# Patient Record
Sex: MALE | Race: WHITE | NOT HISPANIC OR LATINO | Employment: OTHER | URBAN - METROPOLITAN AREA
[De-identification: names, ages, dates, MRNs, and addresses within clinical notes are randomized per-mention and may not be internally consistent; named-entity substitution may affect disease eponyms.]

---

## 2019-10-01 ENCOUNTER — SURGERY - HEALTHEAST (OUTPATIENT)
Dept: SURGERY | Facility: HOSPITAL | Age: 74
End: 2019-10-01

## 2019-10-01 ENCOUNTER — ANESTHESIA - HEALTHEAST (OUTPATIENT)
Dept: SURGERY | Facility: HOSPITAL | Age: 74
End: 2019-10-01

## 2019-10-02 ENCOUNTER — AMBULATORY - HEALTHEAST (OUTPATIENT)
Dept: OTHER | Facility: CLINIC | Age: 74
End: 2019-10-02

## 2021-01-22 ENCOUNTER — AMBULATORY - HEALTHEAST (OUTPATIENT)
Dept: SURGERY | Facility: AMBULATORY SURGERY CENTER | Age: 76
End: 2021-01-22

## 2021-01-22 DIAGNOSIS — Z11.59 ENCOUNTER FOR SCREENING FOR OTHER VIRAL DISEASES: ICD-10-CM

## 2021-01-29 ENCOUNTER — ANESTHESIA - HEALTHEAST (OUTPATIENT)
Dept: SURGERY | Facility: AMBULATORY SURGERY CENTER | Age: 76
End: 2021-01-29

## 2021-01-29 ASSESSMENT — MIFFLIN-ST. JEOR
SCORE: 1830.76
SCORE: 1830.76

## 2021-02-01 ENCOUNTER — SURGERY - HEALTHEAST (OUTPATIENT)
Dept: SURGERY | Facility: AMBULATORY SURGERY CENTER | Age: 76
End: 2021-02-01

## 2021-02-01 ENCOUNTER — HOSPITAL ENCOUNTER (OUTPATIENT)
Dept: SURGERY | Facility: AMBULATORY SURGERY CENTER | Age: 76
Discharge: HOME | End: 2021-02-01
Attending: UROLOGY | Admitting: UROLOGY
Payer: MEDICARE

## 2021-02-01 DIAGNOSIS — C80.1 MALIGNANT NEOPLASM (H): ICD-10-CM

## 2021-02-01 LAB — GLUCOSE BLDC GLUCOMTR-MCNC: 193 MG/DL (ref 70–125)

## 2021-02-01 RX ORDER — OXYCODONE AND ACETAMINOPHEN 5; 325 MG/1; MG/1
1 TABLET ORAL EVERY 6 HOURS PRN
Qty: 14 TABLET | Refills: 0 | Status: SHIPPED | OUTPATIENT
Start: 2021-02-01 | End: 2021-09-02

## 2021-02-01 ASSESSMENT — MIFFLIN-ST. JEOR
SCORE: 1830.76
SCORE: 1830.76

## 2021-02-02 ENCOUNTER — RECORDS - HEALTHEAST (OUTPATIENT)
Dept: ADMINISTRATIVE | Facility: OTHER | Age: 76
End: 2021-02-02

## 2021-02-18 ENCOUNTER — TRANSFERRED RECORDS (OUTPATIENT)
Dept: HEALTH INFORMATION MANAGEMENT | Facility: CLINIC | Age: 76
End: 2021-02-18
Payer: MEDICARE

## 2021-03-17 ASSESSMENT — MIFFLIN-ST. JEOR
SCORE: 1830.76
SCORE: 1830.76

## 2021-03-19 ENCOUNTER — ANESTHESIA - HEALTHEAST (OUTPATIENT)
Dept: SURGERY | Facility: AMBULATORY SURGERY CENTER | Age: 76
End: 2021-03-19

## 2021-03-22 ENCOUNTER — HOSPITAL ENCOUNTER (OUTPATIENT)
Dept: SURGERY | Facility: AMBULATORY SURGERY CENTER | Age: 76
Discharge: HOME | End: 2021-03-22
Attending: UROLOGY | Admitting: UROLOGY
Payer: MEDICARE

## 2021-03-22 ENCOUNTER — SURGERY - HEALTHEAST (OUTPATIENT)
Dept: SURGERY | Facility: AMBULATORY SURGERY CENTER | Age: 76
End: 2021-03-22

## 2021-03-22 DIAGNOSIS — C67.9 MALIGNANT NEOPLASM OF URINARY BLADDER, UNSPECIFIED SITE (H): ICD-10-CM

## 2021-03-22 DIAGNOSIS — C67.9 MALIGNANT NEOPLASM OF URINARY BLADDER (H): ICD-10-CM

## 2021-03-22 LAB
GLUCOSE BLDC GLUCOMTR-MCNC: 157 MG/DL (ref 70–125)
GLUCOSE BLDC GLUCOMTR-MCNC: 170 MG/DL (ref 70–125)

## 2021-03-22 RX ORDER — OXYCODONE HYDROCHLORIDE 5 MG/1
5 TABLET ORAL EVERY 6 HOURS PRN
Qty: 12 TABLET | Refills: 0 | Status: SHIPPED | OUTPATIENT
Start: 2021-03-22 | End: 2021-09-02

## 2021-03-22 ASSESSMENT — MIFFLIN-ST. JEOR
SCORE: 1830.76
SCORE: 1830.76

## 2021-03-23 ENCOUNTER — RECORDS - HEALTHEAST (OUTPATIENT)
Dept: ADMINISTRATIVE | Facility: OTHER | Age: 76
End: 2021-03-23

## 2021-05-25 ENCOUNTER — RECORDS - HEALTHEAST (OUTPATIENT)
Dept: ADMINISTRATIVE | Facility: CLINIC | Age: 76
End: 2021-05-25

## 2021-05-28 ENCOUNTER — RECORDS - HEALTHEAST (OUTPATIENT)
Dept: ADMINISTRATIVE | Facility: CLINIC | Age: 76
End: 2021-05-28

## 2021-06-01 NOTE — ANESTHESIA PREPROCEDURE EVALUATION
Anesthesia Evaluation      No history of anesthetic complications     Airway   Mallampati: II  Neck ROM: full  Comment: Short TM distance     Pulmonary     breath sounds clear to auscultation  (+) a smoker (former smoker)                         Cardiovascular   Exercise tolerance: > or = 4 METS  (+) hypertension, , hypercholesterolemia,     ECG reviewed  Rhythm: regular  Rate: normal,         Neuro/Psych - negative ROS     Endo/Other    (+) diabetes mellitus type 2 well controlled,      GI/Hepatic/Renal    (+)   chronic renal disease (CKD - s/f cysto and bladder biopsy),           Dental    (+) poor dentition                       Anesthesia Plan  Planned anesthetic: general LMA  LMA 4  Decadron 4, Zofran 4  ASA 3   Induction: intravenous   Anesthetic plan and risks discussed with: patient  Anesthesia plan special considerations: antiemetics,   Post-op plan: routine recovery

## 2021-06-01 NOTE — ANESTHESIA POSTPROCEDURE EVALUATION
Patient: Marcelo Valerio  CYSTOSCOPY, WITH BLADDER BIOPSIES and Fulgeration, BILATERAL RETROGRADE PYELOGRAMS  Anesthesia type: general    Patient location: PACU  Last vitals:   Vitals Value Taken Time   /94 10/1/2019  2:30 PM   Temp 36.2  C (97.2  F) 10/1/2019  2:30 PM   Pulse 70 10/1/2019  2:30 PM   Resp 24 10/1/2019  2:30 PM   SpO2 99 % 10/1/2019  2:30 PM     Post vital signs: stable  Level of consciousness: awake and responds to simple questions  Post-anesthesia pain: pain controlled  Post-anesthesia nausea and vomiting: no  Pulmonary: unassisted, return to baseline  Cardiovascular: stable and blood pressure at baseline  Hydration: adequate  Anesthetic events: no    QCDR Measures:  ASA# 11 - Veena-op Cardiac Arrest: ASA11B - Patient did NOT experience unanticipated cardiac arrest  ASA# 12 - Veena-op Mortality Rate: ASA12B - Patient did NOT die  ASA# 13 - PACU Re-Intubation Rate: ASA13B - Patient did NOT require a new airway mgmt  ASA# 10 - Composite Anes Safety: ASA10A - No serious adverse event    Additional Notes:

## 2021-06-01 NOTE — ANESTHESIA CARE TRANSFER NOTE
Last vitals:   Vitals:    10/01/19 1320   BP: 166/72   Pulse: 67   Resp: 16   Temp: 36.3  C (97.4  F)   SpO2: 100%     Patient's level of consciousness is drowsy  Spontaneous respirations: yes  Maintains airway independently: yes  Dentition unchanged: yes  Oropharynx: oropharynx clear of all foreign objects    QCDR Measures:  ASA# 20 - Surgical Safety Checklist: WHO surgical safety checklist completed prior to induction    PQRS# 430 - Adult PONV Prevention: 4558F - Pt received => 2 anti-emetic agents (different classes) preop & intraop  ASA# 8 - Peds PONV Prevention: NA - Not pediatric patient, not GA or 2 or more risk factors NOT present  PQRS# 424 - Veena-op Temp Management: 4559F - At least one body temp DOCUMENTED => 35.5C or 95.9F within required timeframe  PQRS# 426 - PACU Transfer Protocol: - Transfer of care checklist used  ASA# 14 - Acute Post-op Pain: ASA14B - Patient did NOT experience pain >= 7 out of 10

## 2021-06-03 VITALS — HEIGHT: 71 IN | BODY MASS INDEX: 35 KG/M2 | WEIGHT: 250 LBS

## 2021-06-05 VITALS
HEIGHT: 71 IN | BODY MASS INDEX: 33.6 KG/M2 | WEIGHT: 240 LBS | HEIGHT: 71 IN | BODY MASS INDEX: 33.6 KG/M2 | WEIGHT: 240 LBS

## 2021-06-05 VITALS
WEIGHT: 240 LBS | HEIGHT: 71 IN | BODY MASS INDEX: 33.6 KG/M2 | BODY MASS INDEX: 33.6 KG/M2 | WEIGHT: 240 LBS | HEIGHT: 71 IN

## 2021-06-14 NOTE — ANESTHESIA PREPROCEDURE EVALUATION
Anesthesia Evaluation      Patient summary reviewed   History of anesthetic complications     Airway   Mallampati: II  Neck ROM: full   Pulmonary - negative ROS and normal exam    breath sounds clear to auscultation                         Cardiovascular - normal exam  (+) hypertension, , hypercholesterolemia,     Rhythm: regular  Rate: normal,         Neuro/Psych    (+) neuromuscular disease (Peripheral neuropathy),      Endo/Other    (+) diabetes mellitus type 2 using insulin, obesity,      Comments: Psoriasis   Glaucoma  Bladder Cancer    GI/Hepatic/Renal    (+)   chronic renal disease CRI,           Dental - normal exam                        Anesthesia Plan  Planned anesthetic: general LMA    ASA 3   Induction: intravenous   Anesthetic plan and risks discussed with: patient  Anesthesia plan special considerations: antiemetics,   Post-op plan: routine recovery

## 2021-06-14 NOTE — ANESTHESIA POSTPROCEDURE EVALUATION
Patient: Marcelo Valerio  Procedure(s):  CYSTOSCOPY SELECTIVE CYTOLOGIES, BLADDER BIOPSY FULGURATION, MEATAL DILATION  Anesthesia type: general    Patient location: Phase II Recovery  Last vitals:   Vitals Value Taken Time   /75 02/01/21 1021   Temp 36.2  C (97.1  F) 02/01/21 0956   Pulse 66 02/01/21 1036   Resp 16 02/01/21 0956   SpO2 96 % 02/01/21 1036   Vitals shown include unvalidated device data.  Post vital signs: stable  Level of consciousness: awake and responds to simple questions  Post-anesthesia pain: pain controlled  Post-anesthesia nausea and vomiting: no  Pulmonary: unassisted, return to baseline  Cardiovascular: stable and blood pressure at baseline  Hydration: adequate  Anesthetic events: no    QCDR Measures:  ASA# 11 - Veena-op Cardiac Arrest: ASA11B - Patient did NOT experience unanticipated cardiac arrest  ASA# 12 - Veena-op Mortality Rate: ASA12B - Patient did NOT die  ASA# 13 - PACU Re-Intubation Rate: ASA13B - Patient did NOT require a new airway mgmt  ASA# 10 - Composite Anes Safety: ASA10A - No serious adverse event    Additional Notes:

## 2021-06-14 NOTE — OP NOTE
DOS: 2/1/21  Preoperative diagnosis: positive urine cytology, history of bladder cancer  Postoperative diagnosis:same  Surgery: cystoscopy, selective cytologies, bladder biopsies, fulguration, meatal dilation, insertion of ng catheter  Surgeon: Fam Manrique MD  Assistant: none  Specimen: right kidney urine for cytology, left kidney urine for cytology, bladder urine for cytology, bladder biopsies  Ebl: 10ml  Drains: ng  Anesthesia: general  Complications: none    Surgery:  Patient was brought into the operating room and identified as Marcelo Valerio.  After induction of anesthesia he was prepped and draped in typical sterile manner in lithotomy position. A time out was carried out, everyone was in agreement.   I began by attempting to insert a 21 fr cystoscope. There was resistance at meatus.   Using gary sounds he was dilated from 16fr to 24fr.   I then inserted the cystoscope.  The prostate was surgically absent.   There were no tumors.   There were a few areas of erythema.     I collected the urine from the bladder and sent that as bladder cytology.   I then collected urine from the left kidney using an open ended catheter and yip wire. Great care to avoid cross contamination was used.   The same was done on the right side.   I then used cold cup biopsies to biopsy all of the different areas of erythema.   In all 6 biopsies were taken.   I then used a bugbi to fulgurate the biopsy sites. There was great hemostasis.   The bladder was relatively thin and I chose to leave a ng catheter for decompression.     This ended my procedure.     Fam Manrique

## 2021-06-14 NOTE — ANESTHESIA CARE TRANSFER NOTE
Last vitals:   Vitals:    02/01/21 0919   BP: 104/68   Pulse: 65   Resp: 14   Temp: 36.6  C (97.9  F)   SpO2: 98%     Patient spontaneous RR, TV 400s, LMA removed to facemask 10LPM, O2 sats 100%. VSS. Report to RN.    Patient's level of consciousness is drowsy  Spontaneous respirations: yes  Maintains airway independently: yes  Dentition unchanged: yes  Oropharynx: oral airway in place    QCDR Measures:  ASA# 20 - Surgical Safety Checklist: WHO surgical safety checklist completed prior to induction    PQRS# 430 - Adult PONV Prevention: 4558F - Pt received => 2 anti-emetic agents (different classes) preop & intraop  ASA# 8 - Peds PONV Prevention: NA - Not pediatric patient, not GA or 2 or more risk factors NOT present  PQRS# 424 - Veena-op Temp Management: 4559F - At least one body temp DOCUMENTED => 35.5C or 95.9F within required timeframe  PQRS# 426 - PACU Transfer Protocol: - Transfer of care checklist used  ASA# 14 - Acute Post-op Pain: ASA14B - Patient did NOT experience pain >= 7 out of 10

## 2021-06-16 NOTE — ANESTHESIA POSTPROCEDURE EVALUATION
Patient: Marcelo Valerio  Procedure(s):  CYSTOSCOPY, RIGHT RETROGRADE PYELOGRAM, RIGHT URETEROSCOPY, URETERAL BIOPSY, RIGHT URETERAL STENT PLACEMENT (Right)  Anesthesia type: general    Patient location: Phase II Recovery  Last vitals:   Vitals Value Taken Time   /88 03/22/21 0900   Temp 36.3  C (97.4  F) 03/22/21 0829   Pulse 69 03/22/21 0916   Resp 16 03/22/21 0857   SpO2 98 % 03/22/21 0916   Vitals shown include unvalidated device data.  Post vital signs: stable  Level of consciousness: awake and responds to simple questions  Post-anesthesia pain: pain controlled  Post-anesthesia nausea and vomiting: no  Pulmonary: unassisted, return to baseline  Cardiovascular: stable and blood pressure at baseline  Hydration: adequate  Anesthetic events: no    QCDR Measures:  ASA# 11 - Veena-op Cardiac Arrest: ASA11B - Patient did NOT experience unanticipated cardiac arrest  ASA# 12 - Veena-op Mortality Rate: ASA12B - Patient did NOT die  ASA# 13 - PACU Re-Intubation Rate: ASA13B - Patient did NOT require a new airway mgmt  ASA# 10 - Composite Anes Safety: ASA10A - No serious adverse event    Additional Notes:

## 2021-06-16 NOTE — ANESTHESIA CARE TRANSFER NOTE
Last vitals:   Vitals:    03/22/21 0757   BP: 115/76   Pulse: 80   Resp: 16   Temp: 36.8  C (98.2  F)   SpO2: 99%     Patient's level of consciousness is drowsy  Spontaneous respirations: yes  Maintains airway independently: yes  Dentition unchanged: yes  Oropharynx: oropharynx clear of all foreign objects    QCDR Measures:  ASA# 20 - Surgical Safety Checklist: WHO surgical safety checklist completed prior to induction    PQRS# 430 - Adult PONV Prevention: 4558F - Pt received => 2 anti-emetic agents (different classes) preop & intraop  ASA# 8 - Peds PONV Prevention: NA - Not pediatric patient, not GA or 2 or more risk factors NOT present  PQRS# 424 - Veena-op Temp Management: 4559F - At least one body temp DOCUMENTED => 35.5C or 95.9F within required timeframe  PQRS# 426 - PACU Transfer Protocol: - Transfer of care checklist used  ASA# 14 - Acute Post-op Pain: ASA14B - Patient did NOT experience pain >= 7 out of 10

## 2021-06-16 NOTE — ANESTHESIA PREPROCEDURE EVALUATION
Anesthesia Evaluation      Patient summary reviewed   History of anesthetic complications     Airway   Mallampati: II   Pulmonary - normal exam   (+) a smoker (quit 1.5 years ago)                         Cardiovascular - normal exam  Exercise tolerance: > or = 4 METS  (+) hypertension, , hypercholesterolemia,     ECG reviewed (NSR with PVD min V crieria for LVH)  Rhythm: regular  Rate: normal,         Neuro/Psych    (+) neuromuscular disease (Peripheral neuropathy),      Endo/Other    (+) diabetes mellitus type 2 using insulin, obesity,      Comments: Psoriasis   Glaucoma  Bladder Cancer    GI/Hepatic/Renal    (+)   chronic renal disease,           Dental - normal exam                          Anesthesia Plan  Planned anesthetic: general LMA    ASA 2   Induction: intravenous   Anesthetic plan and risks discussed with: patient  Anesthesia plan special considerations: antiemetics,   Post-op plan: routine recovery

## 2021-06-16 NOTE — OP NOTE
DOS: 3/22/21  Preoperative diagnosis: positive cytology from right ureter  Postoperative diagnosis:same  Surgery: cystoscopy, right retrograde pyelogram, right ureteroscopy, ureteral biopsy, right ureteral stent placement  Surgeon: Fam Manrique MD  Assistant: none  Specimen: right ureteral brush biopsy, right renal pelvis urine for cytology  Ebl: 10ml  Drains: none  Anesthesia: general  Complications: none    Surgery:   Patient was brought into the operating room and identified as Marcelo Valerio.  After induction of anesthesia he was prepped and draped in typical sterile manner   A time out was carried out, everyone was in agreement.   I began by inserting a rigid cystoscope, the prostate was surgically absent.   I examined the bladder. There was patchy erythema consistent with known carcinoma in situ.   I then shot a right retrograde pyelogram, there were no filling defects, no hydronephrosis.   I then placed a yip wire to the level of the pelvis.   I dilated with an 8/10 coaxial dilator.   Second yip wire was placed  Over the second yip wire I advanced a flexible ureteroscope.   The ureteropelvic junction was a little tight and there was some stagnant urine in the pelvis.   The renal pelvis and calyces were examined. There were no tumors.   I did notice some erythematous mucosa consistent with either malignancy or inflamation   The urine from the renal pelvis was sent for cytology .     I then made my way down to the bladder.   The ureter was inspected.   There were no tumors  There was a location of friable mucosa  Using a brush biopsy this was sampled and sent as a separate specimen.   At the end of the procedure I shot one last retrograde pyelogram  I placed a 6fr 26cm stent with a good coil in the renal pelvis and in the bladder. This was done under combination of fluorscopic and visual guidance.   I left the stent on a string.   The stent was taped to the penis   This completed my procedure.      Fam Manrique

## 2021-07-04 NOTE — ADDENDUM NOTE
Addendum Note by Janette Carlson LPN at 1/22/2021  8:35 AM     Author: Janette Carlson LPN Service: -- Author Type: Licensed Nurse    Filed: 2/26/2021  3:26 PM Encounter Date: 1/22/2021 Status: Signed    : Janette Carlson LPN (Licensed Nurse)    Addended by: KENDRICK CARLSON on: 2/26/2021 03:26 PM        Modules accepted: Orders

## 2021-08-24 DIAGNOSIS — Z11.59 ENCOUNTER FOR SCREENING FOR OTHER VIRAL DISEASES: ICD-10-CM

## 2021-09-01 ENCOUNTER — TRANSFERRED RECORDS (OUTPATIENT)
Dept: HEALTH INFORMATION MANAGEMENT | Facility: CLINIC | Age: 76
End: 2021-09-01

## 2021-09-01 LAB
CREATININE (EXTERNAL): 1.7 MG/DL (ref 0.6–1.2)
GFR ESTIMATED (EXTERNAL): 39 ML/MIN/{1.73M2}
GLUCOSE (EXTERNAL): 278 MG/DL (ref 70–99)
POTASSIUM (EXTERNAL): 5 MEQ/L (ref 3.4–5.1)

## 2021-09-04 ENCOUNTER — LAB (OUTPATIENT)
Dept: FAMILY MEDICINE | Facility: CLINIC | Age: 76
End: 2021-09-04
Payer: COMMERCIAL

## 2021-09-04 DIAGNOSIS — Z11.59 ENCOUNTER FOR SCREENING FOR OTHER VIRAL DISEASES: ICD-10-CM

## 2021-09-04 LAB — SARS-COV-2 RNA RESP QL NAA+PROBE: NEGATIVE

## 2021-09-04 PROCEDURE — U0003 INFECTIOUS AGENT DETECTION BY NUCLEIC ACID (DNA OR RNA); SEVERE ACUTE RESPIRATORY SYNDROME CORONAVIRUS 2 (SARS-COV-2) (CORONAVIRUS DISEASE [COVID-19]), AMPLIFIED PROBE TECHNIQUE, MAKING USE OF HIGH THROUGHPUT TECHNOLOGIES AS DESCRIBED BY CMS-2020-01-R: HCPCS

## 2021-09-04 PROCEDURE — U0005 INFEC AGEN DETEC AMPLI PROBE: HCPCS

## 2021-09-07 ENCOUNTER — ANESTHESIA EVENT (OUTPATIENT)
Dept: SURGERY | Facility: AMBULATORY SURGERY CENTER | Age: 76
End: 2021-09-07
Payer: COMMERCIAL

## 2021-09-08 ENCOUNTER — HOSPITAL ENCOUNTER (OUTPATIENT)
Facility: AMBULATORY SURGERY CENTER | Age: 76
End: 2021-09-08
Attending: UROLOGY
Payer: MEDICARE

## 2021-09-08 ENCOUNTER — ANESTHESIA (OUTPATIENT)
Dept: SURGERY | Facility: AMBULATORY SURGERY CENTER | Age: 76
End: 2021-09-08
Payer: COMMERCIAL

## 2021-09-08 VITALS
RESPIRATION RATE: 16 BRPM | SYSTOLIC BLOOD PRESSURE: 135 MMHG | OXYGEN SATURATION: 96 % | HEART RATE: 70 BPM | DIASTOLIC BLOOD PRESSURE: 66 MMHG | TEMPERATURE: 98 F

## 2021-09-08 DIAGNOSIS — C67.9 MALIGNANT NEOPLASM OF URINARY BLADDER, UNSPECIFIED SITE (H): Primary | ICD-10-CM

## 2021-09-08 DIAGNOSIS — Z80.52 FAMILY HISTORY OF MALIGNANT NEOPLASM OF BLADDER: ICD-10-CM

## 2021-09-08 LAB
GLUCOSE POCT: 164 MG/DL (ref 70–99)
POC GLU MONITORING DEVICE - HISTORICAL: 173

## 2021-09-08 DEVICE — URETERAL STENT
Type: IMPLANTABLE DEVICE | Site: KIDNEY | Status: FUNCTIONAL
Brand: PERCUFLEX™ PLUS

## 2021-09-08 RX ORDER — LIDOCAINE HYDROCHLORIDE 20 MG/ML
INJECTION, SOLUTION INFILTRATION; PERINEURAL PRN
Status: DISCONTINUED | OUTPATIENT
Start: 2021-09-08 | End: 2021-09-08

## 2021-09-08 RX ORDER — OXYCODONE HYDROCHLORIDE 5 MG/1
5 TABLET ORAL EVERY 4 HOURS PRN
Status: DISCONTINUED | OUTPATIENT
Start: 2021-09-08 | End: 2021-09-09 | Stop reason: HOSPADM

## 2021-09-08 RX ORDER — CEFAZOLIN SODIUM 2 G/100ML
2 INJECTION, SOLUTION INTRAVENOUS
Status: COMPLETED | OUTPATIENT
Start: 2021-09-08 | End: 2021-09-08

## 2021-09-08 RX ORDER — PROPOFOL 10 MG/ML
INJECTION, EMULSION INTRAVENOUS PRN
Status: DISCONTINUED | OUTPATIENT
Start: 2021-09-08 | End: 2021-09-08

## 2021-09-08 RX ORDER — ONDANSETRON 2 MG/ML
INJECTION INTRAMUSCULAR; INTRAVENOUS PRN
Status: DISCONTINUED | OUTPATIENT
Start: 2021-09-08 | End: 2021-09-08

## 2021-09-08 RX ORDER — ACETAMINOPHEN 325 MG/1
650 TABLET ORAL
Status: DISCONTINUED | OUTPATIENT
Start: 2021-09-08 | End: 2021-09-09 | Stop reason: HOSPADM

## 2021-09-08 RX ORDER — ONDANSETRON 2 MG/ML
4 INJECTION INTRAMUSCULAR; INTRAVENOUS EVERY 30 MIN PRN
Status: DISCONTINUED | OUTPATIENT
Start: 2021-09-08 | End: 2021-09-09 | Stop reason: HOSPADM

## 2021-09-08 RX ORDER — MEPERIDINE HYDROCHLORIDE 25 MG/ML
12.5 INJECTION INTRAMUSCULAR; INTRAVENOUS; SUBCUTANEOUS
Status: DISCONTINUED | OUTPATIENT
Start: 2021-09-08 | End: 2021-09-09 | Stop reason: HOSPADM

## 2021-09-08 RX ORDER — HYDROCODONE BITARTRATE AND ACETAMINOPHEN 7.5; 325 MG/15ML; MG/15ML
10 SOLUTION ORAL
Status: DISCONTINUED | OUTPATIENT
Start: 2021-09-08 | End: 2021-09-09 | Stop reason: HOSPADM

## 2021-09-08 RX ORDER — CEFAZOLIN SODIUM 2 G/100ML
2 INJECTION, SOLUTION INTRAVENOUS SEE ADMIN INSTRUCTIONS
Status: DISCONTINUED | OUTPATIENT
Start: 2021-09-08 | End: 2021-09-09 | Stop reason: HOSPADM

## 2021-09-08 RX ORDER — DEXAMETHASONE SODIUM PHOSPHATE 4 MG/ML
INJECTION, SOLUTION INTRA-ARTICULAR; INTRALESIONAL; INTRAMUSCULAR; INTRAVENOUS; SOFT TISSUE PRN
Status: DISCONTINUED | OUTPATIENT
Start: 2021-09-08 | End: 2021-09-08

## 2021-09-08 RX ORDER — OXYCODONE HYDROCHLORIDE 5 MG/1
5-10 TABLET ORAL EVERY 4 HOURS PRN
Qty: 6 TABLET | Refills: 0 | Status: ON HOLD | OUTPATIENT
Start: 2021-09-08 | End: 2021-12-06

## 2021-09-08 RX ORDER — ONDANSETRON 4 MG/1
4 TABLET, ORALLY DISINTEGRATING ORAL EVERY 30 MIN PRN
Status: DISCONTINUED | OUTPATIENT
Start: 2021-09-08 | End: 2021-09-09 | Stop reason: HOSPADM

## 2021-09-08 RX ORDER — SODIUM CHLORIDE, SODIUM LACTATE, POTASSIUM CHLORIDE, CALCIUM CHLORIDE 600; 310; 30; 20 MG/100ML; MG/100ML; MG/100ML; MG/100ML
INJECTION, SOLUTION INTRAVENOUS CONTINUOUS
Status: DISCONTINUED | OUTPATIENT
Start: 2021-09-08 | End: 2021-09-09 | Stop reason: HOSPADM

## 2021-09-08 RX ORDER — EPHEDRINE SULFATE 50 MG/ML
INJECTION, SOLUTION INTRAMUSCULAR; INTRAVENOUS; SUBCUTANEOUS PRN
Status: DISCONTINUED | OUTPATIENT
Start: 2021-09-08 | End: 2021-09-08

## 2021-09-08 RX ORDER — ACETAMINOPHEN 325 MG/1
975 TABLET ORAL ONCE
Status: COMPLETED | OUTPATIENT
Start: 2021-09-08 | End: 2021-09-08

## 2021-09-08 RX ORDER — FENTANYL CITRATE 50 UG/ML
INJECTION, SOLUTION INTRAMUSCULAR; INTRAVENOUS PRN
Status: DISCONTINUED | OUTPATIENT
Start: 2021-09-08 | End: 2021-09-08

## 2021-09-08 RX ORDER — FENTANYL CITRATE 50 UG/ML
25 INJECTION, SOLUTION INTRAMUSCULAR; INTRAVENOUS EVERY 5 MIN PRN
Status: DISCONTINUED | OUTPATIENT
Start: 2021-09-08 | End: 2021-09-09 | Stop reason: HOSPADM

## 2021-09-08 RX ORDER — LIDOCAINE 40 MG/G
CREAM TOPICAL
Status: DISCONTINUED | OUTPATIENT
Start: 2021-09-08 | End: 2021-09-09 | Stop reason: HOSPADM

## 2021-09-08 RX ADMIN — Medication 100 MCG: at 07:33

## 2021-09-08 RX ADMIN — LIDOCAINE HYDROCHLORIDE 60 MG: 20 INJECTION, SOLUTION INFILTRATION; PERINEURAL at 07:14

## 2021-09-08 RX ADMIN — SODIUM CHLORIDE, SODIUM LACTATE, POTASSIUM CHLORIDE, CALCIUM CHLORIDE: 600; 310; 30; 20 INJECTION, SOLUTION INTRAVENOUS at 06:50

## 2021-09-08 RX ADMIN — EPHEDRINE SULFATE 5 MG: 50 INJECTION, SOLUTION INTRAMUSCULAR; INTRAVENOUS; SUBCUTANEOUS at 07:33

## 2021-09-08 RX ADMIN — ACETAMINOPHEN 975 MG: 325 TABLET ORAL at 06:51

## 2021-09-08 RX ADMIN — DEXAMETHASONE SODIUM PHOSPHATE 4 MG: 4 INJECTION, SOLUTION INTRA-ARTICULAR; INTRALESIONAL; INTRAMUSCULAR; INTRAVENOUS; SOFT TISSUE at 07:14

## 2021-09-08 RX ADMIN — Medication 100 MCG: at 07:36

## 2021-09-08 RX ADMIN — Medication 100 MCG: at 07:23

## 2021-09-08 RX ADMIN — FENTANYL CITRATE 50 MCG: 50 INJECTION, SOLUTION INTRAMUSCULAR; INTRAVENOUS at 07:14

## 2021-09-08 RX ADMIN — CEFAZOLIN SODIUM 2 G: 2 INJECTION, SOLUTION INTRAVENOUS at 07:20

## 2021-09-08 RX ADMIN — ONDANSETRON 4 MG: 2 INJECTION INTRAMUSCULAR; INTRAVENOUS at 07:14

## 2021-09-08 RX ADMIN — PROPOFOL 120 MG: 10 INJECTION, EMULSION INTRAVENOUS at 07:14

## 2021-09-08 RX ADMIN — EPHEDRINE SULFATE 5 MG: 50 INJECTION, SOLUTION INTRAMUSCULAR; INTRAVENOUS; SUBCUTANEOUS at 07:42

## 2021-09-08 RX ADMIN — Medication 100 MCG: at 07:42

## 2021-09-08 RX ADMIN — Medication 100 MCG: at 07:29

## 2021-09-08 RX ADMIN — PROPOFOL 50 MG: 10 INJECTION, EMULSION INTRAVENOUS at 08:02

## 2021-09-08 RX ADMIN — EPHEDRINE SULFATE 5 MG: 50 INJECTION, SOLUTION INTRAMUSCULAR; INTRAVENOUS; SUBCUTANEOUS at 07:32

## 2021-09-08 RX ADMIN — FENTANYL CITRATE 50 MCG: 50 INJECTION, SOLUTION INTRAMUSCULAR; INTRAVENOUS at 07:29

## 2021-09-08 RX ADMIN — Medication 100 MCG: at 07:48

## 2021-09-08 ASSESSMENT — ENCOUNTER SYMPTOMS: SEIZURES: 0

## 2021-09-08 ASSESSMENT — LIFESTYLE VARIABLES: TOBACCO_USE: 1

## 2021-09-08 NOTE — ANESTHESIA CARE TRANSFER NOTE
Patient: Marcelo Valerio    Procedure(s):  CYSTOSCOPY, SELECTIVE CYTOLOGIES, BLADDER BIOPSIES AND FULGURATION, BILATERAL RETROGRADE PYELOGRAMS, RIGHT URETEROSCOPY , RIGHT URERETAL STENT PLACEMENT    Diagnosis: Family history of malignant neoplasm of bladder [Z80.52]  Diagnosis Additional Information: No value filed.    Anesthesia Type:   General     Note:    Oropharynx: oropharynx clear of all foreign objects  Level of Consciousness: drowsy  Oxygen Supplementation: face mask  Level of Supplemental Oxygen (L/min / FiO2): 6  Independent Airway: airway patency satisfactory and stable  Dentition: dentition unchanged  Vital Signs Stable: post-procedure vital signs reviewed and stable  Report to RN Given: handoff report given  Patient transferred to: PACU    Handoff Report: Identifed the Patient, Identified the Reponsible Provider, Reviewed the pertinent medical history, Discussed the surgical course, Reviewed Intra-OP anesthesia mangement and issues during anesthesia, Set expectations for post-procedure period and Allowed opportunity for questions and acknowledgement of understanding      Vitals:  Vitals Value Taken Time   /76 09/08/21 0815   Temp 98.3  F (36.8  C) 09/08/21 0815   Pulse 72 09/08/21 0815   Resp 16 09/08/21 0815   SpO2 98 % 09/08/21 0815       Electronically Signed By: YANCY Dumont CRNA  September 8, 2021  8:18 AM

## 2021-09-08 NOTE — ANESTHESIA PREPROCEDURE EVALUATION
Anesthesia Pre-Procedure Evaluation    Patient: Marcelo Valerio   MRN: [id number] : 1945        Preoperative Diagnosis: Family history of malignant neoplasm of bladder [Z80.52]   Procedure : Procedure(s):  CYSTOSCOPY, SELECTIVE CYTOLOGIES, BLADDER BIOPSIES AND FULGURATION, BILATERAL RETROGRADE PYELOGRAMS, RIGHT URETEROSCOPY , POSSIBLE URETERAL BIOPSY, RIGHT URERETAL STENT PLACEMENT, POSSIBLE LEFT URETEROSCOPY     Past Medical History:   Diagnosis Date     Basal cell carcinoma      Cancer (H)     Prostate     Chronic kidney disease      DM2 (diabetes mellitus, type 2) (H)      Glaucoma      History of gout      HTN (hypertension)      Hyperlipidemia      Metabolic syndrome      Obesity      Osteoarthritis of knee      Psoriasis       Past Surgical History:   Procedure Laterality Date     APPENDECTOMY       ARTHROPLASTY REVISION HIP Left      BASAL CELL CARCINOMA EXCISION      back     CYSTOSCOPY, TRANSURETHRAL RESECTION (TUR) TUMOR BLADDER, COMBINED N/A 2021    Procedure: CYSTOSCOPY SELECTIVE CYTOLOGIES, BLADDER BIOPSY FULGURATION, MEATAL DILATION;  Surgeon: Fam Manrique MD;  Location: [facility];  Service: Urology     INGUINAL HERNIA REPAIR Bilateral      JOINT REPLACEMENT Bilateral     bilateral CARLI, left TKA     WI CYSTOSCOPY,REMV CALCULUS,SIMPLE Right 3/22/2021    Procedure: CYSTOSCOPY, RIGHT RETROGRADE PYELOGRAM, RIGHT URETEROSCOPY, URETERAL BIOPSY, RIGHT URETERAL STENT PLACEMENT;  Surgeon: Fam Manrique MD;  Location: [facility];  Service: Urology     WI CYSTOURETHROSCOPY,BIOPSY N/A 10/1/2019    Procedure: CYSTOSCOPY, WITH BLADDER BIOPSIES and Fulgeration;  Surgeon: Jose Mucria MD;  Location: [facility] [place];  Service: Urology     WI CYSTOURETHROSCOPY,URETER CATHETER Bilateral 10/1/2019    Procedure: BILATERAL RETROGRADE PYELOGRAMS;  Surgeon: Jose Murcia MD;  Location: [facility] [place];  Service: Urology     PROSTATECTOMY      Radical suprapubic      RELEASE CARPAL TUNNEL       TONSILLECTOMY       TOTAL KNEE ARTHROPLASTY       VASECTOMY        Allergies   Allergen Reactions     Sulfa (Sulfonamide Antibiotics) [Sulfa Drugs] Unknown     Zinc Acetate Itching      Social History     Tobacco Use     Smoking status: Former Smoker     Packs/day: 0.00     Quit date: 8/1/2018     Years since quitting: 3.1     Smokeless tobacco: Never Used     Tobacco comment: Cigars and Pipes   Substance Use Topics     Alcohol use: Yes     Comment: Alcoholic Drinks/day: occassional       Wt Readings from Last 1 Encounters:   03/22/21 108.9 kg (240 lb)        Anesthesia Evaluation            ROS/MED HX  ENT/Pulmonary: Comment: Glaucoma    (+) JERALD risk factors, tobacco use, Past use,  (-) sleep apnea   Neurologic:     (+) peripheral neuropathy, - peripheral.  (-) no seizures, no CVA and no TIA   Cardiovascular:     (+) Dyslipidemia hypertension-----    METS/Exercise Tolerance: >4 METS    Hematologic:       Musculoskeletal: Comment: psoriasis      GI/Hepatic:    (-) GERD   Renal/Genitourinary:     (+) renal disease,     Endo:     (+) type II DM, Obesity (BMI 33),  (-) thyroid disease   Psychiatric/Substance Use:       Infectious Disease:       Malignancy:   (+) Malignancy, History of Other.Other CA Bladder Active status post.    Other:            Physical Exam    Airway        Mallampati: III   TM distance: > 3 FB   Neck ROM: full   Mouth opening: > 3 cm    Respiratory Devices and Support         Dental     Comment: Fair dentition. No loose or removable teeth.         Cardiovascular          Rhythm and rate: regular and normal     Pulmonary           breath sounds clear to auscultation       Other findings:   COVID negative 9/4    Labs 9/1  Na 138  K 5  BUN/Cr 34/1.7    OUTSIDE LABS:  CBC: No results found for: WBC, HGB, HCT, PLT  BMP:   Lab Results   Component Value Date     (A) 03/22/2021     (A) 03/22/2021     COAGS: No results found for: PTT, INR, FIBR  POC: No results  found for: BGM, HCG, HCGS  HEPATIC: No results found for: ALBUMIN, PROTTOTAL, ALT, AST, GGT, ALKPHOS, BILITOTAL, BILIDIRECT, BRAYAN  OTHER: No results found for: PH, LACT, A1C, JERI, PHOS, MAG, LIPASE, AMYLASE, TSH, T4, T3, CRP, SED    Anesthesia Plan    ASA Status:  3   NPO Status:  NPO Appropriate    Anesthesia Type: General.     - Airway: LMA              Consents    Anesthesia Plan(s) and associated risks, benefits, and realistic alternatives discussed. Questions answered and patient/representative(s) expressed understanding.     - Discussed with:  Patient         Postoperative Care    Pain management: IV analgesics, Multi-modal analgesia (ketamine 25 mg on induction).   PONV prophylaxis: Ondansetron (or other 5HT-3), Dexamethasone or Solumedrol     Comments:                Fiorella Haddad MD

## 2021-09-08 NOTE — OP NOTE
DOS: 9/8/21  Preoperative diagnosis: history of bladder cancer  Postoperative diagnosis:same  Surgery: cystoscopy, selective cytologies, bilateral retrograde pyelogram, right ureteral stent placement, right ureteroscopy, bladder biopsy and fulguration   Surgeon: Fam Manrique MD  Assistant: none  Specimen: bladder urine cytology, left and right kidney urine cytology, bladder biopsy area of erythema, bladder biopsy random  Ebl: 10  Drains: none  Anesthesia: general  Complications: none    Surgery:   Patient was brought into the operating room and identified as cesilia danielson.   After induction of anesthesia he was prepped and draped in typical sterile manner in lithotomy position.    a time out was carried out.   I began by inserting a rigid cystoscope.   The prostate was surgically absent.   The bladder was inspected.  The urine from bladder was collected and sent for cytology  I then placed an open ended catheter into the left collecting system and collected urine for cytology. Same was done on the right.   I then shot bilateral retrograde pyelograms, both sides did not have filling defects or hydronephrosis.   I then placed a yip wire to the right collecting system. Dilated the distal ureter with an 8/10 dilator. Placed a second yip wire and advanced a ureteroscope.   I was able to get to the mid ureter but due to a narrowing was not able to advance the scope further. I then placed a 6fr 28cm stent with a good coil in the kidney and in the bladder.     I then took cup biopsy of the area of erythema on the back wall.  Also took random bladder biopsies.  The biopsy sites were fulgurated and there was great hemostasis.   This completed my procedure.     Fam Manrique MD

## 2021-09-08 NOTE — DISCHARGE INSTRUCTIONS
You received 975 mg of acetaminophen (Tylenol) at 6:51 AM. Do not exceed 4,000 mg of acetaminophen during a 24 hour period and keep in mind that acetaminophen can also be found in many over-the-counter cold medications as well as narcotics that may be given for pain.

## 2021-09-08 NOTE — ANESTHESIA POSTPROCEDURE EVALUATION
Patient: Marcelo Valerio    Procedure(s):  CYSTOSCOPY, SELECTIVE CYTOLOGIES, BLADDER BIOPSIES AND FULGURATION, BILATERAL RETROGRADE PYELOGRAMS, RIGHT URETEROSCOPY , RIGHT URERETAL STENT PLACEMENT    Diagnosis:Family history of malignant neoplasm of bladder [Z80.52]  Diagnosis Additional Information: No value filed.    Anesthesia Type:  General    Note:  Disposition: Outpatient   Postop Pain Control: Uneventful            Sign Out: Well controlled pain   PONV: No   Neuro/Psych: Uneventful            Sign Out: Acceptable/Baseline neuro status   Airway/Respiratory: Uneventful            Sign Out: Acceptable/Baseline resp. status   CV/Hemodynamics: Uneventful            Sign Out: Acceptable CV status; No obvious hypovolemia; No obvious fluid overload   Other NRE: NONE   DID A NON-ROUTINE EVENT OCCUR? No           Last vitals:  Vitals Value Taken Time   /80 09/08/21 0820   Temp 98.3  F (36.8  C) 09/08/21 0815   Pulse 71 09/08/21 0827   Resp 16 09/08/21 0815   SpO2 92 % 09/08/21 0827   Vitals shown include unvalidated device data.    Electronically Signed By: Fiorella Haddad MD  September 8, 2021  10:04 AM

## 2021-09-08 NOTE — ANESTHESIA PROCEDURE NOTES
Airway       Patient location during procedure: OR  Staff -        CRNA: Haydee Flores APRN CRNA       Performed By: CRNAIndications and Patient Condition       Indications for airway management: ranjit-procedural       Induction type:intravenous       Mask difficulty assessment: 1 - vent by mask    Final Airway Details       Final airway type: supraglottic airway    Supraglottic Airway Details        Type: LMA       LMA size: 5    Post intubation assessment        Placement verified by: capnometry, equal breath sounds and chest rise        Number of attempts at approach: 1       Secured with: silk tape       Ease of procedure: easy       Dentition: Intact and Unchanged

## 2021-11-03 DIAGNOSIS — Z11.59 ENCOUNTER FOR SCREENING FOR OTHER VIRAL DISEASES: ICD-10-CM

## 2021-11-08 NOTE — OR NURSING
Pt called with questions, uncertain of what he needs to do before his surgery on 12/06/2021. Advised pre op with PMD not later than 30 days before procedure and they may order labs.  He asked about site markings. Advised pt to call Dr. Whitman office to find out where and when this will be done.  Pt was agreeable and will call Dr. Manrique's office

## 2021-11-15 ENCOUNTER — TRANSFERRED RECORDS (OUTPATIENT)
Dept: MULTI SPECIALTY CLINIC | Facility: CLINIC | Age: 76
End: 2021-11-15
Payer: COMMERCIAL

## 2021-11-15 LAB
CREATININE (EXTERNAL): 2.8 MG/DL (ref 0.6–1.2)
GFR ESTIMATED (EXTERNAL): 22 ML/MIN/1.73M2
GLUCOSE (EXTERNAL): 187 MG/DL (ref 70–99)
POTASSIUM (EXTERNAL): 4.9 MEQ/L (ref 3.4–5.1)

## 2021-11-17 ENCOUNTER — TRANSFERRED RECORDS (OUTPATIENT)
Dept: HEALTH INFORMATION MANAGEMENT | Facility: CLINIC | Age: 76
End: 2021-11-17
Payer: MEDICARE

## 2021-11-19 ENCOUNTER — LAB (OUTPATIENT)
Dept: LAB | Facility: CLINIC | Age: 76
End: 2021-11-19
Attending: UROLOGY
Payer: COMMERCIAL

## 2021-11-19 DIAGNOSIS — Z11.59 ENCOUNTER FOR SCREENING FOR OTHER VIRAL DISEASES: ICD-10-CM

## 2021-11-19 PROCEDURE — U0005 INFEC AGEN DETEC AMPLI PROBE: HCPCS

## 2021-11-19 PROCEDURE — U0003 INFECTIOUS AGENT DETECTION BY NUCLEIC ACID (DNA OR RNA); SEVERE ACUTE RESPIRATORY SYNDROME CORONAVIRUS 2 (SARS-COV-2) (CORONAVIRUS DISEASE [COVID-19]), AMPLIFIED PROBE TECHNIQUE, MAKING USE OF HIGH THROUGHPUT TECHNOLOGIES AS DESCRIBED BY CMS-2020-01-R: HCPCS

## 2021-11-20 LAB — SARS-COV-2 RNA RESP QL NAA+PROBE: NEGATIVE

## 2021-11-22 ENCOUNTER — APPOINTMENT (OUTPATIENT)
Dept: RADIOLOGY | Facility: HOSPITAL | Age: 76
End: 2021-11-22
Attending: UROLOGY
Payer: MEDICARE

## 2021-11-22 ENCOUNTER — HOSPITAL ENCOUNTER (OUTPATIENT)
Facility: HOSPITAL | Age: 76
Discharge: HOME | End: 2021-11-22
Attending: UROLOGY | Admitting: UROLOGY
Payer: MEDICARE

## 2021-11-22 ENCOUNTER — ANESTHESIA EVENT (OUTPATIENT)
Dept: SURGERY | Facility: HOSPITAL | Age: 76
End: 2021-11-22
Payer: MEDICARE

## 2021-11-22 ENCOUNTER — ANESTHESIA (OUTPATIENT)
Dept: SURGERY | Facility: HOSPITAL | Age: 76
End: 2021-11-22
Payer: MEDICARE

## 2021-11-22 VITALS
DIASTOLIC BLOOD PRESSURE: 76 MMHG | SYSTOLIC BLOOD PRESSURE: 162 MMHG | TEMPERATURE: 97 F | WEIGHT: 232.9 LBS | HEART RATE: 69 BPM | OXYGEN SATURATION: 95 % | RESPIRATION RATE: 18 BRPM | BODY MASS INDEX: 32.48 KG/M2

## 2021-11-22 DIAGNOSIS — Q62.11 HYDRONEPHROSIS WITH URETEROPELVIC JUNCTION (UPJ) OBSTRUCTION: Primary | ICD-10-CM

## 2021-11-22 LAB
GLUCOSE BLDC GLUCOMTR-MCNC: 143 MG/DL (ref 70–99)
GLUCOSE BLDC GLUCOMTR-MCNC: 191 MG/DL (ref 70–99)

## 2021-11-22 PROCEDURE — 272N000001 HC OR GENERAL SUPPLY STERILE: Performed by: UROLOGY

## 2021-11-22 PROCEDURE — 250N000009 HC RX 250: Performed by: NURSE ANESTHETIST, CERTIFIED REGISTERED

## 2021-11-22 PROCEDURE — 999N000180 XR SURGERY CARM FLUORO LESS THAN 5 MIN

## 2021-11-22 PROCEDURE — 710N000012 HC RECOVERY PHASE 2, PER MINUTE: Performed by: UROLOGY

## 2021-11-22 PROCEDURE — C1769 GUIDE WIRE: HCPCS | Performed by: UROLOGY

## 2021-11-22 PROCEDURE — C2617 STENT, NON-COR, TEM W/O DEL: HCPCS | Performed by: UROLOGY

## 2021-11-22 PROCEDURE — 370N000017 HC ANESTHESIA TECHNICAL FEE, PER MIN: Performed by: UROLOGY

## 2021-11-22 PROCEDURE — 250N000011 HC RX IP 250 OP 636: Performed by: NURSE ANESTHETIST, CERTIFIED REGISTERED

## 2021-11-22 PROCEDURE — 258N000003 HC RX IP 258 OP 636: Performed by: NURSE ANESTHETIST, CERTIFIED REGISTERED

## 2021-11-22 PROCEDURE — 999N000141 HC STATISTIC PRE-PROCEDURE NURSING ASSESSMENT: Performed by: UROLOGY

## 2021-11-22 PROCEDURE — 82962 GLUCOSE BLOOD TEST: CPT

## 2021-11-22 PROCEDURE — 258N000003 HC RX IP 258 OP 636: Performed by: ANESTHESIOLOGY

## 2021-11-22 PROCEDURE — 360N000082 HC SURGERY LEVEL 2 W/ FLUORO, PER MIN: Performed by: UROLOGY

## 2021-11-22 PROCEDURE — 255N000002 HC RX 255 OP 636: Performed by: UROLOGY

## 2021-11-22 DEVICE — URETERAL STENT
Type: IMPLANTABLE DEVICE | Site: URETER | Status: FUNCTIONAL
Brand: PERCUFLEX™ PLUS

## 2021-11-22 RX ORDER — CEFAZOLIN SODIUM 1 G/3ML
INJECTION, POWDER, FOR SOLUTION INTRAMUSCULAR; INTRAVENOUS PRN
Status: DISCONTINUED | OUTPATIENT
Start: 2021-11-22 | End: 2021-11-22

## 2021-11-22 RX ORDER — PROPOFOL 10 MG/ML
INJECTION, EMULSION INTRAVENOUS PRN
Status: DISCONTINUED | OUTPATIENT
Start: 2021-11-22 | End: 2021-11-22

## 2021-11-22 RX ORDER — LIDOCAINE 40 MG/G
CREAM TOPICAL
Status: DISCONTINUED | OUTPATIENT
Start: 2021-11-22 | End: 2021-11-22 | Stop reason: HOSPADM

## 2021-11-22 RX ORDER — SODIUM CHLORIDE, SODIUM LACTATE, POTASSIUM CHLORIDE, CALCIUM CHLORIDE 600; 310; 30; 20 MG/100ML; MG/100ML; MG/100ML; MG/100ML
INJECTION, SOLUTION INTRAVENOUS CONTINUOUS
Status: DISCONTINUED | OUTPATIENT
Start: 2021-11-22 | End: 2021-11-22 | Stop reason: HOSPADM

## 2021-11-22 RX ORDER — ONDANSETRON 4 MG/1
4 TABLET, ORALLY DISINTEGRATING ORAL EVERY 30 MIN PRN
Status: DISCONTINUED | OUTPATIENT
Start: 2021-11-22 | End: 2021-11-22 | Stop reason: HOSPADM

## 2021-11-22 RX ORDER — FENTANYL CITRATE 50 UG/ML
INJECTION, SOLUTION INTRAMUSCULAR; INTRAVENOUS PRN
Status: DISCONTINUED | OUTPATIENT
Start: 2021-11-22 | End: 2021-11-22

## 2021-11-22 RX ORDER — ACETAMINOPHEN 325 MG/1
650 TABLET ORAL
Status: CANCELLED | OUTPATIENT
Start: 2021-11-22

## 2021-11-22 RX ORDER — OXYCODONE HYDROCHLORIDE 5 MG/1
5 TABLET ORAL EVERY 4 HOURS PRN
Status: DISCONTINUED | OUTPATIENT
Start: 2021-11-22 | End: 2021-11-22 | Stop reason: HOSPADM

## 2021-11-22 RX ORDER — ONDANSETRON 2 MG/ML
INJECTION INTRAMUSCULAR; INTRAVENOUS PRN
Status: DISCONTINUED | OUTPATIENT
Start: 2021-11-22 | End: 2021-11-22

## 2021-11-22 RX ORDER — LIDOCAINE HYDROCHLORIDE 20 MG/ML
INJECTION, SOLUTION INFILTRATION; PERINEURAL PRN
Status: DISCONTINUED | OUTPATIENT
Start: 2021-11-22 | End: 2021-11-22

## 2021-11-22 RX ORDER — HYDROMORPHONE HCL IN WATER/PF 6 MG/30 ML
0.2 PATIENT CONTROLLED ANALGESIA SYRINGE INTRAVENOUS EVERY 5 MIN PRN
Status: DISCONTINUED | OUTPATIENT
Start: 2021-11-22 | End: 2021-11-22 | Stop reason: HOSPADM

## 2021-11-22 RX ORDER — ONDANSETRON 2 MG/ML
4 INJECTION INTRAMUSCULAR; INTRAVENOUS EVERY 30 MIN PRN
Status: DISCONTINUED | OUTPATIENT
Start: 2021-11-22 | End: 2021-11-22 | Stop reason: HOSPADM

## 2021-11-22 RX ORDER — HYDROCODONE BITARTRATE AND ACETAMINOPHEN 7.5; 325 MG/15ML; MG/15ML
10 SOLUTION ORAL
Status: CANCELLED | OUTPATIENT
Start: 2021-11-22

## 2021-11-22 RX ORDER — GLYCOPYRROLATE 0.2 MG/ML
INJECTION, SOLUTION INTRAMUSCULAR; INTRAVENOUS PRN
Status: DISCONTINUED | OUTPATIENT
Start: 2021-11-22 | End: 2021-11-22

## 2021-11-22 RX ORDER — FENTANYL CITRATE 50 UG/ML
25 INJECTION, SOLUTION INTRAMUSCULAR; INTRAVENOUS
Status: DISCONTINUED | OUTPATIENT
Start: 2021-11-22 | End: 2021-11-22 | Stop reason: HOSPADM

## 2021-11-22 RX ORDER — MEPERIDINE HYDROCHLORIDE 25 MG/ML
12.5 INJECTION INTRAMUSCULAR; INTRAVENOUS; SUBCUTANEOUS
Status: DISCONTINUED | OUTPATIENT
Start: 2021-11-22 | End: 2021-11-22 | Stop reason: HOSPADM

## 2021-11-22 RX ORDER — CEPHALEXIN 500 MG/1
500 CAPSULE ORAL 3 TIMES DAILY
Qty: 12 CAPSULE | Refills: 0 | Status: ON HOLD | OUTPATIENT
Start: 2021-11-22 | End: 2021-12-06

## 2021-11-22 RX ORDER — FENTANYL CITRATE 50 UG/ML
25 INJECTION, SOLUTION INTRAMUSCULAR; INTRAVENOUS EVERY 5 MIN PRN
Status: DISCONTINUED | OUTPATIENT
Start: 2021-11-22 | End: 2021-11-22 | Stop reason: HOSPADM

## 2021-11-22 RX ORDER — PROPOFOL 10 MG/ML
INJECTION, EMULSION INTRAVENOUS CONTINUOUS PRN
Status: DISCONTINUED | OUTPATIENT
Start: 2021-11-22 | End: 2021-11-22

## 2021-11-22 RX ADMIN — PHENYLEPHRINE HYDROCHLORIDE 200 MCG: 10 INJECTION INTRAVENOUS at 17:36

## 2021-11-22 RX ADMIN — PHENYLEPHRINE HYDROCHLORIDE 100 MCG: 10 INJECTION INTRAVENOUS at 17:26

## 2021-11-22 RX ADMIN — PHENYLEPHRINE HYDROCHLORIDE 100 MCG: 10 INJECTION INTRAVENOUS at 17:15

## 2021-11-22 RX ADMIN — ONDANSETRON 4 MG: 2 INJECTION INTRAMUSCULAR; INTRAVENOUS at 17:22

## 2021-11-22 RX ADMIN — PHENYLEPHRINE HYDROCHLORIDE 100 MCG: 10 INJECTION INTRAVENOUS at 17:22

## 2021-11-22 RX ADMIN — GLYCOPYRROLATE 0.2 MG: 0.2 INJECTION, SOLUTION INTRAMUSCULAR; INTRAVENOUS at 17:06

## 2021-11-22 RX ADMIN — PROPOFOL 100 MCG/KG/MIN: 10 INJECTION, EMULSION INTRAVENOUS at 17:07

## 2021-11-22 RX ADMIN — CEFAZOLIN 22 G: 1 INJECTION, POWDER, FOR SOLUTION INTRAMUSCULAR; INTRAVENOUS at 17:12

## 2021-11-22 RX ADMIN — FENTANYL CITRATE 50 MCG: 50 INJECTION, SOLUTION INTRAMUSCULAR; INTRAVENOUS at 17:11

## 2021-11-22 RX ADMIN — LIDOCAINE HYDROCHLORIDE 60 MG: 20 INJECTION, SOLUTION INFILTRATION; PERINEURAL at 17:06

## 2021-11-22 RX ADMIN — SODIUM CHLORIDE, POTASSIUM CHLORIDE, SODIUM LACTATE AND CALCIUM CHLORIDE: 600; 310; 30; 20 INJECTION, SOLUTION INTRAVENOUS at 15:17

## 2021-11-22 RX ADMIN — PROPOFOL 40 MG: 10 INJECTION, EMULSION INTRAVENOUS at 17:09

## 2021-11-22 RX ADMIN — PROPOFOL 40 MG: 10 INJECTION, EMULSION INTRAVENOUS at 17:32

## 2021-11-22 ASSESSMENT — ENCOUNTER SYMPTOMS: SEIZURES: 0

## 2021-11-22 ASSESSMENT — LIFESTYLE VARIABLES: TOBACCO_USE: 1

## 2021-11-22 NOTE — ANESTHESIA PREPROCEDURE EVALUATION
Anesthesia Pre-Procedure Evaluation    Patient: Marcelo Valerio   MRN: [id number] : 1945        Preoperative Diagnosis: Family history of malignant neoplasm of bladder [Z80.52]   Procedure : Procedure(s):  CYSTOSCOPY, SELECTIVE CYTOLOGIES, BLADDER BIOPSIES AND FULGURATION, BILATERAL RETROGRADE PYELOGRAMS, RIGHT URETEROSCOPY , POSSIBLE URETERAL BIOPSY, RIGHT URERETAL STENT PLACEMENT, POSSIBLE LEFT URETEROSCOPY     Past Medical History:   Diagnosis Date     Basal cell carcinoma      Bladder tumor      Cancer (H)     Prostate     Chronic kidney disease      CKD (chronic kidney disease)      DM2 (diabetes mellitus, type 2) (H)      Glaucoma      History of gout      HTN (hypertension)      Hyperlipidemia      Kidney stone      Lung nodule      Metabolic syndrome      Obesity      Osteoarthritis of knee      Psoriasis      Restless legs syndrome       Past Surgical History:   Procedure Laterality Date     APPENDECTOMY       ARTHROPLASTY REVISION HIP Left      BASAL CELL CARCINOMA EXCISION      back     CYSTOSCOPY, FULGURATE BLADDER TUMOR, COMBINED Bilateral 2021    Procedure: CYSTOSCOPY, SELECTIVE CYTOLOGIES, BLADDER BIOPSIES AND FULGURATION, BILATERAL RETROGRADE PYELOGRAMS, RIGHT URETEROSCOPY , RIGHT URERETAL STENT PLACEMENT;  Surgeon: Fam Manrique MD;  Location: [facility]     CYSTOSCOPY, TRANSURETHRAL RESECTION (TUR) TUMOR BLADDER, COMBINED N/A 2021    Procedure: CYSTOSCOPY SELECTIVE CYTOLOGIES, BLADDER BIOPSY FULGURATION, MEATAL DILATION;  Surgeon: Fam Manrique MD;  Location: [facility];  Service: Urology     INGUINAL HERNIA REPAIR Bilateral      JOINT REPLACEMENT Bilateral     bilateral CARLI, left TKA     MT CYSTOSCOPY,REMV CALCULUS,SIMPLE Right 3/22/2021    Procedure: CYSTOSCOPY, RIGHT RETROGRADE PYELOGRAM, RIGHT URETEROSCOPY, URETERAL BIOPSY, RIGHT URETERAL STENT PLACEMENT;  Surgeon: Fam Manrique MD;  Location: [facility];  Service: Urology     MT  CYSTOURETHROSCOPY,BIOPSY N/A 10/1/2019    Procedure: CYSTOSCOPY, WITH BLADDER BIOPSIES and Fulgeration;  Surgeon: Jose Murcia MD;  Location: [facility];  Service: Urology     ND CYSTOURETHROSCOPY,URETER CATHETER Bilateral 10/1/2019    Procedure: BILATERAL RETROGRADE PYELOGRAMS;  Surgeon: Jose Murcia MD;  Location: [facility];  Service: Urology     PROSTATECTOMY      Radical suprapubic     RELEASE CARPAL TUNNEL       TONSILLECTOMY       TOTAL KNEE ARTHROPLASTY       VASECTOMY        Allergies   Allergen Reactions     Zinc Acetate Itching     Sulfa (Sulfonamide Antibiotics) [Sulfa Drugs] Unknown      Social History     Tobacco Use     Smoking status: Former Smoker     Packs/day: 0.00     Quit date: 8/1/2018     Years since quitting: 3.3     Smokeless tobacco: Never Used     Tobacco comment: Cigars and Pipes   Substance Use Topics     Alcohol use: Yes     Comment: Alcoholic Drinks/day: occassional       Wt Readings from Last 1 Encounters:   11/22/21 105.6 kg (232 lb 14.4 oz)        Anesthesia Evaluation            ROS/MED HX  ENT/Pulmonary: Comment: Glaucoma    (+) JERALD risk factors, tobacco use, Past use,  (-) sleep apnea   Neurologic:     (+) peripheral neuropathy, - peripheral.  (-) no seizures, no CVA and no TIA   Cardiovascular:     (+) Dyslipidemia hypertension-----    METS/Exercise Tolerance: >4 METS    Hematologic:       Musculoskeletal: Comment: psoriasis      GI/Hepatic:    (-) GERD   Renal/Genitourinary:     (+) renal disease,     Endo:     (+) type II DM, Obesity (BMI 33),  (-) thyroid disease   Psychiatric/Substance Use:       Infectious Disease:       Malignancy:   (+) Malignancy, History of Other.Other CA Bladder Active status post.    Other:            Physical Exam    Airway        Mallampati: III   TM distance: > 3 FB   Neck ROM: full   Mouth opening: > 3 cm    Respiratory Devices and Support         Dental     Comment: Fair dentition. No loose or removable  teeth.         Cardiovascular          Rhythm and rate: regular and normal     Pulmonary           breath sounds clear to auscultation       Other findings:   COVID negative 9/4    Labs 9/1  Na 138  K 5  BUN/Cr 34/1.7    OUTSIDE LABS:  CBC: No results found for: WBC, HGB, HCT, PLT  BMP:   Lab Results   Component Value Date     (H) 11/22/2021     (A) 03/22/2021     COAGS: No results found for: PTT, INR, FIBR  POC: No results found for: BGM, HCG, HCGS  HEPATIC: No results found for: ALBUMIN, PROTTOTAL, ALT, AST, GGT, ALKPHOS, BILITOTAL, BILIDIRECT, BRAYAN  OTHER: No results found for: PH, LACT, A1C, JERI, PHOS, MAG, LIPASE, AMYLASE, TSH, T4, T3, CRP, SED    Anesthesia Plan    ASA Status:  3   NPO Status:  NPO Appropriate    Anesthesia Type: MAC.              Consents    Anesthesia Plan(s) and associated risks, benefits, and realistic alternatives discussed. Questions answered and patient/representative(s) expressed understanding.    - Discussed:     - Discussed with:  Patient         Postoperative Care    Pain management: IV analgesics, Multi-modal analgesia (ketamine 25 mg on induction).   PONV prophylaxis: Ondansetron (or other 5HT-3), Dexamethasone or Solumedrol     Comments:    Other Comments: COVID negative  Glucose-191  The patient understands and accepts the risks of MAC anesthesia including (but not limited to) nausea, vomiting, dizziness, and chipped teeth. I also discussed the possibility of conversion to GAETT/GALMA anesthesia which include hoarse voice, sore throat, and pinched lip or chipped teeth.  Versed/fent  propofol ggt  Decadron/zofran                Malvin Donald MD

## 2021-11-22 NOTE — OP NOTE
DOS: 11/22/21  Preoperative diagnosis: obstructed right ureteral stent  Postoperative diagnosis:same  Surgery: cystoscopy, right retrograde pyelogram, right ureteral stent exchange.   Surgeon: Fam Manrique MD  Assistant: none  Specimen: none  Ebl: none  Drains: none  Anesthesia: general  Complications: none    Patient was brought into the operating room and identified as Iman Robertson  After induction of anesthesia he was prepped and draped in typical sterile manner in lithotomy position.   A time out was carried out, everyone in agreement.   I began by inserting a rigid cystoscope into the bladder  The areas of cis are still present.   Well healed biopsy sites.     I then grasped the right ureteral stent and removed it.   A right retrograde pyelogram was shot.   The pyelogram demonstrated a dilated renal pelvis without a dilated ureter.   No filling defect.   The contrast did not drain well from that kidney.   I then replaced a yip wire and it went without difficulty.     I then advanced a new 6fr 26cm stent and it formed a good coil in the renal pelvis and in the bladder.     The contrast promptly drained form the collecting system    Bladder was drained.     This completed my procedure.     Fam Manrique MD

## 2021-11-22 NOTE — ANESTHESIA CARE TRANSFER NOTE
Patient: Marcelo Valerio    Procedure: Procedure(s):  CYSTOSCOPY, RIGHT RETROGRADE PYELOGRAM, RIGHT URETERAL STENT EXCHANGE       Diagnosis: Unspecified hydronephrosis [N13.30]  Diagnosis Additional Information: No value filed.    Anesthesia Type:   MAC     Note:    Oropharynx: oropharynx clear of all foreign objects and spontaneously breathing  Level of Consciousness: drowsy  Oxygen Supplementation: face mask  Level of Supplemental Oxygen (L/min / FiO2): 6  Independent Airway: airway patency satisfactory and stable  Dentition: dentition unchanged  Vital Signs Stable: post-procedure vital signs reviewed and stable  Report to RN Given: handoff report given  Patient transferred to: Phase II    Handoff Report: Identifed the Patient, Identified the Reponsible Provider, Reviewed the pertinent medical history, Discussed the surgical course, Reviewed Intra-OP anesthesia mangement and issues during anesthesia, Set expectations for post-procedure period and Allowed opportunity for questions and acknowledgement of understanding      Vitals:  Vitals Value Taken Time   /60 11/22/21 1756   Temp 98.4    Pulse 84    Resp 14    SpO2 100%        Electronically Signed By: YANCY Maher CRNA  November 22, 2021  5:58 PM

## 2021-11-23 NOTE — ANESTHESIA POSTPROCEDURE EVALUATION
Patient: Marcelo Valerio    Procedure: Procedure(s):  CYSTOSCOPY, RIGHT RETROGRADE PYELOGRAM, RIGHT URETERAL STENT EXCHANGE       Diagnosis:Unspecified hydronephrosis [N13.30]  Diagnosis Additional Information: No value filed.    Anesthesia Type:  MAC    Note:  Disposition: Outpatient   Postop Pain Control: Uneventful            Sign Out: Well controlled pain   PONV: No   Neuro/Psych: Uneventful            Sign Out: Acceptable/Baseline neuro status   Airway/Respiratory: Uneventful            Sign Out: Acceptable/Baseline resp. status   CV/Hemodynamics: Uneventful            Sign Out: Acceptable CV status; No obvious hypovolemia; No obvious fluid overload   Other NRE: NONE   DID A NON-ROUTINE EVENT OCCUR? No           Last vitals:  Vitals Value Taken Time   /75 11/22/21 1817   Temp 36.9  C (98.4  F) 11/22/21 1759   Pulse 74 11/22/21 1822   Resp 18 11/22/21 1815   SpO2 96 % 11/22/21 1822   Vitals shown include unvalidated device data.    Electronically Signed By: Kathy Ellison MD  November 22, 2021  6:30 PM

## 2021-11-30 ENCOUNTER — HOSPITAL ENCOUNTER (OUTPATIENT)
Dept: WOUND CARE | Facility: HOSPITAL | Age: 76
End: 2021-11-30
Attending: UROLOGY
Payer: MEDICARE

## 2021-11-30 PROCEDURE — 999N000199 HC STATISTIC WOC PT EDUCATION, 31-45 MIN

## 2021-11-30 NOTE — PROGRESS NOTES
PRE-OP OSTOMY CONSULTATION AND ASSESSMENT    INTAKE    Type of Stoma Planned: Permanent Other: Cystectomy with an ileal conduit    Diagnosis Pertinent to Stoma: Cancer - Bladder  Date of Diagnosis: 2/2020  Type of Surgery: Ileal Conduit Surgery Date: 12/6/21  Surgeon: [facility]: [facility]    Current Living Situation: House  Anticipated Discharge Location Post Hospital Stay: House    EDUCATION ASSESSMENT  Present for Teaching Session: Patient and Spouse/Significant Other   Present: NA    Significant Vision Issues: No  Hand Dexterity Issues: No    Pertinent Information/Identified Barriers to Independent Care: None    Stoma Site Marking    Assessed Patient while: Lying, Sitting and Standing  Marked in RUQ with: Permanent Marker, Covered with Tegaderm and Marker and extra Tegaderm sent with patient    EDUCATION    Teaching Folder Given: Yes  Instruction: WOC Role, Home Care: reviewed, Hospital Stay: reviewed, Pouching Products: Showed pouching system, Night time drainage  and Supply Ordering    Total Time Spent with Patient: 30 minutes

## 2021-12-03 ENCOUNTER — LAB (OUTPATIENT)
Dept: LAB | Facility: CLINIC | Age: 76
End: 2021-12-03
Attending: UROLOGY
Payer: COMMERCIAL

## 2021-12-03 DIAGNOSIS — Z11.59 ENCOUNTER FOR SCREENING FOR OTHER VIRAL DISEASES: ICD-10-CM

## 2021-12-03 PROCEDURE — U0005 INFEC AGEN DETEC AMPLI PROBE: HCPCS

## 2021-12-03 PROCEDURE — U0003 INFECTIOUS AGENT DETECTION BY NUCLEIC ACID (DNA OR RNA); SEVERE ACUTE RESPIRATORY SYNDROME CORONAVIRUS 2 (SARS-COV-2) (CORONAVIRUS DISEASE [COVID-19]), AMPLIFIED PROBE TECHNIQUE, MAKING USE OF HIGH THROUGHPUT TECHNOLOGIES AS DESCRIBED BY CMS-2020-01-R: HCPCS

## 2021-12-04 ENCOUNTER — ANESTHESIA EVENT (OUTPATIENT)
Dept: SURGERY | Facility: HOSPITAL | Age: 76
DRG: 654 | End: 2021-12-04
Payer: MEDICARE

## 2021-12-04 LAB — SARS-COV-2 RNA RESP QL NAA+PROBE: NEGATIVE

## 2021-12-05 RX ORDER — CEFAZOLIN SODIUM 2 G/100ML
2 INJECTION, SOLUTION INTRAVENOUS
Status: CANCELLED | OUTPATIENT
Start: 2021-12-05

## 2021-12-05 RX ORDER — CEFAZOLIN SODIUM 2 G/100ML
2 INJECTION, SOLUTION INTRAVENOUS SEE ADMIN INSTRUCTIONS
Status: CANCELLED | OUTPATIENT
Start: 2021-12-05

## 2021-12-06 ENCOUNTER — ANESTHESIA (OUTPATIENT)
Dept: SURGERY | Facility: HOSPITAL | Age: 76
DRG: 654 | End: 2021-12-06
Payer: MEDICARE

## 2021-12-06 ENCOUNTER — HOSPITAL ENCOUNTER (INPATIENT)
Facility: HOSPITAL | Age: 76
LOS: 6 days | Discharge: HOME HEALTH | DRG: 654 | End: 2021-12-12
Attending: UROLOGY | Admitting: UROLOGY
Payer: MEDICARE

## 2021-12-06 DIAGNOSIS — K52.1 ANTIBIOTIC-INDUCED COLITIS: ICD-10-CM

## 2021-12-06 DIAGNOSIS — T36.95XA ANTIBIOTIC-INDUCED COLITIS: ICD-10-CM

## 2021-12-06 DIAGNOSIS — C67.8 MALIGNANT NEOPLASM OF OVERLAPPING SITES OF BLADDER (H): Primary | ICD-10-CM

## 2021-12-06 DIAGNOSIS — I10 BENIGN ESSENTIAL HYPERTENSION: ICD-10-CM

## 2021-12-06 DIAGNOSIS — D72.829 LEUKOCYTOSIS, UNSPECIFIED TYPE: ICD-10-CM

## 2021-12-06 LAB
ANION GAP SERPL CALCULATED.3IONS-SCNC: 12 MMOL/L (ref 5–18)
ANION GAP SERPL CALCULATED.3IONS-SCNC: 8 MMOL/L (ref 5–18)
BUN SERPL-MCNC: 27 MG/DL (ref 8–28)
BUN SERPL-MCNC: 30 MG/DL (ref 8–28)
CALCIUM SERPL-MCNC: 8.6 MG/DL (ref 8.5–10.5)
CALCIUM SERPL-MCNC: 9.9 MG/DL (ref 8.5–10.5)
CHLORIDE BLD-SCNC: 105 MMOL/L (ref 98–107)
CHLORIDE BLD-SCNC: 107 MMOL/L (ref 98–107)
CO2 SERPL-SCNC: 21 MMOL/L (ref 22–31)
CO2 SERPL-SCNC: 27 MMOL/L (ref 22–31)
CREAT SERPL-MCNC: 1.78 MG/DL (ref 0.7–1.3)
CREAT SERPL-MCNC: 2.13 MG/DL (ref 0.7–1.3)
ERYTHROCYTE [DISTWIDTH] IN BLOOD BY AUTOMATED COUNT: 13.2 % (ref 10–15)
ERYTHROCYTE [DISTWIDTH] IN BLOOD BY AUTOMATED COUNT: 13.2 % (ref 10–15)
GFR SERPL CREATININE-BSD FRML MDRD: 29 ML/MIN/1.73M2
GFR SERPL CREATININE-BSD FRML MDRD: 36 ML/MIN/1.73M2
GLUCOSE BLD-MCNC: 183 MG/DL (ref 70–125)
GLUCOSE BLD-MCNC: 277 MG/DL (ref 70–125)
GLUCOSE BLDC GLUCOMTR-MCNC: 193 MG/DL (ref 70–99)
GLUCOSE BLDC GLUCOMTR-MCNC: 273 MG/DL (ref 70–99)
GLUCOSE BLDC GLUCOMTR-MCNC: 285 MG/DL (ref 70–99)
HCT VFR BLD AUTO: 37 % (ref 40–53)
HCT VFR BLD AUTO: 40.4 % (ref 40–53)
HGB BLD-MCNC: 11.6 G/DL (ref 13.3–17.7)
HGB BLD-MCNC: 13 G/DL (ref 13.3–17.7)
MCH RBC QN AUTO: 30.3 PG (ref 26.5–33)
MCH RBC QN AUTO: 30.5 PG (ref 26.5–33)
MCHC RBC AUTO-ENTMCNC: 31.4 G/DL (ref 31.5–36.5)
MCHC RBC AUTO-ENTMCNC: 32.2 G/DL (ref 31.5–36.5)
MCV RBC AUTO: 95 FL (ref 78–100)
MCV RBC AUTO: 97 FL (ref 78–100)
PLATELET # BLD AUTO: 196 10E3/UL (ref 150–450)
PLATELET # BLD AUTO: 202 10E3/UL (ref 150–450)
POTASSIUM BLD-SCNC: 4.8 MMOL/L (ref 3.5–5)
POTASSIUM BLD-SCNC: 5.1 MMOL/L (ref 3.5–5)
RBC # BLD AUTO: 3.83 10E6/UL (ref 4.4–5.9)
RBC # BLD AUTO: 4.26 10E6/UL (ref 4.4–5.9)
SODIUM SERPL-SCNC: 140 MMOL/L (ref 136–145)
SODIUM SERPL-SCNC: 140 MMOL/L (ref 136–145)
WBC # BLD AUTO: 14.1 10E3/UL (ref 4–11)
WBC # BLD AUTO: 7.4 10E3/UL (ref 4–11)

## 2021-12-06 PROCEDURE — 250N000009 HC RX 250: Performed by: FAMILY MEDICINE

## 2021-12-06 PROCEDURE — 258N000003 HC RX IP 258 OP 636: Performed by: ANESTHESIOLOGY

## 2021-12-06 PROCEDURE — 360N000080 HC SURGERY LEVEL 7, PER MIN: Performed by: UROLOGY

## 2021-12-06 PROCEDURE — P9041 ALBUMIN (HUMAN),5%, 50ML: HCPCS | Performed by: NURSE ANESTHETIST, CERTIFIED REGISTERED

## 2021-12-06 PROCEDURE — 250N000012 HC RX MED GY IP 250 OP 636 PS 637: Performed by: FAMILY MEDICINE

## 2021-12-06 PROCEDURE — 250N000011 HC RX IP 250 OP 636: Performed by: NURSE ANESTHETIST, CERTIFIED REGISTERED

## 2021-12-06 PROCEDURE — 80048 BASIC METABOLIC PNL TOTAL CA: CPT | Performed by: UROLOGY

## 2021-12-06 PROCEDURE — 250N000025 HC SEVOFLURANE, PER MIN: Performed by: UROLOGY

## 2021-12-06 PROCEDURE — 0T1847C BYPASS BILATERAL URETERS TO ILEOCUTANEOUS WITH AUTOLOGOUS TISSUE SUBSTITUTE, PERCUTANEOUS ENDOSCOPIC APPROACH: ICD-10-PCS | Performed by: UROLOGY

## 2021-12-06 PROCEDURE — C2625 STENT, NON-COR, TEM W/DEL SY: HCPCS | Performed by: UROLOGY

## 2021-12-06 PROCEDURE — 250N000011 HC RX IP 250 OP 636: Performed by: UROLOGY

## 2021-12-06 PROCEDURE — 99221 1ST HOSP IP/OBS SF/LOW 40: CPT | Performed by: FAMILY MEDICINE

## 2021-12-06 PROCEDURE — 85014 HEMATOCRIT: CPT | Performed by: UROLOGY

## 2021-12-06 PROCEDURE — 370N000017 HC ANESTHESIA TECHNICAL FEE, PER MIN: Performed by: UROLOGY

## 2021-12-06 PROCEDURE — 120N000001 HC R&B MED SURG/OB

## 2021-12-06 PROCEDURE — 07BC4ZZ EXCISION OF PELVIS LYMPHATIC, PERCUTANEOUS ENDOSCOPIC APPROACH: ICD-10-PCS | Performed by: UROLOGY

## 2021-12-06 PROCEDURE — 0DBB4ZZ EXCISION OF ILEUM, PERCUTANEOUS ENDOSCOPIC APPROACH: ICD-10-PCS | Performed by: UROLOGY

## 2021-12-06 PROCEDURE — 999N000141 HC STATISTIC PRE-PROCEDURE NURSING ASSESSMENT: Performed by: UROLOGY

## 2021-12-06 PROCEDURE — C1760 CLOSURE DEV, VASC: HCPCS | Performed by: UROLOGY

## 2021-12-06 PROCEDURE — 250N000013 HC RX MED GY IP 250 OP 250 PS 637: Performed by: UROLOGY

## 2021-12-06 PROCEDURE — 250N000011 HC RX IP 250 OP 636: Performed by: ANESTHESIOLOGY

## 2021-12-06 PROCEDURE — 250N000013 HC RX MED GY IP 250 OP 250 PS 637: Performed by: FAMILY MEDICINE

## 2021-12-06 PROCEDURE — 250N000009 HC RX 250: Performed by: NURSE ANESTHETIST, CERTIFIED REGISTERED

## 2021-12-06 PROCEDURE — 88305 TISSUE EXAM BY PATHOLOGIST: CPT | Mod: TC | Performed by: UROLOGY

## 2021-12-06 PROCEDURE — 710N000009 HC RECOVERY PHASE 1, LEVEL 1, PER MIN: Performed by: UROLOGY

## 2021-12-06 PROCEDURE — 258N000003 HC RX IP 258 OP 636: Performed by: NURSE ANESTHETIST, CERTIFIED REGISTERED

## 2021-12-06 PROCEDURE — 258N000003 HC RX IP 258 OP 636: Performed by: UROLOGY

## 2021-12-06 PROCEDURE — 36415 COLL VENOUS BLD VENIPUNCTURE: CPT | Performed by: UROLOGY

## 2021-12-06 PROCEDURE — 0DNW4ZZ RELEASE PERITONEUM, PERCUTANEOUS ENDOSCOPIC APPROACH: ICD-10-PCS | Performed by: UROLOGY

## 2021-12-06 PROCEDURE — 0TTB4ZZ RESECTION OF BLADDER, PERCUTANEOUS ENDOSCOPIC APPROACH: ICD-10-PCS | Performed by: UROLOGY

## 2021-12-06 PROCEDURE — 999N000127 HC STATISTIC PERIPHERAL IV START W US GUIDANCE

## 2021-12-06 PROCEDURE — 8E0W4CZ ROBOTIC ASSISTED PROCEDURE OF TRUNK REGION, PERCUTANEOUS ENDOSCOPIC APPROACH: ICD-10-PCS | Performed by: UROLOGY

## 2021-12-06 PROCEDURE — 272N000001 HC OR GENERAL SUPPLY STERILE: Performed by: UROLOGY

## 2021-12-06 PROCEDURE — 0TP98DZ REMOVAL OF INTRALUMINAL DEVICE FROM URETER, VIA NATURAL OR ARTIFICIAL OPENING ENDOSCOPIC: ICD-10-PCS | Performed by: UROLOGY

## 2021-12-06 PROCEDURE — 250N000011 HC RX IP 250 OP 636: Performed by: EMERGENCY MEDICINE

## 2021-12-06 PROCEDURE — 250N000009 HC RX 250: Performed by: UROLOGY

## 2021-12-06 DEVICE — IMPLANTABLE DEVICE: Type: IMPLANTABLE DEVICE | Site: BLADDER | Status: FUNCTIONAL

## 2021-12-06 RX ORDER — ACETAMINOPHEN 650 MG/1
650 SUPPOSITORY RECTAL EVERY 8 HOURS
Status: DISCONTINUED | OUTPATIENT
Start: 2021-12-06 | End: 2021-12-06

## 2021-12-06 RX ORDER — CALCIUM CARBONATE 500 MG/1
500 TABLET, CHEWABLE ORAL 4 TIMES DAILY PRN
Status: DISCONTINUED | OUTPATIENT
Start: 2021-12-06 | End: 2021-12-12 | Stop reason: HOSPADM

## 2021-12-06 RX ORDER — AMOXICILLIN 250 MG
1 CAPSULE ORAL 2 TIMES DAILY
Status: DISCONTINUED | OUTPATIENT
Start: 2021-12-06 | End: 2021-12-12 | Stop reason: HOSPADM

## 2021-12-06 RX ORDER — ONDANSETRON 2 MG/ML
4 INJECTION INTRAMUSCULAR; INTRAVENOUS EVERY 6 HOURS PRN
Status: DISCONTINUED | OUTPATIENT
Start: 2021-12-06 | End: 2021-12-12 | Stop reason: HOSPADM

## 2021-12-06 RX ORDER — SODIUM CHLORIDE, SODIUM LACTATE, POTASSIUM CHLORIDE, CALCIUM CHLORIDE 600; 310; 30; 20 MG/100ML; MG/100ML; MG/100ML; MG/100ML
INJECTION, SOLUTION INTRAVENOUS CONTINUOUS
Status: DISCONTINUED | OUTPATIENT
Start: 2021-12-06 | End: 2021-12-06 | Stop reason: HOSPADM

## 2021-12-06 RX ORDER — HYDROMORPHONE HCL IN WATER/PF 6 MG/30 ML
0.2 PATIENT CONTROLLED ANALGESIA SYRINGE INTRAVENOUS
Status: DISCONTINUED | OUTPATIENT
Start: 2021-12-06 | End: 2021-12-12 | Stop reason: HOSPADM

## 2021-12-06 RX ORDER — SODIUM CHLORIDE 9 MG/ML
INJECTION, SOLUTION INTRAVENOUS CONTINUOUS PRN
Status: DISCONTINUED | OUTPATIENT
Start: 2021-12-06 | End: 2021-12-06

## 2021-12-06 RX ORDER — OXYCODONE HYDROCHLORIDE 5 MG/1
5 TABLET ORAL EVERY 4 HOURS PRN
Status: DISCONTINUED | OUTPATIENT
Start: 2021-12-06 | End: 2021-12-10

## 2021-12-06 RX ORDER — ONDANSETRON 2 MG/ML
4 INJECTION INTRAMUSCULAR; INTRAVENOUS EVERY 30 MIN PRN
Status: DISCONTINUED | OUTPATIENT
Start: 2021-12-06 | End: 2021-12-06 | Stop reason: HOSPADM

## 2021-12-06 RX ORDER — NALOXONE HYDROCHLORIDE 0.4 MG/ML
0.2 INJECTION, SOLUTION INTRAMUSCULAR; INTRAVENOUS; SUBCUTANEOUS
Status: DISCONTINUED | OUTPATIENT
Start: 2021-12-06 | End: 2021-12-12 | Stop reason: HOSPADM

## 2021-12-06 RX ORDER — DEXTROSE MONOHYDRATE 25 G/50ML
25-50 INJECTION, SOLUTION INTRAVENOUS
Status: DISCONTINUED | OUTPATIENT
Start: 2021-12-06 | End: 2021-12-12 | Stop reason: HOSPADM

## 2021-12-06 RX ORDER — FENTANYL CITRATE 50 UG/ML
INJECTION, SOLUTION INTRAMUSCULAR; INTRAVENOUS PRN
Status: DISCONTINUED | OUTPATIENT
Start: 2021-12-06 | End: 2021-12-06

## 2021-12-06 RX ORDER — DEXAMETHASONE SODIUM PHOSPHATE 4 MG/ML
INJECTION, SOLUTION INTRA-ARTICULAR; INTRALESIONAL; INTRAMUSCULAR; INTRAVENOUS; SOFT TISSUE PRN
Status: DISCONTINUED | OUTPATIENT
Start: 2021-12-06 | End: 2021-12-06

## 2021-12-06 RX ORDER — LIDOCAINE HYDROCHLORIDE 20 MG/ML
INJECTION, SOLUTION INFILTRATION; PERINEURAL PRN
Status: DISCONTINUED | OUTPATIENT
Start: 2021-12-06 | End: 2021-12-06

## 2021-12-06 RX ORDER — LIDOCAINE 40 MG/G
CREAM TOPICAL
Status: DISCONTINUED | OUTPATIENT
Start: 2021-12-06 | End: 2021-12-12 | Stop reason: HOSPADM

## 2021-12-06 RX ORDER — BISACODYL 10 MG
10 SUPPOSITORY, RECTAL RECTAL DAILY PRN
Status: DISCONTINUED | OUTPATIENT
Start: 2021-12-06 | End: 2021-12-12 | Stop reason: HOSPADM

## 2021-12-06 RX ORDER — ALBUMIN, HUMAN INJ 5% 5 %
SOLUTION INTRAVENOUS CONTINUOUS PRN
Status: DISCONTINUED | OUTPATIENT
Start: 2021-12-06 | End: 2021-12-06

## 2021-12-06 RX ORDER — ONDANSETRON 2 MG/ML
INJECTION INTRAMUSCULAR; INTRAVENOUS PRN
Status: DISCONTINUED | OUTPATIENT
Start: 2021-12-06 | End: 2021-12-06

## 2021-12-06 RX ORDER — ATENOLOL 25 MG/1
50 TABLET ORAL 2 TIMES DAILY
Status: DISCONTINUED | OUTPATIENT
Start: 2021-12-06 | End: 2021-12-12 | Stop reason: HOSPADM

## 2021-12-06 RX ORDER — ACETAMINOPHEN 325 MG/1
650 TABLET ORAL EVERY 8 HOURS
Status: DISCONTINUED | OUTPATIENT
Start: 2021-12-06 | End: 2021-12-12 | Stop reason: HOSPADM

## 2021-12-06 RX ORDER — CEFAZOLIN SODIUM 2 G/100ML
2 INJECTION, SOLUTION INTRAVENOUS SEE ADMIN INSTRUCTIONS
Status: DISCONTINUED | OUTPATIENT
Start: 2021-12-06 | End: 2021-12-06 | Stop reason: HOSPADM

## 2021-12-06 RX ORDER — FENTANYL CITRATE 50 UG/ML
25 INJECTION, SOLUTION INTRAMUSCULAR; INTRAVENOUS EVERY 5 MIN PRN
Status: DISCONTINUED | OUTPATIENT
Start: 2021-12-06 | End: 2021-12-06 | Stop reason: HOSPADM

## 2021-12-06 RX ORDER — BUPIVACAINE HYDROCHLORIDE 5 MG/ML
INJECTION, SOLUTION PERINEURAL PRN
Status: DISCONTINUED | OUTPATIENT
Start: 2021-12-06 | End: 2021-12-06 | Stop reason: HOSPADM

## 2021-12-06 RX ORDER — HALOPERIDOL 5 MG/ML
1 INJECTION INTRAMUSCULAR
Status: DISCONTINUED | OUTPATIENT
Start: 2021-12-06 | End: 2021-12-06 | Stop reason: HOSPADM

## 2021-12-06 RX ORDER — OXYCODONE HYDROCHLORIDE 5 MG/1
5 TABLET ORAL EVERY 4 HOURS PRN
Status: DISCONTINUED | OUTPATIENT
Start: 2021-12-06 | End: 2021-12-06 | Stop reason: HOSPADM

## 2021-12-06 RX ORDER — KETAMINE HYDROCHLORIDE 50 MG/ML
INJECTION, SOLUTION INTRAMUSCULAR; INTRAVENOUS PRN
Status: DISCONTINUED | OUTPATIENT
Start: 2021-12-06 | End: 2021-12-06

## 2021-12-06 RX ORDER — SODIUM CHLORIDE 9 MG/ML
INJECTION, SOLUTION INTRAVENOUS CONTINUOUS
Status: DISCONTINUED | OUTPATIENT
Start: 2021-12-06 | End: 2021-12-10

## 2021-12-06 RX ORDER — NICOTINE POLACRILEX 4 MG
15-30 LOZENGE BUCCAL
Status: DISCONTINUED | OUTPATIENT
Start: 2021-12-06 | End: 2021-12-12 | Stop reason: HOSPADM

## 2021-12-06 RX ORDER — POLYETHYLENE GLYCOL 3350 17 G/17G
17 POWDER, FOR SOLUTION ORAL DAILY
Status: DISCONTINUED | OUTPATIENT
Start: 2021-12-07 | End: 2021-12-12 | Stop reason: HOSPADM

## 2021-12-06 RX ORDER — HYDRALAZINE HYDROCHLORIDE 20 MG/ML
5 INJECTION INTRAMUSCULAR; INTRAVENOUS EVERY 6 HOURS PRN
Status: DISCONTINUED | OUTPATIENT
Start: 2021-12-06 | End: 2021-12-12 | Stop reason: HOSPADM

## 2021-12-06 RX ORDER — HYDROMORPHONE HCL IN WATER/PF 6 MG/30 ML
0.2 PATIENT CONTROLLED ANALGESIA SYRINGE INTRAVENOUS EVERY 5 MIN PRN
Status: DISCONTINUED | OUTPATIENT
Start: 2021-12-06 | End: 2021-12-06 | Stop reason: HOSPADM

## 2021-12-06 RX ORDER — ONDANSETRON 4 MG/1
4 TABLET, ORALLY DISINTEGRATING ORAL EVERY 6 HOURS PRN
Status: DISCONTINUED | OUTPATIENT
Start: 2021-12-06 | End: 2021-12-12 | Stop reason: HOSPADM

## 2021-12-06 RX ORDER — NALOXONE HYDROCHLORIDE 0.4 MG/ML
0.4 INJECTION, SOLUTION INTRAMUSCULAR; INTRAVENOUS; SUBCUTANEOUS
Status: DISCONTINUED | OUTPATIENT
Start: 2021-12-06 | End: 2021-12-12 | Stop reason: HOSPADM

## 2021-12-06 RX ORDER — PROCHLORPERAZINE MALEATE 5 MG
5 TABLET ORAL EVERY 6 HOURS PRN
Status: DISCONTINUED | OUTPATIENT
Start: 2021-12-06 | End: 2021-12-12 | Stop reason: HOSPADM

## 2021-12-06 RX ORDER — SIMVASTATIN 10 MG
20 TABLET ORAL AT BEDTIME
Status: DISCONTINUED | OUTPATIENT
Start: 2021-12-06 | End: 2021-12-12 | Stop reason: HOSPADM

## 2021-12-06 RX ORDER — CEFAZOLIN SODIUM 2 G/100ML
2 INJECTION, SOLUTION INTRAVENOUS
Status: COMPLETED | OUTPATIENT
Start: 2021-12-06 | End: 2021-12-06

## 2021-12-06 RX ORDER — GLYCOPYRROLATE 0.2 MG/ML
INJECTION, SOLUTION INTRAMUSCULAR; INTRAVENOUS PRN
Status: DISCONTINUED | OUTPATIENT
Start: 2021-12-06 | End: 2021-12-06

## 2021-12-06 RX ORDER — BRIMONIDINE TARTRATE 1.5 MG/ML
1 SOLUTION/ DROPS OPHTHALMIC 2 TIMES DAILY
Status: DISCONTINUED | OUTPATIENT
Start: 2021-12-06 | End: 2021-12-12 | Stop reason: HOSPADM

## 2021-12-06 RX ORDER — PROPOFOL 10 MG/ML
INJECTION, EMULSION INTRAVENOUS PRN
Status: DISCONTINUED | OUTPATIENT
Start: 2021-12-06 | End: 2021-12-06

## 2021-12-06 RX ORDER — ONDANSETRON 4 MG/1
4 TABLET, ORALLY DISINTEGRATING ORAL EVERY 30 MIN PRN
Status: DISCONTINUED | OUTPATIENT
Start: 2021-12-06 | End: 2021-12-06 | Stop reason: HOSPADM

## 2021-12-06 RX ORDER — HEPARIN SODIUM 5000 [USP'U]/.5ML
5000 INJECTION, SOLUTION INTRAVENOUS; SUBCUTANEOUS EVERY 8 HOURS
Status: DISCONTINUED | OUTPATIENT
Start: 2021-12-07 | End: 2021-12-12 | Stop reason: HOSPADM

## 2021-12-06 RX ORDER — LIDOCAINE 40 MG/G
CREAM TOPICAL
Status: DISCONTINUED | OUTPATIENT
Start: 2021-12-06 | End: 2021-12-06 | Stop reason: HOSPADM

## 2021-12-06 RX ADMIN — HYDROMORPHONE HYDROCHLORIDE 0.2 MG: 0.2 INJECTION, SOLUTION INTRAMUSCULAR; INTRAVENOUS; SUBCUTANEOUS at 22:58

## 2021-12-06 RX ADMIN — GLYCOPYRROLATE 0.2 MG: 0.2 INJECTION, SOLUTION INTRAMUSCULAR; INTRAVENOUS at 08:01

## 2021-12-06 RX ADMIN — SODIUM CHLORIDE: 9 INJECTION, SOLUTION INTRAVENOUS at 09:50

## 2021-12-06 RX ADMIN — HYDROMORPHONE HYDROCHLORIDE 1 MG: 1 INJECTION, SOLUTION INTRAMUSCULAR; INTRAVENOUS; SUBCUTANEOUS at 09:00

## 2021-12-06 RX ADMIN — PHENYLEPHRINE HYDROCHLORIDE 200 MCG: 10 INJECTION INTRAVENOUS at 08:06

## 2021-12-06 RX ADMIN — ONDANSETRON 4 MG: 2 INJECTION INTRAMUSCULAR; INTRAVENOUS at 14:30

## 2021-12-06 RX ADMIN — BRIMONIDINE TARTRATE 1 DROP: 1.5 SOLUTION OPHTHALMIC at 20:30

## 2021-12-06 RX ADMIN — DEXAMETHASONE SODIUM PHOSPHATE 10 MG: 4 INJECTION, SOLUTION INTRA-ARTICULAR; INTRALESIONAL; INTRAMUSCULAR; INTRAVENOUS; SOFT TISSUE at 08:06

## 2021-12-06 RX ADMIN — SIMVASTATIN 20 MG: 10 TABLET, FILM COATED ORAL at 20:32

## 2021-12-06 RX ADMIN — FENTANYL CITRATE 100 MCG: 50 INJECTION, SOLUTION INTRAMUSCULAR; INTRAVENOUS at 07:43

## 2021-12-06 RX ADMIN — FENTANYL CITRATE 50 MCG: 50 INJECTION, SOLUTION INTRAMUSCULAR; INTRAVENOUS at 10:59

## 2021-12-06 RX ADMIN — PHENYLEPHRINE HYDROCHLORIDE 200 MCG: 10 INJECTION INTRAVENOUS at 08:18

## 2021-12-06 RX ADMIN — PHENYLEPHRINE HYDROCHLORIDE 200 MCG: 10 INJECTION INTRAVENOUS at 08:15

## 2021-12-06 RX ADMIN — ROCURONIUM BROMIDE 10 MG: 50 INJECTION, SOLUTION INTRAVENOUS at 11:16

## 2021-12-06 RX ADMIN — FENTANYL CITRATE 25 MCG: 50 INJECTION, SOLUTION INTRAMUSCULAR; INTRAVENOUS at 15:53

## 2021-12-06 RX ADMIN — ALBUMIN HUMAN: 0.05 INJECTION, SOLUTION INTRAVENOUS at 13:40

## 2021-12-06 RX ADMIN — SODIUM CHLORIDE: 9 INJECTION, SOLUTION INTRAVENOUS at 07:36

## 2021-12-06 RX ADMIN — PHENYLEPHRINE HYDROCHLORIDE 200 MCG: 10 INJECTION INTRAVENOUS at 08:01

## 2021-12-06 RX ADMIN — PHENYLEPHRINE HYDROCHLORIDE 200 MCG: 10 INJECTION INTRAVENOUS at 08:33

## 2021-12-06 RX ADMIN — SUGAMMADEX 200 MG: 100 INJECTION, SOLUTION INTRAVENOUS at 14:50

## 2021-12-06 RX ADMIN — LIDOCAINE HYDROCHLORIDE 60 MG: 20 INJECTION, SOLUTION INFILTRATION; PERINEURAL at 07:43

## 2021-12-06 RX ADMIN — ROCURONIUM BROMIDE 20 MG: 50 INJECTION, SOLUTION INTRAVENOUS at 08:31

## 2021-12-06 RX ADMIN — PHENYLEPHRINE HYDROCHLORIDE 200 MCG: 10 INJECTION INTRAVENOUS at 13:51

## 2021-12-06 RX ADMIN — ROCURONIUM BROMIDE 50 MG: 50 INJECTION, SOLUTION INTRAVENOUS at 07:43

## 2021-12-06 RX ADMIN — PHENYLEPHRINE HYDROCHLORIDE 100 MCG: 10 INJECTION INTRAVENOUS at 14:10

## 2021-12-06 RX ADMIN — PROPOFOL 150 MG: 10 INJECTION, EMULSION INTRAVENOUS at 07:43

## 2021-12-06 RX ADMIN — PHENYLEPHRINE HYDROCHLORIDE 200 MCG: 10 INJECTION INTRAVENOUS at 13:36

## 2021-12-06 RX ADMIN — SODIUM CHLORIDE, PRESERVATIVE FREE: 5 INJECTION INTRAVENOUS at 20:29

## 2021-12-06 RX ADMIN — INSULIN GLARGINE 20 UNITS: 100 INJECTION, SOLUTION SUBCUTANEOUS at 21:21

## 2021-12-06 RX ADMIN — SODIUM CHLORIDE, POTASSIUM CHLORIDE, SODIUM LACTATE AND CALCIUM CHLORIDE: 600; 310; 30; 20 INJECTION, SOLUTION INTRAVENOUS at 13:37

## 2021-12-06 RX ADMIN — HYDROMORPHONE HYDROCHLORIDE 0.2 MG: 0.2 INJECTION, SOLUTION INTRAMUSCULAR; INTRAVENOUS; SUBCUTANEOUS at 19:14

## 2021-12-06 RX ADMIN — ROCURONIUM BROMIDE 10 MG: 50 INJECTION, SOLUTION INTRAVENOUS at 10:30

## 2021-12-06 RX ADMIN — ROCURONIUM BROMIDE 20 MG: 50 INJECTION, SOLUTION INTRAVENOUS at 12:33

## 2021-12-06 RX ADMIN — ACETAMINOPHEN 650 MG: 325 TABLET ORAL at 20:31

## 2021-12-06 RX ADMIN — SODIUM CHLORIDE, POTASSIUM CHLORIDE, SODIUM LACTATE AND CALCIUM CHLORIDE 100 ML/HR: 600; 310; 30; 20 INJECTION, SOLUTION INTRAVENOUS at 16:27

## 2021-12-06 RX ADMIN — PHENYLEPHRINE HYDROCHLORIDE 0.3 MCG/KG/MIN: 10 INJECTION INTRAVENOUS at 08:22

## 2021-12-06 RX ADMIN — PHENYLEPHRINE HYDROCHLORIDE 100 MCG: 10 INJECTION INTRAVENOUS at 12:49

## 2021-12-06 RX ADMIN — KETAMINE HYDROCHLORIDE 50 MG: 50 INJECTION, SOLUTION INTRAMUSCULAR; INTRAVENOUS at 08:06

## 2021-12-06 RX ADMIN — CEFAZOLIN SODIUM 2 G: 2 INJECTION, SOLUTION INTRAVENOUS at 11:59

## 2021-12-06 RX ADMIN — ATENOLOL 50 MG: 25 TABLET ORAL at 20:32

## 2021-12-06 RX ADMIN — FENTANYL CITRATE 50 MCG: 50 INJECTION, SOLUTION INTRAMUSCULAR; INTRAVENOUS at 11:55

## 2021-12-06 RX ADMIN — PHENYLEPHRINE HYDROCHLORIDE 200 MCG: 10 INJECTION INTRAVENOUS at 07:49

## 2021-12-06 RX ADMIN — PHENYLEPHRINE HYDROCHLORIDE 100 MCG: 10 INJECTION INTRAVENOUS at 13:35

## 2021-12-06 RX ADMIN — SENNOSIDES AND DOCUSATE SODIUM 1 TABLET: 50; 8.6 TABLET ORAL at 20:31

## 2021-12-06 RX ADMIN — CEFAZOLIN SODIUM 2 G: 2 INJECTION, SOLUTION INTRAVENOUS at 07:55

## 2021-12-06 RX ADMIN — PHENYLEPHRINE HYDROCHLORIDE 50 MCG: 10 INJECTION INTRAVENOUS at 11:25

## 2021-12-06 ASSESSMENT — ACTIVITIES OF DAILY LIVING (ADL)
ADLS_ACUITY_SCORE: 16
ADLS_ACUITY_SCORE: 16
ADLS_ACUITY_SCORE: 12
ADLS_ACUITY_SCORE: 16

## 2021-12-06 ASSESSMENT — ENCOUNTER SYMPTOMS: SEIZURES: 0

## 2021-12-06 ASSESSMENT — LIFESTYLE VARIABLES: TOBACCO_USE: 1

## 2021-12-06 ASSESSMENT — MIFFLIN-ST. JEOR: SCORE: 1818.52

## 2021-12-06 NOTE — ANESTHESIA CARE TRANSFER NOTE
Patient: Marcelo Valerio    Procedure: Procedure(s):  CYSTOSCOPY, RIGHT URETEROSCOPY, RIGHT STENT REMOVAL ROBOTIC ASSISTED RADICAL CYSTECTOMY, CREATION OF ILEAL CONDUIT, EXTENSIVE LYSIS OF ADHESIONS       Diagnosis: Malignant neoplasm of bladder, unspecified (H) [C67.9]  Diagnosis Additional Information: No value filed.    Anesthesia Type:   General     Note:    Oropharynx: oropharynx clear of all foreign objects  Level of Consciousness: drowsy  Oxygen Supplementation: face mask  Level of Supplemental Oxygen (L/min / FiO2): 6  Independent Airway: airway patency satisfactory and stable  Dentition: dentition unchanged  Vital Signs Stable: post-procedure vital signs reviewed and stable  Report to RN Given: handoff report given  Patient transferred to: PACU    Handoff Report: Identifed the Patient, Identified the Reponsible Provider, Reviewed the pertinent medical history, Discussed the surgical course, Reviewed Intra-OP anesthesia mangement and issues during anesthesia, Set expectations for post-procedure period and Allowed opportunity for questions and acknowledgement of understanding      Vitals:  Vitals Value Taken Time   BP     Temp     Pulse 70 12/06/21 1514   Resp 19 12/06/21 1514   SpO2 100 % 12/06/21 1514   Vitals shown include unvalidated device data.    Electronically Signed By: YANCY Childress CRNA  December 6, 2021  3:14 PM

## 2021-12-06 NOTE — ANESTHESIA PREPROCEDURE EVALUATION
Anesthesia Pre-Procedure Evaluation    Patient: Marcelo Valerio   MRN: [id number] : 1945        Preoperative Diagnosis: Family history of malignant neoplasm of bladder [Z80.52]   Procedure : Procedure(s):  CYSTOSCOPY, SELECTIVE CYTOLOGIES, BLADDER BIOPSIES AND FULGURATION, BILATERAL RETROGRADE PYELOGRAMS, RIGHT URETEROSCOPY , POSSIBLE URETERAL BIOPSY, RIGHT URERETAL STENT PLACEMENT, POSSIBLE LEFT URETEROSCOPY     Past Medical History:   Diagnosis Date     Basal cell carcinoma      Bladder tumor      Cancer (H)     Prostate     Chronic kidney disease      CKD (chronic kidney disease)      DM2 (diabetes mellitus, type 2) (H)      Glaucoma      History of gout      HTN (hypertension)      Hyperlipidemia      Kidney stone      Lung nodule      Malignant neoplasm of urinary bladder, unspecified site (H)      Metabolic syndrome      Obesity      Osteoarthritis of knee      Psoriasis      Restless legs syndrome       Past Surgical History:   Procedure Laterality Date     APPENDECTOMY       ARTHROPLASTY REVISION HIP Left      BASAL CELL CARCINOMA EXCISION      back     CARPAL TUNNEL RELEASE RT/LT       CATARACT EXTRACTION       COMBINED CYSTOSCOPY, RETROGRADES, EXCHANGE STENT URETER(S) Right 2021    Procedure: CYSTOSCOPY, RIGHT RETROGRADE PYELOGRAM, RIGHT URETERAL STENT EXCHANGE;  Surgeon: Fam Manrique MD;  Location: [facility]     CYSTOSCOPY, FULGURATE BLADDER TUMOR, COMBINED Bilateral 2021    Procedure: CYSTOSCOPY, SELECTIVE CYTOLOGIES, BLADDER BIOPSIES AND FULGURATION, BILATERAL RETROGRADE PYELOGRAMS, RIGHT URETEROSCOPY , RIGHT URERETAL STENT PLACEMENT;  Surgeon: Fam Manrique MD;  Location: [facility]     CYSTOSCOPY, TRANSURETHRAL RESECTION (TUR) TUMOR BLADDER, COMBINED N/A 2021    Procedure: CYSTOSCOPY SELECTIVE CYTOLOGIES, BLADDER BIOPSY FULGURATION, MEATAL DILATION;  Surgeon: Fam Manrique MD;  Location: [facility];  Service: Urology     INGUINAL HERNIA  REPAIR Bilateral      JOINT REPLACEMENT Bilateral     bilateral CARLI, left TKA     SC CYSTOSCOPY,REMV CALCULUS,SIMPLE Right 03/22/2021    Procedure: CYSTOSCOPY, RIGHT RETROGRADE PYELOGRAM, RIGHT URETEROSCOPY, URETERAL BIOPSY, RIGHT URETERAL STENT PLACEMENT;  Surgeon: Fam Manrique MD;  Location: [facility];  Service: Urology     SC CYSTOURETHROSCOPY,BIOPSY N/A 10/01/2019    Procedure: CYSTOSCOPY, WITH BLADDER BIOPSIES and Fulgeration;  Surgeon: Jose Murcia MD;  Location: [facility];  Service: Urology     SC CYSTOURETHROSCOPY,URETER CATHETER Bilateral 10/01/2019    Procedure: BILATERAL RETROGRADE PYELOGRAMS;  Surgeon: Jose Murcia MD;  Location: [facility];  Service: Urology     PROSTATECTOMY      Radical suprapubic     RELEASE CARPAL TUNNEL       TONSILLECTOMY       TOTAL KNEE ARTHROPLASTY       VASECTOMY        Allergies   Allergen Reactions     Zinc Acetate Itching     Nickel      Sulfa (Sulfonamide Antibiotics) [Sulfa Drugs] Unknown      Social History     Tobacco Use     Smoking status: Former Smoker     Packs/day: 0.00     Quit date: 8/1/2018     Years since quitting: 3.3     Smokeless tobacco: Never Used     Tobacco comment: Cigars and Pipes   Substance Use Topics     Alcohol use: Yes     Comment: Alcoholic Drinks/day: occassional       Wt Readings from Last 1 Encounters:   12/06/21 107.2 kg (236 lb 4.8 oz)        Anesthesia Evaluation   Pt has had prior anesthetic. Type: MAC.    No history of anesthetic complications       ROS/MED HX  ENT/Pulmonary: Comment: Glaucoma    (+) JERALD risk factors, tobacco use, Past use,  (-) sleep apnea   Neurologic:     (+) peripheral neuropathy, - peripheral.  (-) no seizures, no CVA and no TIA   Cardiovascular:     (+) Dyslipidemia hypertension-----    METS/Exercise Tolerance: >4 METS    Hematologic:       Musculoskeletal: Comment: psoriasis      GI/Hepatic:    (-) GERD   Renal/Genitourinary:     (+) renal disease,     Endo:      (+) type II DM, Obesity (BMI 33),  (-) thyroid disease   Psychiatric/Substance Use:       Infectious Disease:       Malignancy:   (+) Malignancy, History of Other.Other CA Bladder Active status post.    Other:            Physical Exam    Airway        Mallampati: III   TM distance: > 3 FB   Neck ROM: full   Mouth opening: > 3 cm    Respiratory Devices and Support         Dental     Comment: Fair dentition. No loose or removable teeth.         Cardiovascular          Rhythm and rate: regular and normal     Pulmonary           breath sounds clear to auscultation       Other findings:   COVID negative 9/4    Labs 9/1  Na 138  K 5  BUN/Cr 34/1.7    OUTSIDE LABS:  CBC: No results found for: WBC, HGB, HCT, PLT  BMP:   Lab Results   Component Value Date     (H) 12/06/2021     (H) 11/22/2021     COAGS: No results found for: PTT, INR, FIBR  POC: No results found for: BGM, HCG, HCGS  HEPATIC: No results found for: ALBUMIN, PROTTOTAL, ALT, AST, GGT, ALKPHOS, BILITOTAL, BILIDIRECT, BRAYAN  OTHER: No results found for: PH, LACT, A1C, JERI, PHOS, MAG, LIPASE, AMYLASE, TSH, T4, T3, CRP, SED    Anesthesia Plan    ASA Status:  3   NPO Status:  NPO Appropriate    Anesthesia Type: General.     - Airway: ETT   Induction: Intravenous.   Maintenance: Inhalation.        Consents    Anesthesia Plan(s) and associated risks, benefits, and realistic alternatives discussed. Questions answered and patient/representative(s) expressed understanding.    - Discussed:     - Discussed with:  Patient      - Extended Intubation/Ventilatory Support Discussed: No.      - Patient is DNR/DNI Status: No    Use of blood products discussed: No .     Postoperative Care    Pain management: IV analgesics, Multi-modal analgesia (ketamine 25 mg on induction).   PONV prophylaxis: Ondansetron (or other 5HT-3), Dexamethasone or Solumedrol     Comments:                    Segundo Lee MD

## 2021-12-06 NOTE — PHARMACY-ADMISSION MEDICATION HISTORY
Pharmacy Note - Admission Medication History    Pertinent Provider Information:    ______________________________________________________________________    Prior To Admission (PTA) med list completed and updated in EMR.       PTA Med List   Medication Sig Last Dose     amoxicillin (AMOXIL) 500 MG capsule [AMOXICILLIN (AMOXIL) 500 MG CAPSULE] Take 2,000 mg by mouth as needed. Prior to dental procedures More than a month at Unknown time     aspirin 81 MG EC tablet [ASPIRIN 81 MG EC TABLET] Take 81 mg by mouth daily. 11/11/2021     atenolol (TENORMIN) 50 MG tablet [ATENOLOL (TENORMIN) 50 MG TABLET] Take 50 mg by mouth 2 (two) times a day. 12/5/2021 at PM     brimonidine (ALPHAGAN) 0.15 % ophthalmic solution [BRIMONIDINE (ALPHAGAN) 0.15 % OPHTHALMIC SOLUTION] Administer 1 drop to both eyes 2 (two) times a day. 12/5/2021 at PM     calcipotriene (DOVONOX) 0.005 % cream [CALCIPOTRIENE (DOVONOX) 0.005 % CREAM] Apply 1 application topically daily as needed. More than a month at Unknown time     Dulaglutide 3 MG/0.5ML SOPN Inject 3 mg Subcutaneous once a week  12/3/2021     insulin glargine (BASAGLAR KWIKPEN) 100 unit/mL (3 mL) pen Inject 20 Units Subcutaneous 2 times daily  12/5/2021 at PM     latanoprost (XALATAN) 0.005 % ophthalmic solution [LATANOPROST (XALATAN) 0.005 % OPHTHALMIC SOLUTION] Administer 1 drop to both eyes at bedtime. 12/5/2021 at PM     losartan (COZAAR) 100 MG tablet Take 50 mg by mouth daily  12/5/2021 at AM     simvastatin (ZOCOR) 20 MG tablet Take 20 mg by mouth At Bedtime  12/5/2021 at PM     triamcinolone (KENALOG) 0.1 % cream [TRIAMCINOLONE (KENALOG) 0.1 % CREAM] Apply 1 application topically see administration instructions. Two times a day ,  2x/week on Saturday and Sunday. More than a month at Unknown time       Information source(s): Patient and CareEverywhere/SureScripts    Method of interview communication: in-person    Patient was asked about OTC/herbal products specifically.  PTA med list  reflects this.    Based on the pharmacist's assessment, the PTA med list information appears reliable    Allergies were reviewed, assessed, and updated with the patient.      Patient did not bring any medications to the hospital and can't retrieve from home. No multi-dose medications are available for use during hospital stay.      Thank you for the opportunity to participate in the care of this patient.      Manuela Rhodes [facility]     12/6/2021     7:08 AM

## 2021-12-06 NOTE — ANESTHESIA POSTPROCEDURE EVALUATION
Patient: Marcelo Valerio    Procedure: Procedure(s):  CYSTOSCOPY, RIGHT URETEROSCOPY, RIGHT STENT REMOVAL ROBOTIC ASSISTED RADICAL CYSTECTOMY, CREATION OF ILEAL CONDUIT, EXTENSIVE LYSIS OF ADHESIONS       Diagnosis:Malignant neoplasm of bladder, unspecified (H) [C67.9]  Diagnosis Additional Information: No value filed.    Anesthesia Type:  General    Note:  Disposition: Inpatient   Postop Pain Control: Uneventful            Sign Out: Well controlled pain   PONV: No   Neuro/Psych: Uneventful            Sign Out: Acceptable/Baseline neuro status   Airway/Respiratory: Uneventful            Sign Out: Acceptable/Baseline resp. status   CV/Hemodynamics: Uneventful            Sign Out: Acceptable CV status; No obvious hypovolemia; No obvious fluid overload   Other NRE: NONE   DID A NON-ROUTINE EVENT OCCUR?            Last vitals:  Vitals Value Taken Time   /81 12/06/21 1630   Temp 36.7  C (98.1  F) 12/06/21 1515   Pulse 77 12/06/21 1635   Resp 29 12/06/21 1635   SpO2 95 % 12/06/21 1635   Vitals shown include unvalidated device data.    Electronically Signed By: León Florentino MD  December 6, 2021  5:32 PM

## 2021-12-06 NOTE — OR NURSING
Informed Dr. Manrique of patient labs taken post operative. He saw patient at bedside and no new orders.

## 2021-12-06 NOTE — OP NOTE
DOS: 12/6/21  Preoperative diagnosis: bladder cancer  Postoperative diagnosis:same  Surgery: robotic assisted cystectomy, creation of ileal conduit, left pelvic lymph node dissection, cystoscopy, right ureteroscopy, right ureteral stent removal, extensive lysis of adhesions  Surgeon: Fam Manrique MD  Assistant: Glenn Sebastian MD.  I asked Dr. Sebastian to assist for his expertise in minimally invasive surgery of the bladder  Specimen: bladder, left pelvic lymph nodes. Left ureteral margin, right ureteral margin x 3, urethral margin  Ebl: 200  Drains: kimo  Anesthesia: general  Complications: none    Surgery:   Patient was brought into the operating room and identified as Marcelo Valerio.   After induction of anesthesia he was prepped and draped in typical sterile manner in lithotomy.   A time out carried out. Everyone in agreement.   I began by inserting a flexible cystoscope and removing the indwelling right ureteral stent.     I then took a flexible ureterosocpe and examined the entire ureter.   There were no tumors.   There was some edema of the ureter.   There was no obvious signs of obstructing lesion       A ng catheter was placed. Patient was supported at all pressure points.   Using veress needle insufflation was achieved. This was done on the left to avoid prior incisions.   A 5mm camera port was placed with a visi-trocar. There were extensive adhesions. An additional assistant port was placed. I sharply dissected the adhesions enough to dock the robot.   I then placed the remaining robotic 8mm ports as well as an assistant 12mm airseal and 5mm port.   Patient was put in 30degrees trendelenburg.   The [employer] robotic system was docked.     At this point there were more pelvic sidewall and anterior adhesions that needed to be taken down.  This took close to an hour to lyse all of the adhesions.     I then made an incision int he posterior peritoneum.   The plane between bladder and rectum was created.    This  was difficult given his prior prostatectomy as well as radiation therapy for recurrent prostate cancer.     Eventually I found a soft plane in between the tissues and was able to complete this dissection     I then made an incision lateral to the median umbilical ligament and carried it down to the internal iliac. The vas was encountered and transected   I then connected the peritoneal incisions.   The right ureter was identified. The right ureter was very fibrotic and very adherent to the surrounding tissues.  It was very difficult to dissect it out. This part also took close to an hour to complete  It was clipped proximally with a tagged clip and distally near the bladder.  A segment was sent to pathology.  Prior to doing so there were two other tubular structures that were encountered and were sent to pathology to make sure there was no inadvertent injury to ureter. Both of those were negative. This distal ureteral segment returned with inflammation but negative for malignancy     I then performed the same dissection on the left side.  The distal ureteral segment returned with benign tissue.  This dissection was also challenging given his pelvic adhesions. .     I then performed the lateral dissection of the bladder down to the endopelvic fascia.  The endopelvic fascia was opened.  This was done on the left and the right.     With this. The pedicles of the bladder and prostate were well developed.   Using clips and an enseal the pedicles of the bladder were taken bilaterally.          The urachus was transected.   The bladder was dropped. There was very tough tissue here from adherent bladder muscles to the pubic bone and pelvic muscles.        The urethra was transected   The catheter was clipped to prevent any possible contamination of the peritoneal cavity with cancer.   It was immediately placed into an endocatch bag.     I then attempted to perform a bilateral pelvic lymph node dissection however the prior  surgery and radiation made it impossible on the right side.  I was able to get a limited pelvic lymph node dissection on the left side.    Hemostasis in the pelvis was assured with spot cautery.     The [employer] robotic system was undocked.   An incision was extended.   The specimens were removed.   We isolated a segment of the terminal ileum 10cm from the ileocecal junction.     Using a 75GIA stapler we stapled off our conduit.   We also performed a side to side ileal anastomosis.       We then spatulated our ureters bilaterally. There was minimal ureteral length and this was technically challenging       The ureteroenteric anastomosis was performed in an end to side fashion.   We used 4-0 vicryl sutures and the anastomosis was performed in a interrupted fashion    A single J stent was advanced on both sides and secured to the conduit using 5-0 chromic.     I then place a 19blake drain through the left most lateral robot port and secured it to the skin with 0 silk.     The site of the stoma was identified and it was too far of a reach for the conduit so we moved it to a more appropriate place bellow the umbilicus.      and a portion of skin and subcutaneous tissues were removed.     A crusciate incision was made in the fascia.     The conduit was brought up through the fascia.     It was secured to the fascia using 0 vicryl sutures .    The stoma was matured using 2-0 vicryl sutures.     The fascia of the 12 mm port was closed with an 0 vicryl     The midline fascia was closed with looped PDS x 2    The fascia was injected with lidocaine.     Skin was closed with staples.     Dressings were applied.

## 2021-12-06 NOTE — CONSULTS
[facility]  Consult Note - Hospitalist Service     Date of Admission:  12/6/2021  Consult Requested by: Dr. Manrique  Reason for Consult: DM and HTN managment    Assessment & Plan     Patient is a 76-year-old male with a history of basal cell carcinoma, prostate cancer, bladder cancer, T2DM, hyperlipidemia, gout, metabolic syndrome, obesity, hypertension, bilateral lung nodules, psoriasis, carpal tunnel, CKD 3 presented for urology procedure.     POD#0 cystoscopy, right uteroscopy with right stent removal and robotic assisted radical cystectomy with creation of ileal conduit and extensive lysis of lesions 12/6.  -Surgeon urologist Dr. Manrique  -Incentive spirometry  -Postoperative care as per surgery  -Tim drain placed    CHINMAY on CKD 3  Baseline serum creatinine 1.6, scr 2.13 post operatively  -serum creatinine 2.8 on preop work-up,   -Losartan held 24 hours prior to surgery, MFM held for 3-4 days prior  -hold losartan  -stop MFM  -IVF    Hypertension  -On atenolol and losartan at home. Elevated post op, did not take morning atenolol  -restart pta atenolol  -As needed hydralazine with parameters    T2DM  -Hemoglobin A1c 8.0  -On PTA Metformin was (discontinued preoperatively) and Trulicity weekly  -Med Sliding scale insulin  -continue glargine 20U BID  -mealtime insuline 1:15 ratio    HLD  -continue statin    Psoriasis-follows with dermatology and improving with light therapy        The patient's care was discussed with the Patient.    David Hopkins MD  [facility]  Securely message with the Vocera Web Console (learn more here)  Text page via [employer] Paging/Directory    ______________________________________________________________________    Chief Complaint     Cystectomy with ileal conduit placement    History of Present Illness   Marcelo Valerio is a 76-year-old male with a history of basal cell carcinoma, prostate cancer, bladder cancer, T2DM,  hyperlipidemia, gout, metabolic syndrome, obesity, hypertension, bilateral lung nodules, psoriasis, carpal tunnel, CKD 3 presented for urology procedure.     Review of Systems   The 10 point Review of Systems is negative other than noted in the HPI or here.     Past Medical History    I have reviewed this patient's medical history and updated it with pertinent information if needed.   Past Medical History:   Diagnosis Date     Basal cell carcinoma      Bladder tumor      Cancer (H)     Prostate     Chronic kidney disease      CKD (chronic kidney disease)      DM2 (diabetes mellitus, type 2) (H)      Glaucoma      History of gout      HTN (hypertension)      Hyperlipidemia      Kidney stone      Lung nodule      Malignant neoplasm of urinary bladder, unspecified site (H)      Metabolic syndrome      Obesity      Osteoarthritis of knee      Psoriasis      Restless legs syndrome        Past Surgical History   I have reviewed this patient's surgical history and updated it with pertinent information if needed.  Past Surgical History:   Procedure Laterality Date     APPENDECTOMY       ARTHROPLASTY REVISION HIP Left      BASAL CELL CARCINOMA EXCISION      back     CARPAL TUNNEL RELEASE RT/LT       CATARACT EXTRACTION       COMBINED CYSTOSCOPY, RETROGRADES, EXCHANGE STENT URETER(S) Right 11/22/2021    Procedure: CYSTOSCOPY, RIGHT RETROGRADE PYELOGRAM, RIGHT URETERAL STENT EXCHANGE;  Surgeon: Fam Manrique MD;  Location: [facility] [place] OR     CYSTOSCOPY, FULGURATE BLADDER TUMOR, COMBINED Bilateral 09/08/2021    Procedure: CYSTOSCOPY, SELECTIVE CYTOLOGIES, BLADDER BIOPSIES AND FULGURATION, BILATERAL RETROGRADE PYELOGRAMS, RIGHT URETEROSCOPY , RIGHT URERETAL STENT PLACEMENT;  Surgeon: Fam Manrique MD;  Location: [facility] OR     CYSTOSCOPY, TRANSURETHRAL RESECTION (TUR) TUMOR BLADDER, COMBINED N/A 02/01/2021    Procedure: CYSTOSCOPY SELECTIVE CYTOLOGIES, BLADDER BIOPSY FULGURATION, MEATAL DILATION;  Surgeon:  Fam Manrique MD;  Location: [facility];  Service: Urology     INGUINAL HERNIA REPAIR Bilateral      JOINT REPLACEMENT Bilateral     bilateral CARLI, left TKA     ME CYSTOSCOPY,REMV CALCULUS,SIMPLE Right 03/22/2021    Procedure: CYSTOSCOPY, RIGHT RETROGRADE PYELOGRAM, RIGHT URETEROSCOPY, URETERAL BIOPSY, RIGHT URETERAL STENT PLACEMENT;  Surgeon: Fam Manrique MD;  Location: [facility];  Service: Urology     ME CYSTOURETHROSCOPY,BIOPSY N/A 10/01/2019    Procedure: CYSTOSCOPY, WITH BLADDER BIOPSIES and Fulgeration;  Surgeon: Jose Murcia MD;  Location: [facility] [place];  Service: Urology     ME CYSTOURETHROSCOPY,URETER CATHETER Bilateral 10/01/2019    Procedure: BILATERAL RETROGRADE PYELOGRAMS;  Surgeon: Jose Murcia MD;  Location: [facility] [place];  Service: Urology     PROSTATECTOMY      Radical suprapubic     RELEASE CARPAL TUNNEL       TONSILLECTOMY       TOTAL KNEE ARTHROPLASTY       VASECTOMY         Social History   I have reviewed this patient's social history and updated it with pertinent information if needed.  Social History     Tobacco Use     Smoking status: Former Smoker     Packs/day: 0.00     Quit date: 8/1/2018     Years since quitting: 3.3     Smokeless tobacco: Never Used     Tobacco comment: Cigars and Pipes   Substance Use Topics     Alcohol use: Yes     Comment: Alcoholic Drinks/day: occassional      Drug use: Not Currently       Family History     Not clinically significant    Medications   I have reviewed this patient's current medications  Medications Prior to Admission   Medication Sig Dispense Refill Last Dose     amoxicillin (AMOXIL) 500 MG capsule [AMOXICILLIN (AMOXIL) 500 MG CAPSULE] Take 2,000 mg by mouth as needed. Prior to dental procedures   More than a month at Unknown time     aspirin 81 MG EC tablet [ASPIRIN 81 MG EC TABLET] Take 81 mg by mouth daily.   11/11/2021     atenolol (TENORMIN) 50 MG tablet [ATENOLOL (TENORMIN) 50 MG  TABLET] Take 50 mg by mouth 2 (two) times a day.   12/5/2021 at PM     brimonidine (ALPHAGAN) 0.15 % ophthalmic solution [BRIMONIDINE (ALPHAGAN) 0.15 % OPHTHALMIC SOLUTION] Administer 1 drop to both eyes 2 (two) times a day.   12/5/2021 at PM     calcipotriene (DOVONOX) 0.005 % cream [CALCIPOTRIENE (DOVONOX) 0.005 % CREAM] Apply 1 application topically daily as needed.   More than a month at Unknown time     Dulaglutide 3 MG/0.5ML SOPN Inject 3 mg Subcutaneous once a week    12/3/2021     insulin glargine (BASAGLAR KWIKPEN) 100 unit/mL (3 mL) pen Inject 20 Units Subcutaneous 2 times daily    12/5/2021 at PM     latanoprost (XALATAN) 0.005 % ophthalmic solution [LATANOPROST (XALATAN) 0.005 % OPHTHALMIC SOLUTION] Administer 1 drop to both eyes at bedtime.   12/5/2021 at PM     losartan (COZAAR) 100 MG tablet Take 50 mg by mouth daily    12/5/2021 at AM     simvastatin (ZOCOR) 20 MG tablet Take 20 mg by mouth At Bedtime    12/5/2021 at PM     triamcinolone (KENALOG) 0.1 % cream [TRIAMCINOLONE (KENALOG) 0.1 % CREAM] Apply 1 application topically see administration instructions. Two times a day ,  2x/week on Saturday and Sunday.   More than a month at Unknown time       Allergies   Allergies   Allergen Reactions     Zinc Acetate Itching     Nickel      Sulfa (Sulfonamide Antibiotics) [Sulfa Drugs] Unknown       Physical Exam   Vital Signs: Temp: 98.2  F (36.8  C) Temp src: Axillary BP: (!) 155/83 Pulse: 73   Resp: 18 SpO2: 98 % O2 Device: Nasal cannula Oxygen Delivery: 2 LPM  Weight: 237 lbs 4.8 oz    Physical Examination:   General appearance - alert, well appearing, and in no distress. On 2L NC.  Mental status - alert, oriented to person, place, and time, normal mood, behavior, speech, dress, motor activity, and thought processes  HEENT - sclera anicteric, left eye normal, right eye normal,  Neck - supple, no significant adenopathy  Respiratory - clear to auscultation, no wheezes, rales or rhonchi, shallow breathing.  IS only at 500cc.   Cardiac - normal rate, regular rhythm, normal S1, S2, no murmur  Abdomen - soft, nontender, nondistended, ileal conduit, incision sites with some serosanguonous fluid. Hypoactive bowel  Neurological - alert, oriented, normal speech, no focal findings or movement disorder noted  Musculoskeletal - no joint tenderness, deformity or swelling, full range of motion without pain  Extremities - peripheral pulses normal, no pedal edema,   Skin - normal coloration and turgor, no rashes, no suspicious skin lesions noted      Data   Results for orders placed or performed during the hospital encounter of 12/06/21 (from the past 24 hour(s))   Glucose by meter   Result Value Ref Range    GLUCOSE BY METER POCT 193 (H) 70 - 99 mg/dL   CBC with platelets   Result Value Ref Range    WBC Count 7.4 4.0 - 11.0 10e3/uL    RBC Count 4.26 (L) 4.40 - 5.90 10e6/uL    Hemoglobin 13.0 (L) 13.3 - 17.7 g/dL    Hematocrit 40.4 40.0 - 53.0 %    MCV 95 78 - 100 fL    MCH 30.5 26.5 - 33.0 pg    MCHC 32.2 31.5 - 36.5 g/dL    RDW 13.2 10.0 - 15.0 %    Platelet Count 196 150 - 450 10e3/uL   Basic metabolic panel   Result Value Ref Range    Sodium 140 136 - 145 mmol/L    Potassium 5.1 (H) 3.5 - 5.0 mmol/L    Chloride 105 98 - 107 mmol/L    Carbon Dioxide (CO2) 27 22 - 31 mmol/L    Anion Gap 8 5 - 18 mmol/L    Urea Nitrogen 27 8 - 28 mg/dL    Creatinine 1.78 (H) 0.70 - 1.30 mg/dL    Calcium 9.9 8.5 - 10.5 mg/dL    Glucose 183 (H) 70 - 125 mg/dL    GFR Estimate 36 (L) >60 mL/min/1.73m2   Surgical Pathology Exam   Result Value Ref Range    Case Report       Surgical Pathology Report                         Case: [id number]                                  Authorizing Provider:  Fam Manrique MD      Collected:           12/06/2021 10:17 AM          Ordering Location:     [facility]      Received:            12/06/2021 10:29 AM                                 [facility] [place]                                                    "            Pathologist:           Miriam Gonzalez MD                                                           Intraop:               Miriam Gonzalez MD                                                           Specimens:   A) - Ureter, Right, distal right ureter for frozen                                                  B) - Ureter, Right, distal right ureter #2 for frozen                                               C) - Ureter, Right, #3 FROZEN                                                                       D) - Ureter, Left, left ureter                                                                       E) - Urethra, Uretheral margin frozen                                                      Intraoperative Consultation       A(1). Ureter, Right, distal right ureter for frozen:  AFS1:  Soft tissue without urothelium    Miriam Gonzalez MD on 12/6/2021 at 10:39 AM    B(2). Ureter, Right, distal right ureter #2 for frozen:  BFS1:  Blood vessel    Miriam Gonzalez MD on 12/6/2021 at 10:44 AM    C(3). Ureter, Right, #3 FROZEN:  CFS1:  Denuded and inflamed ureter. No evidence of malignancy    Miriam Gonzalez MD on 12/6/2021 at 12:10 PM     D(4). Ureter, Left, left ureter:  DFS1:  \"Negative for invasive carcinoma, carcinoma in situ and dysplasia. Complete cross section of ureter. Mixed acute and chronic inflammation.\" Reported to Dr. Manrique by Dr. Ravi at 1228 on 12/06/2021.    E(5). Urethra, Uretheral margin frozen:  EFS1:  No evidence of malignancy    Miriam Gonzalez MD on 12/6/2021 at 1:05 PM             CBC with platelets   Result Value Ref Range    WBC Count 14.1 (H) 4.0 - 11.0 10e3/uL    RBC Count 3.83 (L) 4.40 - 5.90 10e6/uL    Hemoglobin 11.6 (L) 13.3 - 17.7 g/dL    Hematocrit 37.0 (L) 40.0 - 53.0 %    MCV 97 78 - 100 fL    MCH 30.3 26.5 - 33.0 pg    MCHC 31.4 (L) 31.5 - 36.5 g/dL    RDW 13.2 10.0 - 15.0 %    Platelet Count 202 150 - 450 10e3/uL   Basic metabolic panel   Result Value Ref Range    " Sodium 140 136 - 145 mmol/L    Potassium 4.8 3.5 - 5.0 mmol/L    Chloride 107 98 - 107 mmol/L    Carbon Dioxide (CO2) 21 (L) 22 - 31 mmol/L    Anion Gap 12 5 - 18 mmol/L    Urea Nitrogen 30 (H) 8 - 28 mg/dL    Creatinine 2.13 (H) 0.70 - 1.30 mg/dL    Calcium 8.6 8.5 - 10.5 mg/dL    Glucose 277 (H) 70 - 125 mg/dL    GFR Estimate 29 (L) >60 mL/min/1.73m2   Glucose by meter   Result Value Ref Range    GLUCOSE BY METER POCT 273 (H) 70 - 99 mg/dL     Most Recent 3 CBC's:Recent Labs   Lab Test 12/06/21  1541 12/06/21  0730   WBC 14.1* 7.4   HGB 11.6* 13.0*   MCV 97 95    196     Most Recent 3 BMP's:Recent Labs   Lab Test 12/06/21  1607 12/06/21  1541 12/06/21  0730   NA  --  140 140   POTASSIUM  --  4.8 5.1*   CHLORIDE  --  107 105   CO2  --  21* 27   BUN  --  30* 27   CR  --  2.13* 1.78*   ANIONGAP  --  12 8   JERI  --  8.6 9.9   * 277* 183*

## 2021-12-07 LAB
ANION GAP SERPL CALCULATED.3IONS-SCNC: 7 MMOL/L (ref 5–18)
BASOPHILS # BLD AUTO: 0 10E3/UL (ref 0–0.2)
BASOPHILS NFR BLD AUTO: 0 %
BUN SERPL-MCNC: 34 MG/DL (ref 8–28)
CALCIUM SERPL-MCNC: 8.8 MG/DL (ref 8.5–10.5)
CHLORIDE BLD-SCNC: 105 MMOL/L (ref 98–107)
CO2 SERPL-SCNC: 27 MMOL/L (ref 22–31)
CREAT SERPL-MCNC: 2.17 MG/DL (ref 0.7–1.3)
EOSINOPHIL # BLD AUTO: 0 10E3/UL (ref 0–0.7)
EOSINOPHIL NFR BLD AUTO: 0 %
ERYTHROCYTE [DISTWIDTH] IN BLOOD BY AUTOMATED COUNT: 13.6 % (ref 10–15)
GFR SERPL CREATININE-BSD FRML MDRD: 28 ML/MIN/1.73M2
GLUCOSE BLD-MCNC: 337 MG/DL (ref 70–125)
GLUCOSE BLD-MCNC: 337 MG/DL (ref 70–125)
GLUCOSE BLDC GLUCOMTR-MCNC: 295 MG/DL (ref 70–99)
GLUCOSE BLDC GLUCOMTR-MCNC: 299 MG/DL (ref 70–99)
GLUCOSE BLDC GLUCOMTR-MCNC: 311 MG/DL (ref 70–99)
GLUCOSE BLDC GLUCOMTR-MCNC: 311 MG/DL (ref 70–99)
GLUCOSE BLDC GLUCOMTR-MCNC: 344 MG/DL (ref 70–99)
HCT VFR BLD AUTO: 33.5 % (ref 40–53)
HGB BLD-MCNC: 10.6 G/DL (ref 13.3–17.7)
IMM GRANULOCYTES # BLD: 0.1 10E3/UL
IMM GRANULOCYTES NFR BLD: 0 %
LYMPHOCYTES # BLD AUTO: 0.9 10E3/UL (ref 0.8–5.3)
LYMPHOCYTES NFR BLD AUTO: 6 %
MCH RBC QN AUTO: 30.2 PG (ref 26.5–33)
MCHC RBC AUTO-ENTMCNC: 31.6 G/DL (ref 31.5–36.5)
MCV RBC AUTO: 95 FL (ref 78–100)
MONOCYTES # BLD AUTO: 1.6 10E3/UL (ref 0–1.3)
MONOCYTES NFR BLD AUTO: 11 %
NEUTROPHILS # BLD AUTO: 11.9 10E3/UL (ref 1.6–8.3)
NEUTROPHILS NFR BLD AUTO: 83 %
NRBC # BLD AUTO: 0 10E3/UL
NRBC BLD AUTO-RTO: 0 /100
PLATELET # BLD AUTO: 207 10E3/UL (ref 150–450)
POTASSIUM BLD-SCNC: 4.8 MMOL/L (ref 3.5–5)
PROCALCITONIN SERPL-MCNC: 0.61 NG/ML (ref 0–0.49)
RBC # BLD AUTO: 3.51 10E6/UL (ref 4.4–5.9)
SODIUM SERPL-SCNC: 139 MMOL/L (ref 136–145)
WBC # BLD AUTO: 14.5 10E3/UL (ref 4–11)

## 2021-12-07 PROCEDURE — 250N000011 HC RX IP 250 OP 636: Performed by: UROLOGY

## 2021-12-07 PROCEDURE — 120N000001 HC R&B MED SURG/OB

## 2021-12-07 PROCEDURE — 250N000013 HC RX MED GY IP 250 OP 250 PS 637: Performed by: FAMILY MEDICINE

## 2021-12-07 PROCEDURE — 36415 COLL VENOUS BLD VENIPUNCTURE: CPT | Performed by: FAMILY MEDICINE

## 2021-12-07 PROCEDURE — 36415 COLL VENOUS BLD VENIPUNCTURE: CPT | Performed by: UROLOGY

## 2021-12-07 PROCEDURE — 250N000013 HC RX MED GY IP 250 OP 250 PS 637: Performed by: UROLOGY

## 2021-12-07 PROCEDURE — 99233 SBSQ HOSP IP/OBS HIGH 50: CPT | Performed by: FAMILY MEDICINE

## 2021-12-07 PROCEDURE — 84145 PROCALCITONIN (PCT): CPT | Performed by: FAMILY MEDICINE

## 2021-12-07 PROCEDURE — 80048 BASIC METABOLIC PNL TOTAL CA: CPT | Performed by: UROLOGY

## 2021-12-07 PROCEDURE — 87040 BLOOD CULTURE FOR BACTERIA: CPT | Performed by: FAMILY MEDICINE

## 2021-12-07 PROCEDURE — 85025 COMPLETE CBC W/AUTO DIFF WBC: CPT | Performed by: UROLOGY

## 2021-12-07 PROCEDURE — 258N000003 HC RX IP 258 OP 636: Performed by: UROLOGY

## 2021-12-07 PROCEDURE — 250N000012 HC RX MED GY IP 250 OP 636 PS 637: Performed by: FAMILY MEDICINE

## 2021-12-07 PROCEDURE — 999N000198 HC STATISTIC WOC PT EDUCATION, 16-30 MIN

## 2021-12-07 PROCEDURE — 250N000011 HC RX IP 250 OP 636: Performed by: FAMILY MEDICINE

## 2021-12-07 RX ORDER — PIPERACILLIN SODIUM, TAZOBACTAM SODIUM 3; .375 G/15ML; G/15ML
3.38 INJECTION, POWDER, LYOPHILIZED, FOR SOLUTION INTRAVENOUS ONCE
Status: COMPLETED | OUTPATIENT
Start: 2021-12-07 | End: 2021-12-07

## 2021-12-07 RX ORDER — PIPERACILLIN SODIUM, TAZOBACTAM SODIUM 3; .375 G/15ML; G/15ML
3.38 INJECTION, POWDER, LYOPHILIZED, FOR SOLUTION INTRAVENOUS EVERY 8 HOURS
Status: DISCONTINUED | OUTPATIENT
Start: 2021-12-07 | End: 2021-12-07

## 2021-12-07 RX ORDER — CEFAZOLIN SODIUM 2 G/100ML
2 INJECTION, SOLUTION INTRAVENOUS EVERY 8 HOURS
Status: DISCONTINUED | OUTPATIENT
Start: 2021-12-07 | End: 2021-12-07

## 2021-12-07 RX ORDER — PIPERACILLIN SODIUM, TAZOBACTAM SODIUM 3; .375 G/15ML; G/15ML
3.38 INJECTION, POWDER, LYOPHILIZED, FOR SOLUTION INTRAVENOUS EVERY 8 HOURS
Status: DISCONTINUED | OUTPATIENT
Start: 2021-12-07 | End: 2021-12-11

## 2021-12-07 RX ORDER — AMLODIPINE BESYLATE 2.5 MG/1
2.5 TABLET ORAL DAILY
Status: DISCONTINUED | OUTPATIENT
Start: 2021-12-07 | End: 2021-12-08

## 2021-12-07 RX ADMIN — POLYETHYLENE GLYCOL 3350 17 G: 17 POWDER, FOR SOLUTION ORAL at 08:54

## 2021-12-07 RX ADMIN — INSULIN GLARGINE 20 UNITS: 100 INJECTION, SOLUTION SUBCUTANEOUS at 20:43

## 2021-12-07 RX ADMIN — HEPARIN SODIUM 5000 UNITS: 5000 INJECTION, SOLUTION INTRAVENOUS; SUBCUTANEOUS at 08:54

## 2021-12-07 RX ADMIN — ACETAMINOPHEN 650 MG: 325 TABLET ORAL at 20:36

## 2021-12-07 RX ADMIN — OXYCODONE HYDROCHLORIDE 5 MG: 5 TABLET ORAL at 20:36

## 2021-12-07 RX ADMIN — SIMVASTATIN 20 MG: 10 TABLET, FILM COATED ORAL at 20:36

## 2021-12-07 RX ADMIN — BRIMONIDINE TARTRATE 1 DROP: 1.5 SOLUTION OPHTHALMIC at 20:42

## 2021-12-07 RX ADMIN — AMLODIPINE BESYLATE 2.5 MG: 2.5 TABLET ORAL at 17:34

## 2021-12-07 RX ADMIN — INSULIN GLARGINE 20 UNITS: 100 INJECTION, SOLUTION SUBCUTANEOUS at 08:55

## 2021-12-07 RX ADMIN — HYDROMORPHONE HYDROCHLORIDE 0.2 MG: 0.2 INJECTION, SOLUTION INTRAMUSCULAR; INTRAVENOUS; SUBCUTANEOUS at 07:27

## 2021-12-07 RX ADMIN — SENNOSIDES AND DOCUSATE SODIUM 1 TABLET: 50; 8.6 TABLET ORAL at 20:36

## 2021-12-07 RX ADMIN — ATENOLOL 50 MG: 25 TABLET ORAL at 20:37

## 2021-12-07 RX ADMIN — SODIUM CHLORIDE, PRESERVATIVE FREE: 5 INJECTION INTRAVENOUS at 20:40

## 2021-12-07 RX ADMIN — ANTACID TABLETS 500 MG: 500 TABLET, CHEWABLE ORAL at 08:53

## 2021-12-07 RX ADMIN — SENNOSIDES AND DOCUSATE SODIUM 1 TABLET: 50; 8.6 TABLET ORAL at 08:54

## 2021-12-07 RX ADMIN — PIPERACILLIN SODIUM AND TAZOBACTAM SODIUM 3.38 G: 3; .375 INJECTION, POWDER, LYOPHILIZED, FOR SOLUTION INTRAVENOUS at 13:10

## 2021-12-07 RX ADMIN — ATENOLOL 50 MG: 25 TABLET ORAL at 08:53

## 2021-12-07 RX ADMIN — ACETAMINOPHEN 650 MG: 325 TABLET ORAL at 13:11

## 2021-12-07 RX ADMIN — ACETAMINOPHEN 650 MG: 325 TABLET ORAL at 05:06

## 2021-12-07 RX ADMIN — INSULIN ASPART 4 UNITS: 100 INJECTION, SOLUTION INTRAVENOUS; SUBCUTANEOUS at 13:34

## 2021-12-07 RX ADMIN — SODIUM CHLORIDE, PRESERVATIVE FREE: 5 INJECTION INTRAVENOUS at 06:39

## 2021-12-07 RX ADMIN — HYDROMORPHONE HYDROCHLORIDE 0.2 MG: 0.2 INJECTION, SOLUTION INTRAMUSCULAR; INTRAVENOUS; SUBCUTANEOUS at 01:23

## 2021-12-07 RX ADMIN — BRIMONIDINE TARTRATE 1 DROP: 1.5 SOLUTION OPHTHALMIC at 08:54

## 2021-12-07 RX ADMIN — PIPERACILLIN SODIUM AND TAZOBACTAM SODIUM 3.38 G: 3; .375 INJECTION, POWDER, LYOPHILIZED, FOR SOLUTION INTRAVENOUS at 16:21

## 2021-12-07 RX ADMIN — HEPARIN SODIUM 5000 UNITS: 5000 INJECTION, SOLUTION INTRAVENOUS; SUBCUTANEOUS at 17:35

## 2021-12-07 ASSESSMENT — ACTIVITIES OF DAILY LIVING (ADL)
ADLS_ACUITY_SCORE: 14
ADLS_ACUITY_SCORE: 14
ADLS_ACUITY_SCORE: 6
ADLS_ACUITY_SCORE: 16
ADLS_ACUITY_SCORE: 6
ADLS_ACUITY_SCORE: 14
ADLS_ACUITY_SCORE: 6
ADLS_ACUITY_SCORE: 14
ADLS_ACUITY_SCORE: 14
ADLS_ACUITY_SCORE: 6
ADLS_ACUITY_SCORE: 6
ADLS_ACUITY_SCORE: 14
ADLS_ACUITY_SCORE: 16
ADLS_ACUITY_SCORE: 14
ADLS_ACUITY_SCORE: 16
ADLS_ACUITY_SCORE: 14
ADLS_ACUITY_SCORE: 6
ADLS_ACUITY_SCORE: 14
ADLS_ACUITY_SCORE: 14
ADLS_ACUITY_SCORE: 6

## 2021-12-07 NOTE — PLAN OF CARE
Problem: Pain (Urinary Diversion)  Goal: Acceptable Pain Control  Outcome: Improving   Scheduled Tylenol given for pain. Helpful.   Problem: Postoperative Stoma Care (Urinary Diversion)  Goal: Optimal Stoma Healing  Outcome: Improving   WOCN saw pt. New pouch and ostomy belt applied. Pouch intact.     BG levels were 311 and 311. Insulin given as ordered.

## 2021-12-07 NOTE — PROGRESS NOTES
[facility] PROGRESS NOTE      ADMIT DATE: 12/6/2021     FACILITY: [facility]    PCP: Urbano Cedeno, [phone]    ASSESSMENT AND PLAN:   Patient is a 76-year-old male with a history of basal cell carcinoma, prostate cancer, bladder cancer, T2DM, hyperlipidemia, gout, metabolic syndrome, obesity, hypertension, bilateral lung nodules, psoriasis, carpal tunnel, CKD 3 presented for urology procedure.     Active Problems:    Malignant neoplasm of overlapping sites of bladder (H)      POD#1 cystoscopy, right uteroscopy with right stent removal and robotic assisted radical cystectomy with creation of ileal conduit and extensive lysis of lesions 12/6.  -Surgeon urologist Dr. Manrique  -Postoperative care as per surgery  -diet, DVT proph, and pain control per urology (on clear liquid diet and on heparin q 8 hours subcutaneous for DVT proph).   -Cystectomy with an ileal conduit keeps leaking-->WOC consulted       CHINMAY on CKD 3  -Baseline serum creatinine 1.6, scr 2.13 post operatively-->2.17 today 12/7   -serum creatinine 2.8 on preop work-up,   -Losartan held 24 hours prior to surgery, MFM held for 3-4 days prior  -c/w holding losartan  -stop MFM  -c/w IVF for now      Hypoxia  -on oxygen post-op  - is improving. Requiring only 2 LPM now.  -wean off of supplemental O2 as tolerated  -if hard to wean off, obtain CXR  -on IV zosyn already to cover for possible pneumonia    Leukocytosis   -most likely stress-related due to surgery; however, procalc came back elevated at 0.61.   -obtained blood cultures which are pending  -started IV zosyn       Hypertension  -On atenolol and losartan at home.   -c/w home atenolol  -start norvasc 2.5 mg PO QD  -c/w as needed hydralazine with parameters  -c/w holding home losartan because of CHINMAY       T2DM  -Hemoglobin A1c 8.0  -On PTA Metformin was (discontinued preoperatively) and Trulicity weekly  -blood sugars elevated  -c/w Lantus 20 units subcutaneous BID   -switch  "from medium resistance to high resistance SSI  -switched from mealtime insulin 1:15 ratio to 1:10 ratio   -diabetic clear liquid diet for now     HLD  -continue home simvastatin     Psoriasis-follows with dermatology and improving with light therapy           FEN/GI: low salt, low fat, diabetic diet  DVT proph: heparin  Code status: Full Code      Discharge per primary team (urology)       SUBJECTIVE:    Patient states he is still having abdominal pain. He is tolerating clears right now. He has not had BM yet. He denies any other complaints at this time.     ROS:  12 Points review of systems reviewed and is negative except for what has already been mentioned above    OBJECTIVE:  Patient Vitals for the past 24 hrs:   BP Temp Temp src Pulse Resp SpO2 Height Weight   12/07/21 1127 (!) 170/77 98.5  F (36.9  C) Oral 72 18 96 % -- --   12/07/21 0753 (!) 151/75 98.4  F (36.9  C) Oral 71 18 95 % -- --   12/07/21 0347 (!) 152/74 98.8  F (37.1  C) Oral 74 18 95 % -- --   12/07/21 0008 (!) 173/79 98.5  F (36.9  C) Oral 74 18 97 % -- --   12/06/21 1941 (!) 142/76 98.2  F (36.8  C) Oral 73 19 97 % -- --   12/06/21 1842 (!) 178/90 97.7  F (36.5  C) Oral 83 20 99 % -- --   12/06/21 1746 (!) 161/83 98  F (36.7  C) Axillary 74 18 98 % -- --   12/06/21 1726 (!) 155/83 98.2  F (36.8  C) Axillary 73 18 98 % -- --   12/06/21 1648 (!) 191/92 98  F (36.7  C) Oral 76 18 95 % 1.803 m (5' 11\") 107.6 kg (237 lb 4.8 oz)   12/06/21 1632 -- -- -- -- -- 96 % -- --   12/06/21 1630 (!) 168/81 -- -- 75 17 94 % -- --   12/06/21 1615 (!) 161/88 -- -- 75 16 94 % -- --   12/06/21 1600 (!) 164/80 -- -- 76 15 94 % -- --   12/06/21 1545 (!) 175/80 -- -- 70 21 100 % -- --   12/06/21 1530 98/53 -- -- 67 11 100 % -- --   12/06/21 1515 98/57 98.1  F (36.7  C) Temporal 69 20 100 % -- --          Intake/Output Summary (Last 24 hours) at 12/7/2021 1448  Last data filed at 12/7/2021 1435  Gross per 24 hour   Intake 2463 ml   Output 2235 ml   Net 228 ml     GENRL: " Not in acute distress. Satting at 96% on 2 LPM.   HEENT: NC/AT      Neck- supple      Mucous membrane- moist and pink  CHEST: Clear to auscultation bilaterally  HEART: S1S2 regular. No murmurs, rubs or gallops  ABDMN: Soft. Bowel sounds- active. Cystectomy with an ileal conduit present with no output.   EXTRM: No pedal edema.   NEURO: No involuntary movements  INTGM: please see nursing assessment for full skin assessment  PSYCH: normal affect, normal speech     DIAGNOSTIC DATA:          Recent Results (from the past 24 hour(s))   CBC with platelets    Collection Time: 12/06/21  3:41 PM   Result Value Ref Range    WBC Count 14.1 (H) 4.0 - 11.0 10e3/uL    RBC Count 3.83 (L) 4.40 - 5.90 10e6/uL    Hemoglobin 11.6 (L) 13.3 - 17.7 g/dL    Hematocrit 37.0 (L) 40.0 - 53.0 %    MCV 97 78 - 100 fL    MCH 30.3 26.5 - 33.0 pg    MCHC 31.4 (L) 31.5 - 36.5 g/dL    RDW 13.2 10.0 - 15.0 %    Platelet Count 202 150 - 450 10e3/uL   Basic metabolic panel    Collection Time: 12/06/21  3:41 PM   Result Value Ref Range    Sodium 140 136 - 145 mmol/L    Potassium 4.8 3.5 - 5.0 mmol/L    Chloride 107 98 - 107 mmol/L    Carbon Dioxide (CO2) 21 (L) 22 - 31 mmol/L    Anion Gap 12 5 - 18 mmol/L    Urea Nitrogen 30 (H) 8 - 28 mg/dL    Creatinine 2.13 (H) 0.70 - 1.30 mg/dL    Calcium 8.6 8.5 - 10.5 mg/dL    Glucose 277 (H) 70 - 125 mg/dL    GFR Estimate 29 (L) >60 mL/min/1.73m2   Glucose by meter    Collection Time: 12/06/21  4:07 PM   Result Value Ref Range    GLUCOSE BY METER POCT 273 (H) 70 - 99 mg/dL   Glucose by meter    Collection Time: 12/06/21  9:04 PM   Result Value Ref Range    GLUCOSE BY METER POCT 285 (H) 70 - 99 mg/dL   Glucose by meter    Collection Time: 12/07/21  1:23 AM   Result Value Ref Range    GLUCOSE BY METER POCT 295 (H) 70 - 99 mg/dL   Basic metabolic panel    Collection Time: 12/07/21  6:09 AM   Result Value Ref Range    Sodium 139 136 - 145 mmol/L    Potassium 4.8 3.5 - 5.0 mmol/L    Chloride 105 98 - 107 mmol/L     Carbon Dioxide (CO2) 27 22 - 31 mmol/L    Anion Gap 7 5 - 18 mmol/L    Urea Nitrogen 34 (H) 8 - 28 mg/dL    Creatinine 2.17 (H) 0.70 - 1.30 mg/dL    Calcium 8.8 8.5 - 10.5 mg/dL    Glucose 337 (H) 70 - 125 mg/dL    GFR Estimate 28 (L) >60 mL/min/1.73m2   Glucose    Collection Time: 12/07/21  6:09 AM   Result Value Ref Range    Glucose 337 (H) 70 - 125 mg/dL   CBC with platelets and differential    Collection Time: 12/07/21  6:09 AM   Result Value Ref Range    WBC Count 14.5 (H) 4.0 - 11.0 10e3/uL    RBC Count 3.51 (L) 4.40 - 5.90 10e6/uL    Hemoglobin 10.6 (L) 13.3 - 17.7 g/dL    Hematocrit 33.5 (L) 40.0 - 53.0 %    MCV 95 78 - 100 fL    MCH 30.2 26.5 - 33.0 pg    MCHC 31.6 31.5 - 36.5 g/dL    RDW 13.6 10.0 - 15.0 %    Platelet Count 207 150 - 450 10e3/uL    % Neutrophils 83 %    % Lymphocytes 6 %    % Monocytes 11 %    % Eosinophils 0 %    % Basophils 0 %    % Immature Granulocytes 0 %    NRBCs per 100 WBC 0 <1 /100    Absolute Neutrophils 11.9 (H) 1.6 - 8.3 10e3/uL    Absolute Lymphocytes 0.9 0.8 - 5.3 10e3/uL    Absolute Monocytes 1.6 (H) 0.0 - 1.3 10e3/uL    Absolute Eosinophils 0.0 0.0 - 0.7 10e3/uL    Absolute Basophils 0.0 0.0 - 0.2 10e3/uL    Absolute Immature Granulocytes 0.1 <=0.4 10e3/uL    Absolute NRBCs 0.0 10e3/uL   Glucose by meter    Collection Time: 12/07/21  8:21 AM   Result Value Ref Range    GLUCOSE BY METER POCT 311 (H) 70 - 99 mg/dL   Procalcitonin    Collection Time: 12/07/21 10:10 AM   Result Value Ref Range    Procalcitonin 0.61 (H) 0.00 - 0.49 ng/mL   Glucose by meter    Collection Time: 12/07/21 12:53 PM   Result Value Ref Range    GLUCOSE BY METER POCT 311 (H) 70 - 99 mg/dL        No results found for this visit on 12/06/21.       All recent labs reviewed personally  Radiology report reviewed.       The total time spent in preparing this progress note is about 35 minutes, >50% time spent in care co-ordination that includes reviewing labs, images, discussing the plan of care with  patient/family, consultants, and .      Waleska Enriquez MD.   [facility] Medicine Service   [phone]   Pager [phone]

## 2021-12-07 NOTE — PLAN OF CARE
Problem: Bleeding (Urinary Diversion)  Goal: Absence of Bleeding  Outcome: Improving     Problem: Pain (Urinary Diversion)  Goal: Acceptable Pain Control  Intervention: Prevent or Manage Pain  Recent Flowsheet Documentation  Taken 12/7/2021 0153 by Nishi Orozco RN  Pain Management Interventions: medication (see MAR)  Taken 12/7/2021 0133 by Nishi Orozco RN  Pain Management Interventions: medication (see MAR)     Problem: Postoperative Stoma Care (Urinary Diversion)  Goal: Optimal Stoma Healing  Outcome: Improving     Patient urostomy was changed due to leaking. Patient may benefit from a WOC consult. Midline incision dressing changed due to moderate saturation and proper urostomy placement. PRN IV dilaudid given x1. Patient presented with mild confusion upon reassessment in the early AM that was not present the previous night. He was grabbing at his lines saying they were broken. Cares clustered for optimum rest. Pt log rolled in bed, but refused to dangle. Will approach again.

## 2021-12-07 NOTE — PLAN OF CARE
Pt came up to the floor this evening.  Midline dressing changed x 2 and urostomy bag was leaking in PACU so writer changed urostomy bag and put to ng drainage.  Stoma has some coagulated blood around it but not bleeding.  Pt had IV dilaudid x 1, doesn't like to take narcotics.  Pt only turned in bed, is alert and oriented but the medications are impairing him slightly, do not see this as a delirium risk but continuing to monitor.  Pt tolerated oral meds, clear liquids and ice chips.  Surgeon rounded this evening and is aware of the midline drainage, not significant but needs to be monitored.Arianne Small RN

## 2021-12-07 NOTE — PROGRESS NOTES
Place of Service:  [facility]     Reason for follow up: POD1 robotic assisted cystectomy, creation of ileal conduit, left pelvic lymph node dissection, cystoscopy, right ureteroscopy, right ureteral stent removal, extensive lysis of adhesions    SUBJECTIVE:  Events:      Patient states pain is controlled. Pt is on clears today, will continue for now. Pt is tolerating w/o nausea. Will advance to tomorrow.  He has not passed gas to this point.     OBJECTIVE:  PHYSICAL EXAM:  Temp: 98.5  F (36.9  C) Temp src: Oral BP: (!) 170/77 Pulse: 72   Resp: 18 SpO2: 96 % O2 Device: Nasal cannula Oxygen Delivery: 2 LPM  General: NAD, alert, cooperative, slightly confused  Head: normocephalic, without abnormality / atraumatic  Abdomen: soft, appropriately tender, mild to moderately distended. no CVA tenderness, incisions are c/d/i. Dressings in place.  Urostomy stoma w/ dressings intact, pink/red coloration.   Genitourinary: no ng catheter  Skin: No rashes or lesions  Musculoskeletal: moves all four extremities equally; no calf edema or tenderness  Psychological: alert and oriented, answers questions appropriately    LABS:  Creatinine   Date Value Ref Range Status   12/07/2021 2.17 (H) 0.70 - 1.30 mg/dL Final     WBC Count   Date Value Ref Range Status   12/07/2021 14.5 (H) 4.0 - 11.0 10e3/uL Final     Hemoglobin   Date Value Ref Range Status   12/07/2021 10.6 (L) 13.3 - 17.7 g/dL Final   ]  Platelet Count   Date Value Ref Range Status   12/07/2021 207 150 - 450 10e3/uL Final       Intake/Output Summary (Last 24 hours) at 12/7/2021 1328  Last data filed at 12/7/2021 1312  Gross per 24 hour   Intake 3713 ml   Output 2195 ml   Net 1518 ml       UA:  UA RESULTS:  No results for input(s): COLOR, APPEARANCE, URINEGLC, URINEBILI, URINEKETONE, SG, UBLD, URINEPH, PROTEIN, UROBILINOGEN, NITRITE, LEUKEST, RBCU, WBCU in the last 84233 hours.      Cultures:      Blood cultures: pending    Lab Results: personally reviewed.      ASSESSMENT/PLAN:  Marcelo Valerio is being seen by [facility] for POD1 robotic assisted cystectomy, creation of ileal conduit, left pelvic lymph node dissection, cystoscopy, right ureteroscopy, right ureteral stent removal, extensive lysis of adhesions.    - Pt urinary output total is 2195 ml.   - ALYSON drain with 90 ml today so far. Yesterday was 380.  - Creatinine is 2.17 which is up from baseline of 1.78.  - Pt wbc is 14.5, yesterday 14.1. he is afebrile.  - He was noted to have some confusion this morning and hospital medicine, who is consulting have ordered blood cultures as well as a procalcitonin (0.61). Appreciate medicine assistance with this.   - Will advance from clear to full liquids if doing well this evening otherwise tomorrow.       David Elena PA-C  [facility]   [phone]

## 2021-12-07 NOTE — CONSULTS
"WOC stoma assessment Urostomy    Allergies:  Zinc Acetate  Nickel  Sulfa (Sulfonamide Antibiotics) [Sulfa Drugs]    Height:  Height: 180.3 cm (5' 11\")    Weight:   Weight: 107.6 kg (237 lb 4.8 oz)    Past Medical History:  Past Medical History:   Diagnosis Date     Basal cell carcinoma      Bladder tumor      Cancer (H)     Prostate     Chronic kidney disease      CKD (chronic kidney disease)      DM2 (diabetes mellitus, type 2) (H)      Glaucoma      History of gout      HTN (hypertension)      Hyperlipidemia      Kidney stone      Lung nodule      Malignant neoplasm of urinary bladder, unspecified site (H)      Metabolic syndrome      Obesity      Osteoarthritis of knee      Psoriasis      Restless legs syndrome        Labs:  Recent Labs   Lab Test 12/07/21  0609   HGB 10.6*       Thaddeus:  Thaddeus Score: 17    Specialty Bed:  Type of Stoma Planned: Permanent Other: Cystectomy with an ileal conduit     Diagnosis Pertinent to Stoma: Cancer - Bladder  Date of Diagnosis: 2/2020  Type of Surgery: Ileal Conduit            Surgery Date: 12/6/21  Surgeon: Isainiya                                                                Hospital: [facility]    INTAKE  Type of Stoma: Permanent Urostomy  Anticipated date of takedown: NA  Diagnosis Pertinent to Stoma:Bladder Cancer Date of Diagnosis: 2/2020  Type of Surgery: Ileal Conduit Surgery Date: 12/6/21  Surgeon: Hilaria   Pertinent Information: Per Op note, spot marked for stoma was unable to be used by surgeon and it was placed superior to marking. Currently in a fold and very close to surgical incision, causing leaking.  Current Equipment:   Supplies: Pouch [place] #[id number] -Snap onto flange 2 piece urinary pouch, Flange [place] # [id number] -Convex 2 piece cut to fit flange, Paste [employer] #[id number] -Adapt Ring and Belt Petra #[id number] -Large 29-49.    ASSESSMENT  Urostomy     Stoma Size: Oval 11/4x11/2 inches  Protrusion: minimal  Vascularity: Pink  Mucocutaneous " Juncture: Intact  Peristomal Skin: Intact       TREATMENT/EQUIPMENT  Applied: Adapt Ring at 3 and 3 o'clock to fill in crease, then 2 piece pouch cut offset to give space for incision, belt in room if needed    Supplies: Pouch [place] #[id number] -Snap onto flange 2 piece urinary pouch, Flange [place] # [id number] -Convex 2 piece cut to fit flange, Paste Petra #[id number] -Adapt Ring and Belt Petra #[id number] -Large 29-49    Instructions Given: WOC Role and Pouching Products: Showed pouching system     Nursing care provided was pouch placement    Discussed plan of care with nurse and patient.    Outcomes and treatment recommendations are to To contain output, To be knowledgable with pouch change/emptying before discharge and To be independant with pouch change/emptying before discharge    Actions taken by WOC RN: 10 minutes of education.    Planned Follow Up: Later this week.    Plan for next visit: Patient to change pouch with assist

## 2021-12-07 NOTE — CONSULTS
Care Management Initial Consult    General Information  Assessment completed with: Patient,  (Marcelo)  Type of CM/SW Visit: Initial Assessment    Primary Care Provider verified and updated as needed: Yes   Readmission within the last 30 days: planned readmission   Return Category: Planned Surgery  Reason for Consult: discharge planning  Advance Care Planning: Advance Care Planning Reviewed: questions answered   (declined)       Communication Assessment  Patient's communication style: spoken language (English or Bilingual)    Hearing Difficulty or Deaf: no   Wear Glasses or Blind: yes    Cognitive  Cognitive/Neuro/Behavioral: WDL  Level of Consciousness: confused (grabbing at lines)  Arousal Level: opens eyes spontaneously  Orientation: oriented x 4  Mood/Behavior: restless  Best Language: 0 - No aphasia  Speech: fluent    Living Environment:   People in home: spouse   (Nely)  Current living Arrangements: house      Able to return to prior arrangements: yes       Family/Social Support:  Care provided by: self  Provides care for: no one  Marital Status:   Wife   (Nely)       Description of Support System: Supportive,Involved    Support Assessment: Adequate family and caregiver support    Current Resources:   Patient receiving home care services: No     Community Resources: None  Equipment currently used at home: cane, straight  Supplies currently used at home:      Employment/Financial:  Employment Status: retired        Financial Concerns: No concerns identified   Referral to Financial Counselor: No       Lifestyle & Psychosocial Needs:  Social Determinants of Health     Tobacco Use: Medium Risk     Smoking Tobacco Use: Former Smoker     Smokeless Tobacco Use: Never Used   Alcohol Use: Not on file   Financial Resource Strain: Not on file   Food Insecurity: Not on file   Transportation Needs: Not on file   Physical Activity: Not on file   Stress: Not on file   Social Connections: Not on file   Intimate  Partner Violence: Not on file   Depression: Not on file   Housing Stability: Not on file       Functional Status:  Prior to admission patient needed assistance:   Dependent ADLs:: Independent          Mental Health Status:  NA          Chemical Dependency Status: NA                Values/Beliefs:  Spiritual, Cultural Beliefs, Druze Practices, Values that affect care:                 Additional Information:  SW met with pt in pt room. Pt agreeable to visit. Pt lives with wife Nely in private home. Pt uses diabetic supplies. Has new ostomy. Pt     Bess Durham, MSW

## 2021-12-08 ENCOUNTER — APPOINTMENT (OUTPATIENT)
Dept: RADIOLOGY | Facility: HOSPITAL | Age: 76
DRG: 654 | End: 2021-12-08
Attending: FAMILY MEDICINE
Payer: MEDICARE

## 2021-12-08 LAB
ALBUMIN SERPL-MCNC: 2.8 G/DL (ref 3.5–5)
ALP SERPL-CCNC: 69 U/L (ref 45–120)
ALT SERPL W P-5'-P-CCNC: 36 U/L (ref 0–45)
ANION GAP SERPL CALCULATED.3IONS-SCNC: 6 MMOL/L (ref 5–18)
AST SERPL W P-5'-P-CCNC: 60 U/L (ref 0–40)
BASOPHILS # BLD AUTO: 0 10E3/UL (ref 0–0.2)
BASOPHILS NFR BLD AUTO: 0 %
BILIRUB SERPL-MCNC: 1.2 MG/DL (ref 0–1)
BUN SERPL-MCNC: 28 MG/DL (ref 8–28)
CALCIUM SERPL-MCNC: 9.6 MG/DL (ref 8.5–10.5)
CHLORIDE BLD-SCNC: 108 MMOL/L (ref 98–107)
CO2 SERPL-SCNC: 29 MMOL/L (ref 22–31)
CREAT SERPL-MCNC: 1.94 MG/DL (ref 0.7–1.3)
EOSINOPHIL # BLD AUTO: 0 10E3/UL (ref 0–0.7)
EOSINOPHIL NFR BLD AUTO: 0 %
ERYTHROCYTE [DISTWIDTH] IN BLOOD BY AUTOMATED COUNT: 13.7 % (ref 10–15)
GFR SERPL CREATININE-BSD FRML MDRD: 32 ML/MIN/1.73M2
GLUCOSE BLD-MCNC: 267 MG/DL (ref 70–125)
GLUCOSE BLD-MCNC: 267 MG/DL (ref 70–125)
GLUCOSE BLDC GLUCOMTR-MCNC: 200 MG/DL (ref 70–99)
GLUCOSE BLDC GLUCOMTR-MCNC: 217 MG/DL (ref 70–99)
GLUCOSE BLDC GLUCOMTR-MCNC: 222 MG/DL (ref 70–99)
GLUCOSE BLDC GLUCOMTR-MCNC: 226 MG/DL (ref 70–99)
GLUCOSE BLDC GLUCOMTR-MCNC: 236 MG/DL (ref 70–99)
GLUCOSE BLDC GLUCOMTR-MCNC: 260 MG/DL (ref 70–99)
HCT VFR BLD AUTO: 33.4 % (ref 40–53)
HGB BLD-MCNC: 10.6 G/DL (ref 13.3–17.7)
IMM GRANULOCYTES # BLD: 0.1 10E3/UL
IMM GRANULOCYTES NFR BLD: 1 %
LYMPHOCYTES # BLD AUTO: 0.8 10E3/UL (ref 0.8–5.3)
LYMPHOCYTES NFR BLD AUTO: 6 %
MCH RBC QN AUTO: 30.7 PG (ref 26.5–33)
MCHC RBC AUTO-ENTMCNC: 31.7 G/DL (ref 31.5–36.5)
MCV RBC AUTO: 97 FL (ref 78–100)
MONOCYTES # BLD AUTO: 1.2 10E3/UL (ref 0–1.3)
MONOCYTES NFR BLD AUTO: 9 %
NEUTROPHILS # BLD AUTO: 11.3 10E3/UL (ref 1.6–8.3)
NEUTROPHILS NFR BLD AUTO: 84 %
NRBC # BLD AUTO: 0 10E3/UL
NRBC BLD AUTO-RTO: 0 /100
PATH REPORT.COMMENTS IMP SPEC: NORMAL
PATH REPORT.COMMENTS IMP SPEC: NORMAL
PATH REPORT.FINAL DX SPEC: NORMAL
PATH REPORT.GROSS SPEC: NORMAL
PATH REPORT.INTRAOP OBS SPEC DOC: NORMAL
PATH REPORT.MICROSCOPIC SPEC OTHER STN: NORMAL
PATH REPORT.RELEVANT HX SPEC: NORMAL
PATHOLOGY SYNOPTIC REPORT: NORMAL
PHOTO IMAGE: NORMAL
PLATELET # BLD AUTO: 180 10E3/UL (ref 150–450)
POTASSIUM BLD-SCNC: 4.1 MMOL/L (ref 3.5–5)
PROT SERPL-MCNC: 6 G/DL (ref 6–8)
RBC # BLD AUTO: 3.45 10E6/UL (ref 4.4–5.9)
SODIUM SERPL-SCNC: 143 MMOL/L (ref 136–145)
WBC # BLD AUTO: 13.3 10E3/UL (ref 4–11)

## 2021-12-08 PROCEDURE — 258N000003 HC RX IP 258 OP 636: Performed by: UROLOGY

## 2021-12-08 PROCEDURE — 36415 COLL VENOUS BLD VENIPUNCTURE: CPT | Performed by: FAMILY MEDICINE

## 2021-12-08 PROCEDURE — 71045 X-RAY EXAM CHEST 1 VIEW: CPT

## 2021-12-08 PROCEDURE — 88309 TISSUE EXAM BY PATHOLOGIST: CPT | Mod: 26 | Performed by: PATHOLOGY

## 2021-12-08 PROCEDURE — 250N000011 HC RX IP 250 OP 636: Performed by: UROLOGY

## 2021-12-08 PROCEDURE — 250N000013 HC RX MED GY IP 250 OP 250 PS 637: Performed by: UROLOGY

## 2021-12-08 PROCEDURE — 120N000001 HC R&B MED SURG/OB

## 2021-12-08 PROCEDURE — 250N000011 HC RX IP 250 OP 636: Performed by: FAMILY MEDICINE

## 2021-12-08 PROCEDURE — 88305 TISSUE EXAM BY PATHOLOGIST: CPT | Mod: 26 | Performed by: PATHOLOGY

## 2021-12-08 PROCEDURE — 88331 PATH CONSLTJ SURG 1 BLK 1SPC: CPT | Mod: 26 | Performed by: PATHOLOGY

## 2021-12-08 PROCEDURE — 85025 COMPLETE CBC W/AUTO DIFF WBC: CPT | Performed by: UROLOGY

## 2021-12-08 PROCEDURE — 250N000013 HC RX MED GY IP 250 OP 250 PS 637: Performed by: FAMILY MEDICINE

## 2021-12-08 PROCEDURE — 80053 COMPREHEN METABOLIC PANEL: CPT | Performed by: FAMILY MEDICINE

## 2021-12-08 PROCEDURE — 99233 SBSQ HOSP IP/OBS HIGH 50: CPT | Performed by: FAMILY MEDICINE

## 2021-12-08 RX ORDER — AMLODIPINE BESYLATE 2.5 MG/1
2.5 TABLET ORAL 2 TIMES DAILY
Status: DISCONTINUED | OUTPATIENT
Start: 2021-12-08 | End: 2021-12-12 | Stop reason: HOSPADM

## 2021-12-08 RX ORDER — FUROSEMIDE 10 MG/ML
10 INJECTION INTRAMUSCULAR; INTRAVENOUS ONCE
Status: COMPLETED | OUTPATIENT
Start: 2021-12-08 | End: 2021-12-08

## 2021-12-08 RX ADMIN — PIPERACILLIN SODIUM AND TAZOBACTAM SODIUM 3.38 G: 3; .375 INJECTION, POWDER, LYOPHILIZED, FOR SOLUTION INTRAVENOUS at 10:06

## 2021-12-08 RX ADMIN — HEPARIN SODIUM 5000 UNITS: 5000 INJECTION, SOLUTION INTRAVENOUS; SUBCUTANEOUS at 18:49

## 2021-12-08 RX ADMIN — ACETAMINOPHEN 650 MG: 325 TABLET ORAL at 12:12

## 2021-12-08 RX ADMIN — POLYETHYLENE GLYCOL 3350 17 G: 17 POWDER, FOR SOLUTION ORAL at 10:06

## 2021-12-08 RX ADMIN — INSULIN GLARGINE 20 UNITS: 100 INJECTION, SOLUTION SUBCUTANEOUS at 10:06

## 2021-12-08 RX ADMIN — BRIMONIDINE TARTRATE 1 DROP: 1.5 SOLUTION OPHTHALMIC at 10:06

## 2021-12-08 RX ADMIN — PIPERACILLIN SODIUM AND TAZOBACTAM SODIUM 3.38 G: 3; .375 INJECTION, POWDER, LYOPHILIZED, FOR SOLUTION INTRAVENOUS at 00:51

## 2021-12-08 RX ADMIN — HEPARIN SODIUM 5000 UNITS: 5000 INJECTION, SOLUTION INTRAVENOUS; SUBCUTANEOUS at 10:05

## 2021-12-08 RX ADMIN — ANTACID TABLETS 500 MG: 500 TABLET, CHEWABLE ORAL at 01:03

## 2021-12-08 RX ADMIN — HEPARIN SODIUM 5000 UNITS: 5000 INJECTION, SOLUTION INTRAVENOUS; SUBCUTANEOUS at 00:51

## 2021-12-08 RX ADMIN — SENNOSIDES AND DOCUSATE SODIUM 1 TABLET: 50; 8.6 TABLET ORAL at 20:32

## 2021-12-08 RX ADMIN — SODIUM CHLORIDE, PRESERVATIVE FREE: 5 INJECTION INTRAVENOUS at 16:14

## 2021-12-08 RX ADMIN — PIPERACILLIN SODIUM AND TAZOBACTAM SODIUM 3.38 G: 3; .375 INJECTION, POWDER, LYOPHILIZED, FOR SOLUTION INTRAVENOUS at 16:07

## 2021-12-08 RX ADMIN — FUROSEMIDE 10 MG: 10 INJECTION, SOLUTION INTRAMUSCULAR; INTRAVENOUS at 18:48

## 2021-12-08 RX ADMIN — SIMVASTATIN 20 MG: 10 TABLET, FILM COATED ORAL at 20:32

## 2021-12-08 RX ADMIN — ATENOLOL 50 MG: 25 TABLET ORAL at 20:32

## 2021-12-08 RX ADMIN — ACETAMINOPHEN 650 MG: 325 TABLET ORAL at 20:32

## 2021-12-08 RX ADMIN — ACETAMINOPHEN 650 MG: 325 TABLET ORAL at 04:55

## 2021-12-08 RX ADMIN — ATENOLOL 50 MG: 25 TABLET ORAL at 10:05

## 2021-12-08 RX ADMIN — HYDROMORPHONE HYDROCHLORIDE 0.2 MG: 0.2 INJECTION, SOLUTION INTRAMUSCULAR; INTRAVENOUS; SUBCUTANEOUS at 01:42

## 2021-12-08 RX ADMIN — BRIMONIDINE TARTRATE 1 DROP: 1.5 SOLUTION OPHTHALMIC at 20:33

## 2021-12-08 RX ADMIN — AMLODIPINE BESYLATE 2.5 MG: 2.5 TABLET ORAL at 20:32

## 2021-12-08 RX ADMIN — SODIUM CHLORIDE, PRESERVATIVE FREE: 5 INJECTION INTRAVENOUS at 06:18

## 2021-12-08 RX ADMIN — SENNOSIDES AND DOCUSATE SODIUM 1 TABLET: 50; 8.6 TABLET ORAL at 10:06

## 2021-12-08 RX ADMIN — INSULIN ASPART 2 UNITS: 100 INJECTION, SOLUTION INTRAVENOUS; SUBCUTANEOUS at 10:11

## 2021-12-08 RX ADMIN — AMLODIPINE BESYLATE 2.5 MG: 2.5 TABLET ORAL at 10:05

## 2021-12-08 ASSESSMENT — ACTIVITIES OF DAILY LIVING (ADL)
ADLS_ACUITY_SCORE: 6
ADLS_ACUITY_SCORE: 8
ADLS_ACUITY_SCORE: 6
ADLS_ACUITY_SCORE: 6
ADLS_ACUITY_SCORE: 8
ADLS_ACUITY_SCORE: 6
ADLS_ACUITY_SCORE: 8
ADLS_ACUITY_SCORE: 6
ADLS_ACUITY_SCORE: 8

## 2021-12-08 NOTE — PLAN OF CARE
Problem: Pain (Urinary Diversion)  Goal: Acceptable Pain Control  Outcome: Improving   Scheduled Tylenol given. Helpful.   Problem: Postoperative Stoma Care (Urinary Diversion)  Goal: Optimal Stoma Healing  Outcome: Improving   Urostomy bag intact.     BG levels were 226 and 260. Scheduled insulin given as ordered.     Passing flatus. Ambulated x2 in hallway.

## 2021-12-08 NOTE — PROGRESS NOTES
"  [facility] - POSTOP PROGRESS NOTE    PLACE OF SERVICE:  [facility]     SURGERY: POD #2 after robotic assisted cystectomy, creation of ileal conduit, left pelvic lymph node dissection, cystoscopy, right ureteroscopy, right ureteral stent removal, extensive lysis of adhesions by Dr Fam Manrique for bladder cancer     SUBJECTIVE:   Events: no acute events overnight     Pt reports he just started passing a little gas this afternoon. Reports some abdominal distention, belching. Tolerating small amounts of clear liquids. Denies nausea, vomiting, fever, chills, chest pain, SOB, LE pain. Abdominal pain controlled with medication. Ambulated x 2 and up to chair today.      OBJECTIVE:  PHYSICAL EXAM  Vital signs:  Temp: 97.8  F (36.6  C) Temp src: Oral BP: (!) 158/76 Pulse: 70   Resp: 18 SpO2: 97 % O2 Device: None (Room air) Oxygen Delivery: 2 LPM Height: 180.3 cm (5' 11\") Weight: 107.6 kg (237 lb 4.8 oz)  Estimated body mass index is 33.1 kg/m  as calculated from the following:    Height as of this encounter: 1.803 m (5' 11\").    Weight as of this encounter: 107.6 kg (237 lb 4.8 oz).    General: NAD, alert, cooperative  Neuro: A&O x 3. Moving all extremities equally.   Resp: Reg resp rate/depth.  Abdomen: Soft, round, appropriately tender, moderately distended, no rebound or peritoneal signs  Incisions: C/D/I, closed with staples, no ecchymosis noted. Ostomy stoma is pink, 2 ureterostomy tube tips noted extending from stoma. 1450 ml urine so far today.   ALYSON: 100 ml ALYSON output so far today, 160 ml on 12/7.   : penis and scrotum without erythema or edema.     LABS:     Lab Results   Component Value Date    WBC 13.3 12/08/2021     Lab Results   Component Value Date    HGB 10.6 12/08/2021     Lab Results   Component Value Date     12/08/2021     Creatinine   Date Value Ref Range Status   12/08/2021 1.94 (H) 0.70 - 1.30 mg/dL Final     Sodium   Date Value Ref Range Status   12/08/2021 143 136 - 145 " mmol/L Final     Potassium   Date Value Ref Range Status   12/08/2021 4.1 3.5 - 5.0 mmol/L Final       Lab Results: personally reviewed.     ASSESSMENT/PLAN:   Marcelo Valerio is POD #2 after robotic assisted cystectomy, creation of ileal conduit, left pelvic lymph node dissection, cystoscopy, right ureteroscopy, right ureteral stent removal, extensive lysis of adhesions by Dr Fam Manrique for bladder cancer     - Diet - clear liquid diet (consistent carb), continue until passing gas regularly. Passed a little gas this afternoon for the first time.   - Drains - 100 ml ALYSON output so far today, 160 ml on 12/7.    - Creatinine 1.94 today, 2.17 yesterday (baseline 1.7 to 2.8). Avoid nephrotoxins.   - WBC 13.3 today, 14.5 yesterday. Remains afebrile.   - Hgb stable, 10.6.   - Labs - CBC and BMP in AM.   - Activity - encouraged ambulation, up to chair and incentive spirometer   - DVT prophylaxis: subcutaneous heparin, ambulation, SCDs  - Appreciate Medicine's assistance with medical management.   - Appreciate WOC RN assistance with stoma management.   - Appreciate Case Management assistance with discharge planning. Anticipate will need C RN for ostomy teaching/management.   - Anticipated discharge: 3-5 days      Will update: Dr Fam Adan, APRN, CNP  [facility]   [phone]

## 2021-12-08 NOTE — PROGRESS NOTES
Doing well  Passing flatus  Pain controlled  Ambulating    1) continue clears. If continues to pass gas overnight will advance diet in am  2) decrease ivf  3) appreciate medicine input.  4) lasix 10mg for diuresis.  2L positive. Over 48 hours.     Fam Manrique MD'

## 2021-12-08 NOTE — PROGRESS NOTES
[facility] PROGRESS NOTE      ADMIT DATE: 12/6/2021     FACILITY: [facility]    PCP: Urbano Cedeno, [phone]    ASSESSMENT AND PLAN:   Patient is a 76-year-old male with a history of basal cell carcinoma, prostate cancer, bladder cancer, T2DM, hyperlipidemia, gout, metabolic syndrome, obesity, hypertension, bilateral lung nodules, psoriasis, carpal tunnel, CKD 3 presented for urology procedure.     Active Problems:    Malignant neoplasm of overlapping sites of bladder (H)      POD#2 cystoscopy, right uteroscopy with right stent removal and robotic assisted radical cystectomy with creation of ileal conduit and extensive lysis of lesions 12/6.  -Surgeon urologist Dr. Manrique  -Postoperative care as per surgery  -diet, DVT proph, and pain control per urology (on clear liquid diet and on heparin q 8 hours subcutaneous for DVT proph). -->still has not been passing yet but tolerating clears right now.  -Cystectomy with an ileal conduit keeps leaking-->WOC consulted and new pouch placed-->leakage resolved  -patient to ambulate in hallway at least QID        CHINMAY on CKD 3  -Baseline serum creatinine 1.6, scr 2.13 post operatively-->2.17 on 12/7 --> 1.94 on 12/8  -serum creatinine 2.8 on preop work-up,   -Losartan held 24 hours prior to surgery, MFM held for 3-4 days prior  -c/w holding losartan until creatinine at baseline  -stop MFM  -c/w IVF for now      Hypoxia  -on oxygen post-op  - is improving and now satting at >90% on room air  -CXR 12/8 not concerning for pneumonia, concerning for atelectasis  -c/w IS and start flutter valve    Leukocytosis   -most likely stress-related due to surgery; however, procalc came back elevated at 0.61.   -obtained blood cultures which are pending but show NGTD  -WBC trending down slowly   -c/w IV zosyn       Hypertension  -On atenolol and losartan at home.   -c/w home atenolol  -adjusted norvasc to 2.5 mg BID  -c/w as needed hydralazine with parameters  -c/w  holding home losartan because of CHINMAY and hold until creatinine at baseline       T2DM  -Hemoglobin A1c 8.0  -On PTA Metformin was (discontinued preoperatively) and Trulicity weekly  -blood sugars elevated-->given dose of decadron on 12/6 am but none since then   -increase Lantus from 20 units subcutaneous BID to 24 units subcutaneous BID   -c/w high resistance SSI  -switched from mealtime insulin 1:10 ratio to 1:7 ratio   -diabetic clear liquid diet for now     HLD  -continue home simvastatin     Psoriasis-follows with dermatology and improving with light therapy           FEN/GI: low salt, low fat, diabetic diet  DVT proph: heparin  Code status: Full Code      Updated wife Nely at bedside on current plan.       Discharge per primary team (urology)       SUBJECTIVE:    Patient states he is having abdominal discomfort right now. He feels bloated. He still has not been passing gas yet. He is tolerating clears right now. He denies any other complaints at this time.     ROS:  12 Points review of systems reviewed and is negative except for what has already been mentioned above    OBJECTIVE:  Patient Vitals for the past 24 hrs:   BP Temp Temp src Pulse Resp SpO2   12/08/21 0821 (!) 170/85 98.4  F (36.9  C) Oral 76 16 97 %   12/07/21 2348 (!) 150/86 98.4  F (36.9  C) Oral 67 18 92 %   12/07/21 1625 -- -- -- 80 16 95 %   12/07/21 1541 (!) 156/83 98.9  F (37.2  C) Oral 72 18 92 %          Intake/Output Summary (Last 24 hours) at 12/7/2021 1448  Last data filed at 12/7/2021 1435  Gross per 24 hour   Intake 2463 ml   Output 2235 ml   Net 228 ml     GENRL: Not in acute distress. Satting at 97% on room air.   HEENT: NC/AT      Neck- supple      Mucous membrane- moist and pink  CHEST: Clear to auscultation bilaterally  HEART: S1S2 regular. No murmurs, rubs or gallops  ABDMN: Soft. Bowel sounds- active in all quadrants. Distended. Cystectomy with an ileal conduit present with minimal output. ALYSON drain draining serosanginous fluid.    EXTRM: No pedal edema.   NEURO: No involuntary movements  INTGM: please see nursing assessment for full skin assessment  PSYCH: normal affect, normal speech     DIAGNOSTIC DATA:          Recent Results (from the past 24 hour(s))   Glucose by meter    Collection Time: 12/07/21  4:44 PM   Result Value Ref Range    GLUCOSE BY METER POCT 344 (H) 70 - 99 mg/dL   Glucose by meter    Collection Time: 12/07/21  8:47 PM   Result Value Ref Range    GLUCOSE BY METER POCT 299 (H) 70 - 99 mg/dL   Glucose by meter    Collection Time: 12/08/21  4:52 AM   Result Value Ref Range    GLUCOSE BY METER POCT 236 (H) 70 - 99 mg/dL   Comprehensive metabolic panel    Collection Time: 12/08/21  6:17 AM   Result Value Ref Range    Sodium 143 136 - 145 mmol/L    Potassium 4.1 3.5 - 5.0 mmol/L    Chloride 108 (H) 98 - 107 mmol/L    Carbon Dioxide (CO2) 29 22 - 31 mmol/L    Anion Gap 6 5 - 18 mmol/L    Urea Nitrogen 28 8 - 28 mg/dL    Creatinine 1.94 (H) 0.70 - 1.30 mg/dL    Calcium 9.6 8.5 - 10.5 mg/dL    Glucose 267 (H) 70 - 125 mg/dL    Alkaline Phosphatase 69 45 - 120 U/L    AST 60 (H) 0 - 40 U/L    ALT 36 0 - 45 U/L    Protein Total 6.0 6.0 - 8.0 g/dL    Albumin 2.8 (L) 3.5 - 5.0 g/dL    Bilirubin Total 1.2 (H) 0.0 - 1.0 mg/dL    GFR Estimate 32 (L) >60 mL/min/1.73m2   Glucose    Collection Time: 12/08/21  6:17 AM   Result Value Ref Range    Glucose 267 (H) 70 - 125 mg/dL   CBC with platelets and differential    Collection Time: 12/08/21  6:17 AM   Result Value Ref Range    WBC Count 13.3 (H) 4.0 - 11.0 10e3/uL    RBC Count 3.45 (L) 4.40 - 5.90 10e6/uL    Hemoglobin 10.6 (L) 13.3 - 17.7 g/dL    Hematocrit 33.4 (L) 40.0 - 53.0 %    MCV 97 78 - 100 fL    MCH 30.7 26.5 - 33.0 pg    MCHC 31.7 31.5 - 36.5 g/dL    RDW 13.7 10.0 - 15.0 %    Platelet Count 180 150 - 450 10e3/uL    % Neutrophils 84 %    % Lymphocytes 6 %    % Monocytes 9 %    % Eosinophils 0 %    % Basophils 0 %    % Immature Granulocytes 1 %    NRBCs per 100 WBC 0 <1 /100     Absolute Neutrophils 11.3 (H) 1.6 - 8.3 10e3/uL    Absolute Lymphocytes 0.8 0.8 - 5.3 10e3/uL    Absolute Monocytes 1.2 0.0 - 1.3 10e3/uL    Absolute Eosinophils 0.0 0.0 - 0.7 10e3/uL    Absolute Basophils 0.0 0.0 - 0.2 10e3/uL    Absolute Immature Granulocytes 0.1 <=0.4 10e3/uL    Absolute NRBCs 0.0 10e3/uL   Glucose by meter    Collection Time: 12/08/21  9:16 AM   Result Value Ref Range    GLUCOSE BY METER POCT 226 (H) 70 - 99 mg/dL   Glucose by meter    Collection Time: 12/08/21 12:10 PM   Result Value Ref Range    GLUCOSE BY METER POCT 260 (H) 70 - 99 mg/dL        No results found for this visit on 12/06/21.       All recent labs reviewed personally  Radiology report reviewed.       The total time spent in preparing this progress note is about 35 minutes, >50% time spent in care co-ordination that includes reviewing labs, images, discussing the plan of care with patient/family, consultants, and .      Waleska Enriquez MD.   [facility]   [phone]   Pager [phone]

## 2021-12-08 NOTE — PLAN OF CARE
Problem: Pain (Urinary Diversion)  Goal: Acceptable Pain Control  Outcome: Improving     Problem: Postoperative Stoma Care (Urinary Diversion)  Goal: Optimal Stoma Healing  Outcome: Improving     Problem: Adult Inpatient Plan of Care  Goal: Readiness for Transition of Care  Outcome: Improving     Patient received PRN Dilaudid x1 for 5/10 pain. Ambulated in lucio. Stoma is beefy red and urostomy is draining appropriately. PRN TUMS given for heartburn. Cares were clustered to improve sleep.

## 2021-12-08 NOTE — PLAN OF CARE
Problem: Ongoing Anesthesia Effects (Urinary Diversion)  Goal: Anesthesia/Sedation Recovery  Outcome: No Change  Intervention: Optimize Anesthesia Recovery  Recent Flowsheet Documentation  Taken 12/7/2021 1625 by Ladonna Baez RN  Safety Promotion/Fall Prevention:   activity supervised   clutter free environment maintained   lighting adjusted   mobility aid in reach   nonskid shoes/slippers when out of bed   room door open  Administration (IS): instruction provided, follow-up  Level Incentive Spirometer (mL): 1500  Number of Repetitions (IS): 5  Patient Tolerance (IS): good     Problem: Adult Inpatient Plan of Care  Goal: Plan of Care Review  Outcome: Improving  Flowsheets (Taken 12/7/2021 1347)  Plan of Care Reviewed With: patient  Outcome Summary: Surgical pain well controlled.  No nausea.  Tolerate ambulation.  Progress: improving     B/P: 156/83, T: 98.9, P: 80, R: 16 O2:  93 % 2LNC    Pt denied needing anything for pain.    Denied nausea and taking in oral fluids.    Tolerated ambulating in the hallway x 1.    Urostomy draining cloudy yellow urine in adequate amounts.    ALYSON draining bloody fluid.    IVF's running per order.

## 2021-12-09 LAB
ALBUMIN SERPL-MCNC: 2.6 G/DL (ref 3.5–5)
ALP SERPL-CCNC: 78 U/L (ref 45–120)
ALT SERPL W P-5'-P-CCNC: 31 U/L (ref 0–45)
ANION GAP SERPL CALCULATED.3IONS-SCNC: 9 MMOL/L (ref 5–18)
AST SERPL W P-5'-P-CCNC: 45 U/L (ref 0–40)
BASOPHILS # BLD AUTO: 0 10E3/UL (ref 0–0.2)
BASOPHILS NFR BLD AUTO: 0 %
BILIRUB SERPL-MCNC: 0.9 MG/DL (ref 0–1)
BUN SERPL-MCNC: 27 MG/DL (ref 8–28)
CALCIUM SERPL-MCNC: 9.5 MG/DL (ref 8.5–10.5)
CHLORIDE BLD-SCNC: 110 MMOL/L (ref 98–107)
CO2 SERPL-SCNC: 26 MMOL/L (ref 22–31)
CREAT SERPL-MCNC: 2.05 MG/DL (ref 0.7–1.3)
EOSINOPHIL # BLD AUTO: 0 10E3/UL (ref 0–0.7)
EOSINOPHIL NFR BLD AUTO: 0 %
ERYTHROCYTE [DISTWIDTH] IN BLOOD BY AUTOMATED COUNT: 13.9 % (ref 10–15)
GFR SERPL CREATININE-BSD FRML MDRD: 30 ML/MIN/1.73M2
GLUCOSE BLD-MCNC: 189 MG/DL (ref 70–125)
GLUCOSE BLDC GLUCOMTR-MCNC: 165 MG/DL (ref 70–99)
GLUCOSE BLDC GLUCOMTR-MCNC: 166 MG/DL (ref 70–99)
GLUCOSE BLDC GLUCOMTR-MCNC: 169 MG/DL (ref 70–99)
GLUCOSE BLDC GLUCOMTR-MCNC: 178 MG/DL (ref 70–99)
GLUCOSE BLDC GLUCOMTR-MCNC: 179 MG/DL (ref 70–99)
HCT VFR BLD AUTO: 33.1 % (ref 40–53)
HGB BLD-MCNC: 10.3 G/DL (ref 13.3–17.7)
IMM GRANULOCYTES # BLD: 0.1 10E3/UL
IMM GRANULOCYTES NFR BLD: 1 %
LYMPHOCYTES # BLD AUTO: 1 10E3/UL (ref 0.8–5.3)
LYMPHOCYTES NFR BLD AUTO: 9 %
MCH RBC QN AUTO: 30.8 PG (ref 26.5–33)
MCHC RBC AUTO-ENTMCNC: 31.1 G/DL (ref 31.5–36.5)
MCV RBC AUTO: 99 FL (ref 78–100)
MONOCYTES # BLD AUTO: 1.1 10E3/UL (ref 0–1.3)
MONOCYTES NFR BLD AUTO: 10 %
NEUTROPHILS # BLD AUTO: 9.7 10E3/UL (ref 1.6–8.3)
NEUTROPHILS NFR BLD AUTO: 80 %
NRBC # BLD AUTO: 0 10E3/UL
NRBC BLD AUTO-RTO: 0 /100
PLATELET # BLD AUTO: 205 10E3/UL (ref 150–450)
POTASSIUM BLD-SCNC: 4.1 MMOL/L (ref 3.5–5)
PROT SERPL-MCNC: 6 G/DL (ref 6–8)
RBC # BLD AUTO: 3.34 10E6/UL (ref 4.4–5.9)
SODIUM SERPL-SCNC: 145 MMOL/L (ref 136–145)
WBC # BLD AUTO: 11.9 10E3/UL (ref 4–11)

## 2021-12-09 PROCEDURE — 250N000013 HC RX MED GY IP 250 OP 250 PS 637: Performed by: FAMILY MEDICINE

## 2021-12-09 PROCEDURE — 36415 COLL VENOUS BLD VENIPUNCTURE: CPT | Performed by: UROLOGY

## 2021-12-09 PROCEDURE — 250N000011 HC RX IP 250 OP 636: Performed by: UROLOGY

## 2021-12-09 PROCEDURE — 99233 SBSQ HOSP IP/OBS HIGH 50: CPT | Performed by: FAMILY MEDICINE

## 2021-12-09 PROCEDURE — 250N000011 HC RX IP 250 OP 636: Performed by: FAMILY MEDICINE

## 2021-12-09 PROCEDURE — 258N000003 HC RX IP 258 OP 636: Performed by: UROLOGY

## 2021-12-09 PROCEDURE — 120N000001 HC R&B MED SURG/OB

## 2021-12-09 PROCEDURE — 80053 COMPREHEN METABOLIC PANEL: CPT | Performed by: FAMILY MEDICINE

## 2021-12-09 PROCEDURE — 85025 COMPLETE CBC W/AUTO DIFF WBC: CPT | Performed by: UROLOGY

## 2021-12-09 RX ORDER — POLYETHYLENE GLYCOL 3350 17 G/17G
17 POWDER, FOR SOLUTION ORAL DAILY
Qty: 255 G | Refills: 0 | Status: SHIPPED | OUTPATIENT
Start: 2021-12-09 | End: 2021-12-12

## 2021-12-09 RX ORDER — ACETAMINOPHEN 325 MG/1
325-650 TABLET ORAL EVERY 4 HOURS PRN
Qty: 30 TABLET | Refills: 0 | COMMUNITY
Start: 2021-12-09

## 2021-12-09 RX ADMIN — PIPERACILLIN SODIUM AND TAZOBACTAM SODIUM 3.38 G: 3; .375 INJECTION, POWDER, LYOPHILIZED, FOR SOLUTION INTRAVENOUS at 08:33

## 2021-12-09 RX ADMIN — ACETAMINOPHEN 650 MG: 325 TABLET ORAL at 11:58

## 2021-12-09 RX ADMIN — ACETAMINOPHEN 650 MG: 325 TABLET ORAL at 04:25

## 2021-12-09 RX ADMIN — HEPARIN SODIUM 5000 UNITS: 5000 INJECTION, SOLUTION INTRAVENOUS; SUBCUTANEOUS at 17:36

## 2021-12-09 RX ADMIN — AMLODIPINE BESYLATE 2.5 MG: 2.5 TABLET ORAL at 21:02

## 2021-12-09 RX ADMIN — INSULIN ASPART 4 UNITS: 100 INJECTION, SOLUTION INTRAVENOUS; SUBCUTANEOUS at 09:32

## 2021-12-09 RX ADMIN — SODIUM CHLORIDE, PRESERVATIVE FREE: 5 INJECTION INTRAVENOUS at 12:01

## 2021-12-09 RX ADMIN — AMLODIPINE BESYLATE 2.5 MG: 2.5 TABLET ORAL at 08:33

## 2021-12-09 RX ADMIN — HYDROMORPHONE HYDROCHLORIDE 0.2 MG: 0.2 INJECTION, SOLUTION INTRAMUSCULAR; INTRAVENOUS; SUBCUTANEOUS at 04:26

## 2021-12-09 RX ADMIN — PIPERACILLIN SODIUM AND TAZOBACTAM SODIUM 3.38 G: 3; .375 INJECTION, POWDER, LYOPHILIZED, FOR SOLUTION INTRAVENOUS at 17:35

## 2021-12-09 RX ADMIN — HEPARIN SODIUM 5000 UNITS: 5000 INJECTION, SOLUTION INTRAVENOUS; SUBCUTANEOUS at 00:32

## 2021-12-09 RX ADMIN — BRIMONIDINE TARTRATE 1 DROP: 1.5 SOLUTION OPHTHALMIC at 08:34

## 2021-12-09 RX ADMIN — ACETAMINOPHEN 650 MG: 325 TABLET ORAL at 21:02

## 2021-12-09 RX ADMIN — PIPERACILLIN SODIUM AND TAZOBACTAM SODIUM 3.38 G: 3; .375 INJECTION, POWDER, LYOPHILIZED, FOR SOLUTION INTRAVENOUS at 00:21

## 2021-12-09 RX ADMIN — BRIMONIDINE TARTRATE 1 DROP: 1.5 SOLUTION OPHTHALMIC at 21:01

## 2021-12-09 RX ADMIN — ATENOLOL 50 MG: 25 TABLET ORAL at 21:02

## 2021-12-09 RX ADMIN — ATENOLOL 50 MG: 25 TABLET ORAL at 08:33

## 2021-12-09 RX ADMIN — SIMVASTATIN 20 MG: 10 TABLET, FILM COATED ORAL at 21:01

## 2021-12-09 RX ADMIN — HEPARIN SODIUM 5000 UNITS: 5000 INJECTION, SOLUTION INTRAVENOUS; SUBCUTANEOUS at 08:33

## 2021-12-09 ASSESSMENT — ACTIVITIES OF DAILY LIVING (ADL)
ADLS_ACUITY_SCORE: 8

## 2021-12-09 NOTE — PROGRESS NOTES
[facility] PROGRESS NOTE      ADMIT DATE: 12/6/2021     FACILITY: [facility]    PCP: Urbano Cedeno, [phone]    ASSESSMENT AND PLAN:   Patient is a 76-year-old male with a history of basal cell carcinoma, prostate cancer, bladder cancer, T2DM, hyperlipidemia, gout, metabolic syndrome, obesity, hypertension, bilateral lung nodules, psoriasis, carpal tunnel, CKD 3 presented for urology procedure.     Active Problems:    Malignant neoplasm of overlapping sites of bladder (H)      POD#3 cystoscopy, right uteroscopy with right stent removal and robotic assisted radical cystectomy with creation of ileal conduit and extensive lysis of lesions 12/6.  -Surgeon urologist Dr. Manrique  -Postoperative care as per surgery  -diet, DVT proph, and pain control per urology (on clear liquid diet and on heparin q 8 hours subcutaneous for DVT proph). -->started passing gas on 12/8 but not passing gas regularly so patient will c/w liquid diet for now  -Cystectomy with an ileal conduit keeps leaking-->WOC consulted and new pouch placed-->leakage resolved  -patient to ambulate in hallway at least QID        CHINMAY on CKD 3  -Baseline serum creatinine 1.6, scr 2.13 post operatively-->2.17 on 12/7 --> 1.94 on 12/8  -serum creatinine 2.8 on preop work-up,   -creatinine on 12/9 is increased at 2.05 which could be because of IV lasix 10 mg given by urology last night (12/8-12/9).   -Losartan held 24 hours prior to surgery, MFM held for 3-4 days prior  -c/w holding losartan until creatinine at baseline  -stop MFM  -c/w IVF for now until patient is taking fluids adequately       Hypoxia  -on oxygen post-op  - is improving and now satting at >90% on room air during the day  -CXR 12/8 not concerning for pneumonia, concerning for atelectasis  -c/w IS and start flutter valve    Leukocytosis   -most likely stress-related due to surgery; however, procalc came back elevated at 0.61.   -obtained blood cultures which are pending  but show NGTD  -WBC trending down slowly   -c/w IV zosyn       Hypertension  -On atenolol and losartan at home.   -BP improving  -c/w home atenolol  -c/w norvasc 2.5 mg BID  -c/w as needed hydralazine with parameters  -c/w holding home losartan because of CHINMAY and hold until creatinine at baseline       T2DM  -Hemoglobin A1c 8.0  -On PTA Metformin was (discontinued preoperatively) and Trulicity weekly  -blood sugars improving  -c/w Lantus 24 units subcutaneous BID   -c/w high resistance SSI  -c/w mealtime insulin 1:7 ratio   -diabetic clear liquid diet for now     HLD  -continue home simvastatin     Psoriasis-follows with dermatology and improving with light therapy           FEN/GI: low salt, low fat, diabetic diet  DVT proph: heparin  Code status: Full Code      Discharge per primary team (urology)       SUBJECTIVE:    Patient states he is having less abdominal pain and discomfort today. He is passing gas but still little. He is tolerating clear liquids. Patient denies any other complaints at this time.     ROS:  12 Points review of systems reviewed and is negative except for what has already been mentioned above    OBJECTIVE:  Patient Vitals for the past 24 hrs:   BP Temp Temp src Pulse Resp SpO2   12/09/21 1549 (!) 142/80 97.8  F (36.6  C) Oral 62 20 93 %   12/09/21 1138 (!) 147/73 97.6  F (36.4  C) Oral 73 20 94 %   12/09/21 0900 -- -- -- -- -- 94 %   12/09/21 0741 (!) 159/80 98  F (36.7  C) Oral 65 20 96 %   12/09/21 0432 (!) 151/89 -- -- 73 20 93 %   12/09/21 0019 (!) 150/90 97.7  F (36.5  C) Oral 66 20 95 %   12/08/21 2231 (!) 151/86 -- -- -- -- --   12/08/21 2038 (!) 188/108 -- -- -- -- --   12/08/21 1846 (!) 161/101 -- -- 68 -- --          Intake/Output Summary (Last 24 hours) at 12/7/2021 1448  Last data filed at 12/7/2021 1435  Gross per 24 hour   Intake 2463 ml   Output 2235 ml   Net 228 ml     GENRL: Not in acute distress. Satting at 94% on room air.   HEENT: NC/AT      Neck- supple      Mucous  membrane- moist and pink  CHEST: Clear to auscultation bilaterally  HEART: S1S2 regular. No murmurs, rubs or gallops  ABDMN: Soft. Bowel sounds- active in all quadrants. Distended but less than yesterday. Cystectomy with an ileal conduit present with minimal output. ALYSON drain draining serosanginous fluid.   EXTRM: No pedal edema.   NEURO: No involuntary movements  INTGM: please see nursing assessment for full skin assessment  PSYCH: normal affect, normal speech     DIAGNOSTIC DATA:          Recent Results (from the past 24 hour(s))   Glucose by meter    Collection Time: 12/08/21  8:44 PM   Result Value Ref Range    GLUCOSE BY METER POCT 200 (H) 70 - 99 mg/dL   Glucose by meter    Collection Time: 12/09/21  2:23 AM   Result Value Ref Range    GLUCOSE BY METER POCT 169 (H) 70 - 99 mg/dL   Comprehensive metabolic panel    Collection Time: 12/09/21  7:33 AM   Result Value Ref Range    Sodium 145 136 - 145 mmol/L    Potassium 4.1 3.5 - 5.0 mmol/L    Chloride 110 (H) 98 - 107 mmol/L    Carbon Dioxide (CO2) 26 22 - 31 mmol/L    Anion Gap 9 5 - 18 mmol/L    Urea Nitrogen 27 8 - 28 mg/dL    Creatinine 2.05 (H) 0.70 - 1.30 mg/dL    Calcium 9.5 8.5 - 10.5 mg/dL    Glucose 189 (H) 70 - 125 mg/dL    Alkaline Phosphatase 78 45 - 120 U/L    AST 45 (H) 0 - 40 U/L    ALT 31 0 - 45 U/L    Protein Total 6.0 6.0 - 8.0 g/dL    Albumin 2.6 (L) 3.5 - 5.0 g/dL    Bilirubin Total 0.9 0.0 - 1.0 mg/dL    GFR Estimate 30 (L) >60 mL/min/1.73m2   CBC with platelets and differential    Collection Time: 12/09/21  7:33 AM   Result Value Ref Range    WBC Count 11.9 (H) 4.0 - 11.0 10e3/uL    RBC Count 3.34 (L) 4.40 - 5.90 10e6/uL    Hemoglobin 10.3 (L) 13.3 - 17.7 g/dL    Hematocrit 33.1 (L) 40.0 - 53.0 %    MCV 99 78 - 100 fL    MCH 30.8 26.5 - 33.0 pg    MCHC 31.1 (L) 31.5 - 36.5 g/dL    RDW 13.9 10.0 - 15.0 %    Platelet Count 205 150 - 450 10e3/uL    % Neutrophils 80 %    % Lymphocytes 9 %    % Monocytes 10 %    % Eosinophils 0 %    % Basophils 0  %    % Immature Granulocytes 1 %    NRBCs per 100 WBC 0 <1 /100    Absolute Neutrophils 9.7 (H) 1.6 - 8.3 10e3/uL    Absolute Lymphocytes 1.0 0.8 - 5.3 10e3/uL    Absolute Monocytes 1.1 0.0 - 1.3 10e3/uL    Absolute Eosinophils 0.0 0.0 - 0.7 10e3/uL    Absolute Basophils 0.0 0.0 - 0.2 10e3/uL    Absolute Immature Granulocytes 0.1 <=0.4 10e3/uL    Absolute NRBCs 0.0 10e3/uL   Glucose by meter    Collection Time: 12/09/21  8:31 AM   Result Value Ref Range    GLUCOSE BY METER POCT 165 (H) 70 - 99 mg/dL   Glucose by meter    Collection Time: 12/09/21 11:49 AM   Result Value Ref Range    GLUCOSE BY METER POCT 178 (H) 70 - 99 mg/dL   Glucose by meter    Collection Time: 12/09/21  4:58 PM   Result Value Ref Range    GLUCOSE BY METER POCT 179 (H) 70 - 99 mg/dL        No results found for this visit on 12/06/21.       All recent labs reviewed personally  Radiology report reviewed.       The total time spent in preparing this progress note is about 35 minutes, >50% time spent in care co-ordination that includes reviewing labs, images, discussing the plan of care with patient/family, consultants, and .      Waleska Enriquez MD.   [facility] Medicine Service   [phone]   Pager [phone]

## 2021-12-09 NOTE — PLAN OF CARE
Problem: Adult Inpatient Plan of Care  Goal: Plan of Care Review  Outcome: Improving   Weaned off oxygen, maintaining sats on room air.  Encouraged use of IS.  Up to chair for meals.  Ambulated in the hallway with walker and SBA.  Taking scheduled tylenol for pain.  Tolerating clears, had one small loose stool.

## 2021-12-09 NOTE — PROGRESS NOTES
"  [facility] - POSTOP PROGRESS NOTE    PLACE OF SERVICE:  [facility]     SURGERY: POD #3 after robotic assisted cystectomy, creation of ileal conduit, left pelvic lymph node dissection, cystoscopy, right ureteroscopy, right ureteral stent removal, extensive lysis of adhesions by Dr Fam Manrique for bladder cancer     SUBJECTIVE:   Events: no acute events overnight     Passing some gas.  Reports some abdominal distention, belching. Tolerating small amounts of clear liquids. Denies nausea, vomiting, fever, chills, chest pain, SOB, LE pain. Abdominal pain controlled with medication. Ambulated x 2 and up to chair today.  Also had some penile bleeding.  No clots.  None on my exam.     OBJECTIVE:  PHYSICAL EXAM  Vital signs:  Temp: 97.6  F (36.4  C) Temp src: Oral BP: (!) 147/73 Pulse: 73   Resp: 20 SpO2: 94 % O2 Device: None (Room air) Oxygen Delivery: 2 LPM Height: 180.3 cm (5' 11\") Weight: 107.6 kg (237 lb 4.8 oz)  Estimated body mass index is 33.1 kg/m  as calculated from the following:    Height as of this encounter: 1.803 m (5' 11\").    Weight as of this encounter: 107.6 kg (237 lb 4.8 oz).    General: NAD, alert, cooperative  Neuro: A&O x 3. Moving all extremities equally.   Resp: Reg resp rate/depth.  Abdomen: Soft, round, appropriately tender, moderately distended, no rebound or peritoneal signs  Incisions: C/D/I, closed with staples, no ecchymosis noted. Ostomy stoma is pink, 2 ureterostomy tube tips noted extending from stoma. 875 ml urine so far today.   ALYSON: 25s ml ALYSON output so far today, 140 ml on 12/8.   : penis and scrotum without erythema or edema.     LABS:     Lab Results   Component Value Date    WBC 13.3 12/08/2021     Lab Results   Component Value Date    HGB 10.6 12/08/2021     Lab Results   Component Value Date     12/08/2021     Creatinine   Date Value Ref Range Status   12/09/2021 2.05 (H) 0.70 - 1.30 mg/dL Final     Sodium   Date Value Ref Range Status   12/09/2021 145 " 136 - 145 mmol/L Final     Potassium   Date Value Ref Range Status   12/09/2021 4.1 3.5 - 5.0 mmol/L Final       Lab Results: personally reviewed.     ASSESSMENT/PLAN:   Marcelo Valerio is POD #3 after robotic assisted cystectomy, creation of ileal conduit, left pelvic lymph node dissection, cystoscopy, right ureteroscopy, right ureteral stent removal, extensive lysis of adhesions by Dr Fam Manrique for bladder cancer     - Diet - clear liquid diet (consistent carb), continue until passing gas regularly. Passed a little gas this afternoon for the first time.   - Drains - 120 ml ALYSON output so far today, 140 ml on 12/8.    - Creatinine 2.05 today, 1.94 yesterday (baseline 1.7 to 2.8). Avoid nephrotoxins.   - WBC 11.9 today, 13.3 yesterday. Remains afebrile.   - Hgb stable, 10.3.   - Labs - CBC and BMP in AM.   - Activity - encouraged ambulation, up to chair and incentive spirometer   - DVT prophylaxis: subcutaneous heparin, ambulation, SCDs  - Appreciate Medicine's assistance with medical management.   - Appreciate WOC RN assistance with stoma management.   - Appreciate Case Management assistance with discharge planning. Anticipate will need [facility] RN for ostomy teaching/management.   - Anticipated discharge: 3-5 days      Will update: Dr Fam Elena PA-C    [facility]   [phone]

## 2021-12-09 NOTE — SIGNIFICANT EVENT
Significant Event Note    Time of event: 4:48 AM December 9, 2021    Description of event:  House officer notified of pt having blood-tinged urine. No clots passed, no pain with this. Hemodynamically stable. Pt reports this is the first time he has had this. Denies any pain with urination.     Plan:  Monitor for now. Urology is following. Pt is POD#3 from cystectomy/cystoscopy/ureteroscopy, so this is likely expected. RN will reach out if further complications arise.     Discussed with: bedside nurse    Filemon Gonzalez DO

## 2021-12-09 NOTE — PLAN OF CARE
Problem: Adult Inpatient Plan of Care  Goal: Plan of Care Review  Outcome: Improving   Alert and oriented. Calm and cooperative with cares. Denies pain.   On 2L of oxygen per NC with sats in the mid 90s.  He is passing gas and is burping. Urostomy and ALYSON drain patent and draining.  Blood pressure slightly elevated. Pt denies dizziness or lightheadedness, nausea or vomiting. 0200 Blood sugar is 163. Cramping pain observed on the entire abdomen, administered dilaudid IV with good relief. Patient verbalized he doesn't like the effects of the oxycodone.     0430Pt up to the bathroom x1 assist. He had loose dark brown moderate stools.  Blood also noted coming out from the penis. No clots and no pain from his penis. Pressure applied. Notified Dr Pratt and said continue to monitor.  Upon reassessment, it looks like the bleeding has stopped. But as soon as he got up again and walked to the bathroom where he had another loose brownish-yellow stools it starts  bleeding again.. slowed down when he settled back to bed. Pressure again applied which proved effective.   He voices no penile or abdominal pain at this time. No complaints of dizziness or lightheadedness.     Urostomy-700cc  ALYSON-120cc

## 2021-12-09 NOTE — TREATMENT PLAN
[facility]AT Treatment Plan    Patient Score: 7    Patient Acuity: 4    Clinical Indication for Therapy: Lung Nodule     Therapy Ordered: Flutter valve as needed- pt able to do it.     Assessment Summary: RT will follow.

## 2021-12-09 NOTE — PROVIDER NOTIFICATION
Dr. Gonzalez notified re: blood coming out form the penis. No pain noted from the penis and no clots.

## 2021-12-09 NOTE — PLAN OF CARE
Problem: Pain (Urinary Diversion)  Goal: Acceptable Pain Control  Outcome: Improving   Patient denies pain or discomfort, remains on 02 @L, sats has been in the upper 90's, no acute respiratory distress, no dyspnea, urostomy and kimo drain intact and patent, blood sugars were 222 add 200, hs bp's were 161/100 and 188/108, had schedule bp meds was rechecked for 151/86, did not meet the parameters for prn bp med, alert and oriented.

## 2021-12-10 LAB
ALBUMIN SERPL-MCNC: 2.5 G/DL (ref 3.5–5)
ALP SERPL-CCNC: 86 U/L (ref 45–120)
ALT SERPL W P-5'-P-CCNC: 30 U/L (ref 0–45)
ANION GAP SERPL CALCULATED.3IONS-SCNC: 8 MMOL/L (ref 5–18)
AST SERPL W P-5'-P-CCNC: 40 U/L (ref 0–40)
BASOPHILS # BLD AUTO: 0 10E3/UL (ref 0–0.2)
BASOPHILS NFR BLD AUTO: 0 %
BILIRUB SERPL-MCNC: 0.9 MG/DL (ref 0–1)
BUN SERPL-MCNC: 27 MG/DL (ref 8–28)
CALCIUM SERPL-MCNC: 9.3 MG/DL (ref 8.5–10.5)
CHLORIDE BLD-SCNC: 111 MMOL/L (ref 98–107)
CO2 SERPL-SCNC: 27 MMOL/L (ref 22–31)
CREAT SERPL-MCNC: 1.87 MG/DL (ref 0.7–1.3)
CREATININE FLUID SOURCE: NORMAL
CREATININE QUAL FLUID: NORMAL
EOSINOPHIL # BLD AUTO: 0 10E3/UL (ref 0–0.7)
EOSINOPHIL NFR BLD AUTO: 0 %
ERYTHROCYTE [DISTWIDTH] IN BLOOD BY AUTOMATED COUNT: 13.7 % (ref 10–15)
GFR SERPL CREATININE-BSD FRML MDRD: 34 ML/MIN/1.73M2
GLUCOSE BLD-MCNC: 112 MG/DL (ref 70–125)
GLUCOSE BLDC GLUCOMTR-MCNC: 103 MG/DL (ref 70–99)
GLUCOSE BLDC GLUCOMTR-MCNC: 136 MG/DL (ref 70–99)
GLUCOSE BLDC GLUCOMTR-MCNC: 151 MG/DL (ref 70–99)
GLUCOSE BLDC GLUCOMTR-MCNC: 186 MG/DL (ref 70–99)
HCT VFR BLD AUTO: 34 % (ref 40–53)
HGB BLD-MCNC: 10.5 G/DL (ref 13.3–17.7)
IMM GRANULOCYTES # BLD: 0.1 10E3/UL
IMM GRANULOCYTES NFR BLD: 1 %
LYMPHOCYTES # BLD AUTO: 1.3 10E3/UL (ref 0.8–5.3)
LYMPHOCYTES NFR BLD AUTO: 15 %
MCH RBC QN AUTO: 30.3 PG (ref 26.5–33)
MCHC RBC AUTO-ENTMCNC: 30.9 G/DL (ref 31.5–36.5)
MCV RBC AUTO: 98 FL (ref 78–100)
MONOCYTES # BLD AUTO: 1 10E3/UL (ref 0–1.3)
MONOCYTES NFR BLD AUTO: 11 %
NEUTROPHILS # BLD AUTO: 6.4 10E3/UL (ref 1.6–8.3)
NEUTROPHILS NFR BLD AUTO: 73 %
NRBC # BLD AUTO: 0 10E3/UL
NRBC BLD AUTO-RTO: 0 /100
PLATELET # BLD AUTO: 231 10E3/UL (ref 150–450)
POTASSIUM BLD-SCNC: 3.7 MMOL/L (ref 3.5–5)
PROT SERPL-MCNC: 5.8 G/DL (ref 6–8)
RBC # BLD AUTO: 3.47 10E6/UL (ref 4.4–5.9)
SODIUM SERPL-SCNC: 146 MMOL/L (ref 136–145)
WBC # BLD AUTO: 8.7 10E3/UL (ref 4–11)

## 2021-12-10 PROCEDURE — 36415 COLL VENOUS BLD VENIPUNCTURE: CPT | Performed by: FAMILY MEDICINE

## 2021-12-10 PROCEDURE — 120N000001 HC R&B MED SURG/OB

## 2021-12-10 PROCEDURE — 85025 COMPLETE CBC W/AUTO DIFF WBC: CPT | Performed by: UROLOGY

## 2021-12-10 PROCEDURE — 250N000011 HC RX IP 250 OP 636: Performed by: FAMILY MEDICINE

## 2021-12-10 PROCEDURE — 250N000011 HC RX IP 250 OP 636: Performed by: UROLOGY

## 2021-12-10 PROCEDURE — 250N000013 HC RX MED GY IP 250 OP 250 PS 637: Performed by: FAMILY MEDICINE

## 2021-12-10 PROCEDURE — 82570 ASSAY OF URINE CREATININE: CPT | Performed by: PHYSICIAN ASSISTANT

## 2021-12-10 PROCEDURE — 80053 COMPREHEN METABOLIC PANEL: CPT | Performed by: FAMILY MEDICINE

## 2021-12-10 PROCEDURE — 999N000198 HC STATISTIC WOC PT EDUCATION, 16-30 MIN

## 2021-12-10 PROCEDURE — 258N000003 HC RX IP 258 OP 636: Performed by: UROLOGY

## 2021-12-10 PROCEDURE — 99233 SBSQ HOSP IP/OBS HIGH 50: CPT | Performed by: FAMILY MEDICINE

## 2021-12-10 RX ORDER — HYDROMORPHONE HYDROCHLORIDE 2 MG/1
2 TABLET ORAL EVERY 4 HOURS PRN
Status: DISCONTINUED | OUTPATIENT
Start: 2021-12-10 | End: 2021-12-10

## 2021-12-10 RX ORDER — HYDROMORPHONE HYDROCHLORIDE 4 MG/1
4 TABLET ORAL EVERY 4 HOURS PRN
Status: DISCONTINUED | OUTPATIENT
Start: 2021-12-10 | End: 2021-12-10

## 2021-12-10 RX ORDER — HYDROMORPHONE HYDROCHLORIDE 2 MG/1
2 TABLET ORAL EVERY 4 HOURS PRN
Status: DISCONTINUED | OUTPATIENT
Start: 2021-12-10 | End: 2021-12-12 | Stop reason: HOSPADM

## 2021-12-10 RX ADMIN — AMLODIPINE BESYLATE 2.5 MG: 2.5 TABLET ORAL at 09:17

## 2021-12-10 RX ADMIN — PIPERACILLIN SODIUM AND TAZOBACTAM SODIUM 3.38 G: 3; .375 INJECTION, POWDER, LYOPHILIZED, FOR SOLUTION INTRAVENOUS at 16:56

## 2021-12-10 RX ADMIN — INSULIN ASPART 4 UNITS: 100 INJECTION, SOLUTION INTRAVENOUS; SUBCUTANEOUS at 18:46

## 2021-12-10 RX ADMIN — AMLODIPINE BESYLATE 2.5 MG: 2.5 TABLET ORAL at 20:35

## 2021-12-10 RX ADMIN — PIPERACILLIN SODIUM AND TAZOBACTAM SODIUM 3.38 G: 3; .375 INJECTION, POWDER, LYOPHILIZED, FOR SOLUTION INTRAVENOUS at 01:07

## 2021-12-10 RX ADMIN — SODIUM CHLORIDE, PRESERVATIVE FREE: 5 INJECTION INTRAVENOUS at 08:40

## 2021-12-10 RX ADMIN — SIMVASTATIN 20 MG: 10 TABLET, FILM COATED ORAL at 20:35

## 2021-12-10 RX ADMIN — HYDROMORPHONE HYDROCHLORIDE 0.2 MG: 0.2 INJECTION, SOLUTION INTRAMUSCULAR; INTRAVENOUS; SUBCUTANEOUS at 03:41

## 2021-12-10 RX ADMIN — BRIMONIDINE TARTRATE 1 DROP: 1.5 SOLUTION OPHTHALMIC at 20:36

## 2021-12-10 RX ADMIN — Medication 1 MG: at 01:19

## 2021-12-10 RX ADMIN — PIPERACILLIN SODIUM AND TAZOBACTAM SODIUM 3.38 G: 3; .375 INJECTION, POWDER, LYOPHILIZED, FOR SOLUTION INTRAVENOUS at 09:18

## 2021-12-10 RX ADMIN — HEPARIN SODIUM 5000 UNITS: 5000 INJECTION, SOLUTION INTRAVENOUS; SUBCUTANEOUS at 16:57

## 2021-12-10 RX ADMIN — HEPARIN SODIUM 5000 UNITS: 5000 INJECTION, SOLUTION INTRAVENOUS; SUBCUTANEOUS at 09:57

## 2021-12-10 RX ADMIN — INSULIN ASPART 4 UNITS: 100 INJECTION, SOLUTION INTRAVENOUS; SUBCUTANEOUS at 12:43

## 2021-12-10 RX ADMIN — ACETAMINOPHEN 650 MG: 325 TABLET ORAL at 12:38

## 2021-12-10 RX ADMIN — ATENOLOL 50 MG: 25 TABLET ORAL at 20:36

## 2021-12-10 RX ADMIN — HYDROMORPHONE HYDROCHLORIDE 0.2 MG: 0.2 INJECTION, SOLUTION INTRAMUSCULAR; INTRAVENOUS; SUBCUTANEOUS at 09:57

## 2021-12-10 RX ADMIN — ATENOLOL 50 MG: 25 TABLET ORAL at 09:18

## 2021-12-10 RX ADMIN — HEPARIN SODIUM 5000 UNITS: 5000 INJECTION, SOLUTION INTRAVENOUS; SUBCUTANEOUS at 01:06

## 2021-12-10 RX ADMIN — ACETAMINOPHEN 650 MG: 325 TABLET ORAL at 05:10

## 2021-12-10 RX ADMIN — INSULIN ASPART 2 UNITS: 100 INJECTION, SOLUTION INTRAVENOUS; SUBCUTANEOUS at 09:51

## 2021-12-10 RX ADMIN — BRIMONIDINE TARTRATE 1 DROP: 1.5 SOLUTION OPHTHALMIC at 09:19

## 2021-12-10 ASSESSMENT — ACTIVITIES OF DAILY LIVING (ADL)
ADLS_ACUITY_SCORE: 10
ADLS_ACUITY_SCORE: 8
ADLS_ACUITY_SCORE: 8
ADLS_ACUITY_SCORE: 10
ADLS_ACUITY_SCORE: 10
ADLS_ACUITY_SCORE: 8
ADLS_ACUITY_SCORE: 10

## 2021-12-10 NOTE — PROGRESS NOTES
"WOC stoma assessment Urostomy    Allergies:  Zinc Acetate  Nickel  Sulfa (Sulfonamide Antibiotics) [Sulfa Drugs]    Height:  Height: 180.3 cm (5' 11\")    Weight:   Weight: 107.6 kg (237 lb 4.8 oz)    Past Medical History:  Past Medical History:   Diagnosis Date     Basal cell carcinoma      Bladder tumor      Cancer (H)     Prostate     Chronic kidney disease      CKD (chronic kidney disease)      DM2 (diabetes mellitus, type 2) (H)      Glaucoma      History of gout      HTN (hypertension)      Hyperlipidemia      Kidney stone      Lung nodule      Malignant neoplasm of urinary bladder, unspecified site (H)      Metabolic syndrome      Obesity      Osteoarthritis of knee      Psoriasis      Restless legs syndrome        Labs:  Recent Labs   Lab Test 12/07/21  0609   HGB 10.6*       Thaddeus:  Thaddeus Score: 17    Specialty Bed:  Type of Stoma Planned: Permanent Other: Cystectomy with an ileal conduit     Diagnosis Pertinent to Stoma: Cancer - Bladder  Date of Diagnosis: 2/2020  Type of Surgery: Ileal Conduit            Surgery Date: 12/6/21  Surgeon: Isainiya                                                                Hospital: [facility]    INTAKE  Type of Stoma: Permanent Urostomy  Anticipated date of takedown: NA  Diagnosis Pertinent to Stoma:Bladder Cancer Date of Diagnosis: 2/2020  Type of Surgery: Ileal Conduit Surgery Date: 12/6/21  Surgeon: Hilaria   Pertinent Information: Per Op note, spot marked for stoma was unable to be used by surgeon and it was placed superior to marking. Currently in a fold and very close to surgical incision, causing leaking.  Current Equipment:   Supplies: Pouch [place] #[id number] -Snap onto flange 2 piece urinary pouch, Flange Petra # [id number] -Convex 2 piece cut to fit flange, Paste Petra #[id number] -Adapt Ring and Belt Petra #[id number] -Large 29-49.    ASSESSMENT  Urostomy     Stoma Size: Oval 1-1/4 x 1-1/2 inches  Protrusion: minimal - 0.5cm  Vascularity: " Pink  Mucocutaneous Juncture: Intact  Peristomal Skin: Intact       TREATMENT/EQUIPMENT  Applied: Adapt Ring at 3 and 3 o'clock to fill in crease, then 2 piece pouch cut offset to give space for incision, belt    Supplies: Pouch Petra #[id number] -Snap onto flange 2 piece urinary pouch, Flange Petra # [id number] -Convex 2 piece cut to fit flange, Paste Petra #[id number] -Adapt Ring and Belt Petra #[id number] -Large 29-49    Instructions Given: WOC Role, Anatomy, Fluids: Drink 6-8 glasses of fluids daily , Home Care: recommended, Hospital Stay: likely discharge Sunday. , Pouching Products: Showed pouching system , follow up with ostomy clinic and Night time drainage     Nursing care provided was coordinated care with patient, spouse, RN and pouch change    Discussed plan of care with nurse, patient and spouse. Patient fell asleep during pouch change.    Outcomes and treatment recommendations are to To contain output, To be knowledgable with pouch change/emptying before discharge and To be independant with pouch change/emptying before discharge    Actions taken by WOC RN: 15 minutes of education and WOC Discharge recommendations entered.    Planned Follow Up: Early next week.    Plan for next visit: Patient to change pouch with assist

## 2021-12-10 NOTE — PROGRESS NOTES
Place of Service:  [facility]     Reason for follow up: POD #3 after robotic assisted cystectomy, creation of ileal conduit, left pelvic lymph node dissection, cystoscopy, right ureteroscopy, right ureteral stent removal, extensive lysis of adhesions by Dr Fam Manrique for bladder cancer     SUBJECTIVE:  Events: no acute events overnight    Passing gas.  Tolerating full liquids. Denies nausea, vomiting, fever, chills, chest pain, SOB, LE pain. Abdominal pain controlled with medication.  No penile bleeding today.  None on my exam.       OBJECTIVE:  PHYSICAL EXAM:  Temp: 98.2  F (36.8  C) Temp src: Oral BP: (!) 154/80 Pulse: 66   Resp: 16 SpO2: 96 % O2 Device: None (Room air)    General: NAD, alert, cooperative  Head: normocephalic, without abnormality / atraumatic  Abdomen: soft, minimally tender, is not distended. no suprapubic fullness, no suprapubic tenderness. no CVA tenderness,   Genitourinary: Normal visual exam  Skin: No rashes or lesions  Musculoskeletal: moves all four extremities equally; no calf edema or tenderness  Psychological: alert and oriented, answers questions appropriately    LABS:  Creatinine   Date Value Ref Range Status   12/10/2021 1.87 (H) 0.70 - 1.30 mg/dL Final     WBC Count   Date Value Ref Range Status   12/10/2021 8.7 4.0 - 11.0 10e3/uL Final     Hemoglobin   Date Value Ref Range Status   12/10/2021 10.5 (L) 13.3 - 17.7 g/dL Final   ]  Platelet Count   Date Value Ref Range Status   12/10/2021 231 150 - 450 10e3/uL Final     Blood cultures:    No growth after 2 days    Lab Results: personally reviewed.     ASSESSMENT/PLAN:  Marcelo Valerio is POD #3 after robotic assisted cystectomy, creation of ileal conduit, left pelvic lymph node dissection, cystoscopy, right ureteroscopy, right ureteral stent removal, extensive lysis of adhesions by Dr Fam Manrique for bladder cancer.    - Diet - move to soft foods. He is passing gas. Is tolerating full liquids without nausea.  -  Drains - 470 ml on 12/6, 90 ml on 12/7, 240 ml on 12/8, 150 ml on 12/9. Given higher output will order a urine creatinine.   - Creatinine is 1.87 today, improved from 2.05 yesterday.  - Appreciate C RN assistance with stoma management.   - Appreciate Case Management assistance with discharge planning. Anticipate will need [facility] RN for ostomy teaching/management.   - Anticipated discharge: 3 days    This case was discussed with:  Dr Fam Elena PA-C  [facility]   [phone]

## 2021-12-10 NOTE — PLAN OF CARE
Problem: Pain (Urinary Diversion)  Goal: Acceptable Pain Control  Intervention: Prevent or Manage Pain  Recent Flowsheet Documentation  Taken 12/10/2021 0030 by Mesha Salazar, RN  Pain Management Interventions:   repositioned   declines   cold applied   medication (see MAR)   Abdominal discomfort, described as cramping, PRN IV dilaudid given as PO oxycodone was refused by pt as he doesn't like the way it makes him feel.  Sticky note placed for MD.    One large green watery stool; continent.  Pt reports cramping and passing flatus.  Urostomy patent.    ALYSON output bloody.    Up OOB with assist of one.  Pursed lip breathing while on his back, states that he is not SOB but feels most comfortable on his side.    Mesha Salazar, RN

## 2021-12-10 NOTE — PROGRESS NOTES
Care Management Follow Up    Length of Stay (days): 4    Expected Discharge Date: 12/12/2021     Concerns to be Addressed:     Post-op progression; WOC and Urology following  Patient plan of care discussed at interdisciplinary rounds: Yes    Anticipated Discharge Disposition:  Home anticipated     Anticipated Discharge Services:  Home RN  Anticipated Discharge DME:      Additional Information:  Accepted for [facility] for RN, once medically stable. CM to f/u with HC closer to discharge. Send orders once available. HC details added to AVS      Per Urology, pt will need home care RN for ostomy education and maintenance. [facility] messaging with [facility] to inquire about openings next week.    10 AM - Pt from [place]; [facility] does not service [place]. Referral sent to [facility] [place] for review. Awaiting availability.    Hx: Pt lives with wife Nely in private home. Pt uses diabetic supplies. Has new ostomy. Spouse Nely to transport pt home when medically stable.    FERNANDA Handley

## 2021-12-10 NOTE — PLAN OF CARE
Problem: Risk for Delirium  Goal: Optimal Coping  Outcome: No Change     Problem: Pain (Urinary Diversion)  Goal: Acceptable Pain Control  Outcome: No Change  Intervention: Prevent or Manage Pain  Recent Flowsheet Documentation  Taken 12/10/2021 1045 by Scot Choi RN  Pain Management Interventions: medication (see MAR)   Patient has declined pain medication this shift, stating he doesn't have any feeling in his RLE. Dressing change done this morning. Incontinent bladder. Order for small bowel follow through challenge. NPO with ice and sips with meds. PT ordered later today. Calls attempted and text to Podiatry. Dr. Oconnell responded to text Dr. Garcia re: weight bearing status with special shoe for right foot. NG to lis.

## 2021-12-10 NOTE — PLAN OF CARE
Problem: Pain (Urinary Diversion)  Goal: Acceptable Pain Control  12/10/2021 1501 by Scot Choi RN  Outcome: Improving  12/10/2021 1143 by Scot Choi RN  Outcome: No Change  Intervention: Prevent or Manage Pain  Recent Flowsheet Documentation  Taken 12/10/2021 1045 by Scot Choi RN  Pain Management Interventions: medication (see MAR)     Problem: Postoperative Stoma Care (Urinary Diversion)  Goal: Optimal Stoma Healing  12/10/2021 1501 by Scot Choi RN  Outcome: Improving  12/10/2021 1143 by Scot Choi RN  Outcome: No Change   Pain is well managed with prn IV dilaudid, patient does not prefer to use Oxycodone due to feeling funny when he uses it. Has had multiple bowel movements today, urostomy pouch in place with adequate output. Started on regular diet and is tolerating well. Spouse in today to visit. Will continue to monitor.    Scot Choi RN

## 2021-12-10 NOTE — PROGRESS NOTES
[facility] [place] PROGRESS NOTE      ADMIT DATE: 12/6/2021     FACILITY: [facility]    PCP: Urbano Cedeno, [phone]    ASSESSMENT AND PLAN:   Patient is a 76-year-old male with a history of basal cell carcinoma, prostate cancer, bladder cancer, T2DM, hyperlipidemia, gout, metabolic syndrome, obesity, hypertension, bilateral lung nodules, psoriasis, carpal tunnel, CKD 3 presented for urology procedure.     Active Problems:    Malignant neoplasm of overlapping sites of bladder (H)      POD#4 cystoscopy, right uteroscopy with right stent removal and robotic assisted radical cystectomy with creation of ileal conduit and extensive lysis of lesions 12/6.  -Surgeon urologist Dr. Manrique  -Postoperative care as per surgery  -diet, DVT proph, and pain control per urology (on clear liquid diet and on heparin q 8 hours subcutaneous for DVT proph). -->started passing gas on 12/8 but not passing gas regularly so patient will c/w liquid diet for now-->having BMs now on 12/9 so transitioned to solid diet  -Cystectomy with an ileal conduit keeps leaking-->WOC consulted and new pouch placed-->leakage resolved  -patient to ambulate in hallway at least QID        CHINMAY on CKD 3  -Baseline serum creatinine 1.6, scr 2.13 post operatively-->2.17 on 12/7 --> 1.94 on 12/8  -serum creatinine 2.8 on preop work-up,   -creatinine on 12/9 is increased at 2.05 which could be because of IV lasix 10 mg given by urology overnight (12/8-12/9). -->creatinine on 12/10 has improved to 1.87.   -Losartan held 24 hours prior to surgery, MFM held for 3-4 days prior  -c/w holding losartan until creatinine at baseline  -stop MFM  -c/w IVF for now until patient is taking fluids adequately       Hypoxia  -on oxygen post-op  - is improving and now satting at >90% on room air during the day  -CXR 12/8 not concerning for pneumonia, concerning for atelectasis  -c/w IS and start flutter valve    Leukocytosis   -most likely stress-related due to  surgery; however, procalc came back elevated at 0.61.   -obtained blood cultures which are pending but show NGTD  -WBC trending down and now WNL  -c/w IV zosyn--->will switch to cipro on discharge (order on Saturday 12/11 since pharmacy closed on Sunday)        Hypertension  -On atenolol and losartan at home.   -BP improving  -c/w home atenolol  -c/w norvasc 2.5 mg BID  -c/w as needed hydralazine with parameters  -c/w holding home losartan because of CHINMAY and hold until creatinine at baseline-->restart home losartan tomorrow 12/11 if creatinine more or less at baseline        T2DM  -Hemoglobin A1c 8.0  -On PTA Metformin was (discontinued preoperatively) and Trulicity weekly  -blood sugars improving and FBS was 112 this 12/10 morning  -converted Lantus 24 units subcutaneous BID to Lantus 20 units subcutaneous BID  -c/w high resistance SSI  -c/w mealtime insulin 1:7 ratio   -start home DM regimen tomorrow 12/11 if creatinine more or less at baseline      HLD  -continue home simvastatin     Psoriasis-follows with dermatology and improving with light therapy           FEN/GI: low salt, low fat, diabetic diet  DVT proph: heparin  Code status: Full Code    Updated wife Nely at bedside on current plan.       Discharge per primary team (urology)       SUBJECTIVE:    Patient states his abdominal pain is improving and he denies any N/V. He is tolerating full liquids well. He wants solid food. Patient started having BMs yesterday. Patient denies any other complaints at this time.        ROS:  12 Points review of systems reviewed and is negative except for what has already been mentioned above    OBJECTIVE:  Patient Vitals for the past 24 hrs:   BP Temp Temp src Pulse Resp SpO2   12/09/21 2311 (!) 149/83 98.2  F (36.8  C) Oral 65 18 94 %   12/09/21 1900 (!) 159/77 98  F (36.7  C) Oral 65 20 95 %   12/09/21 1549 (!) 142/80 97.8  F (36.6  C) Oral 62 20 93 %   12/09/21 1138 (!) 147/73 97.6  F (36.4  C) Oral 73 20 94 %   12/09/21  0900 -- -- -- -- -- 94 %          Intake/Output Summary (Last 24 hours) at 12/7/2021 1448  Last data filed at 12/7/2021 1435  Gross per 24 hour   Intake 2463 ml   Output 2235 ml   Net 228 ml     GENRL: Not in acute distress. Satting at 94% on room air.   HEENT: NC/AT      Neck- supple      Mucous membrane- moist and pink  CHEST: Clear to auscultation bilaterally  HEART: S1S2 regular. No murmurs, rubs or gallops  ABDMN: Soft. Bowel sounds- active in all quadrants. Distended but less than yesterday. Cystectomy with an ileal conduit present with dark brown fluid output. ALYSON drain draining serosanginous fluid.   EXTRM: No pedal edema.   NEURO: No involuntary movements  INTGM: please see nursing assessment for full skin assessment  PSYCH: normal affect, normal speech     DIAGNOSTIC DATA:          Recent Results (from the past 24 hour(s))   Glucose by meter    Collection Time: 12/09/21  8:31 AM   Result Value Ref Range    GLUCOSE BY METER POCT 165 (H) 70 - 99 mg/dL   Glucose by meter    Collection Time: 12/09/21 11:49 AM   Result Value Ref Range    GLUCOSE BY METER POCT 178 (H) 70 - 99 mg/dL   Glucose by meter    Collection Time: 12/09/21  4:58 PM   Result Value Ref Range    GLUCOSE BY METER POCT 179 (H) 70 - 99 mg/dL   Glucose by meter    Collection Time: 12/09/21  8:59 PM   Result Value Ref Range    GLUCOSE BY METER POCT 166 (H) 70 - 99 mg/dL   Comprehensive metabolic panel    Collection Time: 12/10/21  6:51 AM   Result Value Ref Range    Sodium 146 (H) 136 - 145 mmol/L    Potassium 3.7 3.5 - 5.0 mmol/L    Chloride 111 (H) 98 - 107 mmol/L    Carbon Dioxide (CO2) 27 22 - 31 mmol/L    Anion Gap 8 5 - 18 mmol/L    Urea Nitrogen 27 8 - 28 mg/dL    Creatinine 1.87 (H) 0.70 - 1.30 mg/dL    Calcium 9.3 8.5 - 10.5 mg/dL    Glucose 112 70 - 125 mg/dL    Alkaline Phosphatase 86 45 - 120 U/L    AST 40 0 - 40 U/L    ALT 30 0 - 45 U/L    Protein Total 5.8 (L) 6.0 - 8.0 g/dL    Albumin 2.5 (L) 3.5 - 5.0 g/dL    Bilirubin Total 0.9 0.0  - 1.0 mg/dL    GFR Estimate 34 (L) >60 mL/min/1.73m2   CBC with platelets and differential    Collection Time: 12/10/21  6:51 AM   Result Value Ref Range    WBC Count 8.7 4.0 - 11.0 10e3/uL    RBC Count 3.47 (L) 4.40 - 5.90 10e6/uL    Hemoglobin 10.5 (L) 13.3 - 17.7 g/dL    Hematocrit 34.0 (L) 40.0 - 53.0 %    MCV 98 78 - 100 fL    MCH 30.3 26.5 - 33.0 pg    MCHC 30.9 (L) 31.5 - 36.5 g/dL    RDW 13.7 10.0 - 15.0 %    Platelet Count 231 150 - 450 10e3/uL    % Neutrophils 73 %    % Lymphocytes 15 %    % Monocytes 11 %    % Eosinophils 0 %    % Basophils 0 %    % Immature Granulocytes 1 %    NRBCs per 100 WBC 0 <1 /100    Absolute Neutrophils 6.4 1.6 - 8.3 10e3/uL    Absolute Lymphocytes 1.3 0.8 - 5.3 10e3/uL    Absolute Monocytes 1.0 0.0 - 1.3 10e3/uL    Absolute Eosinophils 0.0 0.0 - 0.7 10e3/uL    Absolute Basophils 0.0 0.0 - 0.2 10e3/uL    Absolute Immature Granulocytes 0.1 <=0.4 10e3/uL    Absolute NRBCs 0.0 10e3/uL        No results found for this visit on 12/06/21.       All recent labs reviewed personally  Radiology report reviewed.       The total time spent in preparing this progress note is about 35 minutes, >50% time spent in care co-ordination that includes reviewing labs, images, discussing the plan of care with patient/family, consultants, and .      Waleska Enriquez MD.   [facility]   [phone]   Pager [phone]

## 2021-12-10 NOTE — PLAN OF CARE
Problem: Pain (Urinary Diversion)  Goal: Acceptable Pain Control  Outcome: Improving     Problem: Bleeding (Urinary Diversion)  Goal: Absence of Bleeding  Outcome: Improving   Patient denies pain or discomfort, no penile bleeding, was up in chair, ambulated x1, had loose stool x1, bowel med held, blood sugars were 179 and 166, bp's were 142/80 and 159/77, had schedule bp meds, remains on room air, sats had been in the mid 90's, urostomy and ALYSON drain intact and patent, alert and oriented.

## 2021-12-11 LAB
ALBUMIN SERPL-MCNC: 2.5 G/DL (ref 3.5–5)
ALP SERPL-CCNC: 91 U/L (ref 45–120)
ALT SERPL W P-5'-P-CCNC: 31 U/L (ref 0–45)
ANION GAP SERPL CALCULATED.3IONS-SCNC: 10 MMOL/L (ref 5–18)
AST SERPL W P-5'-P-CCNC: 39 U/L (ref 0–40)
BASOPHILS # BLD AUTO: 0 10E3/UL (ref 0–0.2)
BASOPHILS NFR BLD AUTO: 0 %
BILIRUB SERPL-MCNC: 0.8 MG/DL (ref 0–1)
BUN SERPL-MCNC: 29 MG/DL (ref 8–28)
CALCIUM SERPL-MCNC: 8.7 MG/DL (ref 8.5–10.5)
CHLORIDE BLD-SCNC: 113 MMOL/L (ref 98–107)
CO2 SERPL-SCNC: 23 MMOL/L (ref 22–31)
CREAT SERPL-MCNC: 1.81 MG/DL (ref 0.7–1.3)
EOSINOPHIL # BLD AUTO: 0.1 10E3/UL (ref 0–0.7)
EOSINOPHIL NFR BLD AUTO: 1 %
ERYTHROCYTE [DISTWIDTH] IN BLOOD BY AUTOMATED COUNT: 13.9 % (ref 10–15)
GFR SERPL CREATININE-BSD FRML MDRD: 35 ML/MIN/1.73M2
GLUCOSE BLD-MCNC: 94 MG/DL (ref 70–125)
GLUCOSE BLDC GLUCOMTR-MCNC: 112 MG/DL (ref 70–99)
GLUCOSE BLDC GLUCOMTR-MCNC: 175 MG/DL (ref 70–99)
GLUCOSE BLDC GLUCOMTR-MCNC: 182 MG/DL (ref 70–99)
GLUCOSE BLDC GLUCOMTR-MCNC: 89 MG/DL (ref 70–99)
HCT VFR BLD AUTO: 33.2 % (ref 40–53)
HGB BLD-MCNC: 10.5 G/DL (ref 13.3–17.7)
IMM GRANULOCYTES # BLD: 0.1 10E3/UL
IMM GRANULOCYTES NFR BLD: 1 %
LYMPHOCYTES # BLD AUTO: 1.3 10E3/UL (ref 0.8–5.3)
LYMPHOCYTES NFR BLD AUTO: 17 %
MAGNESIUM SERPL-MCNC: 1.9 MG/DL (ref 1.8–2.6)
MCH RBC QN AUTO: 30.8 PG (ref 26.5–33)
MCHC RBC AUTO-ENTMCNC: 31.6 G/DL (ref 31.5–36.5)
MCV RBC AUTO: 97 FL (ref 78–100)
MONOCYTES # BLD AUTO: 1.1 10E3/UL (ref 0–1.3)
MONOCYTES NFR BLD AUTO: 14 %
NEUTROPHILS # BLD AUTO: 5 10E3/UL (ref 1.6–8.3)
NEUTROPHILS NFR BLD AUTO: 67 %
NRBC # BLD AUTO: 0 10E3/UL
NRBC BLD AUTO-RTO: 0 /100
PLATELET # BLD AUTO: 211 10E3/UL (ref 150–450)
POTASSIUM BLD-SCNC: 3.4 MMOL/L (ref 3.5–5)
POTASSIUM BLD-SCNC: 3.6 MMOL/L (ref 3.5–5)
PROT SERPL-MCNC: 5.7 G/DL (ref 6–8)
RBC # BLD AUTO: 3.41 10E6/UL (ref 4.4–5.9)
SODIUM SERPL-SCNC: 146 MMOL/L (ref 136–145)
WBC # BLD AUTO: 7.4 10E3/UL (ref 4–11)

## 2021-12-11 PROCEDURE — 250N000011 HC RX IP 250 OP 636: Performed by: FAMILY MEDICINE

## 2021-12-11 PROCEDURE — 99233 SBSQ HOSP IP/OBS HIGH 50: CPT | Performed by: FAMILY MEDICINE

## 2021-12-11 PROCEDURE — 84132 ASSAY OF SERUM POTASSIUM: CPT | Performed by: FAMILY MEDICINE

## 2021-12-11 PROCEDURE — 36415 COLL VENOUS BLD VENIPUNCTURE: CPT | Performed by: UROLOGY

## 2021-12-11 PROCEDURE — 83735 ASSAY OF MAGNESIUM: CPT | Performed by: FAMILY MEDICINE

## 2021-12-11 PROCEDURE — 80053 COMPREHEN METABOLIC PANEL: CPT | Performed by: FAMILY MEDICINE

## 2021-12-11 PROCEDURE — 36415 COLL VENOUS BLD VENIPUNCTURE: CPT | Performed by: FAMILY MEDICINE

## 2021-12-11 PROCEDURE — 85025 COMPLETE CBC W/AUTO DIFF WBC: CPT | Performed by: UROLOGY

## 2021-12-11 PROCEDURE — 250N000013 HC RX MED GY IP 250 OP 250 PS 637: Performed by: FAMILY MEDICINE

## 2021-12-11 PROCEDURE — 250N000011 HC RX IP 250 OP 636: Performed by: UROLOGY

## 2021-12-11 PROCEDURE — 120N000001 HC R&B MED SURG/OB

## 2021-12-11 RX ORDER — SACCHAROMYCES BOULARDII 250 MG
250 CAPSULE ORAL 2 TIMES DAILY
Qty: 30 CAPSULE | Refills: 0 | Status: SHIPPED | OUTPATIENT
Start: 2021-12-11 | End: 2022-01-04

## 2021-12-11 RX ORDER — CIPROFLOXACIN 500 MG/1
500 TABLET, FILM COATED ORAL EVERY 12 HOURS SCHEDULED
Status: DISCONTINUED | OUTPATIENT
Start: 2021-12-11 | End: 2021-12-12 | Stop reason: HOSPADM

## 2021-12-11 RX ORDER — METRONIDAZOLE 500 MG/1
500 TABLET ORAL 3 TIMES DAILY
Status: DISCONTINUED | OUTPATIENT
Start: 2021-12-11 | End: 2021-12-12 | Stop reason: HOSPADM

## 2021-12-11 RX ORDER — AMLODIPINE BESYLATE 2.5 MG/1
2.5 TABLET ORAL 2 TIMES DAILY
Qty: 60 TABLET | Refills: 0 | Status: SHIPPED | OUTPATIENT
Start: 2021-12-11 | End: 2022-01-29

## 2021-12-11 RX ORDER — METRONIDAZOLE 500 MG/1
500 TABLET ORAL 3 TIMES DAILY
Qty: 12 TABLET | Refills: 0 | Status: SHIPPED | OUTPATIENT
Start: 2021-12-11 | End: 2021-12-15

## 2021-12-11 RX ORDER — POTASSIUM CHLORIDE 1500 MG/1
40 TABLET, EXTENDED RELEASE ORAL ONCE
Status: COMPLETED | OUTPATIENT
Start: 2021-12-11 | End: 2021-12-11

## 2021-12-11 RX ORDER — CIPROFLOXACIN 500 MG/1
500 TABLET, FILM COATED ORAL EVERY 12 HOURS
Qty: 8 TABLET | Refills: 0 | Status: SHIPPED | OUTPATIENT
Start: 2021-12-11 | End: 2021-12-15

## 2021-12-11 RX ADMIN — HYDROMORPHONE HYDROCHLORIDE 2 MG: 2 TABLET ORAL at 01:52

## 2021-12-11 RX ADMIN — HEPARIN SODIUM 5000 UNITS: 5000 INJECTION, SOLUTION INTRAVENOUS; SUBCUTANEOUS at 08:50

## 2021-12-11 RX ADMIN — CIPROFLOXACIN HYDROCHLORIDE 500 MG: 500 TABLET, FILM COATED ORAL at 20:33

## 2021-12-11 RX ADMIN — HEPARIN SODIUM 5000 UNITS: 5000 INJECTION, SOLUTION INTRAVENOUS; SUBCUTANEOUS at 17:27

## 2021-12-11 RX ADMIN — SIMVASTATIN 20 MG: 10 TABLET, FILM COATED ORAL at 20:33

## 2021-12-11 RX ADMIN — AMLODIPINE BESYLATE 2.5 MG: 2.5 TABLET ORAL at 08:48

## 2021-12-11 RX ADMIN — PIPERACILLIN SODIUM AND TAZOBACTAM SODIUM 3.38 G: 3; .375 INJECTION, POWDER, LYOPHILIZED, FOR SOLUTION INTRAVENOUS at 08:48

## 2021-12-11 RX ADMIN — METRONIDAZOLE 500 MG: 500 TABLET ORAL at 13:52

## 2021-12-11 RX ADMIN — HEPARIN SODIUM 5000 UNITS: 5000 INJECTION, SOLUTION INTRAVENOUS; SUBCUTANEOUS at 01:13

## 2021-12-11 RX ADMIN — POTASSIUM CHLORIDE 40 MEQ: 1500 TABLET, EXTENDED RELEASE ORAL at 12:51

## 2021-12-11 RX ADMIN — AMLODIPINE BESYLATE 2.5 MG: 2.5 TABLET ORAL at 20:33

## 2021-12-11 RX ADMIN — HYDROMORPHONE HYDROCHLORIDE 2 MG: 2 TABLET ORAL at 08:49

## 2021-12-11 RX ADMIN — ATENOLOL 50 MG: 25 TABLET ORAL at 20:33

## 2021-12-11 RX ADMIN — CIPROFLOXACIN HYDROCHLORIDE 500 MG: 500 TABLET, FILM COATED ORAL at 11:33

## 2021-12-11 RX ADMIN — BRIMONIDINE TARTRATE 1 DROP: 1.5 SOLUTION OPHTHALMIC at 20:34

## 2021-12-11 RX ADMIN — ACETAMINOPHEN 650 MG: 325 TABLET ORAL at 11:32

## 2021-12-11 RX ADMIN — METRONIDAZOLE 500 MG: 500 TABLET ORAL at 20:33

## 2021-12-11 RX ADMIN — ACETAMINOPHEN 650 MG: 325 TABLET ORAL at 04:04

## 2021-12-11 RX ADMIN — METRONIDAZOLE 500 MG: 500 TABLET ORAL at 11:33

## 2021-12-11 RX ADMIN — ATENOLOL 50 MG: 25 TABLET ORAL at 08:48

## 2021-12-11 RX ADMIN — BRIMONIDINE TARTRATE 1 DROP: 1.5 SOLUTION OPHTHALMIC at 08:50

## 2021-12-11 ASSESSMENT — ACTIVITIES OF DAILY LIVING (ADL)
ADLS_ACUITY_SCORE: 10

## 2021-12-11 NOTE — PLAN OF CARE
Problem: Pain (Urinary Diversion)  Goal: Acceptable Pain Control  Outcome: No Change  Intervention: Prevent or Manage Pain  Recent Flowsheet Documentation  Taken 12/10/2021 2035 by Carleen Guaman, RN  Pain Management Interventions:   emotional support   rest     Problem: Postoperative Stoma Care (Urinary Diversion)  Goal: Optimal Stoma Healing  Outcome: No Change     A/Ox4. VSS ex HTN. On RA. Denies pain, refused scheduled tylenol. Up SBA. Regular diet with BGM. ALYSON with bloody output. Urostomy patent. Midline incision with mepilex on abdomen, no drainage, CDI. Penile bleeding noted this shift when patient up to bathroom, no pain, notified urology on call Dr. Harden and she said bleeding is normal if having bowel movement, continue to monitor. Discharge to home pending improvement.

## 2021-12-11 NOTE — PROGRESS NOTES
"    Place of Service:  [facility]     Reason for follow up: POD 4 robotic assisted cystectomy, creation of ileal conduit, left pelvic lymph node dissection, cystoscopy, right ureteroscopy, right ureteral stent removal, extensive lysis of adhesions by Dr Fam Manrique for bladder cancer     SUBJECTIVE:  Events: no acute events overnight    Patient reports feeling well this morning. He is ambulating daily. Slight dizziness with ambulation this morning, but resolved quickly. Passing gas. Tolerating regular diet without N/V. Pain under good control. Good urine output. Afebrile.  Hgb: 10.5    OBJECTIVE:  PHYSICAL EXAM:  /67 (BP Location: Right arm)   Pulse 63   Temp 97.6  F (36.4  C) (Oral)   Resp 22   Ht 1.803 m (5' 11\")   Wt 107.6 kg (237 lb 4.8 oz)   SpO2 96%   BMI 33.10 kg/m     General: NAD, alert, cooperative  Head: normocephalic, without abnormality / atraumatic  Abdomen: soft, appropriately tender, non distended. No CVA tenderness noted bilaterally. Urostomy present.   Genitourinary: Urine yellow with mild sediment. Two stents present at ostomy  Psychological: alert and oriented, answers questions appropriately    LABS:  Lab Results   Component Value Date    WBC 7.4 12/11/2021    HGB 10.5 (L) 12/11/2021    HCT 33.2 (L) 12/11/2021     12/11/2021    ALT 31 12/11/2021    AST 39 12/11/2021     (H) 12/11/2021    BUN 29 (H) 12/11/2021    CO2 23 12/11/2021        Lab Results: personally reviewed.     ASSESSMENT/PLAN:  Marcelo Valerio is being seen by [facility] for POD 4 robotic assisted cystectomy, creation of ileal conduit, left pelvic lymph node dissection, cystoscopy, right ureteroscopy, right ureteral stent removal, extensive lysis of adhesions by Dr Fam Manrique for bladder cancer     - Continue ambulation. Monitor for continue dizziness. Recommend walking with staff.   - Regular diet. Encouraged fluid consumption  - Continue current pain regiment  - No concern for " urine leak based on body fluid creatinine yesterday  - Appreciate WOC RN assistance with Stoma education    Expected discharge: Tomorrow      David Bridges PA-C   [facility]

## 2021-12-11 NOTE — PLAN OF CARE
Problem: Adjustment to Surgery (Urinary Diversion)  Goal: Psychosocial Adjustment Initiation  Outcome: Improving     Problem: Bleeding (Urinary Diversion)  Goal: Absence of Bleeding  Outcome: Improving     Problem: Pain (Urinary Diversion)  Goal: Acceptable Pain Control  Outcome: Improving   Patient denies pain, was encouraged to take scheduled tylenol. Urostomy intact, draining. Minimal penile bleeding noted when up to the bathroom. Call light within reach, continue to monitor.

## 2021-12-11 NOTE — PLAN OF CARE
Problem: Adjustment to Surgery (Urinary Diversion)  Goal: Psychosocial Adjustment Initiation  Outcome: Improving     Problem: Pain (Urinary Diversion)  Goal: Acceptable Pain Control  Outcome: Improving  Intervention: Prevent or Manage Pain  Recent Flowsheet Documentation  Taken 12/11/2021 1148 by Scot Choi RN  Pain Management Interventions:   medication (see MAR)   ambulation/increased activity     Problem: Postoperative Stoma Care (Urinary Diversion)  Goal: Optimal Stoma Healing  Outcome: Improving   Pain is well improved with use of PRN oral dilaudid, patient ambulated in the lucio with walker and stand by assist and tolerated well. Urostomy pouch in intact with adequate out put. Tolerating regular diet, BM x 2 today, vital signs stable at this time. Will continue to monitor

## 2021-12-11 NOTE — PROGRESS NOTES
[facility] [place]      ADMIT DATE: 12/6/2021     FACILITY: [facility]    PCP: Urbano Cedeno, [phone]    ASSESSMENT AND PLAN:   Patient is a 76-year-old male with a history of basal cell carcinoma, prostate cancer, bladder cancer, T2DM, hyperlipidemia, gout, metabolic syndrome, obesity, hypertension, bilateral lung nodules, psoriasis, carpal tunnel, CKD 3 presented for urology procedure.     Active Problems:    Malignant neoplasm of overlapping sites of bladder (H)      POD#5 cystoscopy, right uteroscopy with right stent removal and robotic assisted radical cystectomy with creation of ileal conduit and extensive lysis of lesions 12/6.  -Surgeon urologist Dr. Manrique  -Postoperative care as per surgery  -diet, DVT proph, and pain control per urology (on clear liquid diet and on heparin q 8 hours subcutaneous for DVT proph). -->started passing gas on 12/8 but not passing gas regularly so patient will c/w liquid diet for now-->having BMs now on 12/10 so transitioned to solid diet-->on 12/11, PO intake is still decreased.   -Cystectomy with an ileal conduit keeps leaking-->WOC consulted and new pouch placed-->leakage resolved  -patient to ambulate in hallway at least QID        CHINMAY on CKD 3  -Baseline serum creatinine 1.6, scr 2.13 post operatively-->2.17 on 12/7 --> 1.94 on 12/8  -serum creatinine 2.8 on preop work-up,   -creatinine on 12/9 is increased at 2.05 which could be because of IV lasix 10 mg given by urology overnight (12/8-12/9). -->creatinine 12/10 is 1.87---> 12/11 is 1.81 (still not at baseline)   -Losartan held 24 hours prior to surgery, MFM held for 3-4 days prior  -c/w holding losartan until creatinine at baseline  -stop MFM        Hypoxia  -on oxygen post-op  - is improving and now satting at >90% on room air  -CXR 12/8 not concerning for pneumonia, concerning for atelectasis  -c/w IS and start flutter valve    Leukocytosis   -most likely stress-related due to  surgery; however, procalc came back elevated at 0.61.   -obtained blood cultures which are pending but show NGTD  -WBC trending down and now WNL  -discontinued IV zosyn and started PO cipro and PO flagyl on 12/11 and will discharge patient with 4 more days of these meds (discharge 12/12).        Hypertension  -On atenolol and losartan at home.   -BP improving  -c/w home atenolol  -c/w norvasc 2.5 mg BID  -c/w as needed hydralazine with parameters  -c/w holding home losartan because of CHINMAY and hold until creatinine at baseline       T2DM  -Hemoglobin A1c 8.0  -On PTA Metformin was (discontinued preoperatively) and Trulicity weekly  -blood sugars improving   -c/w home Lantus 20 units subcutaneous BID  -c/w high resistance SSI  -started meal-time set dosing of 2 units/meal  -will discharge patient on current home regimen since latest HgbA1c is WNL for patient based on his age.      HLD  -continue home simvastatin     Psoriasis-follows with dermatology and improving with light therapy         Hypokalemia  -Mag level  -RN managed Mag and K protocols    Anemia  -stable      FEN/GI: low salt, low fat, diabetic diet  DVT proph: heparin  Code status: Full Code    Updated wife Nely at bedside on current plan.       Discharge per primary team (urology)       SUBJECTIVE:    Patient states his abdominal pain is improving and he denies any N/V. He is tolerating solid food for breakfast this morning but is going slow with solids. Patient is having BMs but it is minimal. Patient denies any other complaints at this time.        ROS:  12 Points review of systems reviewed and is negative except for what has already been mentioned above    OBJECTIVE:  Patient Vitals for the past 24 hrs:   BP Temp Temp src Pulse Resp SpO2   12/11/21 0119 (!) 148/68 97.4  F (36.3  C) Oral 59 23 94 %   12/10/21 2035 -- -- -- -- 18 --   12/10/21 1840 (!) 161/72 97.6  F (36.4  C) Oral 62 18 93 %   12/10/21 1716 -- -- -- -- 18 --   12/10/21 1532 128/73 97.8   F (36.6  C) Oral 73 18 95 %   12/10/21 1327 (!) 147/70 98  F (36.7  C) Oral 67 20 94 %   12/10/21 0824 (!) 154/80 98.2  F (36.8  C) Oral 66 16 96 %          Intake/Output Summary (Last 24 hours) at 12/7/2021 1448  Last data filed at 12/7/2021 1435  Gross per 24 hour   Intake 2463 ml   Output 2235 ml   Net 228 ml     GENRL: Not in acute distress. Satting at 98% on room air.   HEENT: NC/AT      Neck- supple      Mucous membrane- moist and pink  CHEST: Clear to auscultation bilaterally  HEART: S1S2 regular. No murmurs, rubs or gallops  ABDMN: Soft. Bowel sounds- active in all quadrants. Distension at baseline. Cystectomy with an ileal conduit present with light brown fluid output. ALYSON drain draining lighter colored serosanginous fluid.   EXTRM: No pedal edema.   NEURO: No involuntary movements  INTGM: please see nursing assessment for full skin assessment  PSYCH: normal affect, normal speech     DIAGNOSTIC DATA:          Recent Results (from the past 24 hour(s))   Glucose by meter    Collection Time: 12/10/21  9:14 AM   Result Value Ref Range    GLUCOSE BY METER POCT 103 (H) 70 - 99 mg/dL   Glucose by meter    Collection Time: 12/10/21 12:39 PM   Result Value Ref Range    GLUCOSE BY METER POCT 186 (H) 70 - 99 mg/dL   Creatinine Body Fluid, Qualitative ( [place] Only)    Collection Time: 12/10/21 12:47 PM   Result Value Ref Range    Creatinine Fluid 2+ creatinine present     Creatinine Fluid Source Abdomen    Glucose by meter    Collection Time: 12/10/21  4:44 PM   Result Value Ref Range    GLUCOSE BY METER POCT 151 (H) 70 - 99 mg/dL   Glucose by meter    Collection Time: 12/10/21 10:45 PM   Result Value Ref Range    GLUCOSE BY METER POCT 136 (H) 70 - 99 mg/dL   Comprehensive metabolic panel    Collection Time: 12/11/21  6:19 AM   Result Value Ref Range    Sodium 146 (H) 136 - 145 mmol/L    Potassium 3.4 (L) 3.5 - 5.0 mmol/L    Chloride 113 (H) 98 - 107 mmol/L    Carbon Dioxide (CO2) 23 22 - 31 mmol/L    Anion Gap 10 5 -  18 mmol/L    Urea Nitrogen 29 (H) 8 - 28 mg/dL    Creatinine 1.81 (H) 0.70 - 1.30 mg/dL    Calcium 8.7 8.5 - 10.5 mg/dL    Glucose 94 70 - 125 mg/dL    Alkaline Phosphatase 91 45 - 120 U/L    AST 39 0 - 40 U/L    ALT 31 0 - 45 U/L    Protein Total 5.7 (L) 6.0 - 8.0 g/dL    Albumin 2.5 (L) 3.5 - 5.0 g/dL    Bilirubin Total 0.8 0.0 - 1.0 mg/dL    GFR Estimate 35 (L) >60 mL/min/1.73m2   CBC with platelets and differential    Collection Time: 12/11/21  6:19 AM   Result Value Ref Range    WBC Count 7.4 4.0 - 11.0 10e3/uL    RBC Count 3.41 (L) 4.40 - 5.90 10e6/uL    Hemoglobin 10.5 (L) 13.3 - 17.7 g/dL    Hematocrit 33.2 (L) 40.0 - 53.0 %    MCV 97 78 - 100 fL    MCH 30.8 26.5 - 33.0 pg    MCHC 31.6 31.5 - 36.5 g/dL    RDW 13.9 10.0 - 15.0 %    Platelet Count 211 150 - 450 10e3/uL    % Neutrophils 67 %    % Lymphocytes 17 %    % Monocytes 14 %    % Eosinophils 1 %    % Basophils 0 %    % Immature Granulocytes 1 %    NRBCs per 100 WBC 0 <1 /100    Absolute Neutrophils 5.0 1.6 - 8.3 10e3/uL    Absolute Lymphocytes 1.3 0.8 - 5.3 10e3/uL    Absolute Monocytes 1.1 0.0 - 1.3 10e3/uL    Absolute Eosinophils 0.1 0.0 - 0.7 10e3/uL    Absolute Basophils 0.0 0.0 - 0.2 10e3/uL    Absolute Immature Granulocytes 0.1 <=0.4 10e3/uL    Absolute NRBCs 0.0 10e3/uL        No results found for this visit on 12/06/21.       All recent labs reviewed personally  Radiology report reviewed.       The total time spent in preparing this progress note is about 35 minutes, >50% time spent in care co-ordination that includes reviewing labs, images, discussing the plan of care with patient/family, consultants, and .      Waleska Enriquez MD.   [facility] Medicine Service   [phone]   Pager [phone]

## 2021-12-12 VITALS
WEIGHT: 237.3 LBS | HEIGHT: 71 IN | BODY MASS INDEX: 33.22 KG/M2 | RESPIRATION RATE: 16 BRPM | SYSTOLIC BLOOD PRESSURE: 131 MMHG | DIASTOLIC BLOOD PRESSURE: 62 MMHG | HEART RATE: 61 BPM | OXYGEN SATURATION: 100 % | TEMPERATURE: 97.8 F

## 2021-12-12 LAB
ALBUMIN SERPL-MCNC: 2.8 G/DL (ref 3.5–5)
ALP SERPL-CCNC: 109 U/L (ref 45–120)
ALT SERPL W P-5'-P-CCNC: 36 U/L (ref 0–45)
ANION GAP SERPL CALCULATED.3IONS-SCNC: 8 MMOL/L (ref 5–18)
AST SERPL W P-5'-P-CCNC: 42 U/L (ref 0–40)
BACTERIA BLD CULT: NO GROWTH
BACTERIA BLD CULT: NO GROWTH
BASOPHILS # BLD AUTO: 0 10E3/UL (ref 0–0.2)
BASOPHILS NFR BLD AUTO: 0 %
BILIRUB SERPL-MCNC: 0.8 MG/DL (ref 0–1)
BUN SERPL-MCNC: 27 MG/DL (ref 8–28)
CALCIUM SERPL-MCNC: 9.1 MG/DL (ref 8.5–10.5)
CHLORIDE BLD-SCNC: 114 MMOL/L (ref 98–107)
CO2 SERPL-SCNC: 22 MMOL/L (ref 22–31)
CREAT SERPL-MCNC: 1.85 MG/DL (ref 0.7–1.3)
EOSINOPHIL # BLD AUTO: 0.2 10E3/UL (ref 0–0.7)
EOSINOPHIL NFR BLD AUTO: 2 %
ERYTHROCYTE [DISTWIDTH] IN BLOOD BY AUTOMATED COUNT: 14.3 % (ref 10–15)
GFR SERPL CREATININE-BSD FRML MDRD: 34 ML/MIN/1.73M2
GLUCOSE BLD-MCNC: 105 MG/DL (ref 70–125)
GLUCOSE BLDC GLUCOMTR-MCNC: 108 MG/DL (ref 70–99)
GLUCOSE BLDC GLUCOMTR-MCNC: 177 MG/DL (ref 70–99)
HCT VFR BLD AUTO: 35.8 % (ref 40–53)
HGB BLD-MCNC: 11.2 G/DL (ref 13.3–17.7)
IMM GRANULOCYTES # BLD: 0.2 10E3/UL
IMM GRANULOCYTES NFR BLD: 2 %
LYMPHOCYTES # BLD AUTO: 1.7 10E3/UL (ref 0.8–5.3)
LYMPHOCYTES NFR BLD AUTO: 15 %
MAGNESIUM SERPL-MCNC: 2.1 MG/DL (ref 1.8–2.6)
MCH RBC QN AUTO: 30.5 PG (ref 26.5–33)
MCHC RBC AUTO-ENTMCNC: 31.3 G/DL (ref 31.5–36.5)
MCV RBC AUTO: 98 FL (ref 78–100)
MONOCYTES # BLD AUTO: 1.3 10E3/UL (ref 0–1.3)
MONOCYTES NFR BLD AUTO: 12 %
NEUTROPHILS # BLD AUTO: 7.8 10E3/UL (ref 1.6–8.3)
NEUTROPHILS NFR BLD AUTO: 69 %
NRBC # BLD AUTO: 0 10E3/UL
NRBC BLD AUTO-RTO: 0 /100
PLATELET # BLD AUTO: 270 10E3/UL (ref 150–450)
POTASSIUM BLD-SCNC: 4 MMOL/L (ref 3.5–5)
PROT SERPL-MCNC: 6.2 G/DL (ref 6–8)
RBC # BLD AUTO: 3.67 10E6/UL (ref 4.4–5.9)
SODIUM SERPL-SCNC: 144 MMOL/L (ref 136–145)
WBC # BLD AUTO: 10.9 10E3/UL (ref 4–11)

## 2021-12-12 PROCEDURE — 83735 ASSAY OF MAGNESIUM: CPT | Performed by: INTERNAL MEDICINE

## 2021-12-12 PROCEDURE — 36415 COLL VENOUS BLD VENIPUNCTURE: CPT | Performed by: FAMILY MEDICINE

## 2021-12-12 PROCEDURE — 250N000011 HC RX IP 250 OP 636: Performed by: UROLOGY

## 2021-12-12 PROCEDURE — 85025 COMPLETE CBC W/AUTO DIFF WBC: CPT | Performed by: UROLOGY

## 2021-12-12 PROCEDURE — 80053 COMPREHEN METABOLIC PANEL: CPT | Performed by: FAMILY MEDICINE

## 2021-12-12 PROCEDURE — 99239 HOSP IP/OBS DSCHRG MGMT >30: CPT | Performed by: HOSPITALIST

## 2021-12-12 PROCEDURE — 250N000013 HC RX MED GY IP 250 OP 250 PS 637: Performed by: FAMILY MEDICINE

## 2021-12-12 RX ORDER — POLYETHYLENE GLYCOL 3350 17 G/17G
17 POWDER, FOR SOLUTION ORAL DAILY
Qty: 255 G | Refills: 0 | Status: SHIPPED | OUTPATIENT
Start: 2021-12-12

## 2021-12-12 RX ADMIN — ATENOLOL 50 MG: 25 TABLET ORAL at 08:19

## 2021-12-12 RX ADMIN — CIPROFLOXACIN HYDROCHLORIDE 500 MG: 500 TABLET, FILM COATED ORAL at 08:18

## 2021-12-12 RX ADMIN — Medication 1 MG: at 00:30

## 2021-12-12 RX ADMIN — HEPARIN SODIUM 5000 UNITS: 5000 INJECTION, SOLUTION INTRAVENOUS; SUBCUTANEOUS at 00:31

## 2021-12-12 RX ADMIN — BRIMONIDINE TARTRATE 1 DROP: 1.5 SOLUTION OPHTHALMIC at 08:19

## 2021-12-12 RX ADMIN — METRONIDAZOLE 500 MG: 500 TABLET ORAL at 08:18

## 2021-12-12 RX ADMIN — AMLODIPINE BESYLATE 2.5 MG: 2.5 TABLET ORAL at 08:18

## 2021-12-12 RX ADMIN — HEPARIN SODIUM 5000 UNITS: 5000 INJECTION, SOLUTION INTRAVENOUS; SUBCUTANEOUS at 08:30

## 2021-12-12 ASSESSMENT — ACTIVITIES OF DAILY LIVING (ADL)
ADLS_ACUITY_SCORE: 8

## 2021-12-12 NOTE — DISCHARGE SUMMARY
[facility]    Medicine Progress Note - Hospitalist Service       Date of Admission:  12/6/2021    Interval History:  Patient feels ready to go home. He denies any significant abdominal pain. Has been passing gas as well as some loose stools. Last BM was this morning. Cleared from urology to get discharged today.   Ok from our perspective to discharge home. Discharge meds and instructions entered.    Assessment & Plan         Patient is a 76-year-old male with a history of basal cell carcinoma, prostate cancer, bladder cancer, T2DM, hyperlipidemia, gout, metabolic syndrome, obesity, hypertension, bilateral lung nodules, psoriasis, carpal tunnel, CKD 3 presented for urology procedure.     POD#6 cystoscopy, right uteroscopy with right stent removal and robotic assisted radical cystectomy with creation of ileal conduit and extensive lysis of lesions 12/6.  -Surgeon urologist Dr. Manrique  -Postoperative care as per surgery  -Had issues with ileal conduit leaking, which resolved   -Passing gas and having stools   -Tolerating PO meds and food  -ALYSON drained removed before discharge  -Discharged with 30 days of SQ heparin for VTE prophylaxis      CHINMAY on CKD 3  -Baseline serum creatinine 1.6, scr 2.13 post operatively peaked 2.17 on 12/7, today down to 1.85 (still not at baseline)   -Continue to hold losartan at discharge   -Continue to hold metformin at discharge   -recheck labs 1-2 weeks    Leukocytosis   Most likely stress-related due to surgery; however, procal did come back elevated at 0.61. Had negative blood cultures. WBC trended down nicely from peak of 14.5. No identified source of infection, but was being treated with empiric IV zosyn and transitioned to po cipro and flagyl on 12/11 with plan to discharge with 4 more days of these medications.      Hypoxia  Required oxygen post-operatively. CXR 12/8 done showing no pneumonia, showed atelectasis. Treated with IS and flutter valve. Hypoxia  resolved.     Hypertension  -Continue atenolol  -Continue amlodipine (new med added this hospitalization)  -Hold losartan given CHINMAY     T2DM  -Hemoglobin A1c 8.0  -Will discharge patient on current home regimen since latest HgbA1c is WNL for patient based on his age  -Would permanently stop metformin given CKD/CHINMAY (metformin stopped prior to surgery so not on current med list)     HLD  -Continue home simvastatin     Psoriasis-follows with dermatology and improving with light therapy       Hypokalemia, replaced     Anemia  -Stable  -Some ABLA from surgery       Diet: Diet  Combination Diet Moderate Consistent Carb (60 g CHO per Meal) Diet; 2 gm NA Diet; Low Fat Diet  Diet    Bob Catheter: Not present  Central Lines: None  Code Status: Full Code      Disposition Plan   Disposition: home with wife, HC ordered  Discharge barriers: None  Medically ready to discharge today: Yes     The patient's care was discussed with the Attending Physician, Dr. Mcguire and the patient.    MEGAN Linton-S2  Hospitalist Service  [facility]  Text page via [employer]® Paging/Directory      Physical Exam   Vital Signs: Temp: 97.8  F (36.6  C) Temp src: Oral BP: 131/62 Pulse: 61   Resp: 16 SpO2: 100 % O2 Device: None (Room air)    Weight: 237 lbs 4.8 oz  General: Nontoxic appearing male sitting up on the bed.   HEENT: No scleral icterus. Moist mucous membranes.   Cardiovascular: Regular rate and rhythm  Pulmonary: No increased work of breathing. Breath sounds clear to auscultation bilaterally.   Abdominal: Soft, nondistended. Bowel sounds present. Nontender. Surgical sites without surrounding erythema or warmth.  ALYSON drain in LLQ.   Neuro: Awake, alert, nonfocal.   Skin: No lower extremity edema.   Psych: normal mood and affect.     Data   Recent Results (from the past 24 hour(s))   Potassium    Collection Time: 12/11/21  4:52 PM   Result Value Ref Range    Potassium 3.6 3.5 - 5.0 mmol/L   Glucose by meter     Collection Time: 12/11/21  5:05 PM   Result Value Ref Range    GLUCOSE BY METER POCT 182 (H) 70 - 99 mg/dL   Glucose by meter    Collection Time: 12/11/21  9:43 PM   Result Value Ref Range    GLUCOSE BY METER POCT 112 (H) 70 - 99 mg/dL   Comprehensive metabolic panel    Collection Time: 12/12/21  6:11 AM   Result Value Ref Range    Sodium 144 136 - 145 mmol/L    Potassium 4.0 3.5 - 5.0 mmol/L    Chloride 114 (H) 98 - 107 mmol/L    Carbon Dioxide (CO2) 22 22 - 31 mmol/L    Anion Gap 8 5 - 18 mmol/L    Urea Nitrogen 27 8 - 28 mg/dL    Creatinine 1.85 (H) 0.70 - 1.30 mg/dL    Calcium 9.1 8.5 - 10.5 mg/dL    Glucose 105 70 - 125 mg/dL    Alkaline Phosphatase 109 45 - 120 U/L    AST 42 (H) 0 - 40 U/L    ALT 36 0 - 45 U/L    Protein Total 6.2 6.0 - 8.0 g/dL    Albumin 2.8 (L) 3.5 - 5.0 g/dL    Bilirubin Total 0.8 0.0 - 1.0 mg/dL    GFR Estimate 34 (L) >60 mL/min/1.73m2   Magnesium    Collection Time: 12/12/21  6:11 AM   Result Value Ref Range    Magnesium 2.1 1.8 - 2.6 mg/dL   CBC with platelets and differential    Collection Time: 12/12/21  6:11 AM   Result Value Ref Range    WBC Count 10.9 4.0 - 11.0 10e3/uL    RBC Count 3.67 (L) 4.40 - 5.90 10e6/uL    Hemoglobin 11.2 (L) 13.3 - 17.7 g/dL    Hematocrit 35.8 (L) 40.0 - 53.0 %    MCV 98 78 - 100 fL    MCH 30.5 26.5 - 33.0 pg    MCHC 31.3 (L) 31.5 - 36.5 g/dL    RDW 14.3 10.0 - 15.0 %    Platelet Count 270 150 - 450 10e3/uL    % Neutrophils 69 %    % Lymphocytes 15 %    % Monocytes 12 %    % Eosinophils 2 %    % Basophils 0 %    % Immature Granulocytes 2 %    NRBCs per 100 WBC 0 <1 /100    Absolute Neutrophils 7.8 1.6 - 8.3 10e3/uL    Absolute Lymphocytes 1.7 0.8 - 5.3 10e3/uL    Absolute Monocytes 1.3 0.0 - 1.3 10e3/uL    Absolute Eosinophils 0.2 0.0 - 0.7 10e3/uL    Absolute Basophils 0.0 0.0 - 0.2 10e3/uL    Absolute Immature Granulocytes 0.2 <=0.4 10e3/uL    Absolute NRBCs 0.0 10e3/uL   Glucose by meter    Collection Time: 12/12/21  8:29 AM   Result Value Ref Range     GLUCOSE BY METER POCT 108 (H) 70 - 99 mg/dL   Glucose by meter    Collection Time: 12/12/21 12:28 PM   Result Value Ref Range    GLUCOSE BY METER POCT 177 (H) 70 - 99 mg/dL     ____________  Interval History   Data reviewed today: I reviewed all medications, new labs and imaging results over the last 24 hours. I personally reviewed no images or EKG's today.    Attending attestation: Doing fine today, denies any complaints. Having soft BMs. No concerns about discharge today.  In supervising the student, I saw and evaluated the patient with the student and personally performed all aspects of the encounter documented above.  I have reviewed and verified that the documentation is correct and complete.  Savanna Mcguire MD  [facility] Medicine Service

## 2021-12-12 NOTE — PLAN OF CARE
Problem: Pain (Urinary Diversion)  Goal: Acceptable Pain Control  Outcome: Improving     Problem: Adult Inpatient Plan of Care  Goal: Optimal Comfort and Wellbeing  Outcome: Improving     A/Ox4. VSS ex HTN. On RA. Denies pain, refused scheduled tylenol. Up SBA. Regular diet with BGM. ALYSON with bloody output. Urostomy patent. Midline incision with mepilex on abdomen, no drainage, CDI. No IV access, ok to leave out per MD because patient expected to discharge soon and currently on oral abx.

## 2021-12-12 NOTE — PROGRESS NOTES
[facility] brief update    Patient ok for discharge from my perspective  Hold losartan at discharge  Ok to continue current course of abx for unknown infection, but seems to be doing well  Meds sent to  pharmacy  Full note to follow    Savanna Mcguire MD  [facility] Medicine Service

## 2021-12-12 NOTE — DISCHARGE INSTRUCTIONS
A home care nurse has been set-up for you through [facility] in [facility]. If you have not heard from them within 48 hours of discharge, call [phone] to schedule your first in-home nursing appointment.         [facility]  WO Nurse Discharge Instructions for New Urostomy      When you get home    - Upon arrival home, call the WO Nurses to schedule an outpatient follow up appoint in a few weeks after discharge from hospital.  The appointment is to assess pouching plan, organize supply ordering, etc.   Call the WOCN office at   [facility]     [phone]    - Supplies- Upon discharge from the hospital you will be sent home with ostomy supplies.      If you have been set up for home care visits the home care nurse will help you coordinate ordering supplies.    If you have not been set up for home care, then make sure to make a follow up appointment with the WOCN nurse to reassess your abdomen and ostomy for changes and determine the correct plan.  At that time ordering supplies will be discussed.       Pouching    1. Change appliance 2x / wk and as needed for any leakage.  Notify the [facility] Nurse if needing to change pouch more than daily or if skin is itchy.   2. Empty pouch when no more than 1/3 to half full (solid, liquid, gas)  3. May shower with pouch on or off, removing pouch/ barrier down to the skin is ok. Just note that you cannot control output so there may occasionally be clean up if you choose to shower / bathe without a pouch on.     The pouching procedure:   1.  Use the  Pouch Change Instruction  sheet to gather and organize supplies  2.  Trace old pattern onto new pouch and cut out new opening on new barrier.  3.  Remove old pouch, observe for any leakage  4.  Clean skin with water and paper towel   5.   Apply prepared barrier to the clean skin and press into place.  6.  Snap pouch onto barrier (can snap onto barrier prior to apply as well)  7.   Hold hand on pouch/ over stoma to warm pouch into place for 2-5 minutes      Your Pouching Plan / Supplies                      NOTE:  Once your supply company has been determined call your supply company with supply issues    BRAND of ostomy supplies:  [employer]  Pouch: [id number]  Skin barrier: 52228      Follow up    1. Physician - follow instructions from MD for follow ups with them  Contact your physician if you have the following signs or symptoms:  Pain in your incision that is not relieved by a short rest or ordered pain medications  Chills or temperature of 101 or higher  Redness or swelling in your incision    2. WOC Nurses (ostomy nurses)        When you get home make an outpatient appointment with the WOCN nurses to assure your pouching plan is still a good plan, etc  [facility] [phone]    ------    We have not been giving your losartan blood pressure pill here, and your blood pressure is normal. So please do not take your losartan at home, or it may drop too low. You should follow up with your primary doctor soon for a blood pressure check. They may want to resume the losartan if your blood pressure runs higher.

## 2021-12-12 NOTE — PROGRESS NOTES
"  Place of Service: [facility]    SUBJECTIVE:  Events: no acute events overnight    Tolerating diet. Moving his bowels, last at 6 AM this morning. Pass flatus as well. Ambulating. Feels he is able to care well for his stoma.    OBJECTIVE:  PHYSICAL EXAM:  /62 (BP Location: Right arm)   Pulse 61   Temp 97.8  F (36.6  C) (Oral)   Resp 16   Ht 1.803 m (5' 11\")   Wt 107.6 kg (237 lb 4.8 oz)   SpO2 100%   BMI 33.10 kg/m     General: NAD, alert, cooperative  Head: normocephalic, without abnormality / atraumatic  Abdomen: obese and protuberant, but soft, not tender, incisions look to be healing well, ileal conduit pink and healthy and productive of urine with stents coming through into appliance.  Skin: No rashes or lesions  Musculoskeletal: moves all four extremities equally; no calf edema or tenderness  Psychological: alert and oriented, answers questions appropriately    LABS:  Lab Results   Component Value Date     12/12/2021     12/11/2021     12/10/2021    CO2 22 12/12/2021    CO2 23 12/11/2021    CO2 27 12/10/2021    BUN 27 12/12/2021    BUN 29 12/11/2021    BUN 27 12/10/2021     Lab Results   Component Value Date    WBC 10.9 12/12/2021    WBC 7.4 12/11/2021    WBC 8.7 12/10/2021    HGB 11.2 12/12/2021    HGB 10.5 12/11/2021    HGB 10.5 12/10/2021    HCT 35.8 12/12/2021    HCT 33.2 12/11/2021    HCT 34.0 12/10/2021    MCV 98 12/12/2021    MCV 97 12/11/2021    MCV 98 12/10/2021     12/12/2021     12/11/2021     12/10/2021       Lab Results: personally reviewed.     ASSESSMENT/PLAN:  76 year old male with muscle invasive bladder cancer status post robotic-assisted laparoscopic radical cystectomy, bilateral pelvic lymphadenectomy, and ileal conduit urinary diversion.    Feeling much better today.  He would like to go home.  Drain out before discharge.  He will follow up with Dr. Manrique as scheduled for stent removal and post-operative visit.    Jack" MD Reese   [facility]   Phone #[phone]

## 2021-12-12 NOTE — PLAN OF CARE
Pt. Pleasant and cooperative this shift, denies pain or need for prn pain medications, eager and excited to be discharge today, medications sent with pt. Discharge paperwork and education completed, demonstration with return demonstration education of how to administer enoxaparin injections and change of urostomy bag, pt. And wife present for demonstrations, voiced understanding, pt. Discharge floor at 1253,

## 2021-12-12 NOTE — PLAN OF CARE
Problem: Bleeding (Urinary Diversion)  Goal: Absence of Bleeding  Outcome: No Change     Problem: Risk for Delirium  Goal: Improved Behavioral Control  Outcome: Adequate for Discharge     Problem: Pain (Urinary Diversion)  Goal: Acceptable Pain Control  Outcome: Adequate for Discharge     Pt alert and oriented, calls for assistance appropriately.  Urostomy and ALYSON drain patent.  Urine is light pink and scant bloody drainage from penis noted.  Pt reports mild abd pain with repositioning, declines scheduled tylenol.  Up to BR via SBA, bm overnight.

## 2021-12-13 ENCOUNTER — PATIENT OUTREACH (OUTPATIENT)
Dept: CARE COORDINATION | Facility: CLINIC | Age: 76
End: 2021-12-13
Payer: COMMERCIAL

## 2021-12-13 DIAGNOSIS — Z71.89 OTHER SPECIFIED COUNSELING: ICD-10-CM

## 2021-12-13 NOTE — PROGRESS NOTES
Clinic Care Coordination Contact  [facility]: Post-Discharge Note  SITUATION                                                      Admission:    Admission Date: 12/06/21   Reason for Admission: Malignant neoplasm of bladder, unspecified  Discharge:   Discharge Date: 12/12/21  Discharge Diagnosis: Malignant neoplasm of bladder, unspecified    BACKGROUND                                                      Marcelo Valerio is a 76 year old male admitted for Malignant neoplasm of bladder, unspecified; Malignant neoplasm of overlapping sites of bladder. Patient underwent the following procedure [robotic assisted cystectomy, creation of ileal conduit, left pelvic lymph node dissection, cystoscopy, right ureteroscopy, right ureteral stent removal, extensive lysis of adhesions]. Patient tolerated the procedure well. By the day of discharge, the patient was ambulatory, tolerating diet, pain was controlled with oral analgesics, labs and vitals stable.     ASSESSMENT      Enrollment  Primary Care Care Coordination Status: Not a Candidate (PCP is with [facility])    Discharge Assessment  How are you doing now that you are home?: progressing along slowly  How are your symptoms? (Red Flag symptoms escalate to triage hotline per guidelines): Improved  Do you feel your condition is stable enough to be safe at home until your provider visit?: Yes  Does the patient have their discharge instructions? : Yes  Does the patient have questions regarding their discharge instructions? : No  Were you started on any new medications or were there changes to any of your previous medications? : Yes  Does the patient have all of their medications?: Yes  Do you have questions regarding any of your medications? : No  Do you have all of your needed medical supplies or equipment (DME)?  (i.e. oxygen tank, CPAP, cane, etc.): Yes  Discharge follow-up appointment scheduled within 14 calendar days? : Yes  Discharge Follow Up Appointment  Date: 12/20/21  Discharge Follow Up Appointment Scheduled with?: Specialty Care Provider    Post-op ([facility] CTA Only)  If the patient had a surgery or procedure, do they have any questions for a nurse?: No      PLAN                                                      Outpatient Plan:      1) Please arrange post-hospitalization follow up with your PCP in 3-7 days as needed or if instructed by your provider.    2) Maintain follow up with your surgeon as previously scheduled. You may call or use patient portal to confirm  appointment as needed. [facility], [phone].    Follow-up with Primary care doctor (Dr. Urbano Cedeno) within 2-3 days of discharge. Primary care doctor should monitor blood sugars and adjust diabetes regimen as needed. Primary care doctor should monitor blood pressure and creatinine and consider when to start ACEI/ARB. Primary care doctor should monitor Na level, potassium, magnesium, and hemoglobin closely in clinic. Primary care doctor should monitor resolution of infection.    No future appointments.      For any urgent concerns, please contact our 24 hour nurse triage line: [phone] ([phone])       Olive Valerio  Community Health Worker  [facility]  Ph: [phone]

## 2022-01-04 ENCOUNTER — HOSPITAL ENCOUNTER (INPATIENT)
Facility: HOSPITAL | Age: 77
LOS: 6 days | Discharge: HOME HEALTH | DRG: 683 | End: 2022-01-10
Attending: EMERGENCY MEDICINE | Admitting: HOSPITALIST
Payer: MEDICARE

## 2022-01-04 ENCOUNTER — APPOINTMENT (OUTPATIENT)
Dept: CT IMAGING | Facility: HOSPITAL | Age: 77
DRG: 683 | End: 2022-01-04
Attending: FAMILY MEDICINE
Payer: MEDICARE

## 2022-01-04 DIAGNOSIS — D50.9 IRON DEFICIENCY ANEMIA, UNSPECIFIED IRON DEFICIENCY ANEMIA TYPE: ICD-10-CM

## 2022-01-04 DIAGNOSIS — K21.00 GASTROESOPHAGEAL REFLUX DISEASE WITH ESOPHAGITIS, UNSPECIFIED WHETHER HEMORRHAGE: Primary | ICD-10-CM

## 2022-01-04 DIAGNOSIS — N12 PYELONEPHRITIS: ICD-10-CM

## 2022-01-04 DIAGNOSIS — N17.9 ACUTE RENAL FAILURE, UNSPECIFIED ACUTE RENAL FAILURE TYPE (H): ICD-10-CM

## 2022-01-04 DIAGNOSIS — I10 PRIMARY HYPERTENSION: ICD-10-CM

## 2022-01-04 DIAGNOSIS — E83.42 HYPOMAGNESEMIA: ICD-10-CM

## 2022-01-04 PROBLEM — Z85.51 HISTORY OF PRIMARY MALIGNANT NEOPLASM OF URINARY BLADDER: Status: ACTIVE | Noted: 2020-02-13

## 2022-01-04 PROBLEM — C61 CARCINOMA OF PROSTATE (H): Status: ACTIVE | Noted: 2022-01-04

## 2022-01-04 PROBLEM — E87.20 METABOLIC ACIDOSIS: Status: ACTIVE | Noted: 2022-01-04

## 2022-01-04 PROBLEM — N18.32 ACUTE RENAL FAILURE SUPERIMPOSED ON STAGE 3B CHRONIC KIDNEY DISEASE (H): Status: ACTIVE | Noted: 2022-01-04

## 2022-01-04 PROBLEM — M10.9 GOUT: Status: ACTIVE | Noted: 2022-01-04

## 2022-01-04 LAB
ALBUMIN UR-MCNC: 100 MG/DL
AMORPH CRY #/AREA URNS HPF: ABNORMAL /HPF
ANION GAP SERPL CALCULATED.3IONS-SCNC: 16 MMOL/L (ref 5–18)
APPEARANCE UR: ABNORMAL
BASOPHILS # BLD AUTO: 0 10E3/UL (ref 0–0.2)
BASOPHILS NFR BLD AUTO: 0 %
BILIRUB UR QL STRIP: NEGATIVE
BUN SERPL-MCNC: 109 MG/DL (ref 8–28)
CALCIUM SERPL-MCNC: 10.1 MG/DL (ref 8.5–10.5)
CHLORIDE BLD-SCNC: 114 MMOL/L (ref 98–107)
CO2 SERPL-SCNC: 8 MMOL/L (ref 22–31)
COLOR UR AUTO: ABNORMAL
CREAT SERPL-MCNC: 7.52 MG/DL (ref 0.7–1.3)
EOSINOPHIL # BLD AUTO: 0 10E3/UL (ref 0–0.7)
EOSINOPHIL NFR BLD AUTO: 0 %
ERYTHROCYTE [DISTWIDTH] IN BLOOD BY AUTOMATED COUNT: 14.5 % (ref 10–15)
GFR SERPL CREATININE-BSD FRML MDRD: 7 ML/MIN/1.73M2
GLUCOSE BLD-MCNC: 195 MG/DL (ref 70–125)
GLUCOSE UR STRIP-MCNC: NEGATIVE MG/DL
HCT VFR BLD AUTO: 35.2 % (ref 40–53)
HGB BLD-MCNC: 11.2 G/DL (ref 13.3–17.7)
HGB UR QL STRIP: ABNORMAL
IMM GRANULOCYTES # BLD: 0.2 10E3/UL
IMM GRANULOCYTES NFR BLD: 2 %
INR PPP: 1.27 (ref 0.9–1.15)
KETONES UR STRIP-MCNC: NEGATIVE MG/DL
LEUKOCYTE ESTERASE UR QL STRIP: ABNORMAL
LYMPHOCYTES # BLD AUTO: 1.8 10E3/UL (ref 0.8–5.3)
LYMPHOCYTES NFR BLD AUTO: 15 %
MAGNESIUM SERPL-MCNC: 2.5 MG/DL (ref 1.8–2.6)
MCH RBC QN AUTO: 29.8 PG (ref 26.5–33)
MCHC RBC AUTO-ENTMCNC: 31.8 G/DL (ref 31.5–36.5)
MCV RBC AUTO: 94 FL (ref 78–100)
MONOCYTES # BLD AUTO: 1.6 10E3/UL (ref 0–1.3)
MONOCYTES NFR BLD AUTO: 13 %
MUCOUS THREADS #/AREA URNS LPF: PRESENT /LPF
NEUTROPHILS # BLD AUTO: 8.4 10E3/UL (ref 1.6–8.3)
NEUTROPHILS NFR BLD AUTO: 70 %
NITRATE UR QL: NEGATIVE
NRBC # BLD AUTO: 0 10E3/UL
NRBC BLD AUTO-RTO: 0 /100
PH UR STRIP: 8.5 [PH] (ref 5–7)
PLATELET # BLD AUTO: 295 10E3/UL (ref 150–450)
POTASSIUM BLD-SCNC: 4.8 MMOL/L (ref 3.5–5)
RBC # BLD AUTO: 3.76 10E6/UL (ref 4.4–5.9)
RBC URINE: 4 /HPF
SODIUM SERPL-SCNC: 138 MMOL/L (ref 136–145)
SP GR UR STRIP: 1.01 (ref 1–1.03)
TRI-PHOS CRY #/AREA URNS HPF: ABNORMAL /HPF
UROBILINOGEN UR STRIP-MCNC: <2 MG/DL
WBC # BLD AUTO: 11.8 10E3/UL (ref 4–11)
WBC URINE: 16 /HPF
YEAST #/AREA URNS HPF: ABNORMAL /HPF

## 2022-01-04 PROCEDURE — 85041 AUTOMATED RBC COUNT: CPT | Performed by: FAMILY MEDICINE

## 2022-01-04 PROCEDURE — 87635 SARS-COV-2 COVID-19 AMP PRB: CPT | Performed by: EMERGENCY MEDICINE

## 2022-01-04 PROCEDURE — 250N000009 HC RX 250: Performed by: EMERGENCY MEDICINE

## 2022-01-04 PROCEDURE — 36415 COLL VENOUS BLD VENIPUNCTURE: CPT | Performed by: FAMILY MEDICINE

## 2022-01-04 PROCEDURE — 83036 HEMOGLOBIN GLYCOSYLATED A1C: CPT | Performed by: HOSPITALIST

## 2022-01-04 PROCEDURE — 99285 EMERGENCY DEPT VISIT HI MDM: CPT | Mod: 25

## 2022-01-04 PROCEDURE — 81001 URINALYSIS AUTO W/SCOPE: CPT | Performed by: FAMILY MEDICINE

## 2022-01-04 PROCEDURE — 99223 1ST HOSP IP/OBS HIGH 75: CPT | Mod: AI | Performed by: HOSPITALIST

## 2022-01-04 PROCEDURE — 120N000001 HC R&B MED SURG/OB

## 2022-01-04 PROCEDURE — C9803 HOPD COVID-19 SPEC COLLECT: HCPCS

## 2022-01-04 PROCEDURE — 258N000003 HC RX IP 258 OP 636: Performed by: EMERGENCY MEDICINE

## 2022-01-04 PROCEDURE — 74176 CT ABD & PELVIS W/O CONTRAST: CPT

## 2022-01-04 PROCEDURE — 85610 PROTHROMBIN TIME: CPT | Performed by: EMERGENCY MEDICINE

## 2022-01-04 PROCEDURE — 82310 ASSAY OF CALCIUM: CPT | Performed by: FAMILY MEDICINE

## 2022-01-04 PROCEDURE — 87086 URINE CULTURE/COLONY COUNT: CPT | Performed by: FAMILY MEDICINE

## 2022-01-04 PROCEDURE — 83735 ASSAY OF MAGNESIUM: CPT | Performed by: FAMILY MEDICINE

## 2022-01-04 PROCEDURE — 36415 COLL VENOUS BLD VENIPUNCTURE: CPT | Performed by: EMERGENCY MEDICINE

## 2022-01-04 RX ORDER — PROCHLORPERAZINE 25 MG
12.5 SUPPOSITORY, RECTAL RECTAL EVERY 12 HOURS PRN
Status: DISCONTINUED | OUTPATIENT
Start: 2022-01-04 | End: 2022-01-10 | Stop reason: HOSPADM

## 2022-01-04 RX ORDER — LIDOCAINE 40 MG/G
CREAM TOPICAL
Status: DISCONTINUED | OUTPATIENT
Start: 2022-01-04 | End: 2022-01-10 | Stop reason: HOSPADM

## 2022-01-04 RX ORDER — LATANOPROST 50 UG/ML
1 SOLUTION/ DROPS OPHTHALMIC AT BEDTIME
Status: DISCONTINUED | OUTPATIENT
Start: 2022-01-04 | End: 2022-01-10 | Stop reason: HOSPADM

## 2022-01-04 RX ORDER — PIPERACILLIN SODIUM, TAZOBACTAM SODIUM 3; .375 G/15ML; G/15ML
3.38 INJECTION, POWDER, LYOPHILIZED, FOR SOLUTION INTRAVENOUS ONCE
Status: DISCONTINUED | OUTPATIENT
Start: 2022-01-05 | End: 2022-01-06

## 2022-01-04 RX ORDER — AMLODIPINE BESYLATE 2.5 MG/1
2.5 TABLET ORAL 2 TIMES DAILY
Status: DISCONTINUED | OUTPATIENT
Start: 2022-01-04 | End: 2022-01-10 | Stop reason: HOSPADM

## 2022-01-04 RX ORDER — ASPIRIN 81 MG/1
81 TABLET ORAL DAILY
Status: DISCONTINUED | OUTPATIENT
Start: 2022-01-05 | End: 2022-01-10 | Stop reason: HOSPADM

## 2022-01-04 RX ORDER — ONDANSETRON 2 MG/ML
4 INJECTION INTRAMUSCULAR; INTRAVENOUS EVERY 6 HOURS PRN
Status: DISCONTINUED | OUTPATIENT
Start: 2022-01-04 | End: 2022-01-10 | Stop reason: HOSPADM

## 2022-01-04 RX ORDER — ACETAMINOPHEN 325 MG/1
325-650 TABLET ORAL EVERY 4 HOURS PRN
Status: DISCONTINUED | OUTPATIENT
Start: 2022-01-04 | End: 2022-01-10 | Stop reason: HOSPADM

## 2022-01-04 RX ORDER — NICOTINE POLACRILEX 4 MG
15-30 LOZENGE BUCCAL
Status: DISCONTINUED | OUTPATIENT
Start: 2022-01-04 | End: 2022-01-10 | Stop reason: HOSPADM

## 2022-01-04 RX ORDER — DEXTROSE MONOHYDRATE 25 G/50ML
25-50 INJECTION, SOLUTION INTRAVENOUS
Status: DISCONTINUED | OUTPATIENT
Start: 2022-01-04 | End: 2022-01-10 | Stop reason: HOSPADM

## 2022-01-04 RX ORDER — PIPERACILLIN SODIUM, TAZOBACTAM SODIUM 3; .375 G/15ML; G/15ML
3.38 INJECTION, POWDER, LYOPHILIZED, FOR SOLUTION INTRAVENOUS EVERY 12 HOURS
Status: DISCONTINUED | OUTPATIENT
Start: 2022-01-05 | End: 2022-01-06

## 2022-01-04 RX ORDER — POLYETHYLENE GLYCOL 3350 17 G/17G
17 POWDER, FOR SOLUTION ORAL DAILY
Status: DISCONTINUED | OUTPATIENT
Start: 2022-01-05 | End: 2022-01-08

## 2022-01-04 RX ORDER — BRIMONIDINE TARTRATE 1.5 MG/ML
1 SOLUTION/ DROPS OPHTHALMIC 2 TIMES DAILY
Status: DISCONTINUED | OUTPATIENT
Start: 2022-01-04 | End: 2022-01-10 | Stop reason: HOSPADM

## 2022-01-04 RX ORDER — ATENOLOL 25 MG/1
50 TABLET ORAL 2 TIMES DAILY
Status: DISCONTINUED | OUTPATIENT
Start: 2022-01-04 | End: 2022-01-06

## 2022-01-04 RX ORDER — ONDANSETRON 4 MG/1
4 TABLET, ORALLY DISINTEGRATING ORAL EVERY 6 HOURS PRN
Status: DISCONTINUED | OUTPATIENT
Start: 2022-01-04 | End: 2022-01-10 | Stop reason: HOSPADM

## 2022-01-04 RX ORDER — PROCHLORPERAZINE MALEATE 5 MG
5 TABLET ORAL EVERY 6 HOURS PRN
Status: DISCONTINUED | OUTPATIENT
Start: 2022-01-04 | End: 2022-01-10 | Stop reason: HOSPADM

## 2022-01-04 RX ADMIN — SODIUM BICARBONATE: 84 INJECTION, SOLUTION INTRAVENOUS at 21:29

## 2022-01-04 ASSESSMENT — ACTIVITIES OF DAILY LIVING (ADL)
ADLS_ACUITY_SCORE: 7
ADLS_ACUITY_SCORE: 7

## 2022-01-04 NOTE — ED PROVIDER NOTES
ED Provider In Triage Note  [facility]  Encounter Date: Jan 4, 2022    Chief Complaint   Patient presents with     Abnormal Labs       Brief HPI:   Marcelo Valerio is a 76 year old male presenting to the Emergency Department with a chief complaint of elevated creatinine.    Patient presenting to ED after referral because his creatinine was elevated at 7.1 and potassium is 5.1 on blood draw today.    Had diverting ileostomy on 12/6 due to bladder cancer.    He has some tenderness around the stoma site.    Brief Physical Exam:  /65   Pulse 68   Temp 97.4  F (36.3  C)   Resp 18   Wt 101.6 kg (224 lb)   SpO2 99%   BMI 31.24 kg/m    General: Non-toxic appearing  HEENT: Atraumatic  Resp: No respiratory distress; lungs clear  Cardiac: RRR   Abdomen: Ileostomy in placed; abdomen is soft with mild tenderness around stoma site  Neuro: Alert, oriented, answers questions appropriately  Psych: Behavior appropriate      Plan Initiated in Triage:  Orders Placed This Encounter     CT Abdomen Pelvis w/o Contrast     Basic metabolic panel     Magnesium     CBC with platelets differential     UA with Microscopic reflex to Culture       PIT Dispo:   Return to lobby while awaiting workup and ED bed availability    Ashley Rodgers M.D. on 1/4/2022 at 3:26 PM    Patient was evaluated by the Physician in Triage due to a limitation of available rooms in the Emergency Department. A plan of care was discussed based on the information obtained on the initial evaluation and patient was consuled to return back to the Emergency Department lobby after this initial evalutaiton until results were obtained or a room became available in the Emergency Department. Patient was counseled not to leave prior to receiving the results of their workup.      Ashley Rodgers MD  01/04/22 1539

## 2022-01-04 NOTE — ED NOTES
Expected Patient Referral to ED  1:15 PM    Referring Clinic/Provider:  Briseida Schmitz    Reason for referral/Clinical facts:  Recent diverting ileostomy with Dr. Phillips, now with creatinine of 7, potassium 5.1    Recommendations provided:  Send to ED for further evaluation    Caller was informed that this institution does not possess the capabilities and/or resources to provide for patient and should be transferred to a different facility due to lack of Inpatient or emergency department beds.    Discussed that if direct admit is sought and any hurdles are encountered, this ED would be happy to see the patient and evaluate.    Informed caller that recommendations provided are recommendations based only on the facts provided and that they responsible to accept or reject the advice, or to seek a formal in person consultation as needed and that this ED will see/treat patient should they arrive.      Al Sparks MD  Emergency Medicine  [facility] EMERGENCY DEPARTMENT  [address] [place] [id number]  [phone]       Al Sparks MD  01/05/22 2256

## 2022-01-04 NOTE — ED TRIAGE NOTES
Patient arrives by private car for evaluation of elevated kidney tests,  Patient reports surgery of December 6th he had his bladder removed due to bladder cancer.  Stoma in place

## 2022-01-05 ENCOUNTER — APPOINTMENT (OUTPATIENT)
Dept: INTERVENTIONAL RADIOLOGY/VASCULAR | Facility: HOSPITAL | Age: 77
DRG: 683 | End: 2022-01-05
Attending: UROLOGY
Payer: MEDICARE

## 2022-01-05 DIAGNOSIS — Z85.51 HISTORY OF PRIMARY MALIGNANT NEOPLASM OF URINARY BLADDER: Primary | ICD-10-CM

## 2022-01-05 LAB
ALBUMIN SERPL-MCNC: 2.2 G/DL (ref 3.5–5)
ALP SERPL-CCNC: 82 U/L (ref 45–120)
ALT SERPL W P-5'-P-CCNC: 15 U/L (ref 0–45)
ANION GAP SERPL CALCULATED.3IONS-SCNC: 14 MMOL/L (ref 5–18)
ANION GAP SERPL CALCULATED.3IONS-SCNC: 15 MMOL/L (ref 5–18)
AST SERPL W P-5'-P-CCNC: 14 U/L (ref 0–40)
BASOPHILS # BLD AUTO: 0 10E3/UL (ref 0–0.2)
BASOPHILS NFR BLD AUTO: 0 %
BILIRUB SERPL-MCNC: 0.7 MG/DL (ref 0–1)
BUN SERPL-MCNC: 105 MG/DL (ref 8–28)
BUN SERPL-MCNC: 109 MG/DL (ref 8–28)
CALCIUM SERPL-MCNC: 9 MG/DL (ref 8.5–10.5)
CALCIUM SERPL-MCNC: 9.2 MG/DL (ref 8.5–10.5)
CHLORIDE BLD-SCNC: 112 MMOL/L (ref 98–107)
CHLORIDE BLD-SCNC: 113 MMOL/L (ref 98–107)
CO2 SERPL-SCNC: 10 MMOL/L (ref 22–31)
CO2 SERPL-SCNC: 15 MMOL/L (ref 22–31)
CREAT SERPL-MCNC: 6.64 MG/DL (ref 0.7–1.3)
CREAT SERPL-MCNC: 6.85 MG/DL (ref 0.7–1.3)
EOSINOPHIL # BLD AUTO: 0.1 10E3/UL (ref 0–0.7)
EOSINOPHIL NFR BLD AUTO: 1 %
ERYTHROCYTE [DISTWIDTH] IN BLOOD BY AUTOMATED COUNT: 14.2 % (ref 10–15)
GFR SERPL CREATININE-BSD FRML MDRD: 8 ML/MIN/1.73M2
GFR SERPL CREATININE-BSD FRML MDRD: 8 ML/MIN/1.73M2
GLUCOSE BLD-MCNC: 145 MG/DL (ref 70–125)
GLUCOSE BLD-MCNC: 200 MG/DL (ref 70–125)
GLUCOSE BLDC GLUCOMTR-MCNC: 134 MG/DL (ref 70–99)
GLUCOSE BLDC GLUCOMTR-MCNC: 147 MG/DL (ref 70–99)
GLUCOSE BLDC GLUCOMTR-MCNC: 177 MG/DL (ref 70–99)
GLUCOSE BLDC GLUCOMTR-MCNC: 213 MG/DL (ref 70–99)
HBA1C MFR BLD: 7.5 %
HCT VFR BLD AUTO: 32.2 % (ref 40–53)
HGB BLD-MCNC: 9.6 G/DL (ref 13.3–17.7)
IMM GRANULOCYTES # BLD: 0.1 10E3/UL
IMM GRANULOCYTES NFR BLD: 1 %
LYMPHOCYTES # BLD AUTO: 1.4 10E3/UL (ref 0.8–5.3)
LYMPHOCYTES NFR BLD AUTO: 17 %
MCH RBC QN AUTO: 30 PG (ref 26.5–33)
MCHC RBC AUTO-ENTMCNC: 29.8 G/DL (ref 31.5–36.5)
MCV RBC AUTO: 101 FL (ref 78–100)
MONOCYTES # BLD AUTO: 1.2 10E3/UL (ref 0–1.3)
MONOCYTES NFR BLD AUTO: 15 %
NEUTROPHILS # BLD AUTO: 5.3 10E3/UL (ref 1.6–8.3)
NEUTROPHILS NFR BLD AUTO: 66 %
NRBC # BLD AUTO: 0 10E3/UL
NRBC BLD AUTO-RTO: 0 /100
PLATELET # BLD AUTO: 200 10E3/UL (ref 150–450)
POTASSIUM BLD-SCNC: 3.6 MMOL/L (ref 3.5–5)
POTASSIUM BLD-SCNC: 4.1 MMOL/L (ref 3.5–5)
PROCALCITONIN SERPL-MCNC: 0.38 NG/ML (ref 0–0.49)
PROT SERPL-MCNC: 6.3 G/DL (ref 6–8)
RBC # BLD AUTO: 3.2 10E6/UL (ref 4.4–5.9)
SARS-COV-2 RNA RESP QL NAA+PROBE: NEGATIVE
SODIUM SERPL-SCNC: 137 MMOL/L (ref 136–145)
SODIUM SERPL-SCNC: 142 MMOL/L (ref 136–145)
WBC # BLD AUTO: 7.9 10E3/UL (ref 4–11)

## 2022-01-05 PROCEDURE — 272N000492 HC NEEDLE CR1

## 2022-01-05 PROCEDURE — 84145 PROCALCITONIN (PCT): CPT | Performed by: HOSPITALIST

## 2022-01-05 PROCEDURE — 250N000009 HC RX 250: Performed by: NURSE PRACTITIONER

## 2022-01-05 PROCEDURE — C1769 GUIDE WIRE: HCPCS

## 2022-01-05 PROCEDURE — 250N000012 HC RX MED GY IP 250 OP 636 PS 637: Performed by: HOSPITALIST

## 2022-01-05 PROCEDURE — 0T9430Z DRAINAGE OF LEFT KIDNEY PELVIS WITH DRAINAGE DEVICE, PERCUTANEOUS APPROACH: ICD-10-PCS | Performed by: RADIOLOGY

## 2022-01-05 PROCEDURE — 250N000013 HC RX MED GY IP 250 OP 250 PS 637: Performed by: INTERNAL MEDICINE

## 2022-01-05 PROCEDURE — 50432 PLMT NEPHROSTOMY CATHETER: CPT | Mod: 50

## 2022-01-05 PROCEDURE — 36415 COLL VENOUS BLD VENIPUNCTURE: CPT | Performed by: INTERNAL MEDICINE

## 2022-01-05 PROCEDURE — 0T9330Z DRAINAGE OF RIGHT KIDNEY PELVIS WITH DRAINAGE DEVICE, PERCUTANEOUS APPROACH: ICD-10-PCS | Performed by: RADIOLOGY

## 2022-01-05 PROCEDURE — 120N000001 HC R&B MED SURG/OB

## 2022-01-05 PROCEDURE — 99233 SBSQ HOSP IP/OBS HIGH 50: CPT | Performed by: INTERNAL MEDICINE

## 2022-01-05 PROCEDURE — 250N000013 HC RX MED GY IP 250 OP 250 PS 637: Performed by: HOSPITALIST

## 2022-01-05 PROCEDURE — 82040 ASSAY OF SERUM ALBUMIN: CPT | Performed by: HOSPITALIST

## 2022-01-05 PROCEDURE — 85025 COMPLETE CBC W/AUTO DIFF WBC: CPT | Performed by: HOSPITALIST

## 2022-01-05 PROCEDURE — C1729 CATH, DRAINAGE: HCPCS

## 2022-01-05 PROCEDURE — 36415 COLL VENOUS BLD VENIPUNCTURE: CPT | Performed by: HOSPITALIST

## 2022-01-05 PROCEDURE — 99152 MOD SED SAME PHYS/QHP 5/>YRS: CPT

## 2022-01-05 PROCEDURE — 250N000009 HC RX 250: Performed by: EMERGENCY MEDICINE

## 2022-01-05 PROCEDURE — 258N000003 HC RX IP 258 OP 636: Performed by: EMERGENCY MEDICINE

## 2022-01-05 PROCEDURE — 255N000002 HC RX 255 OP 636: Performed by: HOSPITALIST

## 2022-01-05 PROCEDURE — 250N000009 HC RX 250: Performed by: INTERNAL MEDICINE

## 2022-01-05 PROCEDURE — 87077 CULTURE AEROBIC IDENTIFY: CPT | Performed by: RADIOLOGY

## 2022-01-05 PROCEDURE — 250N000009 HC RX 250: Performed by: HOSPITALIST

## 2022-01-05 PROCEDURE — 250N000011 HC RX IP 250 OP 636: Performed by: HOSPITALIST

## 2022-01-05 PROCEDURE — 250N000011 HC RX IP 250 OP 636: Performed by: NURSE PRACTITIONER

## 2022-01-05 PROCEDURE — 87106 FUNGI IDENTIFICATION YEAST: CPT | Performed by: RADIOLOGY

## 2022-01-05 PROCEDURE — 80053 COMPREHEN METABOLIC PANEL: CPT | Performed by: HOSPITALIST

## 2022-01-05 PROCEDURE — 258N000003 HC RX IP 258 OP 636: Performed by: INTERNAL MEDICINE

## 2022-01-05 PROCEDURE — 87075 CULTR BACTERIA EXCEPT BLOOD: CPT | Performed by: RADIOLOGY

## 2022-01-05 RX ORDER — NALOXONE HYDROCHLORIDE 0.4 MG/ML
0.4 INJECTION, SOLUTION INTRAMUSCULAR; INTRAVENOUS; SUBCUTANEOUS
Status: DISCONTINUED | OUTPATIENT
Start: 2022-01-05 | End: 2022-01-10 | Stop reason: HOSPADM

## 2022-01-05 RX ORDER — CEFAZOLIN SODIUM 2 G/100ML
2 INJECTION, SOLUTION INTRAVENOUS
Status: COMPLETED | OUTPATIENT
Start: 2022-01-05 | End: 2022-01-05

## 2022-01-05 RX ORDER — FLUMAZENIL 0.1 MG/ML
0.2 INJECTION, SOLUTION INTRAVENOUS
Status: DISCONTINUED | OUTPATIENT
Start: 2022-01-05 | End: 2022-01-10 | Stop reason: HOSPADM

## 2022-01-05 RX ORDER — FENTANYL CITRATE 50 UG/ML
25-50 INJECTION, SOLUTION INTRAMUSCULAR; INTRAVENOUS EVERY 5 MIN PRN
Status: DISCONTINUED | OUTPATIENT
Start: 2022-01-05 | End: 2022-01-06

## 2022-01-05 RX ORDER — NALOXONE HYDROCHLORIDE 0.4 MG/ML
0.2 INJECTION, SOLUTION INTRAMUSCULAR; INTRAVENOUS; SUBCUTANEOUS
Status: DISCONTINUED | OUTPATIENT
Start: 2022-01-05 | End: 2022-01-10 | Stop reason: HOSPADM

## 2022-01-05 RX ADMIN — ATENOLOL 50 MG: 25 TABLET ORAL at 00:31

## 2022-01-05 RX ADMIN — PIPERACILLIN SODIUM AND TAZOBACTAM SODIUM 3.38 G: 3; .375 INJECTION, POWDER, LYOPHILIZED, FOR SOLUTION INTRAVENOUS at 05:40

## 2022-01-05 RX ADMIN — CEFAZOLIN SODIUM 2 G: 2 INJECTION, SOLUTION INTRAVENOUS at 11:55

## 2022-01-05 RX ADMIN — BRIMONIDINE TARTRATE 1 DROP: 1.5 SOLUTION OPHTHALMIC at 00:32

## 2022-01-05 RX ADMIN — MIDAZOLAM HYDROCHLORIDE 1 MG: 1 INJECTION, SOLUTION INTRAMUSCULAR; INTRAVENOUS at 12:34

## 2022-01-05 RX ADMIN — SODIUM BICARBONATE: 84 INJECTION, SOLUTION INTRAVENOUS at 08:16

## 2022-01-05 RX ADMIN — INSULIN GLARGINE 20 UNITS: 100 INJECTION, SOLUTION SUBCUTANEOUS at 23:36

## 2022-01-05 RX ADMIN — BRIMONIDINE TARTRATE 1 DROP: 1.5 SOLUTION OPHTHALMIC at 23:38

## 2022-01-05 RX ADMIN — INSULIN GLARGINE 20 UNITS: 100 INJECTION, SOLUTION SUBCUTANEOUS at 10:19

## 2022-01-05 RX ADMIN — INSULIN ASPART 1 UNITS: 100 INJECTION, SOLUTION INTRAVENOUS; SUBCUTANEOUS at 18:42

## 2022-01-05 RX ADMIN — BRIMONIDINE TARTRATE 1 DROP: 1.5 SOLUTION OPHTHALMIC at 10:19

## 2022-01-05 RX ADMIN — AMLODIPINE BESYLATE 2.5 MG: 2.5 TABLET ORAL at 10:19

## 2022-01-05 RX ADMIN — LATANOPROST 1 DROP: 50 SOLUTION OPHTHALMIC at 00:34

## 2022-01-05 RX ADMIN — IOHEXOL 15 ML: 350 INJECTION, SOLUTION INTRAVENOUS at 13:01

## 2022-01-05 RX ADMIN — FENTANYL CITRATE 25 MCG: 50 INJECTION INTRAMUSCULAR; INTRAVENOUS at 12:37

## 2022-01-05 RX ADMIN — PIPERACILLIN SODIUM AND TAZOBACTAM SODIUM 3.38 G: 3; .375 INJECTION, POWDER, LYOPHILIZED, FOR SOLUTION INTRAVENOUS at 18:08

## 2022-01-05 RX ADMIN — LATANOPROST 1 DROP: 50 SOLUTION OPHTHALMIC at 23:39

## 2022-01-05 RX ADMIN — SODIUM BICARBONATE: 84 INJECTION, SOLUTION INTRAVENOUS at 05:09

## 2022-01-05 RX ADMIN — AMLODIPINE BESYLATE 2.5 MG: 2.5 TABLET ORAL at 00:30

## 2022-01-05 RX ADMIN — LIDOCAINE HYDROCHLORIDE 15 ML: 10 INJECTION, SOLUTION INFILTRATION; PERINEURAL at 12:39

## 2022-01-05 RX ADMIN — SODIUM BICARBONATE: 84 INJECTION, SOLUTION INTRAVENOUS at 21:31

## 2022-01-05 ASSESSMENT — ACTIVITIES OF DAILY LIVING (ADL)
DRESSING/BATHING_DIFFICULTY: YES
WHICH_OF_THE_ABOVE_FUNCTIONAL_RISKS_HAD_A_RECENT_ONSET_OR_CHANGE?: AMBULATION;TRANSFERRING;DRESSING
ADLS_ACUITY_SCORE: 8
FALL_HISTORY_WITHIN_LAST_SIX_MONTHS: NO
EQUIPMENT_CURRENTLY_USED_AT_HOME: WALKER, STANDARD;OTHER (SEE COMMENTS)
ADLS_ACUITY_SCORE: 7
WALKING_OR_CLIMBING_STAIRS_DIFFICULTY: YES
DIFFICULTY_COMMUNICATING: NO
ADLS_ACUITY_SCORE: 7
ADLS_ACUITY_SCORE: 7
ADLS_ACUITY_SCORE: 8
ADLS_ACUITY_SCORE: 7
ADLS_ACUITY_SCORE: 7
ADLS_ACUITY_SCORE: 9
ADLS_ACUITY_SCORE: 7
ADLS_ACUITY_SCORE: 7
DIFFICULTY_EATING/SWALLOWING: NO
HEARING_DIFFICULTY_OR_DEAF: NO
ADLS_ACUITY_SCORE: 7
CONCENTRATING,_REMEMBERING_OR_MAKING_DECISIONS_DIFFICULTY: NO
ADLS_ACUITY_SCORE: 9
ADLS_ACUITY_SCORE: 7
TOILETING_ISSUES: NO
ADLS_ACUITY_SCORE: 7
ADLS_ACUITY_SCORE: 7
WALKING_OR_CLIMBING_STAIRS: AMBULATION DIFFICULTY, REQUIRES EQUIPMENT
ADLS_ACUITY_SCORE: 7
DOING_ERRANDS_INDEPENDENTLY_DIFFICULTY: YES
ADLS_ACUITY_SCORE: 9
WEAR_GLASSES_OR_BLIND: NO
ADLS_ACUITY_SCORE: 7
PATIENT_/_FAMILY_COMMUNICATION_STYLE: SPOKEN LANGUAGE (ENGLISH OR BILINGUAL)
ADLS_ACUITY_SCORE: 9
ADLS_ACUITY_SCORE: 7
ADLS_ACUITY_SCORE: 9
ADLS_ACUITY_SCORE: 7
DRESSING/BATHING: DRESSING DIFFICULTY, ASSISTANCE 1 PERSON

## 2022-01-05 ASSESSMENT — MIFFLIN-ST. JEOR: SCORE: 1765.9

## 2022-01-05 NOTE — PHARMACY-ADMISSION MEDICATION HISTORY
Pharmacy Note - Admission Medication History    Pertinent Provider Information:   -Pt states wife will bring in eye drops ______________________________________________________________________    Prior To Admission (PTA) med list completed and updated in EMR.       PTA Med List   Medication Sig Last Dose     acetaminophen (TYLENOL) 325 MG tablet Take 1-2 tablets (325-650 mg) by mouth every 4 hours as needed for mild pain or pain      amLODIPine (NORVASC) 2.5 MG tablet Take 1 tablet (2.5 mg) by mouth 2 times daily 1/4/2022 at am     amoxicillin (AMOXIL) 500 MG capsule [AMOXICILLIN (AMOXIL) 500 MG CAPSULE] Take 2,000 mg by mouth as needed. Prior to dental procedures      aspirin (ASA) 81 MG EC tablet Take 1 tablet (81 mg) by mouth daily 1/4/2022     atenolol (TENORMIN) 50 MG tablet [ATENOLOL (TENORMIN) 50 MG TABLET] Take 50 mg by mouth 2 (two) times a day. 1/4/2022 at am     brimonidine (ALPHAGAN) 0.15 % ophthalmic solution [BRIMONIDINE (ALPHAGAN) 0.15 % OPHTHALMIC SOLUTION] Administer 1 drop to both eyes 2 (two) times a day. 1/4/2022 at am     calcipotriene (DOVONOX) 0.005 % cream [CALCIPOTRIENE (DOVONOX) 0.005 % CREAM] Apply 1 application topically daily as needed.      Dulaglutide 3 MG/0.5ML SOPN Inject 3 mg Subcutaneous once a week Mondays 1/3/2022     insulin glargine (BASAGLAR KWIKPEN) 100 unit/mL (3 mL) pen Inject 20 Units Subcutaneous 2 times daily  1/4/2022 at am     latanoprost (XALATAN) 0.005 % ophthalmic solution [LATANOPROST (XALATAN) 0.005 % OPHTHALMIC SOLUTION] Administer 1 drop to both eyes at bedtime. 1/3/2022     polyethylene glycol (MIRALAX) 17 GM/Dose powder Take 17 g by mouth daily Discontinue if stools are loose      simvastatin (ZOCOR) 20 MG tablet Take 20 mg by mouth At Bedtime  1/3/2022     triamcinolone (KENALOG) 0.1 % cream Apply 1 Application topically See Admin Instructions Two times a day on Saturdays and Sundays Past Week       Information source(s): Patient and  Colette/Brian  Method of interview communication: in-person    Summary of Changes to PTA Med List  New: -  Discontinued: lovenox, florastor  Changed: -    Patient was asked about OTC/herbal products specifically.  PTA med list reflects this.    In the past week, patient estimated taking medication this percent of the time:  greater than 90%.    Allergies were reviewed, assessed, and updated with the patient.      Medications currently not available for use during hospital stay. Family/Patient representative states they will bring eye drops to [facility].    The information provided in this note is only as accurate as the sources available at the time of the update(s).    Thank you for the opportunity to participate in the care of this patient.    Manuela Root, PharmD, BCPS 01/04/22 10:06 PM

## 2022-01-05 NOTE — ED NOTES
The pt's wife, Nely would like an update in the morning. Her home phone number is [phone]  Her cell number is [phone].

## 2022-01-05 NOTE — PRE-PROCEDURE
GENERAL PRE-PROCEDURE:   Procedure:  Bilat PNT placement  Date/Time:  1/5/2022 8:09 AM    Written consent obtained?: Yes    Risks and benefits: Risks, benefits and alternatives were discussed    Consent given by:  Patient  Patient states understanding of procedure being performed: Yes    Patient's understanding of procedure matches consent: Yes    Procedure consent matches procedure scheduled: Yes    Expected level of sedation:  Moderate  Appropriately NPO:  Yes  ASA Class:  3  Mallampati  :  Grade 2- soft palate, base of uvula, tonsillar pillars, and portion of posterior pharyngeal wall visible  Lungs:  Lungs clear with good breath sounds bilaterally and other (comment)  Lung exam comment:  Diminished bases  Heart:  Normal heart sounds and rate  History & Physical reviewed:  History and physical reviewed and no updates needed  Statement of review:  I have reviewed the lab findings, diagnostic data, medications, and the plan for sedation

## 2022-01-05 NOTE — ED NOTES
[facility] ED Handoff Report    ED Chief Complaint: Increase kidney function tests    ED Diagnosis:  (N17.9) Acute renal failure, unspecified acute renal failure type (H)  Comment: bilateral nephrostomy tubes placed  Plan:        PMH:    Past Medical History:   Diagnosis Date     Basal cell carcinoma      Bladder tumor      Cancer (H)     Prostate     Chronic kidney disease      CKD (chronic kidney disease)      DM2 (diabetes mellitus, type 2) (H)      Glaucoma      History of gout      HTN (hypertension)      Hyperlipidemia      Kidney stone      Lung nodule      Malignant neoplasm of urinary bladder, unspecified site (H)      Metabolic syndrome      Obesity      Osteoarthritis of knee      Psoriasis      Restless legs syndrome         Code Status:  Full Code     Falls Risk: Yes Band: Applied    Current Living Situation/Residence: lives with a significant other     Elimination Status: Continent: Yes     Activity Level: SBA w/ walker    Patients Preferred Language:  English     Needed: No    Vital Signs:  /65   Pulse 64   Temp 97.8  F (36.6  C) (Oral)   Resp 29   Wt 101.6 kg (224 lb)   SpO2 99%   BMI 31.24 kg/m       Cardiac Rhythm: NA    Pain Score: 0/10    Is the Patient Confused:  No    Last Food or Drink: 1/4/22 at unknown    Focused Assessment:  Both nephrostomy tubes are draining    Tests Performed: Done: Labs and Imaging    Treatments Provided:  Bilateral nephrostomy tubes placed    Family Dynamics/Concerns: No    Family Updated On Visitor Policy: Yes    Plan of Care Communicated to Family: Yes    Who Was Updated about Plan of Care: wife and daughter    Belongings Checklist Done and Signed by Patient: No    Covid: asymptomatic , negative    Additional Information: Patient had cystoprostatectomy and ileal conduit diversion with ostomy early 12/2021 due to prostate cancer and tumor on bladder.  Went to PCP yesterday and was advised to come to the ED due to increase BUN and CREA.       RN: Thais Dominique   1/5/2022 3:00 PM

## 2022-01-05 NOTE — PROGRESS NOTES
Place of Service:  [facility]     Reason for follow up: Bilateral hydronephrosis with CHINMAY; S/p cystectomy with ileal conduit 12/6/21; S/p bilateral PNT placement 1/5/22    SUBJECTIVE:    Patient reports he is feeling tired. Denies flank and abdominal pain, fever, chills, nausea/vomiting.     OBJECTIVE:  PHYSICAL EXAM:  Temp: 97.9  F (36.6  C) Temp src: Oral BP: 137/68 Pulse: 67   Resp: 20 SpO2: 98 % O2 Device: None (Room air)    General: NAD, alert, cooperative  Head: normocephalic, without abnormality / atraumatic  Abdomen: soft, non- tender, non- distended. No suprapubic fullness, no suprapubic tenderness. Abdominal incisions scabbed over and without erythema. Urostomy stoma pink, some clear yellow urine in bag. 300 ml ostomy output yesterday, 685 ml so far today.  No bilateral CVA tenderness. Bilateral PNT exit sites dry with dressings intact.   Genitourinary: Deferred.   Musculoskeletal: moves all four extremities equally.   Psychological: alert and oriented, answers questions appropriately    LABS:  Creatinine   Date Value Ref Range Status   01/05/2022 6.85 (HH) 0.70 - 1.30 mg/dL Final     WBC Count   Date Value Ref Range Status   01/05/2022 7.9 4.0 - 11.0 10e3/uL Final     Hemoglobin   Date Value Ref Range Status   01/05/2022 9.6 (L) 13.3 - 17.7 g/dL Final     Platelet Count   Date Value Ref Range Status   01/05/2022 200 150 - 450 10e3/uL Final       UA:  UA RESULTS:  Recent Labs   Lab Test 01/04/22  1903   COLOR Light Yellow   APPEARANCE Turbid*   URINEGLC Negative   URINEBILI Negative   URINEKETONE Negative   SG 1.012   UBLD 0.1 mg/dL*   URINEPH 8.5*   PROTEIN 100 *   NITRITE Negative   LEUKEST 500 Rao/uL*   RBCU 4*   WBCU 16*     Cultures:  Urine 1/4: pending    Blood: none    Left kidney 1/5: Aerobic - pending; Anaerobic - pending      Lab Results: personally reviewed.     ASSESSMENT/PLAN:  Marcelo Valerio is being seen by [facility] for Bilateral hydronephrosis with CHINMAY; S/p cystectomy  with ileal conduit 12/6/21; S/p bilateral PNT placement 1/5/22    - Creatinine 6.85 this AM. Nephrology consulted, repeating BMP today.   - Bilateral PNTs draining well. Currently without significant flank pain.   - Urine cultures pending. WBC 7.9. Remains afebrile. Continue broad spectrum antibiotic, adjust as needed pending cultures/sensitivities.     Will update:  Dr Fam Manrique and Dr. Romulo Adan, [facility]   [phone]

## 2022-01-05 NOTE — CONSULTS
NEPHROLOGY CONSULTATION - [facility]        REASON FOR CONSULTATION: CHINMAY    HISTORY OF PRESENT ILLNESS: 77 yo male with h/o bladder cancer s/p cystectomy/ileal diversion in December, CKD 4 with creat of 1.8, HTN admit from clinic with CHINMAY.  Noted to have creat of 7.5 and has b/l hydronephrosis on CT.  He reports fatigue since surgery, some decrease in appetite but no nausea, no signif abd or back pain, no change in urine output. Taking occ Advil to help with sleep.  No sob.  Will be going for b/l PNT placement today.    REVIEW OF SYSTEMS:Comprehensive review of systems obtained and otherwise negative.    Past Medical History:   Diagnosis Date     Basal cell carcinoma      Bladder tumor      Cancer (H)     Prostate     Chronic kidney disease      CKD (chronic kidney disease)      DM2 (diabetes mellitus, type 2) (H)      Glaucoma      History of gout      HTN (hypertension)      Hyperlipidemia      Kidney stone      Lung nodule      Malignant neoplasm of urinary bladder, unspecified site (H)      Metabolic syndrome      Obesity      Osteoarthritis of knee      Psoriasis      Restless legs syndrome        Social History     Socioeconomic History     Marital status:      Spouse name: Not on file     Number of children: Not on file     Years of education: Not on file     Highest education level: Not on file   Occupational History     Not on file   Tobacco Use     Smoking status: Former Smoker     Packs/day: 0.00     Quit date: 8/1/2018     Years since quitting: 3.4     Smokeless tobacco: Never Used     Tobacco comment: Cigars and Pipes   Substance and Sexual Activity     Alcohol use: Yes     Comment: Alcoholic Drinks/day: occassional      Drug use: Not Currently     Sexual activity: Not on file   Other Topics Concern     Not on file   Social History Narrative     Not on file     Social Determinants of Health     Financial Resource Strain: Not on file   Food Insecurity: Not on file   Transportation  Needs: Not on file   Physical Activity: Not on file   Stress: Not on file   Social Connections: Not on file   Intimate Partner Violence: Not on file   Housing Stability: Not on file       No family history on file. No CKD    Allergies   Allergen Reactions     Zinc Acetate Itching     Nickel      Sulfa (Sulfonamide Antibiotics) [Sulfa Drugs] Unknown     Childhood reaction       MEDICATIONS:    amLODIPine  2.5 mg Oral BID     [Held by provider] aspirin  81 mg Oral Daily     atenolol  50 mg Oral BID     brimonidine  1 drop Both Eyes BID     ceFAZolin  2 g Intravenous Pre-Op/Pre-procedure x 1 dose     insulin aspart  1-7 Units Subcutaneous TID AC     insulin aspart  1-5 Units Subcutaneous At Bedtime     insulin glargine  20 Units Subcutaneous BID     latanoprost  1 drop Both Eyes At Bedtime     piperacillin-tazobactam  3.375 g Intravenous Once    Followed by     piperacillin-tazobactam  3.375 g Intravenous Q12H     polyethylene glycol  17 g Oral Daily     sodium chloride (PF)  3 mL Intracatheter Q8H         PHYSICAL EXAM    BP (!) 143/74   Pulse 63   Temp 97.4  F (36.3  C)   Resp 18   Wt 101.6 kg (224 lb)   SpO2 100%   BMI 31.24 kg/m          Gen: NAD  HEENT: No icterus, NC/AT  CARDIOVASCULAR: RR, no rub,  No edema  PULMONARY: CTA ant No cyanosis  GASTROINTESTINAL: soft, NT/ND  MSK: Diffuse muscle atrophy, warm  NEURO: Alert, no gross focal findings  PSYCHIATRIC: Normal affect, answers questions appropriately  SKIN: Pale, no jaundice, no rash.    LABORATORIES    Recent Labs   Lab 01/05/22  0648 01/04/22  1901   WBC 7.9 11.8*   HGB 9.6* 11.2*   HCT 32.2* 35.2*    295     Recent Labs   Lab 01/05/22  0648 01/04/22  1901    138   CO2 10* 8*   * 109*   ALKPHOS 82  --    ALT 15  --    AST 14  --          ASSESSMENT:   77 yo male with h/o bladder cancer s/p cystectomy/ileal diversion in December, CKD 4 with creat of 1.8, HTN, DM2 admit from clinic with CHINMAY, b/l hdyronephrosis    PLAN:  1. CHINMAY/CKD4 -  baseline CKD 4 likely due to DM2, HTN and now CHINMAY due to b/l hydronephrosis post cystectomy/ileal diversion for bladder cancer.  Was taking Advil PTA, may have further contributed to CHINMAY  -await PNT placement today as expect CHINMAY primarily obstructive  -no acute dialysis indications now but will monitor urine output, serial labs closely to determine need for dialysis  -cont IVF with bicarb gtt  -repeat bmp later today  2. B/l Hydro - post-cystectomy, ileal diversion  -for PNT placement today  -urology and IR following  3. Metabolic acidosis - due to CHINMAY  -some improvement today  -cont bicarb gtt, address obstructive CHINMAY with PNT placement  4. Anemia - expect due to recent surgery, CKD, may see further decline with  Hydration  -serial hb  -update iron stores          Chika Malloy MD  [facility]  [phone]

## 2022-01-05 NOTE — H&P
[facility]    History and Physical - Hospitalist Service       Date of Admission:  1/4/2022    Assessment & Plan      Marcelo Valerio is a 76 year old male admitted on 1/4/2022. He complained of nausea, fatigue and decreased urine output.  He was sent to the ED after labs in the clinic showed elevated creatinine.    1.  Acute kidney injury on chronic kidney disease stage IIIb  CT abdomen and pelvis showed moderate right hydronephrosis and mild right proximal ureterectasis similar to prior; interval development left perinephric edema moderate hydronephrosis and mild ureterectasis that tapers to the level of the ureteral ileal conduit anastomosis with cause of obstruction not evident.  Urology input appreciated  IR consult for nephrostomy tubes placement  Nephrology consult    2.  Abnormal UA  Challenging interpretation due to ileal conduit  Afebrile, however WBC 11.8 and CT scan showed left perinephric edema  IV Zosyn  Follow-up urine culture    3.  Non-anion gap metabolic acidosis  Secondary to renal failure  Sodium Bicarb drip per nephrology    4.  Type 2 diabetes mellitus with long-term insulin use  On dulaglutide once a week  Continue Lantus  NovoLog sliding scale    5.  Essential hypertension  Continue amlodipine, atenolol    6.  Glaucoma  Continue Xalatan       Diet: Combination Diet Moderate Consistent Carb (60 g CHO per Meal) Diet; Low Saturated Fat Na <2400mg Diet    DVT Prophylaxis: Pneumatic Compression Devices, hold pharmacologic prophylaxis for nephrostomy placement  Bob Catheter: Not present  Central Lines: None  Code Status: Full Code      Clinically Significant Risk Factors Present on Admission             # Coagulation Defect: INR = 1.27 (Ref range: 0.90 - 1.15) and/or PTT = N/A on admission, will monitor for bleeding  # Platelet Defect: home medication list includes an antiplatelet medication   # Obesity: last Body mass index is 31.24 kg/m .      Disposition Plan   Expected  Discharge:  at least 2 midnights  Anticipated discharge location:  Awaiting care coordination huddle  Delays:    Renal failure       The patient's care was discussed with the Patient.    Gilbert Mcdaniels MD  [facility]  Securely message with the Vocera Web Console (learn more here)  Text page via [facility] Paging/Directory        ______________________________________________________________________    Chief Complaint   Nausea, fatigue, elevated creatinine    History is obtained from the patient, electronic health record and emergency department physician    History of Present Illness   Marcelo Valerio is a 76 year old male who was sent to the emergency department due to elevated creatinine.  Past medical history of prostate cancer, bladder tumor, status post robotic assisted cystectomy ileal conduit creation on December 6, 2021, hypertension, diabetes, dyslipidemia, glaucoma, gout, obesity, knee osteoarthritis, restless leg syndrome.  Patient denies fevers, chills, chest pain, shortness of breath or palpitations.  He has noted decreased urine output in the ileostomy bag without blood.  Laboratory work-up showed creatinine 7.52, carbon dioxide 8.  Nephrology recommended bicarb drip.  CT scan showed interval development left perinephric edema, moderate hydronephrosis and mild ureterectasis that tapers to the level of the ureteral ileal conduit anastomosis.  Patient was evaluated by urology and recommended bilateral nephrostomy tubes and may need antegrade stent placement.    Review of Systems    The 10 point Review of Systems is negative other than noted in the HPI or here.     Past Medical History    I have reviewed this patient's medical history and updated it with pertinent information if needed.   Past Medical History:   Diagnosis Date     Basal cell carcinoma      Bladder tumor      Cancer (H)     Prostate     Chronic kidney disease      CKD (chronic kidney disease)      DM2 (diabetes  mellitus, type 2) (H)      Glaucoma      History of gout      HTN (hypertension)      Hyperlipidemia      Kidney stone      Lung nodule      Malignant neoplasm of urinary bladder, unspecified site (H)      Metabolic syndrome      Obesity      Osteoarthritis of knee      Psoriasis      Restless legs syndrome        Past Surgical History   I have reviewed this patient's surgical history and updated it with pertinent information if needed.  Past Surgical History:   Procedure Laterality Date     APPENDECTOMY       ARTHROPLASTY REVISION HIP Left      BASAL CELL CARCINOMA EXCISION      back     CARPAL TUNNEL RELEASE RT/LT       CATARACT EXTRACTION       COMBINED CYSTOSCOPY, RETROGRADES, EXCHANGE STENT URETER(S) Right 11/22/2021    Procedure: CYSTOSCOPY, RIGHT RETROGRADE PYELOGRAM, RIGHT URETERAL STENT EXCHANGE;  Surgeon: Fma Manrique MD;  Location: [facility] OR     CYSTECTOMY, ROBOT-ASSISTED, LAPAROSCOPIC, USING DA LARISSA SI, WITH URETEROILEAL CONDUIT CREATION N/A 12/6/2021    Procedure: CYSTOSCOPY, RIGHT URETEROSCOPY, RIGHT STENT REMOVAL ROBOTIC ASSISTED RADICAL CYSTECTOMY, CREATION OF ILEAL CONDUIT, EXTENSIVE LYSIS OF ADHESIONS;  Surgeon: Fam Manrique MD;  Location: [facility] OR     CYSTOSCOPY, FULGURATE BLADDER TUMOR, COMBINED Bilateral 09/08/2021    Procedure: CYSTOSCOPY, SELECTIVE CYTOLOGIES, BLADDER BIOPSIES AND FULGURATION, BILATERAL RETROGRADE PYELOGRAMS, RIGHT URETEROSCOPY , RIGHT URERETAL STENT PLACEMENT;  Surgeon: Fam Manrique MD;  Location: [facility]     CYSTOSCOPY, TRANSURETHRAL RESECTION (TUR) TUMOR BLADDER, COMBINED N/A 02/01/2021    Procedure: CYSTOSCOPY SELECTIVE CYTOLOGIES, BLADDER BIOPSY FULGURATION, MEATAL DILATION;  Surgeon: Fam Manrique MD;  Location: [facility];  Service: Urology     INGUINAL HERNIA REPAIR Bilateral      JOINT REPLACEMENT Bilateral     bilateral CARLI, left TKA     NH CYSTOSCOPY,REMV CALCULUS,SIMPLE Right 03/22/2021    Procedure:  CYSTOSCOPY, RIGHT RETROGRADE PYELOGRAM, RIGHT URETEROSCOPY, URETERAL BIOPSY, RIGHT URETERAL STENT PLACEMENT;  Surgeon: Fam Manrique MD;  Location: [facility];  Service: Urology     TN CYSTOURETHROSCOPY,BIOPSY N/A 10/01/2019    Procedure: CYSTOSCOPY, WITH BLADDER BIOPSIES and Fulgeration;  Surgeon: Jose Murcia MD;  [place] [facility]rology     TN CYSTOURETHROSCOPY,URETER CATHETER Bilateral 10/01/2019    Procedure: BILATERAL RETROGRADE PYELOGRAMS;  Surgeon: Jose Murcia MD;  [place] [facility]  Service: Urology     PROSTATECTOMY      Radical suprapubic     RELEASE CARPAL TUNNEL       TONSILLECTOMY       TOTAL KNEE ARTHROPLASTY       VASECTOMY         Social History   I have reviewed this patient's social history and updated it with pertinent information if needed.  Social History     Tobacco Use     Smoking status: Former Smoker     Packs/day: 0.00     Quit date: 8/1/2018     Years since quitting: 3.4     Smokeless tobacco: Never Used     Tobacco comment: Cigars and Pipes   Substance Use Topics     Alcohol use: Yes     Comment: Alcoholic Drinks/day: occassional      Drug use: Not Currently       Family History     No significant family history, including no history of:     Prior to Admission Medications   Prior to Admission Medications   Prescriptions Last Dose Informant Patient Reported? Taking?   Dulaglutide 3 MG/0.5ML SOPN 1/3/2022  Yes Yes   Sig: Inject 3 mg Subcutaneous once a week Mondays   acetaminophen (TYLENOL) 325 MG tablet   No Yes   Sig: Take 1-2 tablets (325-650 mg) by mouth every 4 hours as needed for mild pain or pain   amLODIPine (NORVASC) 2.5 MG tablet 1/4/2022 at am  No Yes   Sig: Take 1 tablet (2.5 mg) by mouth 2 times daily   amoxicillin (AMOXIL) 500 MG capsule   Yes Yes   Sig: [AMOXICILLIN (AMOXIL) 500 MG CAPSULE] Take 2,000 mg by mouth as needed. Prior to dental procedures   aspirin (ASA) 81 MG EC tablet 1/4/2022  No Yes    Sig: Take 1 tablet (81 mg) by mouth daily   atenolol (TENORMIN) 50 MG tablet 1/4/2022 at am  Yes Yes   Sig: [ATENOLOL (TENORMIN) 50 MG TABLET] Take 50 mg by mouth 2 (two) times a day.   brimonidine (ALPHAGAN) 0.15 % ophthalmic solution 1/4/2022 at am  Yes Yes   Sig: [BRIMONIDINE (ALPHAGAN) 0.15 % OPHTHALMIC SOLUTION] Administer 1 drop to both eyes 2 (two) times a day.   calcipotriene (DOVONOX) 0.005 % cream   Yes Yes   Sig: [CALCIPOTRIENE (DOVONOX) 0.005 % CREAM] Apply 1 application topically daily as needed.   insulin glargine (BASAGLAR KWIKPEN) 100 unit/mL (3 mL) pen 1/4/2022 at am  Yes Yes   Sig: Inject 20 Units Subcutaneous 2 times daily    latanoprost (XALATAN) 0.005 % ophthalmic solution 1/3/2022  Yes Yes   Sig: [LATANOPROST (XALATAN) 0.005 % OPHTHALMIC SOLUTION] Administer 1 drop to both eyes at bedtime.   polyethylene glycol (MIRALAX) 17 GM/Dose powder   No Yes   Sig: Take 17 g by mouth daily Discontinue if stools are loose   simvastatin (ZOCOR) 20 MG tablet 1/3/2022  Yes Yes   Sig: Take 20 mg by mouth At Bedtime    triamcinolone (KENALOG) 0.1 % cream Past Week  Yes Yes   Sig: Apply 1 Application topically See Admin Instructions Two times a day on Saturdays and Sundays      Facility-Administered Medications: None     Allergies   Allergies   Allergen Reactions     Zinc Acetate Itching     Nickel      Sulfa (Sulfonamide Antibiotics) [Sulfa Drugs] Unknown     Childhood reaction       Physical Exam   Vital Signs: Temp: 97.4  F (36.3  C)   BP: (!) 154/74 Pulse: 67   Resp: 18 SpO2: 97 %      Weight: 224 lbs 0 oz    Constitutional: awake, somnolent, cooperative and no apparent distress  Eyes: extra-ocular muscles intact and sclera clear  ENT: normocepalic, without obvious abnormality, atramatic  Hematologic / Lymphatic: no cervical lymphadenopathy and no supraclavicular lymphadenopathy  Respiratory: No increased work of breathing, good air exchange, clear to auscultation bilaterally, no crackles or  wheezing  Cardiovascular: regular rate and rhythm and normal S1 and S2  GI: normal bowel sounds, soft, non-distended and mild tenderness around ileal conduit ostomy   Skin: maturing bruise around surgical scar  Musculoskeletal: no lower extremity pitting edema present  there is no redness, warmth, or swelling of the joints  Neurologic: Mental Status Exam:  Level of Alertness:   somnolent  Motor Exam:  moves all extremities well and symmetrically    Data   Data reviewed today: I reviewed all medications, new labs and imaging results over the last 24 hours. I personally reviewed no images or EKG's today.    Recent Labs   Lab 01/04/22 2126 01/04/22  1901   WBC  --  11.8*   HGB  --  11.2*   MCV  --  94   PLT  --  295   INR 1.27*  --    NA  --  138   POTASSIUM  --  4.8   CHLORIDE  --  114*   CO2  --  8*   BUN  --  109*   CR  --  7.52*   ANIONGAP  --  16   JERI  --  10.1   GLC  --  195*     Recent Results (from the past 24 hour(s))   CT Abdomen Pelvis w/o Contrast    Narrative    EXAM: CT ABDOMEN PELVIS W/O CONTRAST  LOCATION: [facility]  DATE/TIME: 1/4/2022 5:33 PM    INDICATION: Recent urostomy, acute renal failure.  COMPARISON: CT AP 11/17/2021.  TECHNIQUE: CT scan of the abdomen and pelvis was performed without IV contrast. Multiplanar reformats were obtained. Dose reduction techniques were used.  CONTRAST: None.    FINDINGS:   LOWER CHEST: Normal.    HEPATOBILIARY: Normal.    PANCREAS: Normal.    SPLEEN: Normal.    ADRENAL GLANDS: Normal.    KIDNEYS/BLADDER: Interval cystectomy and ileal conduit urinary diversion with right lower quadrant ostomy. Moderate right hydronephrosis and mild right proximal ureterectasis similar to prior. Interval development left perinephric edema, moderate   hydronephrosis and mild ureterectasis to the level of the ureteral ileal conduit anastomosis. The ileal conduit is decompressed and normal in appearance. Small benign cyst right kidney.    BOWEL: Colonic  diverticulosis. No bowel obstruction or inflammatory change.    LYMPH NODES: Normal.    VASCULATURE: Unremarkable.    PELVIC ORGANS: Prostatectomy.    MUSCULOSKELETAL: Bones are demineralized. Bilateral hip arthroplasties.      Impression    IMPRESSION:   1.  Interval cystectomy and ileal conduit urinary diversion with right lower quadrant ostomy.  2.  Moderate right hydronephrosis and mild right proximal ureterectasis similar to prior.  3.  Interval development left perinephric edema moderate hydronephrosis and mild ureterectasis that tapers to the level of the ureteral ileal conduit anastomosis with cause of obstruction not evident.

## 2022-01-05 NOTE — CONSULTS
Interventional Radiology - Consultation Note:  Inpatient - [facility]: Interventional Radiology   [phone]  1/5/2022    Reason for Consult:  BILATERAL PNT placement  Requesting Provider:  Glenn Sebastian MD    HPI:  Marcelo Valerio is a 76 year old male PMH HTN, HLD, CKD, DM2. Obesity and metabolic syndrome, prostate cancer, and bladder tumor.  Hx of cystoprostatectomy and ileal conduit diversion with ostomy early 12/2021.    Presented to ER 1/4/2021 2/2 nausea, fatigue, and concerning labs obtained in clinic for CHINMAY.  Dx with CHINMAY and ileal conduit anastomosis obstruction.     Urology consulted.  Bilateral hydronephrosis on imaging.  AF, but with WBC 11.8 and perinephric edema on CT.  Started on IV Zosyn.    Consultation requested for BILATERAL PNT placement.      IMAGING:    EXAM: CT ABDOMEN PELVIS W/O CONTRAST  LOCATION: [facility]  DATE/TIME: 1/4/2022 5:33 PM     INDICATION: Recent urostomy, acute renal failure.  COMPARISON: CT AP 11/17/2021.  TECHNIQUE: CT scan of the abdomen and pelvis was performed without IV contrast. Multiplanar reformats were obtained. Dose reduction techniques were used.  CONTRAST: None.     FINDINGS:   LOWER CHEST: Normal.     HEPATOBILIARY: Normal.     PANCREAS: Normal.     SPLEEN: Normal.     ADRENAL GLANDS: Normal.     KIDNEYS/BLADDER: Interval cystectomy and ileal conduit urinary diversion with right lower quadrant ostomy. Moderate right hydronephrosis and mild right proximal ureterectasis similar to prior. Interval development left perinephric edema, moderate   hydronephrosis and mild ureterectasis to the level of the ureteral ileal conduit anastomosis. The ileal conduit is decompressed and normal in appearance. Small benign cyst right kidney.     BOWEL: Colonic diverticulosis. No bowel obstruction or inflammatory change.     LYMPH NODES: Normal.     VASCULATURE: Unremarkable.     PELVIC ORGANS: Prostatectomy.     MUSCULOSKELETAL: Bones  "are demineralized. Bilateral hip arthroplasties.                                                                      IMPRESSION:   1.  Interval cystectomy and ileal conduit urinary diversion with right lower quadrant ostomy.  2.  Moderate right hydronephrosis and mild right proximal ureterectasis similar to prior.  3.  Interval development left perinephric edema moderate hydronephrosis and mild ureterectasis that tapers to the level of the ureteral ileal conduit anastomosis with cause of obstruction not evident.        NPO Status:  [place]  Anticoagulation/Antiplatelets/Bleeding tendencies:   Aspirin 81 mg PO QD - last dose \"a handful of days\" ago.    Antibiotics:  Zosyn 3.375g IV Q12.  Recommend Ancef 2 g IV x1 ordered for procedure     REVIEW OF SYSTEMS:  A comprehensive 10-point review of systems was performed. All systems were reviewed and negative with exception to those reported in the HPI.     PAST MEDICAL HISTORY:  Past Medical History:   Diagnosis Date     Basal cell carcinoma      Bladder tumor      Cancer (H)     Prostate     Chronic kidney disease      CKD (chronic kidney disease)      DM2 (diabetes mellitus, type 2) (H)      Glaucoma      History of gout      HTN (hypertension)      Hyperlipidemia      Kidney stone      Lung nodule      Malignant neoplasm of urinary bladder, unspecified site (H)      Metabolic syndrome      Obesity      Osteoarthritis of knee      Psoriasis      Restless legs syndrome        PAST SURGICAL HISTORY:  Past Surgical History:   Procedure Laterality Date     APPENDECTOMY       ARTHROPLASTY REVISION HIP Left      BASAL CELL CARCINOMA EXCISION      back     CARPAL TUNNEL RELEASE RT/LT       CATARACT EXTRACTION       COMBINED CYSTOSCOPY, RETROGRADES, EXCHANGE STENT URETER(S) Right 11/22/2021    Procedure: CYSTOSCOPY, RIGHT RETROGRADE PYELOGRAM, RIGHT URETERAL STENT EXCHANGE;  Surgeon: Fam Manrique MD;  [place] [facility] [place]     CYSTECTOMY, ROBOT-ASSISTED, LAPAROSCOPIC, " USING DA LARISSA SI, WITH URETEROILEAL CONDUIT CREATION N/A 12/6/2021    Procedure: CYSTOSCOPY, RIGHT URETEROSCOPY, RIGHT STENT REMOVAL ROBOTIC ASSISTED RADICAL CYSTECTOMY, CREATION OF ILEAL CONDUIT, EXTENSIVE LYSIS OF ADHESIONS;  Surgeon: Fam Manrique MD;  Location: [facility]     CYSTOSCOPY, FULGURATE BLADDER TUMOR, COMBINED Bilateral 09/08/2021    Procedure: CYSTOSCOPY, SELECTIVE CYTOLOGIES, BLADDER BIOPSIES AND FULGURATION, BILATERAL RETROGRADE PYELOGRAMS, RIGHT URETEROSCOPY , RIGHT URERETAL STENT PLACEMENT;  Surgeon: Fam Manrique MD;  Location: [facility]     CYSTOSCOPY, TRANSURETHRAL RESECTION (TUR) TUMOR BLADDER, COMBINED N/A 02/01/2021    Procedure: CYSTOSCOPY SELECTIVE CYTOLOGIES, BLADDER BIOPSY FULGURATION, MEATAL DILATION;  Surgeon: Fam Manrique MD;  Location: [facility];  Service: Urology     INGUINAL HERNIA REPAIR Bilateral      JOINT REPLACEMENT Bilateral     bilateral CARLI, left TKA     LA CYSTOSCOPY,REMV CALCULUS,SIMPLE Right 03/22/2021    Procedure: CYSTOSCOPY, RIGHT RETROGRADE PYELOGRAM, RIGHT URETEROSCOPY, URETERAL BIOPSY, RIGHT URETERAL STENT PLACEMENT;  Surgeon: Fam Manrique MD;  Location: [facility];  Service: Urology     LA CYSTOURETHROSCOPY,BIOPSY N/A 10/01/2019    Procedure: CYSTOSCOPY, WITH BLADDER BIOPSIES and Fulgeration;  Surgeon: Jose Murcia MD;  Location: [facility];  Service: Urology     LA CYSTOURETHROSCOPY,URETER CATHETER Bilateral 10/01/2019    Procedure: BILATERAL RETROGRADE PYELOGRAMS;  Surgeon: Jose Murcia MD;  Location: [facility];  Service: Urology     PROSTATECTOMY      Radical suprapubic     RELEASE CARPAL TUNNEL       TONSILLECTOMY       TOTAL KNEE ARTHROPLASTY       VASECTOMY         ALLERGIES:  Allergies   Allergen Reactions     Zinc Acetate Itching     Nickel      Sulfa (Sulfonamide Antibiotics) [Sulfa Drugs] Unknown     Childhood reaction        MEDICATIONS:  Current  Facility-Administered Medications   Medication     acetaminophen (TYLENOL) tablet 325-650 mg     amLODIPine (NORVASC) tablet 2.5 mg     [Held by provider] aspirin EC tablet 81 mg     atenolol (TENORMIN) tablet 50 mg     brimonidine (ALPHAGAN-P) 0.15 % ophthalmic solution 1 drop     glucose gel 15-30 g    Or     dextrose 50 % injection 25-50 mL    Or     glucagon injection 1 mg     insulin aspart (NovoLOG) injection (RAPID ACTING)     insulin aspart (NovoLOG) injection (RAPID ACTING)     insulin glargine (LANTUS PEN) injection 20 Units     latanoprost (XALATAN) 0.005 % ophthalmic solution 1 drop     lidocaine (LMX4) cream     lidocaine 1 % 0.1-1 mL     melatonin tablet 1 mg     ondansetron (ZOFRAN-ODT) ODT tab 4 mg    Or     ondansetron (ZOFRAN) injection 4 mg     piperacillin-tazobactam (ZOSYN) 3.375 g vial to attach to  mL bag    Followed by     piperacillin-tazobactam (ZOSYN) 3.375 g vial to attach to  mL bag     polyethylene glycol (MIRALAX) Packet 17 g     prochlorperazine (COMPAZINE) injection 5 mg    Or     prochlorperazine (COMPAZINE) tablet 5 mg    Or     prochlorperazine (COMPAZINE) suppository 12.5 mg     sodium bicarbonate 150 mEq in D5W 1,000 mL infusion     sodium chloride (PF) 0.9% PF flush 3 mL     sodium chloride (PF) 0.9% PF flush 3 mL     Current Outpatient Medications   Medication     acetaminophen (TYLENOL) 325 MG tablet     amLODIPine (NORVASC) 2.5 MG tablet     amoxicillin (AMOXIL) 500 MG capsule     aspirin (ASA) 81 MG EC tablet     atenolol (TENORMIN) 50 MG tablet     brimonidine (ALPHAGAN) 0.15 % ophthalmic solution     calcipotriene (DOVONOX) 0.005 % cream     Dulaglutide 3 MG/0.5ML SOPN     insulin glargine (BASAGLAR KWIKPEN) 100 unit/mL (3 mL) pen     latanoprost (XALATAN) 0.005 % ophthalmic solution     polyethylene glycol (MIRALAX) 17 GM/Dose powder     simvastatin (ZOCOR) 20 MG tablet     triamcinolone (KENALOG) 0.1 % cream        LABS:  INR   Date Value Ref Range Status    01/04/2022 1.27 (H) 0.90 - 1.15 Final      Hemoglobin   Date Value Ref Range Status   01/05/2022 9.6 (L) 13.3 - 17.7 g/dL Final   ]  Platelet Count   Date Value Ref Range Status   01/05/2022 200 150 - 450 10e3/uL Final     Creatinine   Date Value Ref Range Status   01/05/2022 6.85 (HH) 0.70 - 1.30 mg/dL Final     Potassium   Date Value Ref Range Status   01/05/2022 4.1 3.5 - 5.0 mmol/L Final         EXAM:  /65   Pulse 60   Temp 97.4  F (36.3  C)   Resp 18   Wt 101.6 kg (224 lb)   SpO2 100%   BMI 31.24 kg/m    General:  Stable.  In no acute distress.    Neuro:  A&O x 3. Moves all extremities equally.  Resp:  Lungs clear to auscultation bilaterally.  RA.  Diminished bases  Cardio:  S1S2 and reg, without murmur, clicks or rubs  Skin:  Without excoriations, ecchymosis, erythema, lesions or open sores on bilateral back/flanks    Pre-Sedation Assessment:  Mallampati Airway Classification:  II - Faucial pillars and soft palate may be seen, but uvula is masked by the base of the tongue  Previous reaction to anesthesia/sedation:  No  Sedation plan based on assessment: Moderate (conscious) sedation  ASA Classification: Class 3 - SEVERE SYSTEMIC DISEASE, DEFINITE FUNCTIONAL LIMITATIONS.   Code Status/Advanced care directive: FULL CODE    ASSESSMENT:  77 yo M    - Complex PMH, including complex urological PMH including surgical creation of an ileal conduit  - CHINMAY on labs, moderate hydronephrosis on imaging, concern for urinary obstruction  - Urology consulted  - Concern for UTI, perinephric edema with WBC 11.8.  AF.  Zosyn per above  - Labs reviewed    Consultation requested for BILATERAL PNT placement    PLAN:    Proceed with BILATERAL percutaneous nephrostomy tube placement, with sedation    - Ancef 2 g IV x1 ordered for procedure       Recommendations, its risks/benefits, details and alternatives were discussed with patient, all questions answered and he verbalized understanding. OK to proceed with above  recommended radiology procedure.     Thank you for kindly for this consultation.     Total time spent on the date of the encounter is 30 minutes, including time spent counseling the patient, performing a medically appropriate evaluation, reviewing prior medical history, ordering medications and tests, documenting clinical information in the medical record, and communication of results.    KELLY HU NP  Interventional Radiology  [phone]        E/M codes for reference only:  41723

## 2022-01-05 NOTE — PROGRESS NOTES
Progress Note    Assessment/Plan  Marcelo Valerio is a 76 year old male with history of bladder cancer status post cystectomy and ileal diversion in December 2021 admitted on 1/4/2022.  Patient has also been taking over-the-counter Advil to help sleep he complained of nausea, fatigue and decreased urine output.  He was sent to the ED after labs in the clinic showed elevated creatinine.     1.  Acute kidney injury on chronic kidney disease stage 4  CT abdomen and pelvis showed moderate right hydronephrosis and mild right proximal ureterectasis similar to prior; interval development left perinephric edema moderate hydronephrosis and mild ureterectasis that tapers to the level of the ureteral ileal conduit anastomosis with cause of obstruction not evident.  Urology input appreciated  --Avoid NSAIDs  IR consult for nephrostomy tubes placement  Nephrology consult started the patient on bicarb drip     2.  Abnormal UA  Challenging interpretation due to ileal conduit  Afebrile, however WBC 11.8 and CT scan showed left perinephric edema  Continue empirical Zosyn  Follow-up urine culture     3.  Non-anion gap metabolic acidosis  Secondary to renal failure  Sodium Bicarb drip per nephrology     4.  Type 2 diabetes mellitus with long-term insulin use  On dulaglutide once a week  Continue Lantus  NovoLog sliding scale     5.  Essential hypertension  Anticipated postobstructive diuresis and therefore will hold amlodipine and atenolol     6.  Glaucoma  Continue Xalatan     Anemia likely due to CKD 4  --Ferritin and iron binding panel ordered by renal    Barriers to discharge:    Anticipated discharge date:        Subjective  Patient new to me.  Chart reviewed.  Patient briefly seen in the hallway before he was wheeled to IR for nephrostomy tubes.  Patient denies any nausea vomiting diarrhea fever chills shortness of breath or chest pain.    Objective    /62   Pulse 60   Temp 97.8  F (36.6  C) (Oral)   Resp 29   Wt 101.6  kg (224 lb)   SpO2 100%   BMI 31.24 kg/m    Weight:   Wt Readings from Last 5 Encounters:   01/04/22 101.6 kg (224 lb)   12/06/21 107.6 kg (237 lb 4.8 oz)   11/22/21 105.6 kg (232 lb 14.4 oz)   03/22/21 108.9 kg (240 lb)   02/01/21 108.9 kg (240 lb)       I/O last 3 completed shifts:  In: -   Out: 300 [Urine:300]  I/O this shift:  In: 10 [I.V.:10]  Out: 685 [Urine:685]          Physical Exam  Sleepy but answers appropriately    Pertinent Labs  ----------------------  Recent Labs   Lab 01/05/22  0943 01/05/22  0648 01/05/22  0029 01/04/22  1901   NA  --  137  --  138   POTASSIUM  --  4.1  --  4.8   CO2  --  10*  --  8*   BUN  --  109*  --  109*   CR  --  6.85*  --  7.52*   MAG  --   --   --  2.5   * 200* 213* 195*   ALBUMIN  --  2.2*  --   --    BILITOTAL  --  0.7  --   --    ALKPHOS  --  82  --   --    ALT  --  15  --   --    AST  --  14  --   --      Recent Labs   Lab 01/05/22  0648 01/04/22  1901   WBC 7.9 11.8*   HGB 9.6* 11.2*   HCT 32.2* 35.2*    295     Recent Labs   Lab 01/04/22  2126   INR 1.27*     Glucose Values Latest Ref Rng & Units 12/8/2021 12/9/2021 12/10/2021 12/11/2021 12/12/2021 1/4/2022 1/5/2022   Bedside Glucose (mg/dl )  - -- -- -- -- -- -- --   GLUCOSE 70 - 125 mg/dL 267(H) 189(H) 112 94 105 195(H) 200(H)   Some recent data might be hidden         Pertinent Radiology   Radiology Results: Personally reviewed impression/s  CT Abdomen Pelvis w/o Contrast    Result Date: 1/4/2022  EXAM: CT ABDOMEN PELVIS W/O CONTRAST LOCATION: [facility] DATE/TIME: 1/4/2022 5:33 PM INDICATION: Recent urostomy, acute renal failure. COMPARISON: CT AP 11/17/2021. TECHNIQUE: CT scan of the abdomen and pelvis was performed without IV contrast. Multiplanar reformats were obtained. Dose reduction techniques were used. CONTRAST: None. FINDINGS: LOWER CHEST: Normal. HEPATOBILIARY: Normal. PANCREAS: Normal. SPLEEN: Normal. ADRENAL GLANDS: Normal. KIDNEYS/BLADDER: Interval  cystectomy and ileal conduit urinary diversion with right lower quadrant ostomy. Moderate right hydronephrosis and mild right proximal ureterectasis similar to prior. Interval development left perinephric edema, moderate hydronephrosis and mild ureterectasis to the level of the ureteral ileal conduit anastomosis. The ileal conduit is decompressed and normal in appearance. Small benign cyst right kidney. BOWEL: Colonic diverticulosis. No bowel obstruction or inflammatory change. LYMPH NODES: Normal. VASCULATURE: Unremarkable. PELVIC ORGANS: Prostatectomy. MUSCULOSKELETAL: Bones are demineralized. Bilateral hip arthroplasties.     IMPRESSION: 1.  Interval cystectomy and ileal conduit urinary diversion with right lower quadrant ostomy. 2.  Moderate right hydronephrosis and mild right proximal ureterectasis similar to prior. 3.  Interval development left perinephric edema moderate hydronephrosis and mild ureterectasis that tapers to the level of the ureteral ileal conduit anastomosis with cause of obstruction not evident.     EKG Results: not reviewed.

## 2022-01-05 NOTE — ED PROVIDER NOTES
EMERGENCY DEPARTMENT ENCOUNTER            IMPRESSION:  Acute kidney failure  Ileal conduit anastomosis obstruction      MEDICAL DECISION MAKING:  Patient has a history of bladder cancer and is postop ileal conduit diversion with ostomy in early December.  He was seen at an outside clinic today for symptoms of nausea and fatigue.  Labs from that visit showed acute kidney injury.  Patient referred here for further evaluation.    On exam his vital signs are normal.  He does appear weak.  Cardiopulmonary exam was normal.  He has a right lower quadrant ileostomy without any tenderness or inflammation.    An IV was started and he was given small fluid bolus    Chemistry shows low bicarb of 8 and elevated BUN of 109 and creatinine of 7.54.  Potassium is normal    White blood cell count is normal    CT showed interval likely obstruction of the left ileal conduit ureteral anastomosis.    I spoke with Dr. Sebastian from on-call urology.  Patient will need a ureteral stent.    I spoke with on-call nephrology who recommended bicarb drip.    I spoke with on-call interventional radiology who will plan to evaluate and treat the patient tomorrow morning  Patient to be n.p.o. after midnight.  INR ordered.  Patient to be on the schedule for [facility] tomorrow morning    I spoke to the hospitalist for admission        =================================================================  CHIEF COMPLAINT:  Chief Complaint   Patient presents with     Abnormal Labs         HPI  Marcelo Valerio is a 76 year old male with a history of type II diabetes mellitus, bladder and prostate cancer, hyperlipidemia, hypertension, and CKD3 who presents to the ED by walk-in with wife for evaluation of abnormal labs.     Per chart review, patient was admitted to [facility] on 11/22/21 for cystoscopy, right retrograde pyelogram, and right ureteral stent exchange. Patient was discharged. Patient was admitted to [facility] from  12/6-12/12/21 for cystectomy with ileal conduit. Surgery was performed by Dr. Manrique. He had issues with ileal conduit leaking, which resolved. Creatinine was peaked 2.17 on 12/7, down to 1.85. Procalcitonin elevated at 0.61. CXR 12/8 done showing no pneumonia, showed atelectasis.ALYSON was drained and removed before discharge. Patient was discharged with 30 days of SQ heparin for VTE prophylaxis.     For the past 2 days, patient has had decreased urine output and felt more fatigued than normal. He went to his PCP this morning in [place] who tested blood work and noted elevated kidney tests, then recommended visiting the ED. Patient also endorses poor appetite and fluid intake today. Patient denies abdominal pain, vomiting, or any other additional symptoms at this time.     I, Jasbir Torre am serving as a scribe to document services personally performed by Dr. Al Dillon MD, based on my observation and the provider's statements to me. I, Dr. Al Dillon MD attest that Jasbir Torre is acting in a scribe capacity, has observed my performance of the services and has documented them in accordance with my direction.      REVIEW OF SYSTEMS   Constitutional: Positive for fatigue, Denies fever, chills, unintentional weight loss or fatigue   Eyes: Denies visual changes or discharge    HENT: Denies sore throat, ear pain or neck pain  Respiratory: Denies cough or shortness of breath    Cardiovascular: Denies chest pain, palpitations or leg swelling  GI: Denies abdominal pain, nausea, vomiting, or dark, bloody stools.    : Positive for decreased urine output, Denies hematuria, dysuria, or flank pain  Musculoskeletal: Denies any new back pain or new muscle/joint pains  Skin: Denies rash or wound  Neurologic: Denies current headache, new weakness, focal weakness, or sensory changes    Lymphatic: Denies swollen glands    Psychiatric: Denies depression, suicidal ideation or homicidal ideation.    Remainder of systems reviewed,  unless noted in HPI all others negative.      PAST MEDICAL HISTORY:  Past Medical History:   Diagnosis Date     Basal cell carcinoma      Bladder tumor      Cancer (H)     Prostate     Chronic kidney disease      CKD (chronic kidney disease)      DM2 (diabetes mellitus, type 2) (H)      Glaucoma      History of gout      HTN (hypertension)      Hyperlipidemia      Kidney stone      Lung nodule      Malignant neoplasm of urinary bladder, unspecified site (H)      Metabolic syndrome      Obesity      Osteoarthritis of knee      Psoriasis      Restless legs syndrome        PAST SURGICAL HISTORY:  Past Surgical History:   Procedure Laterality Date     APPENDECTOMY       ARTHROPLASTY REVISION HIP Left      BASAL CELL CARCINOMA EXCISION      back     CARPAL TUNNEL RELEASE RT/LT       CATARACT EXTRACTION       COMBINED CYSTOSCOPY, RETROGRADES, EXCHANGE STENT URETER(S) Right 11/22/2021    Procedure: CYSTOSCOPY, RIGHT RETROGRADE PYELOGRAM, RIGHT URETERAL STENT EXCHANGE;  Surgeon: Fam Manrique MD;  Location: [facility]     CYSTECTOMY, ROBOT-ASSISTED, LAPAROSCOPIC, USING DA LARISSA SI, WITH URETEROILEAL CONDUIT CREATION N/A 12/6/2021    Procedure: CYSTOSCOPY, RIGHT URETEROSCOPY, RIGHT STENT REMOVAL ROBOTIC ASSISTED RADICAL CYSTECTOMY, CREATION OF ILEAL CONDUIT, EXTENSIVE LYSIS OF ADHESIONS;  Surgeon: Fam Manrique MD;  Location: [facility] OR     CYSTOSCOPY, FULGURATE BLADDER TUMOR, COMBINED Bilateral 09/08/2021    Procedure: CYSTOSCOPY, SELECTIVE CYTOLOGIES, BLADDER BIOPSIES AND FULGURATION, BILATERAL RETROGRADE PYELOGRAMS, RIGHT URETEROSCOPY , RIGHT URERETAL STENT PLACEMENT;  Surgeon: Fam Manrique MD;  Location: [facility]     CYSTOSCOPY, TRANSURETHRAL RESECTION (TUR) TUMOR BLADDER, COMBINED N/A 02/01/2021    Procedure: CYSTOSCOPY SELECTIVE CYTOLOGIES, BLADDER BIOPSY FULGURATION, MEATAL DILATION;  Surgeon: Fam Manrique MD;  Location: [facility];  Service: Urology     INGUINAL  HERNIA REPAIR Bilateral      JOINT REPLACEMENT Bilateral     bilateral CARLI, left TKA     OH CYSTOSCOPY,REMV CALCULUS,SIMPLE Right 03/22/2021    Procedure: CYSTOSCOPY, RIGHT RETROGRADE PYELOGRAM, RIGHT URETEROSCOPY, URETERAL BIOPSY, RIGHT URETERAL STENT PLACEMENT;  Surgeon: Fam Manrique MD;  Location: [facility];  Service: Urology     OH CYSTOURETHROSCOPY,BIOPSY N/A 10/01/2019    Procedure: CYSTOSCOPY, WITH BLADDER BIOPSIES and Fulgeration;  Surgeon: Jose Murcia MD;  Location: [facility];  Service: Urology     OH CYSTOURETHROSCOPY,URETER CATHETER Bilateral 10/01/2019    Procedure: BILATERAL RETROGRADE PYELOGRAMS;  Surgeon: Jose Murcia MD;  Location: [facility];  Service: Urology     PROSTATECTOMY      Radical suprapubic     RELEASE CARPAL TUNNEL       TONSILLECTOMY       TOTAL KNEE ARTHROPLASTY       VASECTOMY           CURRENT MEDICATIONS:    acetaminophen (TYLENOL) 325 MG tablet  amLODIPine (NORVASC) 2.5 MG tablet  amoxicillin (AMOXIL) 500 MG capsule  aspirin (ASA) 81 MG EC tablet  atenolol (TENORMIN) 50 MG tablet  brimonidine (ALPHAGAN) 0.15 % ophthalmic solution  calcipotriene (DOVONOX) 0.005 % cream  Dulaglutide 3 MG/0.5ML SOPN  enoxaparin ANTICOAGULANT (LOVENOX) 40 MG/0.4ML syringe  insulin glargine (BASAGLAR KWIKPEN) 100 unit/mL (3 mL) pen  latanoprost (XALATAN) 0.005 % ophthalmic solution  polyethylene glycol (MIRALAX) 17 GM/Dose powder  saccharomyces boulardii (FLORASTOR) 250 MG capsule  simvastatin (ZOCOR) 20 MG tablet  triamcinolone (KENALOG) 0.1 % cream        ALLERGIES:  Allergies   Allergen Reactions     Zinc Acetate Itching     Nickel      Sulfa (Sulfonamide Antibiotics) [Sulfa Drugs] Unknown       FAMILY HISTORY:  No family history on file.    SOCIAL HISTORY:   Social History     Socioeconomic History     Marital status:      Spouse name: Not on file     Number of children: Not on file     Years of education: Not on file     Highest  education level: Not on file   Occupational History     Not on file   Tobacco Use     Smoking status: Former Smoker     Packs/day: 0.00     Quit date: 8/1/2018     Years since quitting: 3.4     Smokeless tobacco: Never Used     Tobacco comment: Cigars and Pipes   Substance and Sexual Activity     Alcohol use: Yes     Comment: Alcoholic Drinks/day: occassional      Drug use: Not Currently     Sexual activity: Not on file   Other Topics Concern     Not on file   Social History Narrative     Not on file     Social Determinants of Health     Financial Resource Strain: Not on file   Food Insecurity: Not on file   Transportation Needs: Not on file   Physical Activity: Not on file   Stress: Not on file   Social Connections: Not on file   Intimate Partner Violence: Not on file   Housing Stability: Not on file       PHYSICAL EXAM:    /65   Pulse 68   Temp 97.4  F (36.3  C)   Resp 18   Wt 101.6 kg (224 lb)   SpO2 99%   BMI 31.24 kg/m      Constitutional: Awake, alert, in no acute distress  Head: Normocephalic, atraumatic.  ENT: Mucous membranes moist. Posterior oropharynx appears normal.  Eyes: Pupils midrange and reactive ,no conjunctival discharge  Neck: No lymphadenopathy, no stridor, supple, soft tissue swelling  Chest: No tenderness   Respiratory: Respirations even, unlabored. Lungs clear to ascultation bilaterally, in no acute respiratory distress.  Cardiovascular: Regular rate and rhythm.+2 radial pulses, equal bilaterally.  No murmurs.   GI: Abdomen soft, right lower quadrant urostomy with a small amount of urine no guarding or rebound. Bowel sounds intact on all 4 quadrants.   Back: No CVA tenderness.    Musculoskeletal: Moves all 4 extremities equally, strength symmetrical on bilateral uppers and lowers.  No peripheral edema  Integument: Warm, dry. No rash. No bruising or petechiae.  Lymphatic: No cervical lymphadenopathy  Neurologic: Alert & oriented x 3. Normal speech. Grossly normal motor and sensory  function. No focal deficits noted.  NIHSS = 0  Psychiatric: Normal mood and affect. Normal judgement.    ED COURSE:    6:34 PM I met with the patient, obtained history, performed an initial exam, and discussed options and plan for diagnostics and treatment here in the ED.  6:57 PM I spoke with Dr. Sebastian, urologist.   8:16 PM I spoke with Dr. Stearns with [facility].   8:30 PM I spoke with Dr. Chaparro with IR.   8:41 PM I spoke with Dr. Mcdaniels, hospitalist who accepts patient for admission.     LAB:  All pertinent labs reviewed and interpreted.  Results for orders placed or performed during the hospital encounter of 01/04/22   CT Abdomen Pelvis w/o Contrast    Impression    IMPRESSION:   1.  Interval cystectomy and ileal conduit urinary diversion with right lower quadrant ostomy.  2.  Moderate right hydronephrosis and mild right proximal ureterectasis similar to prior.  3.  Interval development left perinephric edema moderate hydronephrosis and mild ureterectasis that tapers to the level of the ureteral ileal conduit anastomosis with cause of obstruction not evident.     Basic metabolic panel   Result Value Ref Range    Sodium 138 136 - 145 mmol/L    Potassium 4.8 3.5 - 5.0 mmol/L    Chloride 114 (H) 98 - 107 mmol/L    Carbon Dioxide (CO2) 8 (LL) 22 - 31 mmol/L    Anion Gap 16 5 - 18 mmol/L    Urea Nitrogen 109 (H) 8 - 28 mg/dL    Creatinine 7.52 (HH) 0.70 - 1.30 mg/dL    Calcium 10.1 8.5 - 10.5 mg/dL    Glucose 195 (H) 70 - 125 mg/dL    GFR Estimate 7 (L) >60 mL/min/1.73m2   Result Value Ref Range    Magnesium 2.5 1.8 - 2.6 mg/dL   UA with Microscopic reflex to Culture    Specimen: Urostomy; Urine   Result Value Ref Range    Color Urine Light Yellow Colorless, Straw, Light Yellow, Yellow    Appearance Urine Turbid (A) Clear    Glucose Urine Negative Negative mg/dL    Bilirubin Urine Negative Negative    Ketones Urine Negative Negative mg/dL    Specific Gravity Urine 1.012 1.001 - 1.030    Blood Urine 0.1  mg/dL (A) Negative    pH Urine 8.5 (H) 5.0 - 7.0    Protein Albumin Urine 100  (A) Negative mg/dL    Urobilinogen Urine <2.0 <2.0 mg/dL    Nitrite Urine Negative Negative    Leukocyte Esterase Urine 500 Rao/uL (A) Negative    Budding Yeast Urine Few (A) None Seen /HPF    Mucus Urine Present (A) None Seen /LPF    Amorphous Crystals Urine Few (A) None Seen /HPF    Triple Phosphate Crystals Urine Many (A) None Seen /HPF    RBC Urine 4 (H) <=2 /HPF    WBC Urine 16 (H) <=5 /HPF   CBC with platelets and differential   Result Value Ref Range    WBC Count 11.8 (H) 4.0 - 11.0 10e3/uL    RBC Count 3.76 (L) 4.40 - 5.90 10e6/uL    Hemoglobin 11.2 (L) 13.3 - 17.7 g/dL    Hematocrit 35.2 (L) 40.0 - 53.0 %    MCV 94 78 - 100 fL    MCH 29.8 26.5 - 33.0 pg    MCHC 31.8 31.5 - 36.5 g/dL    RDW 14.5 10.0 - 15.0 %    Platelet Count 295 150 - 450 10e3/uL    % Neutrophils 70 %    % Lymphocytes 15 %    % Monocytes 13 %    % Eosinophils 0 %    % Basophils 0 %    % Immature Granulocytes 2 %    NRBCs per 100 WBC 0 <1 /100    Absolute Neutrophils 8.4 (H) 1.6 - 8.3 10e3/uL    Absolute Lymphocytes 1.8 0.8 - 5.3 10e3/uL    Absolute Monocytes 1.6 (H) 0.0 - 1.3 10e3/uL    Absolute Eosinophils 0.0 0.0 - 0.7 10e3/uL    Absolute Basophils 0.0 0.0 - 0.2 10e3/uL    Absolute Immature Granulocytes 0.2 <=0.4 10e3/uL    Absolute NRBCs 0.0 10e3/uL       RADIOLOGY:  Reviewed all pertinent imaging. Please see official radiology report.  CT Abdomen Pelvis w/o Contrast   Final Result   IMPRESSION:    1.  Interval cystectomy and ileal conduit urinary diversion with right lower quadrant ostomy.   2.  Moderate right hydronephrosis and mild right proximal ureterectasis similar to prior.   3.  Interval development left perinephric edema moderate hydronephrosis and mild ureterectasis that tapers to the level of the ureteral ileal conduit anastomosis with cause of obstruction not evident.                MEDICATIONS GIVEN IN THE EMERGENCY:  Medications   sodium  bicarbonate 150 mEq in D5W 1,000 mL infusion (has no administration in time range)           NEW PRESCRIPTIONS STARTED AT TODAY'S ER VISIT:  New Prescriptions    No medications on file          FINAL DIAGNOSIS:    ICD-10-CM    1. Acute renal failure, unspecified acute renal failure type (H)  N17.9             At the conclusion of the encounter I discussed the results of all of the tests and the disposition. The questions were answered. The patient or family acknowledged understanding and was agreeable with the care plan.     NAME: Marcelo Valerio  AGE: 76 year old male  YOB: 1945  MRN: [id number]  EVALUATION DATE & TIME: No admission date for patient encounter.    PCP: Urbano Cedeno    ED PROVIDER: Al Dillon M.D.      I, Jasbir Torre, am serving as a scribe to document services personally performed by Dr. Al Dillon based on my observation and the provider's statements to me. I, Al Dillon MD attest that Jasbir Torre is acting in a scribe capacity, has observed my performance of the services and has documented them in accordance with my direction.    Al Dillon M.D.  Emergency Medicine  [facility] EMERGENCY DEPARTMENT  [facility] [facility] [id number] [phone]  Dept: [phone]  1/4/2022       Al Dillon MD  01/04/22 2050

## 2022-01-05 NOTE — CONSULTS
I have been asked to consult on Marcelo Valerio, a 76 year old male  by Dr. Dillon. For hydronephrosis status post cystectomy with ileal conduit renal failure    HPI: 68-year-old gentleman cystoprostatectomy for bladder cancer presents with acute renal failure and bilateral hydronephrosis with an ileal conduit.  Progressive malaise and fatigue and renal failure is his reason for admission.  Progressive symptoms over the last couple of weeks.  Denies pain.  Cystectomy performed by Dr. Mireles.    REVIEW OF SYSTEMS:  Fatigue malaise without fever or chills.  Tired sleepy    Past Medical History:   Diagnosis Date     Basal cell carcinoma      Bladder tumor      Cancer (H)     Prostate     Chronic kidney disease      CKD (chronic kidney disease)      DM2 (diabetes mellitus, type 2) (H)      Glaucoma      History of gout      HTN (hypertension)      Hyperlipidemia      Kidney stone      Lung nodule      Malignant neoplasm of urinary bladder, unspecified site (H)      Metabolic syndrome      Obesity      Osteoarthritis of knee      Psoriasis      Restless legs syndrome      No family history on file.  Social History     Tobacco Use     Smoking status: Former Smoker     Packs/day: 0.00     Quit date: 8/1/2018     Years since quitting: 3.4     Smokeless tobacco: Never Used     Tobacco comment: Cigars and Pipes   Substance Use Topics     Alcohol use: Yes     Comment: Alcoholic Drinks/day: occassional      Past Surgical History:   Procedure Laterality Date     APPENDECTOMY       ARTHROPLASTY REVISION HIP Left      BASAL CELL CARCINOMA EXCISION      back     CARPAL TUNNEL RELEASE RT/LT       CATARACT EXTRACTION       COMBINED CYSTOSCOPY, RETROGRADES, EXCHANGE STENT URETER(S) Right 11/22/2021    Procedure: CYSTOSCOPY, RIGHT RETROGRADE PYELOGRAM, RIGHT URETERAL STENT EXCHANGE;  Surgeon: Fam Manrique MD;  Location: [facility] OR     CYSTECTOMY, ROBOT-ASSISTED, LAPAROSCOPIC, USING DA LARISSA SI, WITH URETEROILEAL CONDUIT  CREATION N/A 12/6/2021    Procedure: CYSTOSCOPY, RIGHT URETEROSCOPY, RIGHT STENT REMOVAL ROBOTIC ASSISTED RADICAL CYSTECTOMY, CREATION OF ILEAL CONDUIT, EXTENSIVE LYSIS OF ADHESIONS;  Surgeon: Fam Manrique MD;  Location: [facility]     CYSTOSCOPY, FULGURATE BLADDER TUMOR, COMBINED Bilateral 09/08/2021    Procedure: CYSTOSCOPY, SELECTIVE CYTOLOGIES, BLADDER BIOPSIES AND FULGURATION, BILATERAL RETROGRADE PYELOGRAMS, RIGHT URETEROSCOPY , RIGHT URERETAL STENT PLACEMENT;  Surgeon: Fam Manrique MD;  Location: [facility]     CYSTOSCOPY, TRANSURETHRAL RESECTION (TUR) TUMOR BLADDER, COMBINED N/A 02/01/2021    Procedure: CYSTOSCOPY SELECTIVE CYTOLOGIES, BLADDER BIOPSY FULGURATION, MEATAL DILATION;  Surgeon: Fam Manrique MD;  Location: [facility];  Service: Urology     INGUINAL HERNIA REPAIR Bilateral      JOINT REPLACEMENT Bilateral     bilateral CARLI, left TKA     TX CYSTOSCOPY,REMV CALCULUS,SIMPLE Right 03/22/2021    Procedure: CYSTOSCOPY, RIGHT RETROGRADE PYELOGRAM, RIGHT URETEROSCOPY, URETERAL BIOPSY, RIGHT URETERAL STENT PLACEMENT;  Surgeon: Fam Manrique MD;  Location: [facility];  Service: Urology     TX CYSTOURETHROSCOPY,BIOPSY N/A 10/01/2019    Procedure: CYSTOSCOPY, WITH BLADDER BIOPSIES and Fulgeration;  Surgeon: Jose Murcia MD;  Location: [facility];  Service: Urology     TX CYSTOURETHROSCOPY,URETER CATHETER Bilateral 10/01/2019    Procedure: BILATERAL RETROGRADE PYELOGRAMS;  Surgeon: Jose Murcia MD;  Location: [facility] [place];  Service: Urology     PROSTATECTOMY      Radical suprapubic     RELEASE CARPAL TUNNEL       TONSILLECTOMY       TOTAL KNEE ARTHROPLASTY       VASECTOMY       Current Facility-Administered Medications   Medication     sodium bicarbonate 150 mEq in D5W 1,000 mL infusion     Current Outpatient Medications   Medication Sig     acetaminophen (TYLENOL) 325 MG tablet Take 1-2 tablets (325-650 mg) by mouth every 4  hours as needed for mild pain or pain     amLODIPine (NORVASC) 2.5 MG tablet Take 1 tablet (2.5 mg) by mouth 2 times daily     amoxicillin (AMOXIL) 500 MG capsule [AMOXICILLIN (AMOXIL) 500 MG CAPSULE] Take 2,000 mg by mouth as needed. Prior to dental procedures     aspirin (ASA) 81 MG EC tablet Take 1 tablet (81 mg) by mouth daily     atenolol (TENORMIN) 50 MG tablet [ATENOLOL (TENORMIN) 50 MG TABLET] Take 50 mg by mouth 2 (two) times a day.     brimonidine (ALPHAGAN) 0.15 % ophthalmic solution [BRIMONIDINE (ALPHAGAN) 0.15 % OPHTHALMIC SOLUTION] Administer 1 drop to both eyes 2 (two) times a day.     calcipotriene (DOVONOX) 0.005 % cream [CALCIPOTRIENE (DOVONOX) 0.005 % CREAM] Apply 1 application topically daily as needed.     Dulaglutide 3 MG/0.5ML SOPN Inject 3 mg Subcutaneous once a week      enoxaparin ANTICOAGULANT (LOVENOX) 40 MG/0.4ML syringe Inject 0.4 mLs (40 mg) Subcutaneous daily     insulin glargine (BASAGLAR KWIKPEN) 100 unit/mL (3 mL) pen Inject 20 Units Subcutaneous 2 times daily      latanoprost (XALATAN) 0.005 % ophthalmic solution [LATANOPROST (XALATAN) 0.005 % OPHTHALMIC SOLUTION] Administer 1 drop to both eyes at bedtime.     polyethylene glycol (MIRALAX) 17 GM/Dose powder Take 17 g by mouth daily Discontinue if stools are loose     saccharomyces boulardii (FLORASTOR) 250 MG capsule Take 1 capsule (250 mg) by mouth 2 times daily     simvastatin (ZOCOR) 20 MG tablet Take 20 mg by mouth At Bedtime      triamcinolone (KENALOG) 0.1 % cream [TRIAMCINOLONE (KENALOG) 0.1 % CREAM] Apply 1 application topically see administration instructions. Two times a day ,  2x/week on Saturday and Sunday.     ALLERGIES:  Zinc acetate, Nickel, and Sulfa (sulfonamide antibiotics) [sulfa drugs]    VITALS:  Blood pressure 131/65, pulse 68, temperature 97.4  F (36.3  C), resp. rate 18, weight 101.6 kg (224 lb), SpO2 99 %.    EXAM:  Patient is a 76 year old male in no acute distress. Agree with vital signs as noted above.   Fatigued and sleepy lying in bed.  Talkative and oriented significant flank abdominal pain on exam.  Respiratory effort is normal.    SELECT LAB RESULTS:  WBC Count   Date Value Ref Range Status   01/04/2022 11.8 (H) 4.0 - 11.0 10e3/uL Final     RBC Count   Date Value Ref Range Status   01/04/2022 3.76 (L) 4.40 - 5.90 10e6/uL Final     Hemoglobin   Date Value Ref Range Status   01/04/2022 11.2 (L) 13.3 - 17.7 g/dL Final     Hematocrit   Date Value Ref Range Status   01/04/2022 35.2 (L) 40.0 - 53.0 % Final     MCV   Date Value Ref Range Status   01/04/2022 94 78 - 100 fL Final     MCH   Date Value Ref Range Status   01/04/2022 29.8 26.5 - 33.0 pg Final     MCHC   Date Value Ref Range Status   01/04/2022 31.8 31.5 - 36.5 g/dL Final     RDW   Date Value Ref Range Status   01/04/2022 14.5 10.0 - 15.0 % Final     Color Urine   Date Value Ref Range Status   01/04/2022 Light Yellow Colorless, Straw, Light Yellow, Yellow Final     Appearance Urine   Date Value Ref Range Status   01/04/2022 Turbid (A) Clear Final     Specific Gravity Urine   Date Value Ref Range Status   01/04/2022 1.012 1.001 - 1.030 Final     Glucose Urine   Date Value Ref Range Status   01/04/2022 Negative Negative mg/dL Final     Ketones Urine   Date Value Ref Range Status   01/04/2022 Negative Negative mg/dL Final     Blood Urine   Date Value Ref Range Status   01/04/2022 0.1 mg/dL (A) Negative Final     Bilirubin Urine   Date Value Ref Range Status   01/04/2022 Negative Negative Final     Nitrite Urine   Date Value Ref Range Status   01/04/2022 Negative Negative Final     Leukocyte Esterase Urine   Date Value Ref Range Status   01/04/2022 500 Rao/uL (A) Negative Final     CT scan is reviewed.  Bilateral hydronephrosis.  He has chronic change on one kidney however both kidneys are currently dilated.    ASSESSMENT: 76-year-old gentleman status post cystectomy now with acute renal failure bilateral hydronephrosis and progressive fatigue.  I reviewed the  CT images and discussed with the patient.  I think bilateral nephrostomy tubes would be most appropriate in this scenario.  Although he does have chronic change on one side I think that is probably a significant contributor to the renal failure as well as the new hydronephrosis.  He may need antegrade stent placement.  Understanding is interventional radiology is planning nephrostomy tube placement 1/5    PLAN:  Orders Placed This Encounter     CT Abdomen Pelvis w/o Contrast     Basic metabolic panel     Magnesium     UA with Microscopic reflex to Culture     CBC with platelets and differential     INR     Asymptomatic COVID-19 Virus (Coronavirus) by PCR     sodium bicarbonate 150 mEq in D5W 1,000 mL infusion         Copied cc to Consulting provider.    Glenn Sebastian MD, Urological Surgeon

## 2022-01-05 NOTE — ED NOTES
[facility] ED Handoff Report    ED Chief Complaint: Abnormal Labs    ED Diagnosis:  (N17.9) Acute renal failure, unspecified acute renal failure type (H)  Comment:   Plan: Antibiotics, Bi-Carb drip, INR stent placement.       PMH:    Past Medical History:   Diagnosis Date     Basal cell carcinoma      Bladder tumor      Cancer (H)     Prostate     Chronic kidney disease      CKD (chronic kidney disease)      DM2 (diabetes mellitus, type 2) (H)      Glaucoma      History of gout      HTN (hypertension)      Hyperlipidemia      Kidney stone      Lung nodule      Malignant neoplasm of urinary bladder, unspecified site (H)      Metabolic syndrome      Obesity      Osteoarthritis of knee      Psoriasis      Restless legs syndrome         Code Status:  Full Code     Falls Risk: Yes Band: Applied    Current Living Situation/Residence: lives with a significant other     Elimination Status: Continent: ileostomy     Activity Level: SBA w/ walker    Patients Preferred Language:  English     Needed: No    Vital Signs:  /65   Pulse 60   Temp 97.4  F (36.3  C)   Resp 18   Wt 101.6 kg (224 lb)   SpO2 100%   BMI 31.24 kg/m       Cardiac Rhythm: NSR    Pain Score: 0/10    Is the Patient Confused:  No    Last Food or Drink: 01/05/22 at     Focused Assessment:      Tests Performed: Done: Labs and Imaging    Treatments Provided:  Antibiotics, Bi-Carb drip, BP meds, Diabetes control.    Family Dynamics/Concerns: No    Family Updated On Visitor Policy: Yes    Plan of Care Communicated to Family: Yes    Who Was Updated about Plan of Care: wife    Belongings Checklist Done and Signed by Patient: No    Covid: asymptomatic , negative    Additional Information:     RN: Leisa Hernandez 1/5/2022 7:13 AM

## 2022-01-06 ENCOUNTER — APPOINTMENT (OUTPATIENT)
Dept: PHYSICAL THERAPY | Facility: HOSPITAL | Age: 77
DRG: 683 | End: 2022-01-06
Attending: INTERNAL MEDICINE
Payer: MEDICARE

## 2022-01-06 ENCOUNTER — APPOINTMENT (OUTPATIENT)
Dept: OCCUPATIONAL THERAPY | Facility: HOSPITAL | Age: 77
DRG: 683 | End: 2022-01-06
Attending: INTERNAL MEDICINE
Payer: MEDICARE

## 2022-01-06 ENCOUNTER — APPOINTMENT (OUTPATIENT)
Dept: CT IMAGING | Facility: HOSPITAL | Age: 77
DRG: 683 | End: 2022-01-06
Payer: MEDICARE

## 2022-01-06 ENCOUNTER — APPOINTMENT (OUTPATIENT)
Dept: RADIOLOGY | Facility: HOSPITAL | Age: 77
DRG: 683 | End: 2022-01-06
Attending: INTERNAL MEDICINE
Payer: MEDICARE

## 2022-01-06 LAB
ANION GAP SERPL CALCULATED.3IONS-SCNC: 15 MMOL/L (ref 5–18)
BACTERIA UR CULT: NORMAL
BUN SERPL-MCNC: 101 MG/DL (ref 8–28)
CALCIUM SERPL-MCNC: 8.5 MG/DL (ref 8.5–10.5)
CHLORIDE BLD-SCNC: 109 MMOL/L (ref 98–107)
CO2 SERPL-SCNC: 19 MMOL/L (ref 22–31)
CREAT SERPL-MCNC: 6.3 MG/DL (ref 0.7–1.3)
ERYTHROCYTE [DISTWIDTH] IN BLOOD BY AUTOMATED COUNT: 14.2 % (ref 10–15)
FERRITIN SERPL-MCNC: 593 NG/ML (ref 27–300)
GFR SERPL CREATININE-BSD FRML MDRD: 8 ML/MIN/1.73M2
GLUCOSE BLD-MCNC: 176 MG/DL (ref 70–125)
GLUCOSE BLDC GLUCOMTR-MCNC: 169 MG/DL (ref 70–99)
GLUCOSE BLDC GLUCOMTR-MCNC: 180 MG/DL (ref 70–99)
GLUCOSE BLDC GLUCOMTR-MCNC: 233 MG/DL (ref 70–99)
GLUCOSE BLDC GLUCOMTR-MCNC: 238 MG/DL (ref 70–99)
HCT VFR BLD AUTO: 29.1 % (ref 40–53)
HGB BLD-MCNC: 9.3 G/DL (ref 13.3–17.7)
IRON SATN MFR SERPL: 8 % (ref 20–50)
IRON SERPL-MCNC: 14 UG/DL (ref 42–175)
MAGNESIUM SERPL-MCNC: 2.1 MG/DL (ref 1.8–2.6)
MCH RBC QN AUTO: 28.8 PG (ref 26.5–33)
MCHC RBC AUTO-ENTMCNC: 32 G/DL (ref 31.5–36.5)
MCV RBC AUTO: 90 FL (ref 78–100)
PLATELET # BLD AUTO: 188 10E3/UL (ref 150–450)
POTASSIUM BLD-SCNC: 3.4 MMOL/L (ref 3.5–5)
RBC # BLD AUTO: 3.23 10E6/UL (ref 4.4–5.9)
SODIUM SERPL-SCNC: 143 MMOL/L (ref 136–145)
TIBC SERPL-MCNC: 176 UG/DL (ref 313–563)
TRANSFERRIN SERPL-MCNC: 141 MG/DL (ref 212–360)
WBC # BLD AUTO: 8.2 10E3/UL (ref 4–11)

## 2022-01-06 PROCEDURE — 120N000001 HC R&B MED SURG/OB

## 2022-01-06 PROCEDURE — 97535 SELF CARE MNGMENT TRAINING: CPT | Mod: GO

## 2022-01-06 PROCEDURE — 84466 ASSAY OF TRANSFERRIN: CPT | Performed by: INTERNAL MEDICINE

## 2022-01-06 PROCEDURE — 70450 CT HEAD/BRAIN W/O DYE: CPT

## 2022-01-06 PROCEDURE — 85027 COMPLETE CBC AUTOMATED: CPT | Performed by: INTERNAL MEDICINE

## 2022-01-06 PROCEDURE — 250N000011 HC RX IP 250 OP 636: Performed by: HOSPITALIST

## 2022-01-06 PROCEDURE — G0463 HOSPITAL OUTPT CLINIC VISIT: HCPCS

## 2022-01-06 PROCEDURE — 250N000013 HC RX MED GY IP 250 OP 250 PS 637: Performed by: INTERNAL MEDICINE

## 2022-01-06 PROCEDURE — 82374 ASSAY BLOOD CARBON DIOXIDE: CPT | Performed by: INTERNAL MEDICINE

## 2022-01-06 PROCEDURE — 250N000011 HC RX IP 250 OP 636: Performed by: INTERNAL MEDICINE

## 2022-01-06 PROCEDURE — 258N000003 HC RX IP 258 OP 636: Performed by: INTERNAL MEDICINE

## 2022-01-06 PROCEDURE — 250N000009 HC RX 250: Performed by: INTERNAL MEDICINE

## 2022-01-06 PROCEDURE — 97166 OT EVAL MOD COMPLEX 45 MIN: CPT | Mod: GO

## 2022-01-06 PROCEDURE — 74176 CT ABD & PELVIS W/O CONTRAST: CPT

## 2022-01-06 PROCEDURE — 97116 GAIT TRAINING THERAPY: CPT | Mod: GP

## 2022-01-06 PROCEDURE — 83735 ASSAY OF MAGNESIUM: CPT | Performed by: INTERNAL MEDICINE

## 2022-01-06 PROCEDURE — 97162 PT EVAL MOD COMPLEX 30 MIN: CPT | Mod: GP

## 2022-01-06 PROCEDURE — 99233 SBSQ HOSP IP/OBS HIGH 50: CPT | Performed by: INTERNAL MEDICINE

## 2022-01-06 PROCEDURE — 250N000013 HC RX MED GY IP 250 OP 250 PS 637: Performed by: HOSPITALIST

## 2022-01-06 PROCEDURE — 82728 ASSAY OF FERRITIN: CPT | Performed by: INTERNAL MEDICINE

## 2022-01-06 PROCEDURE — 36415 COLL VENOUS BLD VENIPUNCTURE: CPT | Performed by: INTERNAL MEDICINE

## 2022-01-06 PROCEDURE — 74018 RADEX ABDOMEN 1 VIEW: CPT

## 2022-01-06 RX ORDER — BISACODYL 10 MG
10 SUPPOSITORY, RECTAL RECTAL DAILY PRN
Status: DISCONTINUED | OUTPATIENT
Start: 2022-01-06 | End: 2022-01-10 | Stop reason: HOSPADM

## 2022-01-06 RX ORDER — POTASSIUM CHLORIDE 750 MG/1
10 TABLET, EXTENDED RELEASE ORAL ONCE
Status: COMPLETED | OUTPATIENT
Start: 2022-01-06 | End: 2022-01-06

## 2022-01-06 RX ORDER — PIPERACILLIN SODIUM, TAZOBACTAM SODIUM 3; .375 G/15ML; G/15ML
3.38 INJECTION, POWDER, LYOPHILIZED, FOR SOLUTION INTRAVENOUS EVERY 12 HOURS
Status: DISCONTINUED | OUTPATIENT
Start: 2022-01-06 | End: 2022-01-08

## 2022-01-06 RX ORDER — DIPHENHYDRAMINE HCL 25 MG
25 TABLET ORAL EVERY 6 HOURS PRN
Status: DISCONTINUED | OUTPATIENT
Start: 2022-01-06 | End: 2022-01-06

## 2022-01-06 RX ADMIN — METOPROLOL TARTRATE 12.5 MG: 25 TABLET, FILM COATED ORAL at 12:48

## 2022-01-06 RX ADMIN — BRIMONIDINE TARTRATE 1 DROP: 1.5 SOLUTION OPHTHALMIC at 08:17

## 2022-01-06 RX ADMIN — PIPERACILLIN SODIUM AND TAZOBACTAM SODIUM 3.38 G: 3; .375 INJECTION, POWDER, LYOPHILIZED, FOR SOLUTION INTRAVENOUS at 05:56

## 2022-01-06 RX ADMIN — INSULIN ASPART 1 UNITS: 100 INJECTION, SOLUTION INTRAVENOUS; SUBCUTANEOUS at 08:19

## 2022-01-06 RX ADMIN — PIPERACILLIN SODIUM AND TAZOBACTAM SODIUM 3.38 G: 3; .375 INJECTION, POWDER, LYOPHILIZED, FOR SOLUTION INTRAVENOUS at 17:37

## 2022-01-06 RX ADMIN — POTASSIUM CHLORIDE 10 MEQ: 750 TABLET, EXTENDED RELEASE ORAL at 11:00

## 2022-01-06 RX ADMIN — BISACODYL 10 MG: 10 SUPPOSITORY RECTAL at 13:24

## 2022-01-06 RX ADMIN — INSULIN GLARGINE 20 UNITS: 100 INJECTION, SOLUTION SUBCUTANEOUS at 08:18

## 2022-01-06 RX ADMIN — POLYETHYLENE GLYCOL 3350 17 G: 17 POWDER, FOR SOLUTION ORAL at 08:17

## 2022-01-06 RX ADMIN — INSULIN ASPART 2 UNITS: 100 INJECTION, SOLUTION INTRAVENOUS; SUBCUTANEOUS at 12:21

## 2022-01-06 RX ADMIN — SODIUM BICARBONATE: 84 INJECTION, SOLUTION INTRAVENOUS at 08:37

## 2022-01-06 RX ADMIN — BRIMONIDINE TARTRATE 1 DROP: 1.5 SOLUTION OPHTHALMIC at 21:47

## 2022-01-06 RX ADMIN — INSULIN ASPART 1 UNITS: 100 INJECTION, SOLUTION INTRAVENOUS; SUBCUTANEOUS at 17:37

## 2022-01-06 RX ADMIN — METOPROLOL TARTRATE 12.5 MG: 25 TABLET, FILM COATED ORAL at 21:45

## 2022-01-06 RX ADMIN — LATANOPROST 1 DROP: 50 SOLUTION OPHTHALMIC at 21:47

## 2022-01-06 RX ADMIN — FAMOTIDINE 20 MG: 10 INJECTION, SOLUTION INTRAVENOUS at 14:09

## 2022-01-06 RX ADMIN — INSULIN ASPART 2 UNITS: 100 INJECTION, SOLUTION INTRAVENOUS; SUBCUTANEOUS at 18:23

## 2022-01-06 ASSESSMENT — ACTIVITIES OF DAILY LIVING (ADL)
ADLS_ACUITY_SCORE: 10
ADLS_ACUITY_SCORE: 10
ADLS_ACUITY_SCORE: 8
ADLS_ACUITY_SCORE: 10
ADLS_ACUITY_SCORE: 8
ADLS_ACUITY_SCORE: 10
ADLS_ACUITY_SCORE: 8
ADLS_ACUITY_SCORE: 10
ADLS_ACUITY_SCORE: 8

## 2022-01-06 NOTE — PROGRESS NOTES
Progress Note    Assessment/Plan  Marcelo Valerio is a 76 year old male with history of bladder cancer status post cystectomy and ileal diversion in December 2021 admitted on 1/4/2022.  Patient has also been taking over-the-counter Advil to help sleep he complained of nausea, fatigue and decreased urine output.  He was sent to the ED after labs in the clinic showed elevated creatinine.     1.  Acute kidney injury on chronic kidney disease stage 4  CT abdomen and pelvis showed moderate right hydronephrosis and mild right proximal ureterectasis similar to prior; interval development left perinephric edema moderate hydronephrosis and mild ureterectasis that tapers to the level of the ureteral ileal conduit anastomosis with cause of obstruction not evident.  Urology input appreciated  --Avoid NSAIDs  S/p bilateral nephrostomy tube placement on 1/5  Creatinine is improving  --Ordered PT OT eval     2.  Abnormal UA  Challenging interpretation due to ileal conduit  Afebrile, however WBC 11.8 and CT scan showed left perinephric edema  Urine culture positive for mixed mely and therefore DC Zosyn    3.  Non-anion gap metabolic acidosis  Secondary to renal failure  Sodium Bicarb drip per nephrology     4.  Type 2 diabetes mellitus with long-term insulin use  --Controlled  On dulaglutide once a week  Decrease Lantus to once daily given kidney failure  NovoLog sliding scale  --Add mealtime insulin ratio of 1 is to 15     5.  Essential hypertension  --controlled.   Anticipated postobstructive diuresis and therefore will hold amlodipine and atenolol. Change atenolol to metoprolol due to renal failure     6.  Glaucoma  Continue Xalatan     Anemia likely due to CKD 4  --Ferritin and iron binding panel ordered by renal--pending    Hypokalemia--mild  --replaced by renal consult  --    Barriers to discharge: Improvement in creatinine    Anticipated discharge date:1/9  Addendum Michael Higgins MD, Hospitalist,01/06/22,1:44 PM    Patient  "complaining of periumbilical pain which is spasmodic.  Dulcolax suppository ordered but did not relieve the pain.  Ordered x-ray of the abdomen and IV Pepcid    Addendum: Michael Higgins MD, Hospitalist,01/06/22,4:25 PM  Spoke to qing little from Urology. Growing UC is growing enterococcus. Resume Zosyn now.       Subjective  Status post bilateral nephrostomy placement.  Urine output greater than 2900 cc in 24 hours.  Creatinine is slightly down to 6.3.  Patient denies any nausea vomiting diarrhea fever chills shortness of breath or chest pain.    Objective    /59 (BP Location: Left arm)   Pulse 71   Temp 98.4  F (36.9  C) (Oral)   Resp 19   Ht 1.803 m (5' 11\")   Wt 102.4 kg (225 lb 11.2 oz)   SpO2 97%   BMI 31.48 kg/m    Weight:   Wt Readings from Last 5 Encounters:   01/05/22 102.4 kg (225 lb 11.2 oz)   12/06/21 107.6 kg (237 lb 4.8 oz)   11/22/21 105.6 kg (232 lb 14.4 oz)   03/22/21 108.9 kg (240 lb)   02/01/21 108.9 kg (240 lb)       I/O last 3 completed shifts:  In: 1240 [P.O.:400; I.V.:840]  Out: 3735 [Urine:785; Drains:2950]  I/O this shift:  In: -   Out: 750 [Drains:750]          Physical Exam  Awake  No tremors of the outstretched hand  S1-S2 normal  Clear urine bilateral nephrostomy bag  Lungs are clear to auscultation  Abdomen is soft  No generalized skin rash  No leg edema  No joint tenderness    Pertinent Labs  ----------------------  Recent Labs   Lab 01/06/22  0815 01/06/22  0631 01/05/22  2056 01/05/22  1833 01/05/22  0943 01/05/22  0648 01/05/22  0029 01/04/22  1901   NA  --  143  --  142  --  137  --  138   POTASSIUM  --  3.4*  --  3.6  --  4.1  --  4.8   CO2  --  19*  --  15*  --  10*  --  8*   BUN  --  101*  --  105*  --  109*  --  109*   CR  --  6.30*  --  6.64*  --  6.85*  --  7.52*   MAG  --  2.1  --   --   --   --   --  2.5   * 176* 134* 145*   < > 200*   < > 195*   ALBUMIN  --   --   --   --   --  2.2*  --   --    BILITOTAL  --   --   --   --   --  0.7  --   --    ALKPHOS "  --   --   --   --   --  82  --   --    ALT  --   --   --   --   --  15  --   --    AST  --   --   --   --   --  14  --   --     < > = values in this interval not displayed.     Recent Labs   Lab 01/06/22  0631 01/05/22  0648 01/04/22  1901   WBC 8.2 7.9 11.8*   HGB 9.3* 9.6* 11.2*   HCT 29.1* 32.2* 35.2*    200 295     Recent Labs   Lab 01/04/22  2126   INR 1.27*     Glucose Values Latest Ref Rng & Units 12/10/2021 12/11/2021 12/12/2021 1/4/2022 1/5/2022 1/5/2022 1/6/2022   Bedside Glucose (mg/dl )  - -- -- -- -- -- -- --   GLUCOSE 70 - 125 mg/dL 112 94 105 195(H) 200(H) 145(H) 176(H)   Some recent data might be hidden         Pertinent Radiology   Radiology Results: Personally reviewed impression/s  CT Abdomen Pelvis w/o Contrast    Result Date: 1/4/2022  EXAM: CT ABDOMEN PELVIS W/O CONTRAST LOCATION: [facility] DATE/TIME: 1/4/2022 5:33 PM INDICATION: Recent urostomy, acute renal failure. COMPARISON: CT AP 11/17/2021. TECHNIQUE: CT scan of the abdomen and pelvis was performed without IV contrast. Multiplanar reformats were obtained. Dose reduction techniques were used. CONTRAST: None. FINDINGS: LOWER CHEST: Normal. HEPATOBILIARY: Normal. PANCREAS: Normal. SPLEEN: Normal. ADRENAL GLANDS: Normal. KIDNEYS/BLADDER: Interval cystectomy and ileal conduit urinary diversion with right lower quadrant ostomy. Moderate right hydronephrosis and mild right proximal ureterectasis similar to prior. Interval development left perinephric edema, moderate hydronephrosis and mild ureterectasis to the level of the ureteral ileal conduit anastomosis. The ileal conduit is decompressed and normal in appearance. Small benign cyst right kidney. BOWEL: Colonic diverticulosis. No bowel obstruction or inflammatory change. LYMPH NODES: Normal. VASCULATURE: Unremarkable. PELVIC ORGANS: Prostatectomy. MUSCULOSKELETAL: Bones are demineralized. Bilateral hip arthroplasties.     IMPRESSION: 1.  Interval  cystectomy and ileal conduit urinary diversion with right lower quadrant ostomy. 2.  Moderate right hydronephrosis and mild right proximal ureterectasis similar to prior. 3.  Interval development left perinephric edema moderate hydronephrosis and mild ureterectasis that tapers to the level of the ureteral ileal conduit anastomosis with cause of obstruction not evident.     EKG Results: not reviewed.

## 2022-01-06 NOTE — CONSULTS
"WO stoma assessment Urostomy    Allergies:  Zinc Acetate  Nickel  Sulfa (Sulfonamide Antibiotics) [Sulfa Drugs]    Height:  Height: 180.3 cm (5' 11\")    Weight:   Weight: 102.4 kg (225 lb 11.2 oz)    Past Medical History:  Past Medical History:   Diagnosis Date     Basal cell carcinoma      Bladder tumor      Cancer (H)     Prostate     Chronic kidney disease      CKD (chronic kidney disease)      DM2 (diabetes mellitus, type 2) (H)      Glaucoma      History of gout      HTN (hypertension)      Hyperlipidemia      Kidney stone      Lung nodule      Malignant neoplasm of urinary bladder, unspecified site (H)      Metabolic syndrome      Obesity      Osteoarthritis of knee      Psoriasis      Restless legs syndrome        Labs:  Recent Labs   Lab Test 01/06/22  0631 01/05/22  0648 01/04/22  1903   HGB 9.3* 9.6*  --    ALBUMIN  --  2.2*  --    CULTURE  --   --  >100,000 CFU/mL Mixture of urogenital mely       Thaddeus:  Thaddeus Score: 17      INTAKE  Type of Stoma: Permanent Urostomy  Anticipated date of takedown: NA  Diagnosis Pertinent to Stoma:Bladder Cancer   Type of Surgery: Urostomy   Surgeon:Hilaria   Pertinent Information: Patient frequently leaking with firm 2 piece urostomy pouch as stoma is directly in a fold.     ASSESSMENT  Urostomy     Stoma Size: Oval 1 x 1-1/8 inches  Protrusion: Flush  Vascularity: Pink  Mucocutaneous Juncture: Separation one small area of separation at 1 o'clock  Peristomal Skin: Intact  Stoma lays directly in a fold.     Was wearing 2 piece high output pouch as they were running out of supplies so fast. Has been using strip paste and regular paste to attempt to maintain seal, using belt as well.      TREATMENT/EQUIPMENT  Applied: stoma powder into separation and Cavilon no-sting skin barrier    Supplies: [employer] #[id number] and barrier ring. This pouch is in [facility] office only- not available in storeroom. If pouch leaks, please notify [facility]. If overnight, leave VM on [facility] line and replace " with 2 piece pouch from store room.    Instructions Given: WOC Role and Pouching Products: Showed pouching system     Nursing care provided was coordinated care with RN    Discussed plan of care with nurse, patient and spouse.    Outcomes and treatment recommendations are to To contain output, To be knowledgable with pouch change/emptying before discharge and To be independant with pouch change/emptying before discharge    Actions taken by WOC RN: 5 minutes of education, WOC Discharge recommendations entered and signed up for [facility] Start.    Planned Follow Up: Early next week.    Plan for next visit: Reassess stoma/peristomal skin and Patient to change pouch with assist

## 2022-01-06 NOTE — PLAN OF CARE
"Care Plan Progress Note      Name: Marcelo Valerio  : 1945  MRN: [id number]    VS: /68 (BP Location: Left arm)   Pulse 67   Temp 97.9  F (36.6  C) (Oral)   Resp 20   Ht 1.803 m (5' 11\")   Wt 102.4 kg (225 lb 11.2 oz)   SpO2 98%   BMI 31.48 kg/m      Problem: Risk for Delirium  Goal: Improved Behavioral Control  Outcome: No Change   VSS, patient denies pain, refusing to be repositioned or changed despite multiple attempts and request by staff.  IV abx infusing, sleeping most of the shift.     Evette Powell RN  2022  9:55 PM    "

## 2022-01-06 NOTE — PLAN OF CARE
Problem: Adult Inpatient Plan of Care  Goal: Optimal Comfort and Wellbeing  Outcome: Improving     Problem: Risk for Delirium  Goal: Improved Sleep  Outcome: Improving     Pt arrived to floor around 1540. Pt alert x4. Bruising noted on arms and abdomen. Urostomy in place needs to be changed tomorrow. No supplies here brought by patient. Right neph tube is light pink output 300ml. CDI dressing. Left Neph tube light red 300 Ml output. Dressing CDI. Skin is dry and flaky. Uses a walker brought from home. No glasses or hearing aids. Has not had a BM since Sunday but has not ate much past few days. . Bedrest currently. ORA. VSS

## 2022-01-06 NOTE — PROGRESS NOTES
Occupational Therapy      01/06/22 1500   Quick Adds   Type of Visit Initial Occupational Therapy Evaluation   Living Environment   People in home spouse   Current Living Arrangements house   Home Accessibility stairs to enter home   Number of Stairs, Main Entrance 1   Stair Railings, Main Entrance railings safe and in good condition   Transportation Anticipated family or friend will provide   Living Environment Comments Able to live on one level, W/I shower w/ SC   Self-Care   Usual Activity Tolerance good   Current Activity Tolerance fair   Regular Exercise No   Equipment Currently Used at Home walker, standard;other (see comments)   Activity/Exercise/Self-Care Comment Pt was Ind. w/ all self cares prior to surgery   Instrumental Activities of Daily Living (IADL)   Previous Responsibilities meal prep;driving;medication management;yardwork;housekeeping;laundry   Disability/Function   Hearing Difficulty or Deaf no   Wear Glasses or Blind no   Concentrating, Remembering or Making Decisions Difficulty no   Difficulty Communicating no   Difficulty Eating/Swallowing no   Walking or Climbing Stairs Difficulty yes   Walking or Climbing Stairs ambulation difficulty, requires equipment   Dressing/Bathing dressing difficulty, assistance 1 person   Toileting issues no   Doing Errands Independently Difficulty (such as shopping) yes   Errands Management Wife to assist    Fall history within last six months no   General Information   Onset of Illness/Injury or Date of Surgery 01/04/22   Referring Physician Michael Higgins MD    Patient/Family Therapy Goal Statement (OT) To get home    Existing Precautions/Restrictions fall   Cognitive Status Examination   Orientation Status orientation to person, place and time   Range of Motion Comprehensive   General Range of Motion no range of motion deficits identified   Bed Mobility   Bed Mobility supine-sit;sit-supine   Supine-Sit [place] (Bed Mobility) contact guard;verbal cues    Sit-Supine [place] (Bed Mobility) contact guard;verbal cues   Assistive Device (Bed Mobility) bed rails   Transfers   Transfers sit-stand transfer;bed-chair transfer   Transfer Skill: Bed to Chair/Chair to Bed   Bed-Chair [place] (Transfers) minimum assist (75% patient effort);contact guard;verbal cues   Assistive Device (Bed-Chair Transfers) standard walker   Sit-Stand Transfer   Sit-Stand [place] (Transfers) minimum assist (75% patient effort);verbal cues   Assistive Device (Sit-Stand Transfers) walker, standard   Balance   Balance Assessment standing balance: static   Standing Balance: Static good balance   Activities of Daily Living   BADL Assessment upper body dressing;lower body dressing;grooming   Upper Body Dressing Assessment   [place] Level (Upper Body Dressing) minimum assist (75% patient effort);verbal cues   Position (Upper Body Dressing) unsupported sitting   Lower Body Dressing Assessment   [place] Level (Lower Body Dressing) minimum assist (75% patient effort);verbal cues   Position (Lower Body Dressing) unsupported sitting   Grooming Assessment   [place] Level (Grooming) set up;verbal cues   Position (Grooming) unsupported sitting   Clinical Impression   Criteria for Skilled Therapeutic Interventions Met (OT) yes;skilled treatment is necessary   OT Diagnosis Decreased strength/endurance/activity tolerance    OT Problem List-Impairments impacting ADL problems related to;activity tolerance impaired;balance;mobility   Assessment of Occupational Performance 3-5 Performance Deficits   Identified Performance Deficits toileting, LB dressing, trnf/mobility, balance    Planned Therapy Interventions (OT) ADL retraining;IADL retraining;balance training;strengthening   Clinical Decision Making Complexity (OT) moderate complexity   Therapy Frequency (OT) Daily   Predicted Duration of Therapy 5   Risk & Benefits of therapy have been explained evaluation/treatment results  reviewed;patient   OT Discharge Planning    OT Discharge Recommendation (DC Rec) Home with assist;home with home care occupational therapy   OT Rationale for DC Rec Pt requiring assist x1 for trnf/mobility/self care tasks, pt may requiring high level of encouragement to engage in therapy. Pt would require assist for bathing/dressing/toileting upon d/c home.l   Total Evaluation Time (Minutes)   Total Evaluation Time (Minutes) 6

## 2022-01-06 NOTE — DISCHARGE INSTRUCTIONS
Percutaneous Nephrostomy Tube (PNT) Discharge Instructions:  You had a nephrostomy tube (PNT) placed. This is a catheter (plastic tube) that is inserted through your skin into your kidney. The nephrostomy tube is placed to drain urine from your body into a collecting bag outside your body. Please follow the below instructions regarding care of your nephrostomy tube:    Care Instructions:  - If you received sedation for your procedure, do not drive or operate heavy machinery for the rest of the day.  - Rest after your drain was placed. Avoid strenuous activity and heavy lifting for the next 2 days. Return to your normal activities as you tolerate after the 2 day restriction.  - Keep the nephrostomy tube site clean and dry.   - You may shower, but do not submerge the nephrostomy tube site in water (avoid tub baths, Jacuzzis, hot tubs and pools)  - If you have a gauze dressing, change the gauze and tape dressing as needed to keep site clean and dry. If you have a securement device, leave in place until your next exchange.  - Clean around nephrostomy tubes exit sites with soap and water, pat and apply new split gauze around the tube at the exit site.  - Urine going into the bag may be red with blood for the first few days.  - Keep the drainage bag lower than your kidney to keep urine from backing up.  - Inspect the tube often for kinks.  - Empty your drainage bag when it is approximately half way fluid. Follow below instructions for emptying bag:  - Clean hands well with soap and water.  - Place a container near the outlet valve of the drainage bag.  - To open the valve:  - If you have a [facility] leg bag: Twist the blue valve at the bottom of the bag while holding the valve over your container to empty bag. Re-twist the valve closed on the drainage bag once complete.  - If you have a [place] leg bag: Turn the lever downward while holding the valve over your container to empty the bag. Turn the valve upward to  "close once complete.  - Discard drainage into toilet once urine drained.    Follow-Up:  - Routine (every 2-3 months) nephrostomy tube exchange is recommended. Your Urologist may order and schedule exchanges by calling [facility] at [phone].  - Follow up with your Urologist.    Call [facility] ([phone]) if you experience the following:  - Nephrostomy tube(s) stops draining urine.  - Nephrostomy tube(s) site is leaking urine.  - Extreme pain at the nephrostomy tube site.  - Nephrostomy tube appears to be falling out or has fallen out, becomes clogged or breaks.    Seek Medical Evaluation for the following:  - Fever (greater than 101 F (38.3C)).  - Purulent (yellow/green/foul smelling) drainage from nephrostomy tube exit sites.  - Significant bleeding from nephrostomy tube site(s).  - Significant or worsening pain at nephrostomy tube exit site(s) or back.    Urostomy pouch:  Pouch: Petra #[id number]. This is a 1-1/8\" soft convex precut pouch  Barrier ring: Petra #[id number]  Belt    "

## 2022-01-06 NOTE — PROGRESS NOTES
"RENAL PROGRESS NOTE - [facility]        CC: f/u [facility]/CKD 4    Subjective: still weak/tired today. Good urine output from nephrostomy tubes 1550 ml today thus far.  Denies sob, nausea           ASSESSMENT:   77 yo male with h/o bladder cancer s/p cystectomy/ileal diversion in December, CKD 4 with creat of 1.8, HTN, DM2 admit from clinic with [facility], b/l hdyronephrosis     PLAN:  1. [facility]/CKD4 - baseline CKD 4 likely due to DM2, HTN and now obstructive [facility]INMAY due to b/l hydronephrosis post cystectomy/ileal diversion for bladder cancer.  Was taking Advil PTA, may have further contributed to CHINMAY  -s/p PNT placement with good urine output  -expect CHINMAY will be slow to improve with advanced age, underlying CKD, likely subacute obstruction  -no acute dialysis indications now but will monitor urine output, serial labs closely to determine need for dialysis  -cont IVF with bicarb gtt    2. B/l [place] - post-cystectomy, ileal diversion  -now s/p b/l  PNT placement   -urology and IR following    3. Metabolic acidosis - due to CHINMAY  -some improvement today  -cont bicarb gtt, address obstructive CHINMAY with PNT placement    4. Anemia - expect due to recent surgery, CKD, may see further decline with hydration  -serial hb  - iron stores pending      5. Hypokalemia - likely some post obstructive diuresis  -replaced, check mag    Chika Malloy MD  [facility]  [phone]    Objective    PHYSICAL EXAM  /59 (BP Location: Left arm)   Pulse 71   Temp 98.4  F (36.9  C) (Oral)   Resp 19   Ht 1.803 m (5' 11\")   Wt 102.4 kg (225 lb 11.2 oz)   SpO2 97%   BMI 31.48 kg/m    I/O last 3 completed shifts:  In: 1240 [P.O.:400; I.V.:840]  Out: 3735 [Urine:785; Drains:2950]  Wt Readings from Last 3 Encounters:   01/05/22 102.4 kg (225 lb 11.2 oz)   12/06/21 107.6 kg (237 lb 4.8 oz)   11/22/21 105.6 kg (232 lb 14.4 oz)       GENERAL: nad  HEENT:normocephalic, atraumatic  CARDIOVASCULAR: rr, no rub,  Trace " edema  PULMONARY:cta ant. No cyanosis  GASTROINTESTINAL: soft, nt/nd  MSK: diffuse muscle atrophy, warm  NEURO: Alert, no gross focal findings  PSYCHIATRIC: Adequate mood and interaction  SKIN: Pale, no jaundice, no rash.    LABORATORIES  Recent Labs   Lab 01/06/22  0631 01/05/22  0648 01/04/22  1901   WBC 8.2 7.9 11.8*   HGB 9.3* 9.6* 11.2*   HCT 29.1* 32.2* 35.2*    200 295     Recent Labs   Lab 01/06/22  0631 01/05/22  1833 01/05/22  0648    142 137   CO2 19* 15* 10*   * 105* 109*   ALKPHOS  --   --  82   ALT  --   --  15   AST  --   --  14         MEDICATIONS    [Held by provider] amLODIPine  2.5 mg Oral BID     [Held by provider] aspirin  81 mg Oral Daily     [Held by provider] atenolol  50 mg Oral BID     brimonidine  1 drop Both Eyes BID     insulin aspart  1-7 Units Subcutaneous TID AC     insulin glargine  20 Units Subcutaneous BID     latanoprost  1 drop Both Eyes At Bedtime     piperacillin-tazobactam  3.375 g Intravenous Once    Followed by     piperacillin-tazobactam  3.375 g Intravenous Q12H     polyethylene glycol  17 g Oral Daily     potassium chloride  10 mEq Oral Once     sodium chloride (PF)  3 mL Intracatheter Q8H         Chika Malloy MD  [facility]  [phone]

## 2022-01-06 NOTE — PLAN OF CARE
Problem: Adult Inpatient Plan of Care  Goal: Optimal Comfort and Wellbeing  Outcome: No Change   Pt denies any pain or discomfort overnight. Neph tubes are intact. Left side draining pink tinged urine that cleared up by morning and the right side draining yellow urine. Only a small amount of urine noted in the urostomy, not enough to empty. Getting iv zosyn. When rounded upon pt has been resting.  Suzanne Atkinson RN

## 2022-01-06 NOTE — PROGRESS NOTES
Place of Service:  [facility]     Reason for follow up: Bilateral hydronephrosis with CHINMAY; S/p cystectomy with ileal conduit 12/6/21; S/p bilateral PNT placement 1/5/22    SUBJECTIVE:    He is feeling ok at present. He just had a small BM. Pt denies pain. No f/c/s. No significant back pain.     OBJECTIVE:  PHYSICAL EXAM:  Temp: 98.4  F (36.9  C) Temp src: Oral BP: 128/59 Pulse: 71   Resp: 19 SpO2: 97 % O2 Device: None (Room air)    General: NAD, alert, cooperative  Head: normocephalic, without abnormality / atraumatic  Abdomen: soft, non tender, is not distended. no suprapubic fullness, no suprapubic tenderness. no CVA tenderness. Bilateral nephrostomy tubes in place. R side is draining yellow urine. L side is draining yellow urine.   Genitourinary:  External genitalia   Skin: No rashes or lesions  Musculoskeletal: moves all four extremities equally; no calf edema or tenderness  Psychological: alert and oriented, answers questions appropriately    LABS:  Creatinine   Date Value Ref Range Status   01/06/2022 6.30 (HH) 0.70 - 1.30 mg/dL Final     WBC Count   Date Value Ref Range Status   01/06/2022 8.2 4.0 - 11.0 10e3/uL Final     Hemoglobin   Date Value Ref Range Status   01/06/2022 9.3 (L) 13.3 - 17.7 g/dL Final   ]  Platelet Count   Date Value Ref Range Status   01/06/2022 188 150 - 450 10e3/uL Final       UA:  UA RESULTS:  Recent Labs   Lab Test 01/04/22  1903   COLOR Light Yellow   APPEARANCE Turbid*   URINEGLC Negative   URINEBILI Negative   URINEKETONE Negative   SG 1.012   UBLD 0.1 mg/dL*   URINEPH 8.5*   PROTEIN 100 *   NITRITE Negative   LEUKEST 500 Rao/uL*   RBCU 4*   WBCU 16*         Cultures:  Urine culture: >100,000 CFU/mL Mixture of urogenital mely       Lab Results: personally reviewed.     ASSESSMENT/PLAN:  Marcelo Valerio is being seen by [facility] for Bilateral hydronephrosis with [facility]; S/p cystectomy with ileal conduit 12/6/21; S/p bilateral PNT placement 1/5/22.    - Creatinine is  6.30 which is improved from yesterday at 6.85.   - Bilateral PNTs draining well. Currently without significant flank pain. Consider antegrade stent placed by IR. Will discuss tomorrow.   - WBC 8.2. Pt is afebrile w/o vitals signs suggestive of infection. Pt urine culture reveals >100,000 CFU/mL mix of urogenital mely. Stop zosyn. Can switch to oral, please start cefdinir for antibiotic coverage.   - Nephrology consulted given CHINMAY, creatinine is improved. 6.30 which is improvement from yesterday.   -  Will see patient tomorrow.     This case was discussed with:  Dr Fam Elena PA-C  [facility]   [phone]       ADDENDUM:    Spoke to Dr. Higgins at 422 pm. New UCX results reveal enterococcus. Zosyn was restarted.

## 2022-01-06 NOTE — PROGRESS NOTES
01/06/22 1530   Quick Adds   Type of Visit Initial PT Evaluation   Living Environment   Transportation Anticipated family or friend will provide   Living Environment Comments see OT eval,patient independent with mobility and amb prior surgery in Dec 2021   Self-Care   Usual Activity Tolerance good   Current Activity Tolerance fair   Equipment Currently Used at Home walker, rolling   Disability/Function   Walking or Climbing Stairs ambulation difficulty, requires equipment   General Information   Onset of Illness/Injury or Date of Surgery 01/04/22   Referring Physician ,Michael Higgins   Patient/Family Therapy Goals Statement (PT) return home   Pertinent History of Current Problem (include personal factors and/or comorbidities that impact the POC) bladder Ca,lamar PNT 1/05   Existing Precautions/Restrictions other (see comments)  (drains)   Weight-Bearing Status - LLE full weight-bearing   Weight-Bearing Status - RLE full weight-bearing   Cognition   Orientation Status (Cognition) oriented x 4   Pain Assessment   Patient Currently in Pain Yes, see Vital Sign flowsheet   Range of Motion (ROM)   ROM Quick Adds ROM WFL   Strength   Manual Muscle Testing Quick Adds Strength WFL   Bed Mobility   Comment (Bed Mobility) N/A -pt on EOB with OT upon therapists arrival   Sit-Stand Transfer   Sit-Stand [place] (Transfers) verbal cues;moderate assist (50% patient effort);2 person assist  (with second attempt)   Assistive Device (Sit-Stand Transfers) walker, front-wheeled   Sit/Stand Transfer Comments bed elevated ,needed cues for walker safety   Gait/Stairs (Locomotion)   [place] Level (Gait) minimum assist (75% patient effort)   Assistive Device (Gait) walker, front-wheeled   Distance in Feet (Required for LE Total Joints) 60   Pattern (Gait) step-through   Deviations/Abnormal Patterns (Gait) tania decreased;gait speed decreased;stride length decreased   Maintains Weight-bearing Status (Gait) able to maintain    Comment (Gait/Stairs) multiple standing rests,flexed forward posture,slow pace   Clinical Impression   Criteria for Skilled Therapeutic Intervention yes, treatment indicated   PT Diagnosis (PT) impaired functional mobility   Influenced by the following impairments weakness,pain   Functional limitations due to impairments bed mobility,transfers,gait   Clinical Presentation Evolving/Changing   Clinical Presentation Rationale pt presnts as med diagnosed   Clinical Decision Making (Complexity) moderate complexity   Therapy Frequency (PT) Daily   Predicted Duration of Therapy Intervention (days/wks) 7 days   Planned Therapy Interventions (PT) bed mobility training;gait training;home exercise program;stair training;strengthening   Anticipated Equipment Needs at Discharge (PT) walker, rolling   Risk & Benefits of therapy have been explained evaluation/treatment results reviewed;patient;spouse/significant other   PT Discharge Planning    PT Discharge Recommendation (DC Rec) home with assist;home with home care physical therapy   PT Rationale for DC Rec pt lives with spouse who can assist ,had home PT after last admit in Dec 2021   Total Evaluation Time   Total Evaluation Time (Minutes) 15

## 2022-01-07 ENCOUNTER — APPOINTMENT (OUTPATIENT)
Dept: PHYSICAL THERAPY | Facility: HOSPITAL | Age: 77
DRG: 683 | End: 2022-01-07
Payer: MEDICARE

## 2022-01-07 LAB
ANION GAP SERPL CALCULATED.3IONS-SCNC: 13 MMOL/L (ref 5–18)
BUN SERPL-MCNC: 85 MG/DL (ref 8–28)
CALCIUM SERPL-MCNC: 8.3 MG/DL (ref 8.5–10.5)
CHLORIDE BLD-SCNC: 104 MMOL/L (ref 98–107)
CO2 SERPL-SCNC: 26 MMOL/L (ref 22–31)
CREAT SERPL-MCNC: 5.39 MG/DL (ref 0.7–1.3)
ERYTHROCYTE [DISTWIDTH] IN BLOOD BY AUTOMATED COUNT: 14 % (ref 10–15)
GFR SERPL CREATININE-BSD FRML MDRD: 10 ML/MIN/1.73M2
GLUCOSE BLD-MCNC: 159 MG/DL (ref 70–125)
GLUCOSE BLDC GLUCOMTR-MCNC: 152 MG/DL (ref 70–99)
GLUCOSE BLDC GLUCOMTR-MCNC: 156 MG/DL (ref 70–99)
GLUCOSE BLDC GLUCOMTR-MCNC: 162 MG/DL (ref 70–99)
GLUCOSE BLDC GLUCOMTR-MCNC: 189 MG/DL (ref 70–99)
GLUCOSE BLDC GLUCOMTR-MCNC: 252 MG/DL (ref 70–99)
HCT VFR BLD AUTO: 27.4 % (ref 40–53)
HGB BLD-MCNC: 8.9 G/DL (ref 13.3–17.7)
MAGNESIUM SERPL-MCNC: 1.9 MG/DL (ref 1.8–2.6)
MCH RBC QN AUTO: 29.7 PG (ref 26.5–33)
MCHC RBC AUTO-ENTMCNC: 32.5 G/DL (ref 31.5–36.5)
MCV RBC AUTO: 91 FL (ref 78–100)
PLATELET # BLD AUTO: 152 10E3/UL (ref 150–450)
POTASSIUM BLD-SCNC: 2.7 MMOL/L (ref 3.5–5)
POTASSIUM BLD-SCNC: 2.7 MMOL/L (ref 3.5–5)
POTASSIUM BLD-SCNC: 2.8 MMOL/L (ref 3.5–5)
PROCALCITONIN SERPL-MCNC: 0.92 NG/ML (ref 0–0.49)
RBC # BLD AUTO: 3 10E6/UL (ref 4.4–5.9)
SODIUM SERPL-SCNC: 143 MMOL/L (ref 136–145)
WBC # BLD AUTO: 7.4 10E3/UL (ref 4–11)

## 2022-01-07 PROCEDURE — 97530 THERAPEUTIC ACTIVITIES: CPT | Mod: GP

## 2022-01-07 PROCEDURE — 84132 ASSAY OF SERUM POTASSIUM: CPT | Performed by: INTERNAL MEDICINE

## 2022-01-07 PROCEDURE — 120N000001 HC R&B MED SURG/OB

## 2022-01-07 PROCEDURE — 80053 COMPREHEN METABOLIC PANEL: CPT | Performed by: INTERNAL MEDICINE

## 2022-01-07 PROCEDURE — 36415 COLL VENOUS BLD VENIPUNCTURE: CPT | Performed by: FAMILY MEDICINE

## 2022-01-07 PROCEDURE — 83735 ASSAY OF MAGNESIUM: CPT | Performed by: INTERNAL MEDICINE

## 2022-01-07 PROCEDURE — 250N000009 HC RX 250: Performed by: INTERNAL MEDICINE

## 2022-01-07 PROCEDURE — 258N000002 HC RX IP 258 OP 250: Performed by: INTERNAL MEDICINE

## 2022-01-07 PROCEDURE — 250N000011 HC RX IP 250 OP 636: Performed by: INTERNAL MEDICINE

## 2022-01-07 PROCEDURE — 250N000012 HC RX MED GY IP 250 OP 636 PS 637: Performed by: INTERNAL MEDICINE

## 2022-01-07 PROCEDURE — 250N000012 HC RX MED GY IP 250 OP 636 PS 637: Performed by: HOSPITALIST

## 2022-01-07 PROCEDURE — 84132 ASSAY OF SERUM POTASSIUM: CPT | Performed by: FAMILY MEDICINE

## 2022-01-07 PROCEDURE — 82550 ASSAY OF CK (CPK): CPT | Performed by: STUDENT IN AN ORGANIZED HEALTH CARE EDUCATION/TRAINING PROGRAM

## 2022-01-07 PROCEDURE — 258N000003 HC RX IP 258 OP 636: Performed by: INTERNAL MEDICINE

## 2022-01-07 PROCEDURE — 36415 COLL VENOUS BLD VENIPUNCTURE: CPT | Performed by: INTERNAL MEDICINE

## 2022-01-07 PROCEDURE — 84145 PROCALCITONIN (PCT): CPT | Performed by: INTERNAL MEDICINE

## 2022-01-07 PROCEDURE — 250N000013 HC RX MED GY IP 250 OP 250 PS 637: Performed by: FAMILY MEDICINE

## 2022-01-07 PROCEDURE — 99233 SBSQ HOSP IP/OBS HIGH 50: CPT | Performed by: INTERNAL MEDICINE

## 2022-01-07 PROCEDURE — 250N000013 HC RX MED GY IP 250 OP 250 PS 637: Performed by: HOSPITALIST

## 2022-01-07 PROCEDURE — 97116 GAIT TRAINING THERAPY: CPT | Mod: GP

## 2022-01-07 PROCEDURE — 85027 COMPLETE CBC AUTOMATED: CPT | Performed by: INTERNAL MEDICINE

## 2022-01-07 PROCEDURE — 250N000013 HC RX MED GY IP 250 OP 250 PS 637: Performed by: INTERNAL MEDICINE

## 2022-01-07 RX ORDER — POTASSIUM CHLORIDE 1500 MG/1
40 TABLET, EXTENDED RELEASE ORAL ONCE
Status: COMPLETED | OUTPATIENT
Start: 2022-01-07 | End: 2022-01-07

## 2022-01-07 RX ORDER — SODIUM CHLORIDE 450 MG/100ML
INJECTION, SOLUTION INTRAVENOUS CONTINUOUS
Status: ACTIVE | OUTPATIENT
Start: 2022-01-07 | End: 2022-01-07

## 2022-01-07 RX ORDER — FLUCONAZOLE 100 MG/1
200 TABLET ORAL DAILY
Status: DISCONTINUED | OUTPATIENT
Start: 2022-01-08 | End: 2022-01-10 | Stop reason: HOSPADM

## 2022-01-07 RX ADMIN — SODIUM CHLORIDE: 4.5 INJECTION, SOLUTION INTRAVENOUS at 12:30

## 2022-01-07 RX ADMIN — POLYETHYLENE GLYCOL 3350 17 G: 17 POWDER, FOR SOLUTION ORAL at 08:59

## 2022-01-07 RX ADMIN — FLUCONAZOLE 400 MG: 150 TABLET ORAL at 12:32

## 2022-01-07 RX ADMIN — POTASSIUM CHLORIDE 40 MEQ: 1500 TABLET, EXTENDED RELEASE ORAL at 14:46

## 2022-01-07 RX ADMIN — POTASSIUM CHLORIDE 40 MEQ: 1500 TABLET, EXTENDED RELEASE ORAL at 09:37

## 2022-01-07 RX ADMIN — INSULIN ASPART 1 UNITS: 100 INJECTION, SOLUTION INTRAVENOUS; SUBCUTANEOUS at 09:00

## 2022-01-07 RX ADMIN — INSULIN ASPART 1 UNITS: 100 INJECTION, SOLUTION INTRAVENOUS; SUBCUTANEOUS at 18:03

## 2022-01-07 RX ADMIN — POTASSIUM CHLORIDE 40 MEQ: 1500 TABLET, EXTENDED RELEASE ORAL at 20:31

## 2022-01-07 RX ADMIN — INSULIN ASPART 4 UNITS: 100 INJECTION, SOLUTION INTRAVENOUS; SUBCUTANEOUS at 18:03

## 2022-01-07 RX ADMIN — ASPIRIN 81 MG: 81 TABLET, COATED ORAL at 08:59

## 2022-01-07 RX ADMIN — INSULIN ASPART 3 UNITS: 100 INJECTION, SOLUTION INTRAVENOUS; SUBCUTANEOUS at 12:32

## 2022-01-07 RX ADMIN — BRIMONIDINE TARTRATE 1 DROP: 1.5 SOLUTION OPHTHALMIC at 08:59

## 2022-01-07 RX ADMIN — SODIUM BICARBONATE: 84 INJECTION, SOLUTION INTRAVENOUS at 08:57

## 2022-01-07 RX ADMIN — PIPERACILLIN SODIUM AND TAZOBACTAM SODIUM 3.38 G: 3; .375 INJECTION, POWDER, LYOPHILIZED, FOR SOLUTION INTRAVENOUS at 17:47

## 2022-01-07 RX ADMIN — LATANOPROST 1 DROP: 50 SOLUTION OPHTHALMIC at 22:51

## 2022-01-07 RX ADMIN — METOPROLOL TARTRATE 12.5 MG: 25 TABLET, FILM COATED ORAL at 08:59

## 2022-01-07 RX ADMIN — PIPERACILLIN SODIUM AND TAZOBACTAM SODIUM 3.38 G: 3; .375 INJECTION, POWDER, LYOPHILIZED, FOR SOLUTION INTRAVENOUS at 06:14

## 2022-01-07 RX ADMIN — FAMOTIDINE 20 MG: 10 INJECTION, SOLUTION INTRAVENOUS at 16:24

## 2022-01-07 RX ADMIN — METOPROLOL TARTRATE 12.5 MG: 25 TABLET, FILM COATED ORAL at 20:31

## 2022-01-07 RX ADMIN — BRIMONIDINE TARTRATE 1 DROP: 1.5 SOLUTION OPHTHALMIC at 20:34

## 2022-01-07 RX ADMIN — IRON SUCROSE 300 MG: 20 INJECTION, SOLUTION INTRAVENOUS at 14:46

## 2022-01-07 ASSESSMENT — ACTIVITIES OF DAILY LIVING (ADL)
ADLS_ACUITY_SCORE: 12
ADLS_ACUITY_SCORE: 10
ADLS_ACUITY_SCORE: 10
ADLS_ACUITY_SCORE: 12
ADLS_ACUITY_SCORE: 10
ADLS_ACUITY_SCORE: 12
ADLS_ACUITY_SCORE: 10
ADLS_ACUITY_SCORE: 12
ADLS_ACUITY_SCORE: 10
ADLS_ACUITY_SCORE: 12
ADLS_ACUITY_SCORE: 10
ADLS_ACUITY_SCORE: 12
ADLS_ACUITY_SCORE: 10
ADLS_ACUITY_SCORE: 12
ADLS_ACUITY_SCORE: 10
ADLS_ACUITY_SCORE: 12
ADLS_ACUITY_SCORE: 12
ADLS_ACUITY_SCORE: 10
ADLS_ACUITY_SCORE: 10

## 2022-01-07 NOTE — PLAN OF CARE
Problem: Adult Inpatient Plan of Care  Goal: Plan of Care Review  Outcome: Improving  Flowsheets (Taken 1/7/2022 1512)  Plan of Care Reviewed With: patient   IV antibiotics intermittent.  Kidney function improving, bicarb changed to 1/2 normal saline.    Good output from nephrostomy tubes.    Problem: Infection (Urinary Diversion)  Goal: Absence of Infection Signs and Symptoms  Outcome: Improving   Afebrile.  IV antibiotics continued.    Problem: Pain (Urinary Diversion)  Goal: Acceptable Pain Control  Outcome: Improving  Intervention: Prevent or Manage Pain  Recent Flowsheet Documentation  Taken 1/7/2022 1000 by Ladonna Jolly, RN  Pain Management Interventions: declines  Taken 1/7/2022 0900 by Ladonna Jolly, RN  Pain Management Interventions: declines   Patient denied pain.      Patient deconditioned/generalized weakness.  Needs a lot of encouragement to do activity.  Toilet to low riser placed over it to help patient.  Patient is assist of 1 with walker and gait belt.

## 2022-01-07 NOTE — PROGRESS NOTES
"RENAL PROGRESS NOTE - [facility]        CC: f/u [facility]/CKD 4    Subjective: still weak/tired today. Good urine output from nephrostomy tubes, having some \"gas\" and nausea.  No abd or back pain, having BMs.         ASSESSMENT:   75 yo male with h/o bladder cancer s/p cystectomy/ileal diversion in December, CKD 4 with creat of 1.8, HTN, DM2 admit from clinic with [facility], b/l hdyronephrosis     PLAN:  1. [facility]INMAY/CKD4 - baseline CKD 4 likely due to DM2, HTN and now obstructive CHINMAY due to b/l hydronephrosis post cystectomy/ileal diversion for bladder cancer.  Was taking Advil PTA, may have further contributed to CHINMAY  -s/p b/l PNT placement with good urine output  -expect CHINMAY will be slow to improve with advanced age, underlying CKD, likely subacute obstruction  -no acute dialysis indications now but will monitor urine output, serial labs closely to determine need for dialysis  -can heplock iv later today if tolerating po    2. B/l [place] - post-cystectomy, ileal diversion  -now s/p b/l  PNT placement   -urology and IR following  -consideration for ureteral stent noted    3. Metabolic acidosis - due to CHINMAY  -resolved  -stop bicarb gtt    4. Anemia - expect due to recent surgery, CKD, may see further decline with hydration  -serial hb  - iron stores low, will give venofer      5. Hypokalemia - likely some post obstructive diuresis  -replaced  -monitor daily mag, k    Discussed with family    Chika Malloy MD  [facility]  [phone]    Objective    PHYSICAL EXAM  /60 (BP Location: Left arm)   Pulse 64   Temp 98.6  F (37  C) (Oral)   Resp 18   Ht 1.803 m (5' 11\")   Wt 102.4 kg (225 lb 11.2 oz)   SpO2 97%   BMI 31.48 kg/m    I/O last 3 completed shifts:  In: 1490.83 [P.O.:240; I.V.:1250.83]  Out: 2510 [Urine:60; Drains:2450]  Wt Readings from Last 3 Encounters:   01/05/22 102.4 kg (225 lb 11.2 oz)   12/06/21 107.6 kg (237 lb 4.8 oz)   11/22/21 105.6 kg (232 lb 14.4 oz)       GENERAL: " nad  HEENT:normocephalic, atraumatic  CARDIOVASCULAR: rr, no rub,  Trace edema  PULMONARY:cta ant. No cyanosis  GASTROINTESTINAL: soft, nt/nd  MSK: diffuse muscle atrophy, warm  NEURO: Alert, no gross focal findings  PSYCHIATRIC: Adequate mood and interaction  SKIN: Pale, no jaundice, no rash.    LABORATORIES  Recent Labs   Lab 01/07/22  0639 01/06/22  0631 01/05/22  0648   WBC 7.4 8.2 7.9   HGB 8.9* 9.3* 9.6*   HCT 27.4* 29.1* 32.2*    188 200     Recent Labs   Lab 01/07/22  0639 01/06/22  0631 01/05/22  1833 01/05/22  0648    143 142 137   CO2 26 19* 15* 10*   BUN 85* 101* 105* 109*   ALKPHOS  --   --   --  82   ALT  --   --   --  15   AST  --   --   --  14         MEDICATIONS    [Held by provider] amLODIPine  2.5 mg Oral BID     aspirin  81 mg Oral Daily     brimonidine  1 drop Both Eyes BID     famotidine  20 mg Intravenous Q24H     fluconazole  400 mg Oral Daily    Followed by     [START ON 1/8/2022] fluconazole  200 mg Oral Daily     insulin aspart   Subcutaneous QAM AC     insulin aspart   Subcutaneous Daily with lunch     insulin aspart   Subcutaneous Daily with supper     insulin aspart  1-7 Units Subcutaneous TID AC     insulin glargine  20 Units Subcutaneous At Bedtime     latanoprost  1 drop Both Eyes At Bedtime     metoprolol tartrate  12.5 mg Oral BID     piperacillin-tazobactam  3.375 g Intravenous Q12H     polyethylene glycol  17 g Oral Daily     sodium chloride (PF)  3 mL Intracatheter Q8H         Chika Malloy MD  [facility]  [phone]

## 2022-01-07 NOTE — PLAN OF CARE
Problem: Adult Inpatient Plan of Care  Goal: Patient-Specific Goal (Individualized)  Outcome: Improving     Problem: Adult Inpatient Plan of Care  Goal: Absence of Hospital-Acquired Illness or Injury  Outcome: Improving     Problem: Adult Inpatient Plan of Care  Goal: Optimal Comfort and Wellbeing  Outcome: Improving    A/O x4. VSS. Complained of abdominal cramps. Bisacodyl suppository given and had two large loose BM. Pt stated abodminal cramps getting better after the two Bms. Also abdominal x-ray was ordered. Result was reported to hospitalist.

## 2022-01-07 NOTE — PROVIDER NOTIFICATION
Dr Felipe to come to see patient regarding unequal pupils.  She is aware and will evaluate as soon as she can.   Evette Powell RN  1/6/2022

## 2022-01-07 NOTE — SIGNIFICANT EVENT
Significant Event Note    Description of event:  Called regarding unequal pupils. On exam, left pupil larger than right. Unreactives to light. Unclear if this is a new finding. The rest of the neurologic exam is normal. Per nursing, appears to be per baseline in communication and level of alertness.     Given this may be a new finding, will get CT w/o contrast stat.    Discussed with: bedside nurse    Karen Felipe MD

## 2022-01-07 NOTE — PROGRESS NOTES
Progress Note    Assessment/Plan  Marcelo Valerio is a 76 year old male with history of bladder cancer status post cystectomy and ileal diversion in December 2021 admitted on 1/4/2022.  Patient has also been taking over-the-counter Advil to help sleep he complained of nausea, fatigue and decreased urine output.  He was sent to the ED after labs in the clinic showed elevated creatinine.     1.  Acute kidney injury on chronic kidney disease stage 4  CT abdomen and pelvis showed moderate right hydronephrosis and mild right proximal ureterectasis similar to prior; interval development left perinephric edema moderate hydronephrosis and mild ureterectasis that tapers to the level of the ureteral ileal conduit anastomosis with cause of obstruction not evident.  Urology input appreciated  --Avoid NSAIDs  S/p bilateral nephrostomy tube placement on 1/5  Creatinine is improving--daily down to 5.39 today  --continue PT OT eval     2.  Positive urine culture from the renal pelvis 1/5 at the time of nephrostomy tubes placement--growing Enterococcus and yeast  --Currently on IV Zosyn.  Discussed with Dr. Love from ID about candiduria and he favors treating it with fluconazole empirically until sensitivities are obtained.  Added oral fluconazole for 2 weeks on 1/7.  Pharmacy to adjust dose based on GFR  -- Procalcitonin is elevated 0.92, thereby favoring treating positive urine cultures with antimicrobial agents.    3.  Non-anion gap metabolic acidosis  Secondary to renal failure  Resolved and therefore DC bicarb drip    4.  Type 2 diabetes mellitus with long-term insulin use  --suboptimal control  On dulaglutide once a week. hold in the hospital  Continue Lantus to once daily given kidney failure  NovoLog sliding scale  -decrease mealtime insulin ratio of 1 is to 10     5.  Essential hypertension  --controlled.   Anticipated postobstructive diuresis and therefore will hold amlodipine and atenolol. Change atenolol to metoprolol  "due to renal failure     6.  Glaucoma  Continue Xalatan     Anemia likely due to CKD 4 and iron deficiency  --low transferrin saturation of 8.  --started on IV iron/Venofer by renal consult    Hypokalemia--mild  Worsened due to bicarb drip.  --now off bicarb drip and  Started 1/2 normal saline on 1/7  Magnesium   Date Value Ref Range Status   01/07/2022 1.9 1.8 - 2.6 mg/dL Final         Periumbilical abdominal pain spasmodic arm 1/6  -- Resolved with 2 bowel movements after Dulcolax suppository was ordered  -- X-ray abdomen shows mild ileus but clinically improving  -- Patient now has liquid stools and therefore will hold MiraLAX    Anisocoria likely chronic due to previous cataract surgery  -- CT head ordered by resident.  No acute process  -- No further work-up necessary    Barriers to discharge: Improvement in creatinine, culture and sensivities    Anticipated discharge date:1/9      Subjective  Patient denies abdominal pain nausea vomiting.  Had episode of lightheadedness when he got up but now it is resolved.  Night nurse noted anisocoria and was evaluated by the resident.  Patient has had history of cataract surgery.  CT of the head was ordered by the resident and was found to be negative.  Patient has had 2 bowel movements since yesterday.  X-ray of the abdomen from yesterday shows mild ileus.  Urine culture from the renal pelvis is growing Enterococcus and yeast.  Restarted the patient on Zosyn.  Added fluconazole this morning after discussion with Dr. Love from infectious disease.  Awaiting urine sensitivities.  Creatinine is trending down.  Potassium is low and therefore ordered potassium replacement.  Bicarb is normal and therefore will DC bicarb drip.    Patient denies any nausea vomiting.  Has some liquid stool/diarrhea.  Will hold MiraLAX  Objective    /60 (BP Location: Left arm)   Pulse 64   Temp 98.6  F (37  C) (Oral)   Resp 18   Ht 1.803 m (5' 11\")   Wt 102.4 kg (225 lb 11.2 oz)   SpO2 " 97%   BMI 31.48 kg/m    Weight:   Wt Readings from Last 5 Encounters:   01/05/22 102.4 kg (225 lb 11.2 oz)   12/06/21 107.6 kg (237 lb 4.8 oz)   11/22/21 105.6 kg (232 lb 14.4 oz)   03/22/21 108.9 kg (240 lb)   02/01/21 108.9 kg (240 lb)       I/O last 3 completed shifts:  In: 1490.83 [P.O.:240; I.V.:1250.83]  Out: 2510 [Urine:60; Drains:2450]  I/O this shift:  In: 1070 [P.O.:720; I.V.:350]  Out: 525 [Drains:525]          Physical Exam  Awake  No tremors of the outstretched hand  S1-S2 normal  Clear urine bilateral nephrostomy bag  Lungs are clear to auscultation  Abdomen is soft, bowel sounds are positive, lexy-colored urine in urostomy  No generalized skin rash  No leg edema  No joint tenderness    Pertinent Labs  ----------------------  Recent Labs   Lab 01/07/22  0804 01/07/22  0639 01/07/22  0619 01/06/22  0815 01/06/22  0631 01/05/22  2056 01/05/22  1833 01/05/22  0943 01/05/22  0648 01/05/22  0029 01/04/22  1901   NA  --  143  --   --  143  --  142  --  137  --  138   POTASSIUM  --  2.7*  --   --  3.4*  --  3.6  --  4.1  --  4.8   CO2  --  26  --   --  19*  --  15*  --  10*  --  8*   BUN  --  85*  --   --  101*  --  105*  --  109*  --  109*   CR  --  5.39*  --   --  6.30*  --  6.64*  --  6.85*  --  7.52*   MAG  --  1.9  --   --  2.1  --   --   --   --   --  2.5   * 159* 152*   < > 176*   < > 145*   < > 200*   < > 195*   ALBUMIN  --   --   --   --   --   --   --   --  2.2*  --   --    BILITOTAL  --   --   --   --   --   --   --   --  0.7  --   --    ALKPHOS  --   --   --   --   --   --   --   --  82  --   --    ALT  --   --   --   --   --   --   --   --  15  --   --    AST  --   --   --   --   --   --   --   --  14  --   --     < > = values in this interval not displayed.     Recent Labs   Lab 01/07/22  0639 01/06/22  0631 01/05/22  0648   WBC 7.4 8.2 7.9   HGB 8.9* 9.3* 9.6*   HCT 27.4* 29.1* 32.2*    188 200     Recent Labs   Lab 01/04/22  2126   INR 1.27*     Glucose Values Latest Ref Rng &  Units 12/11/2021 12/12/2021 1/4/2022 1/5/2022 1/5/2022 1/6/2022 1/7/2022   Bedside Glucose (mg/dl )  - -- -- -- -- -- -- --   GLUCOSE 70 - 125 mg/dL 94 105 195(H) 200(H) 145(H) 176(H) 159(H)   Some recent data might be hidden         Pertinent Radiology   Radiology Results: Personally reviewed impression/s  CT Abdomen Pelvis w/o Contrast    Result Date: 1/4/2022  EXAM: CT ABDOMEN PELVIS W/O CONTRAST LOCATION: [facility] DATE/TIME: 1/4/2022 5:33 PM INDICATION: Recent urostomy, acute renal failure. COMPARISON: CT AP 11/17/2021. TECHNIQUE: CT scan of the abdomen and pelvis was performed without IV contrast. Multiplanar reformats were obtained. Dose reduction techniques were used. CONTRAST: None. FINDINGS: LOWER CHEST: Normal. HEPATOBILIARY: Normal. PANCREAS: Normal. SPLEEN: Normal. ADRENAL GLANDS: Normal. KIDNEYS/BLADDER: Interval cystectomy and ileal conduit urinary diversion with right lower quadrant ostomy. Moderate right hydronephrosis and mild right proximal ureterectasis similar to prior. Interval development left perinephric edema, moderate hydronephrosis and mild ureterectasis to the level of the ureteral ileal conduit anastomosis. The ileal conduit is decompressed and normal in appearance. Small benign cyst right kidney. BOWEL: Colonic diverticulosis. No bowel obstruction or inflammatory change. LYMPH NODES: Normal. VASCULATURE: Unremarkable. PELVIC ORGANS: Prostatectomy. MUSCULOSKELETAL: Bones are demineralized. Bilateral hip arthroplasties.     IMPRESSION: 1.  Interval cystectomy and ileal conduit urinary diversion with right lower quadrant ostomy. 2.  Moderate right hydronephrosis and mild right proximal ureterectasis similar to prior. 3.  Interval development left perinephric edema moderate hydronephrosis and mild ureterectasis that tapers to the level of the ureteral ileal conduit anastomosis with cause of obstruction not evident.     EKG Results: not reviewed.

## 2022-01-07 NOTE — PLAN OF CARE
Problem: Pain (Urinary Diversion)  Goal: Acceptable Pain Control  Outcome: Improving     Problem: Postoperative Stoma Care (Urinary Diversion)  Goal: Optimal Stoma Healing  Outcome: Improving     Problem: Urine Elimination Impaired (Urinary Diversion)  Goal: Effective Urinary Elimination  Outcome: Improving     Pt continues w/ a urostomy which was placed on 12/6. Minimal lexy urine output from urostomy. Bilateral nephrostomy tubes remained patent w/ large amount of clear yellow urine. Neph sites remained CDI. Pt denied and offered no c/o pain when asked, absent of nonverbal indicators. Continued on Sodium bicarb in D5% IV fluids throughout the shift. Also continued on IV Zosyn. VSS. L pupil noted to be larger than R pupil on previous shift, head CT obtained which didn't reveal any acute processes. Pt reports that he has a hx of Cataract Sx in one eye. Neurochecks: WDL x2 this shift. Pt does however have have intermittent forgetfulness which has been present since admission. Pt makes position changes independently.

## 2022-01-07 NOTE — PROGRESS NOTES
"    Place of Service:  [facility]     Reason for follow up: Bilateral hydronephrosis with CHINMAY; S/p cystectomy with ileal conduit 12/6/21; S/p bilateral PNT placement 1/5/22     SUBJECTIVE:  Patient reports feeling weak and tired this morning. No pain noted. Passing gas. Tolerating diet without N/V.Very limited ambulation due to fatigue/weakeness. Tolerating PNT bilaterally.     OBJECTIVE:  PHYSICAL EXAM:  /60 (BP Location: Left arm)   Pulse 64   Temp 98.6  F (37  C) (Oral)   Resp 18   Ht 1.803 m (5' 11\")   Wt 102.4 kg (225 lb 11.2 oz)   SpO2 97%   BMI 31.48 kg/m     General: NAD, alert, cooperative  Abdomen: soft, non tender, non distended. Urostomy present. Stoma pink. No CVA tenderness noted bilaterally.    Genitourinary: Yellow urine draining from both PNT  Psychological: alert and oriented, answers questions appropriately    LABS:  Lab Results   Component Value Date    WBC 7.4 01/07/2022    HGB 8.9 (L) 01/07/2022    HCT 27.4 (L) 01/07/2022     01/07/2022    ALT 15 01/05/2022    AST 14 01/05/2022     01/07/2022    BUN 85 (H) 01/07/2022    CO2 26 01/07/2022    INR 1.27 (H) 01/04/2022       Lab Results: personally reviewed.     ASSESSMENT/PLAN:  Marcelo Valerio is being seen by [facility] for Bilateral hydronephrosis with CHINMAY; S/p cystectomy with ileal conduit 12/6/21; S/p bilateral PNT placement 1/5/22    - Renal function gradually improving. Maintain bilateral PNT. He will need these exchanged every 3 months. IR arranging.   - Entercoccus and yeast noted from left PNT. Pending sensitivities. Continue IV Zosyn and fluconazole.   - Encouraged ambulation as tolerated. Continue work with PT/OT to help with return of strength.       David Bridges PA-C   [facility]              "

## 2022-01-07 NOTE — PROGRESS NOTES
"  Interventional Radiology - Progress Note  Inpatient - [facility]: Interventional Radiology   [phone]  1/7/2022    S:  Remains hospitalized.  Reports feeling well this AM.  No significant pain.  Reports PNTs are draining well.  No pressing questions or concerns for this writer.    Bedside RN did note anisocoria last evening.  Planning CT for eval.      Culture:  >100,000 CFU/mL Mixture of urogenital mely   Antibiotic: Zosyn  Flushing: None    O:  /60 (BP Location: Left arm)   Pulse 64   Temp 98.6  F (37  C) (Oral)   Resp 18   Ht 1.803 m (5' 11\")   Wt 102.4 kg (225 lb 11.2 oz)   SpO2 97%   BMI 31.48 kg/m    General:  Stable.  In no acute distress.    Neuro:  A&O x 3. Answers questions appropriately  Resp:  Breathing easily on RA, unlabored  Cardio:  WWP    Bilateral PNT:  Drain in place.  Insertion site c/d/i.  Stay suture in place.  Dressing c/d/i.  Drainage bags with small amount of clear, yellow urine      IMAGING:  [facility]  LOCATION: [facility]  DATE: 1/5/2022     PROCEDURE:   1.  BILATERAL PERCUTANEOUS NEPHROSTOMY PLACEMENT WITH IMAGING GUIDANCE.  2.  MODERATE SEDATION.     INTERVENTIONAL RADIOLOGIST: León Adams MD.     INDICATION: 76-year-old male with bilateral ureteral obstruction status post cystectomy with ileal diversion. Bilateral nephrostomy placement is requested.     CONSENT: The risks, benefits and alternatives of the stated procedure were discussed with the patient  in detail. All questions were answered. Informed consent was given to proceed with the procedure.     MODERATE SEDATION: Versed 1 mg IV; Fentanyl 25 mcg IV.  Under physician supervision, Versed and fentanyl were administered for moderate sedation. Pulse oximetry, heart rate and blood pressure were continuously monitored by an independent trained   observer. The physician spent 15 minutes of face-to-face sedation time with the patient.     CONTRAST: 15 mL Omnipaque " 350.  ANTIBIOTICS: None.  ADDITIONAL MEDICATIONS: None.     FLUOROSCOPIC TIME: 0.5 minutes.  RADIATION DOSE: Air Kerma: 34 mGy.     COMPLICATIONS: No immediate complications.     STERILE BARRIER TECHNIQUE: Maximum sterile barrier technique was used. Cutaneous antisepsis was performed at the operative site with application of 2% chlorhexidine and large sterile drape. Prior to the procedure, the  and assistant performed   hand hygiene and wore hat, mask, sterile gown, and sterile gloves during the entire procedure.     PROCEDURE:    Using real-time ultrasound guidance, an 18-gauge trocar needle was inserted into a posterior midpole calyx of the right kidney from a subcostal approach. Over wire exchange was made for a 10 Indonesian nephrostomy. The loop was formed within the renal pelvis   and attached to gravity drainage.     Using real-time ultrasound guidance, an 18-gauge trocar needle was inserted into a posterior lower pole calyx of the left kidney from a subcostal approach. Over wire exchange was made for a 10 Indonesian nephrostomy. The loop was formed within the renal   pelvis and attached to gravity drainage.        FINDINGS:  Ultrasound demonstrates bilateral moderate hydronephrosis. Permanent images were stored of each kidney.     The right antegrade nephrostogram confirms hydronephrosis. The completion image shows the nephrostomy with its loop in the right renal pelvis.     The left antegrade nephrostogram confirms hydronephrosis. The completion image shows the nephrostomy with its loop in the left renal pelvis.                                                                         IMPRESSION:    1.  Successful bilateral percutaneous nephrostomy placement, as detailed above.  2.  A urine specimen was sent from each kidney for microbiology analysis.       LABS:   CBC RESULTS: Recent Labs   Lab Test 01/07/22  0639   WBC 7.4   RBC 3.00*   HGB 8.9*   HCT 27.4*   MCV 91   MCH 29.7   MCHC 32.5   RDW 14.0   PLT  152       I&O:           ASSESSMENT:  75 yo M     - Complex PMH, including complex urological PMH including surgical creation of an ileal conduit  - CHINMAY on labs, moderate hydronephrosis on imaging, concern for urinary obstruction.    - Urology, nephrology. consulted  - Concern for UTI, perinephric edema.  AF.  Zosyn and UC per above  - S/P bilateral PNT placement, per above, doing well.     PLAN:    - Continue present nephrostomy tube cares. Continue to gravity drainage.  - PNT cares discussed with patient/family. All questions answered.  - PNT discharge instructions entered into D/C navigator.   - If PNT is maintained long term, would recommend routine PNT exchanges q3 months. Ordered.  - IR will follow remotely. Please contact IR with PNT related questions or concerns.  - Patient to follow-up with urology as an outpatient.      Total time spent on the date of the encounter is 30 minutes, including time spent counseling the patient, performing a medically appropriate evaluation, reviewing prior medical history, ordering medications and tests, documenting clinical information in the medical record, and communication of results.    KELLY HU NP  Interventional Radiology  [phone]    E/M codes for reference only:  98628

## 2022-01-08 ENCOUNTER — APPOINTMENT (OUTPATIENT)
Dept: PHYSICAL THERAPY | Facility: HOSPITAL | Age: 77
DRG: 683 | End: 2022-01-08
Payer: MEDICARE

## 2022-01-08 LAB
ALBUMIN SERPL-MCNC: 1.9 G/DL (ref 3.5–5)
ALP SERPL-CCNC: 66 U/L (ref 45–120)
ALT SERPL W P-5'-P-CCNC: <9 U/L (ref 0–45)
ANION GAP SERPL CALCULATED.3IONS-SCNC: 10 MMOL/L (ref 5–18)
ANION GAP SERPL CALCULATED.3IONS-SCNC: 11 MMOL/L (ref 5–18)
AST SERPL W P-5'-P-CCNC: 23 U/L (ref 0–40)
BACTERIA ASPIRATE CULT: ABNORMAL
BILIRUB SERPL-MCNC: 0.4 MG/DL (ref 0–1)
BUN SERPL-MCNC: 68 MG/DL (ref 8–28)
BUN SERPL-MCNC: 75 MG/DL (ref 8–28)
CALCIUM SERPL-MCNC: 7.7 MG/DL (ref 8.5–10.5)
CALCIUM SERPL-MCNC: 7.8 MG/DL (ref 8.5–10.5)
CHLORIDE BLD-SCNC: 106 MMOL/L (ref 98–107)
CHLORIDE BLD-SCNC: 106 MMOL/L (ref 98–107)
CK SERPL-CCNC: 31 U/L (ref 30–190)
CO2 SERPL-SCNC: 23 MMOL/L (ref 22–31)
CO2 SERPL-SCNC: 24 MMOL/L (ref 22–31)
CREAT SERPL-MCNC: 4.38 MG/DL (ref 0.7–1.3)
CREAT SERPL-MCNC: 4.59 MG/DL (ref 0.7–1.3)
GFR SERPL CREATININE-BSD FRML MDRD: 12 ML/MIN/1.73M2
GFR SERPL CREATININE-BSD FRML MDRD: 13 ML/MIN/1.73M2
GLUCOSE BLD-MCNC: 140 MG/DL (ref 70–125)
GLUCOSE BLD-MCNC: 153 MG/DL (ref 70–125)
GLUCOSE BLDC GLUCOMTR-MCNC: 131 MG/DL (ref 70–99)
GLUCOSE BLDC GLUCOMTR-MCNC: 217 MG/DL (ref 70–99)
GLUCOSE BLDC GLUCOMTR-MCNC: 220 MG/DL (ref 70–99)
GLUCOSE BLDC GLUCOMTR-MCNC: 268 MG/DL (ref 70–99)
HGB BLD-MCNC: 8.4 G/DL (ref 13.3–17.7)
MAGNESIUM SERPL-MCNC: 1.7 MG/DL (ref 1.8–2.6)
POTASSIUM BLD-SCNC: 3.1 MMOL/L (ref 3.5–5)
POTASSIUM BLD-SCNC: 3.1 MMOL/L (ref 3.5–5)
POTASSIUM BLD-SCNC: 3.2 MMOL/L (ref 3.5–5)
POTASSIUM BLD-SCNC: 3.4 MMOL/L (ref 3.5–5)
PROT SERPL-MCNC: 5.5 G/DL (ref 6–8)
SODIUM SERPL-SCNC: 140 MMOL/L (ref 136–145)
SODIUM SERPL-SCNC: 140 MMOL/L (ref 136–145)

## 2022-01-08 PROCEDURE — 36415 COLL VENOUS BLD VENIPUNCTURE: CPT | Performed by: INTERNAL MEDICINE

## 2022-01-08 PROCEDURE — 83735 ASSAY OF MAGNESIUM: CPT | Performed by: INTERNAL MEDICINE

## 2022-01-08 PROCEDURE — 36415 COLL VENOUS BLD VENIPUNCTURE: CPT | Performed by: FAMILY MEDICINE

## 2022-01-08 PROCEDURE — 85018 HEMOGLOBIN: CPT | Performed by: INTERNAL MEDICINE

## 2022-01-08 PROCEDURE — 250N000011 HC RX IP 250 OP 636: Performed by: STUDENT IN AN ORGANIZED HEALTH CARE EDUCATION/TRAINING PROGRAM

## 2022-01-08 PROCEDURE — 87040 BLOOD CULTURE FOR BACTERIA: CPT | Performed by: INTERNAL MEDICINE

## 2022-01-08 PROCEDURE — 120N000001 HC R&B MED SURG/OB

## 2022-01-08 PROCEDURE — 82310 ASSAY OF CALCIUM: CPT | Performed by: INTERNAL MEDICINE

## 2022-01-08 PROCEDURE — 250N000013 HC RX MED GY IP 250 OP 250 PS 637: Performed by: HOSPITALIST

## 2022-01-08 PROCEDURE — 99223 1ST HOSP IP/OBS HIGH 75: CPT | Performed by: INTERNAL MEDICINE

## 2022-01-08 PROCEDURE — 250N000013 HC RX MED GY IP 250 OP 250 PS 637: Performed by: INTERNAL MEDICINE

## 2022-01-08 PROCEDURE — 250N000013 HC RX MED GY IP 250 OP 250 PS 637: Performed by: FAMILY MEDICINE

## 2022-01-08 PROCEDURE — 84132 ASSAY OF SERUM POTASSIUM: CPT | Performed by: FAMILY MEDICINE

## 2022-01-08 PROCEDURE — 258N000003 HC RX IP 258 OP 636: Performed by: STUDENT IN AN ORGANIZED HEALTH CARE EDUCATION/TRAINING PROGRAM

## 2022-01-08 PROCEDURE — 99233 SBSQ HOSP IP/OBS HIGH 50: CPT | Performed by: INTERNAL MEDICINE

## 2022-01-08 RX ORDER — AMOXICILLIN 250 MG
1 CAPSULE ORAL AT BEDTIME
Status: DISCONTINUED | OUTPATIENT
Start: 2022-01-08 | End: 2022-01-10 | Stop reason: HOSPADM

## 2022-01-08 RX ORDER — LINEZOLID 600 MG/1
600 TABLET, FILM COATED ORAL EVERY 12 HOURS SCHEDULED
Status: DISCONTINUED | OUTPATIENT
Start: 2022-01-08 | End: 2022-01-10 | Stop reason: HOSPADM

## 2022-01-08 RX ORDER — FAMOTIDINE 10 MG
10 TABLET ORAL 2 TIMES DAILY
Status: DISCONTINUED | OUTPATIENT
Start: 2022-01-08 | End: 2022-01-10 | Stop reason: HOSPADM

## 2022-01-08 RX ORDER — POTASSIUM CHLORIDE 1500 MG/1
20 TABLET, EXTENDED RELEASE ORAL ONCE
Status: COMPLETED | OUTPATIENT
Start: 2022-01-08 | End: 2022-01-08

## 2022-01-08 RX ORDER — GRANULES FOR ORAL 3 G/1
3 POWDER ORAL ONCE
Status: COMPLETED | OUTPATIENT
Start: 2022-01-08 | End: 2022-01-08

## 2022-01-08 RX ADMIN — DAPTOMYCIN 800 MG: 500 INJECTION, POWDER, LYOPHILIZED, FOR SOLUTION INTRAVENOUS at 02:32

## 2022-01-08 RX ADMIN — LINEZOLID 600 MG: 600 TABLET, FILM COATED ORAL at 10:42

## 2022-01-08 RX ADMIN — DOCUSATE SODIUM 50 MG AND SENNOSIDES 8.6 MG 1 TABLET: 8.6; 5 TABLET, FILM COATED ORAL at 21:30

## 2022-01-08 RX ADMIN — ASPIRIN 81 MG: 81 TABLET, COATED ORAL at 08:26

## 2022-01-08 RX ADMIN — BRIMONIDINE TARTRATE 1 DROP: 1.5 SOLUTION OPHTHALMIC at 21:28

## 2022-01-08 RX ADMIN — INSULIN ASPART 3 UNITS: 100 INJECTION, SOLUTION INTRAVENOUS; SUBCUTANEOUS at 18:36

## 2022-01-08 RX ADMIN — FLUCONAZOLE 200 MG: 100 TABLET ORAL at 08:25

## 2022-01-08 RX ADMIN — FAMOTIDINE 10 MG: 10 TABLET ORAL at 21:28

## 2022-01-08 RX ADMIN — INSULIN ASPART 8 UNITS: 100 INJECTION, SOLUTION INTRAVENOUS; SUBCUTANEOUS at 18:35

## 2022-01-08 RX ADMIN — FOSFOMYCIN TROMETHAMINE 3 G: 3 POWDER ORAL at 10:42

## 2022-01-08 RX ADMIN — POTASSIUM CHLORIDE 20 MEQ: 1500 TABLET, EXTENDED RELEASE ORAL at 08:25

## 2022-01-08 RX ADMIN — INSULIN ASPART 2 UNITS: 100 INJECTION, SOLUTION INTRAVENOUS; SUBCUTANEOUS at 13:16

## 2022-01-08 RX ADMIN — POTASSIUM CHLORIDE 20 MEQ: 1500 TABLET, EXTENDED RELEASE ORAL at 18:35

## 2022-01-08 RX ADMIN — METOPROLOL TARTRATE 12.5 MG: 25 TABLET, FILM COATED ORAL at 08:26

## 2022-01-08 RX ADMIN — POTASSIUM CHLORIDE 20 MEQ: 1500 TABLET, EXTENDED RELEASE ORAL at 03:54

## 2022-01-08 RX ADMIN — LATANOPROST 1 DROP: 50 SOLUTION OPHTHALMIC at 21:28

## 2022-01-08 RX ADMIN — LINEZOLID 600 MG: 600 TABLET, FILM COATED ORAL at 21:27

## 2022-01-08 RX ADMIN — BRIMONIDINE TARTRATE 1 DROP: 1.5 SOLUTION OPHTHALMIC at 08:26

## 2022-01-08 RX ADMIN — METOPROLOL TARTRATE 12.5 MG: 25 TABLET, FILM COATED ORAL at 21:28

## 2022-01-08 ASSESSMENT — ACTIVITIES OF DAILY LIVING (ADL)
ADLS_ACUITY_SCORE: 14
ADLS_ACUITY_SCORE: 14
ADLS_ACUITY_SCORE: 12
ADLS_ACUITY_SCORE: 12
ADLS_ACUITY_SCORE: 14
ADLS_ACUITY_SCORE: 14
DEPENDENT_IADLS:: CLEANING;COOKING;LAUNDRY;SHOPPING;MEAL PREPARATION;TRANSPORTATION
ADLS_ACUITY_SCORE: 14
ADLS_ACUITY_SCORE: 12
ADLS_ACUITY_SCORE: 14
ADLS_ACUITY_SCORE: 12
ADLS_ACUITY_SCORE: 14
ADLS_ACUITY_SCORE: 14
ADLS_ACUITY_SCORE: 12
ADLS_ACUITY_SCORE: 14

## 2022-01-08 ASSESSMENT — MIFFLIN-ST. JEOR: SCORE: 1729.61

## 2022-01-08 NOTE — PROGRESS NOTES
Place of Service:  [facility]     Reason for follow up: bilat pnt tubes and positive culture from left npt    SUBJECTIVE:  Events: Normal.  No complaints.    Patient reports no pain.  Eating well..    OBJECTIVE:  PHYSICAL EXAM:  Temp: 98.6  F (37  C) Temp src: Oral BP: 122/61 Pulse: 61   Resp: 16 SpO2: 97 % O2 Device: None (Room air)    Alert and oriented.  Sitting in a chair.  No complaints bilateral  LABS:  Creatinine   Date Value Ref Range Status   01/08/2022 4.38 (H) 0.70 - 1.30 mg/dL Final     CBC RESULTS:   Recent Labs   Lab Test 01/08/22  0557 01/07/22  0639   WBC  --  7.4   RBC  --  3.00*   HGB 8.4* 8.9*   HCT  --  27.4*   MCV  --  91   MCH  --  29.7   MCHC  --  32.5   RDW  --  14.0   PLT  --  152       UA:  UA RESULTS:  Recent Labs   Lab Test 01/04/22  1903   COLOR Light Yellow   APPEARANCE Turbid*   URINEGLC Negative   URINEBILI Negative   URINEKETONE Negative   SG 1.012   UBLD 0.1 mg/dL*   URINEPH 8.5*   PROTEIN 100 *   NITRITE Negative   LEUKEST 500 Rao/uL*   RBCU 4*   WBCU 16*       ASSESSMENT/PLAN:  Marcelo Valerio is being seen by [facility] for with renal failure slowly improving creatinine.  Bilateral nephrostomy tubes are draining adequately.  Creatinine is slowly lowering.  Waiting on antibiotic change from culture.        Glenn Sebastian MD  [facility]   [phone]

## 2022-01-08 NOTE — PROGRESS NOTES
"RENAL PROGRESS NOTE - [facility]        CC: f/u CHINMAY/CKD 4    Subjective: Patient reports feeling better. Eating improved. No sob. Having bowel movements. No other concerns. Discussed labs with wife.          ASSESSMENT:   77 yo male with h/o bladder cancer s/p cystectomy/ileal diversion in December, CKD 4 with creat of 1.8, HTN, DM2 admit from clinic with CHINMAY, b/l hdyronephrosis     PLAN:  1. CHINMAY/CKD4 - baseline CKD 4 likely due to DM2, HTN and now obstructive CHINMAY due to b/l hydronephrosis post cystectomy/ileal diversion for bladder cancer.  Was taking Advil PTA, may have further contributed to CHINMAY  -Slowly improving.   -s/p b/l PNT placement with good urine output  -expect CHINMAY will be slow to improve with advanced age, underlying CKD, likely subacute obstruction  -no acute dialysis indications now but will monitor urine output, serial labs closely to determine need for dialysis    2. B/l Hydro - post-cystectomy, ileal diversion  -now s/p b/l  PNT placement   -urology and IR following  -consideration for ureteral stent noted    3. Metabolic acidosis - due to CHINMAY  -resolved    4. Anemia - expect due to recent surgery, CKD, may see further decline with hydration  -serial hb  - iron stores low, will give venofer      5. Hypokalemia - likely some post obstructive diuresis  -replaced  -monitor daily nohemi koenig,   [facility]  [phone]    Objective    PHYSICAL EXAM  /61 (BP Location: Right arm)   Pulse 61   Temp 98.6  F (37  C) (Oral)   Resp 16   Ht 1.803 m (5' 11\")   Wt 98.7 kg (217 lb 11.2 oz)   SpO2 97%   BMI 30.36 kg/m    I/O last 3 completed shifts:  In: 1070 [P.O.:720; I.V.:350]  Out: 4095 [Urine:495; Drains:3600]  Wt Readings from Last 3 Encounters:   01/08/22 98.7 kg (217 lb 11.2 oz)   12/06/21 107.6 kg (237 lb 4.8 oz)   11/22/21 105.6 kg (232 lb 14.4 oz)       GENERAL: nad  HEENT:normocephalic, atraumatic  CARDIOVASCULAR:  Trace edema  PULMONARY: No " cyanosis  GASTROINTESTINAL: soft, nt/nd  MSK: diffuse muscle atrophy, warm  NEURO: Alert, no gross focal findings  PSYCHIATRIC: Adequate mood and interaction  SKIN: Pale, no jaundice, no rash.    LABORATORIES  Recent Labs   Lab 01/08/22  0557 01/07/22  0639 01/06/22  0631 01/05/22  0648   WBC  --  7.4 8.2 7.9   HGB 8.4* 8.9* 9.3* 9.6*   HCT  --  27.4* 29.1* 32.2*   PLT  --  152 188 200     Recent Labs   Lab 01/08/22  0557 01/07/22  2357 01/07/22  0639 01/05/22  1833 01/05/22  0648    140 143   < > 137   CO2 24 23 26   < > 10*   BUN 68* 75* 85*   < > 109*   ALKPHOS  --  66  --   --  82   ALT  --  <9  --   --  15   AST  --  23  --   --  14    < > = values in this interval not displayed.         MEDICATIONS    [Held by provider] amLODIPine  2.5 mg Oral BID     aspirin  81 mg Oral Daily     brimonidine  1 drop Both Eyes BID     DAPTOmycin (CUBICIN) intermittent infusion  8 mg/kg Intravenous Q48H     famotidine  20 mg Intravenous Q24H     fluconazole  200 mg Oral Daily     insulin aspart   Subcutaneous QAM AC     insulin aspart   Subcutaneous Daily with lunch     insulin aspart   Subcutaneous Daily with supper     insulin aspart  1-7 Units Subcutaneous TID AC     insulin glargine  20 Units Subcutaneous At Bedtime     iron sucrose  300 mg Intravenous Every Other Day     latanoprost  1 drop Both Eyes At Bedtime     linezolid  600 mg Oral Q12H JIMI     metoprolol tartrate  12.5 mg Oral BID     [Held by provider] polyethylene glycol  17 g Oral Daily     sodium chloride (PF)  3 mL Intracatheter Q8H         Chika Malloy MD  [facility]  [phone]

## 2022-01-08 NOTE — PLAN OF CARE
Problem: OT General Care Plan  Goal: Toilet Transfer/Toileting (OT)  Description: Toilet Transfer/Toileting (OT)  Outcome: Improving   Pt up to bathroom with assistance of 2 .  He had small liquid bowel movement.        Problem: Urine Elimination Impaired (Urinary Diversion)  Goal: Effective Urinary Elimination  Outcome: Improving   Pt had output this evening: urostomy 475 R neph tube 525  L neph tube 450

## 2022-01-08 NOTE — CONSULTS
Care Management Initial Consult    General Information  Assessment completed with: Patient, pt  Type of CM/SW Visit: Offer D/C Planning    Primary Care Provider verified and updated as needed: Yes   Readmission within the last 30 days: planned readmission   Return Category: Planned Surgery  Reason for Consult: discharge planning  Advance Care Planning:       General Information Comments: pt lives w/wife in [place], independent prior to surgery, has homecare w/[facility] w/RN/PT/OT/HHA, will need to resume at discharge    Communication Assessment  Patient's communication style: spoken language (English or Bilingual)    Hearing Difficulty or Deaf: no   Wear Glasses or Blind: no    Cognitive  Cognitive/Neuro/Behavioral: WDL  Level of Consciousness: alert     Orientation: oriented x 4  Mood/Behavior: calm,cooperative  Best Language: 0 - No aphasia  Speech: clear    Living Environment:   People in home: spouse     Current living Arrangements: house      Able to return to prior arrangements: yes       Family/Social Support:  Care provided by: self,spouse/significant other,homecare agency  Provides care for: no one  Marital Status:   Wife          Description of Support System: Supportive    Support Assessment: Adequate family and caregiver support    Current Resources:   Patient receiving home care services: Yes  Skilled Home Care Services: Skilled Nursing,Home Health Aid,Physicial Therapy,Occupational Therapy  Community Resources: DME,Home Care  Equipment currently used at home: walker, standard  Supplies currently used at home: None    Employment/Financial:  Employment Status:          Financial Concerns: No concerns identified   Referral to Financial Counselor: No       Lifestyle & Psychosocial Needs:  Social Determinants of Health     Tobacco Use: Medium Risk     Smoking Tobacco Use: Former Smoker     Smokeless Tobacco Use: Never Used   Alcohol Use: Not on file   Financial Resource Strain: Not on file   Food  Insecurity: Not on file   Transportation Needs: Not on file   Physical Activity: Not on file   Stress: Not on file   Social Connections: Not on file   Intimate Partner Violence: Not on file   Depression: Not on file   Housing Stability: Not on file       Functional Status:  Prior to admission patient needed assistance:   Dependent ADLs:: Ambulation-walker,Dressing,Grooming,Transfers,Bathing  Dependent IADLs:: Cleaning,Cooking,Laundry,Shopping,Meal Preparation,Transportation  Assesssment of Functional Status: Not at baseline with ADL Functioning,Not at baseline with mobility,Not at  functional baseline,Needs assistance with dressing,Needs assistance with bathing,Needs assistance with meals,Needs assistance with laundry/houskeeping,Needs assistance with shopping,Needs assistance with transportation    Mental Health Status:  Mental Health Status: No Current Concerns       Chemical Dependency Status:                Values/Beliefs:  Spiritual, Cultural Beliefs, Gnosticism Practices, Values that affect care:                 Additional Information:  Assessed, lives w/wife, has [facility] (RN/PT/OT/HHA) out of [place]. Will need to resume, has bilateral nephrostomy w/drain bag. CM to follow for needs. Continue to follow therapy recs. Family transport.      Mary Guaman RN

## 2022-01-08 NOTE — PLAN OF CARE
Problem: Postoperative Stoma Care (Urinary Diversion)  Goal: Optimal Stoma Healing  Outcome: Improving     Problem: Urine Elimination Impaired (Urinary Diversion)  Goal: Effective Urinary Elimination  Outcome: Improving     Pt continues w/ a urostomy which was placed on 12/6. Minimal lexy urine output from urostomy. Bilateral nephrostomy tubes remained patent w/ large amount of clear yellow urine. Neph sites remained CDI. Pt denied and offered no c/o pain when asked, absent of nonverbal indicators. VSS. L pupil noted to be larger than R pupil on previous shift, head CT obtained 0n 1/6/22 which didn't reveal any acute processes. Pt reports that he has a hx of Cataract surgery in one eye. Neurochecks: WDL x2 this shift. A&Ox3    Call received from I/D, VRE grew in aspirated fluid from L kidney. Page sent to On-Call Hospitalist, new orders to discontinue Zosyn and start Daptomycin. First dose of Daptomycin administered. Placed on Contact precautions. Continued on K+ protocol w/ last draw: 3.1, 20Meq of oral potassium given and recheck at 0800.

## 2022-01-08 NOTE — PLAN OF CARE
Problem: Pain (Urinary Diversion)  Goal: Acceptable Pain Control  Outcome: No Change  Intervention: Prevent or Manage Pain  Recent Flowsheet Documentation  Taken 1/8/2022 1400 by Ladonna Jolly RN  Pain Management Interventions: declines   Patient denied pain throughout the shift.    Problem: Adult Inpatient Plan of Care  Goal: Plan of Care Review  Outcome: Improving  Flowsheets (Taken 1/8/2022 1555)  Plan of Care Reviewed With: patient  Goal: Patient-Specific Goal (Individualized)  Outcome: Improving   Patient reporting increased strength.  Ambulating hallways with sba and walker.    Problem: Infection (Urinary Diversion)  Goal: Absence of Infection Signs and Symptoms  Outcome: Improving   ID following.  PO antibiotics and antivirals given.  Patient afebrile tolerating well.

## 2022-01-08 NOTE — CONSULTS
[facility]    Infectious Disease Consultation     Date of Admission:  1/4/2022  Date of Consult (When I saw the patient): 01/08/22    Assessment & Plan   Marcelo Valerio is a 76 year old male who was admitted on 1/4/2022.     Impression:  1. Pyelonephritis, status post bilateral nephrostomy tube  2. Cultures with VRE, Candida parapsilosis  3. Creatinine improving  4. History of bladder cancer, status post cystectomy and ileal diversion in December 2021, admitted on 1/4/2022, with nausea fatigue decreased urine output, elevated creatinine  5. Diabetes mellitus, hypertension, glaucoma.      Recommendations:    1. Linezolid   2. Fosfomycin x 1  3.  Pharm D and Primary team to assess for drug interaction with eye drops and if the eye drops can be held or not.  Dosing per Pharm.D., appreciate input  4. Fluconazole  5. Duration of antimicrobials: 10-14 days mimimum  6. Daptomycin can be stopped.  Monitor.  Obtain blood cultures  7. Monitor CBC, CMP  8. Thank you for consulting infectious disease.    Lindsay Benites MD  [facility]  [phone]        Reason for Consult   Reason for consult: I was asked to evaluate this patient for VRE in urine culture    Primary Care Physician   Urbano Cedeno    Chief Complaint       History is obtained from the patient and medical records    History of Present Illness   Marcelo Valerio is a 76 year old male who presents with nausea vomiting fatigue.  Patient has history of bladder cancer and status post resection, diverting ileostomy.  Worsening creatinine and had bilateral nephrostomy tube placement  Urine culture with VRE and Candida brucellosis.  Patient started on IV daptomycin  Discussed linezolid with patient and to monitor for side effects  Currently improving      Past Medical History   I have reviewed this patient's medical history and updated it with pertinent information if needed.   Past Medical History:   Diagnosis Date      Basal cell carcinoma      Bladder tumor      Cancer (H)     Prostate     Chronic kidney disease      CKD (chronic kidney disease)      DM2 (diabetes mellitus, type 2) (H)      Glaucoma      History of gout      HTN (hypertension)      Hyperlipidemia      Kidney stone      Lung nodule      Malignant neoplasm of urinary bladder, unspecified site (H)      Metabolic syndrome      Obesity      Osteoarthritis of knee      Psoriasis      Restless legs syndrome        Past Surgical History   I have reviewed this patient's surgical history and updated it with pertinent information if needed.  Past Surgical History:   Procedure Laterality Date     APPENDECTOMY       ARTHROPLASTY REVISION HIP Left      BASAL CELL CARCINOMA EXCISION      back     CARPAL TUNNEL RELEASE RT/LT       CATARACT EXTRACTION       COMBINED CYSTOSCOPY, RETROGRADES, EXCHANGE STENT URETER(S) Right 11/22/2021    Procedure: CYSTOSCOPY, RIGHT RETROGRADE PYELOGRAM, RIGHT URETERAL STENT EXCHANGE;  Surgeon: Fam Manrique MD;  Location: [facility]     CYSTECTOMY, ROBOT-ASSISTED, LAPAROSCOPIC, USING DA LARISSA SI, WITH URETEROILEAL CONDUIT CREATION N/A 12/6/2021    Procedure: CYSTOSCOPY, RIGHT URETEROSCOPY, RIGHT STENT REMOVAL ROBOTIC ASSISTED RADICAL CYSTECTOMY, CREATION OF ILEAL CONDUIT, EXTENSIVE LYSIS OF ADHESIONS;  Surgeon: Fam Manrique MD;  Location: [facility] OR     CYSTOSCOPY, FULGURATE BLADDER TUMOR, COMBINED Bilateral 09/08/2021    Procedure: CYSTOSCOPY, SELECTIVE CYTOLOGIES, BLADDER BIOPSIES AND FULGURATION, BILATERAL RETROGRADE PYELOGRAMS, RIGHT URETEROSCOPY , RIGHT URERETAL STENT PLACEMENT;  Surgeon: Fam Manrique MD;  Location: [facility]     CYSTOSCOPY, TRANSURETHRAL RESECTION (TUR) TUMOR BLADDER, COMBINED N/A 02/01/2021    Procedure: CYSTOSCOPY SELECTIVE CYTOLOGIES, BLADDER BIOPSY FULGURATION, MEATAL DILATION;  Surgeon: Fam Manrique MD;  Location: [facility];  Service: Urology     INGUINAL HERNIA REPAIR  Bilateral      IR NEPHROSTOMY TUBE PLACEMENT BILATERAL  1/5/2022     JOINT REPLACEMENT Bilateral     bilateral CARLI, left TKA     CO CYSTOSCOPY,REMV CALCULUS,SIMPLE Right 03/22/2021    Procedure: CYSTOSCOPY, RIGHT RETROGRADE PYELOGRAM, RIGHT URETEROSCOPY, URETERAL BIOPSY, RIGHT URETERAL STENT PLACEMENT;  Surgeon: Fam Manrique MD;  Location: [facility];  Service: Urology     CO CYSTOURETHROSCOPY,BIOPSY N/A 10/01/2019    Procedure: CYSTOSCOPY, WITH BLADDER BIOPSIES and Fulgeration;  Surgeon: Jose Murcia MD;  Location: [facility];  Service: Urology     CO CYSTOURETHROSCOPY,URETER CATHETER Bilateral 10/01/2019    Procedure: BILATERAL RETROGRADE PYELOGRAMS;  Surgeon: Jose Murcia MD;  Location: [facility];  Service: Urology     PROSTATECTOMY      Radical suprapubic     RELEASE CARPAL TUNNEL       TONSILLECTOMY       TOTAL KNEE ARTHROPLASTY       VASECTOMY         Prior to Admission Medications   Prior to Admission Medications   Prescriptions Last Dose Informant Patient Reported? Taking?   Dulaglutide 3 MG/0.5ML SOPN Past Week at Unknown time  Yes Yes   Sig: Inject 3 mg Subcutaneous once a week Mondays   acetaminophen (TYLENOL) 325 MG tablet Unknown at Unknown time  No Yes   Sig: Take 1-2 tablets (325-650 mg) by mouth every 4 hours as needed for mild pain or pain   amLODIPine (NORVASC) 2.5 MG tablet 1/5/2022 at am  No Yes   Sig: Take 1 tablet (2.5 mg) by mouth 2 times daily   amoxicillin (AMOXIL) 500 MG capsule Unknown at Unknown time  Yes Yes   Sig: [AMOXICILLIN (AMOXIL) 500 MG CAPSULE] Take 2,000 mg by mouth as needed. Prior to dental procedures   aspirin (ASA) 81 MG EC tablet 1/4/2022  No Yes   Sig: Take 1 tablet (81 mg) by mouth daily   atenolol (TENORMIN) 50 MG tablet 1/4/2022 at am  Yes Yes   Sig: [ATENOLOL (TENORMIN) 50 MG TABLET] Take 50 mg by mouth 2 (two) times a day.   brimonidine (ALPHAGAN) 0.15 % ophthalmic solution 1/4/2022 at am  Yes Yes   Sig:  [BRIMONIDINE (ALPHAGAN) 0.15 % OPHTHALMIC SOLUTION] Administer 1 drop to both eyes 2 (two) times a day.   calcipotriene (DOVONOX) 0.005 % cream Unknown at Unknown time  Yes Yes   Sig: [CALCIPOTRIENE (DOVONOX) 0.005 % CREAM] Apply 1 application topically daily as needed.   insulin glargine (BASAGLAR KWIKPEN) 100 unit/mL (3 mL) pen 1/4/2022 at am  Yes Yes   Sig: Inject 20 Units Subcutaneous 2 times daily    latanoprost (XALATAN) 0.005 % ophthalmic solution 1/5/2022 at Unknown time  Yes Yes   Sig: [LATANOPROST (XALATAN) 0.005 % OPHTHALMIC SOLUTION] Administer 1 drop to both eyes at bedtime.   polyethylene glycol (MIRALAX) 17 GM/Dose powder Unknown at Unknown time  No Yes   Sig: Take 17 g by mouth daily Discontinue if stools are loose   simvastatin (ZOCOR) 20 MG tablet Past Week at Unknown time  Yes Yes   Sig: Take 20 mg by mouth At Bedtime    triamcinolone (KENALOG) 0.1 % cream Past Week  Yes Yes   Sig: Apply 1 Application topically See Admin Instructions Two times a day on Saturdays and Sundays      Facility-Administered Medications: None     Allergies   Allergies   Allergen Reactions     Zinc Acetate Itching     Nickel      Sulfa (Sulfonamide Antibiotics) [Sulfa Drugs] Unknown     Childhood reaction       Immunization History   Immunization History   Administered Date(s) Administered     COVID-19,PF,Moderna 01/29/2021, 02/26/2021, 11/15/2021     Influenza, Quad, High Dose, Pf, 65yr+ (Fluzone HD) 11/15/2021       Social History   I have reviewed this patient's social history and updated it with pertinent information if needed. Marcelo Valerio  reports that he quit smoking about 3 years ago. He smoked 0.00 packs per day. He has never used smokeless tobacco. He reports current alcohol use. He reports previous drug use.    Family History   I have reviewed this patient's family history and updated it with pertinent information if needed.   History reviewed. No pertinent family history.    Review of Systems   The 10 point  Review of Systems is negative other than noted in the HPI or here.     Physical Exam   Temp: 98.6  F (37  C) Temp src: Oral BP: 122/61 Pulse: 61   Resp: 16 SpO2: 97 % O2 Device: None (Room air)    Vital Signs with Ranges  Temp:  [98.3  F (36.8  C)-98.8  F (37.1  C)] 98.6  F (37  C)  Pulse:  [60-69] 61  Resp:  [16-20] 16  BP: (103-123)/(57-69) 122/61  SpO2:  [96 %-98 %] 97 %  217 lbs 11.2 oz  Body mass index is 30.36 kg/m .    GENERAL APPEARANCE:  awake  EYES: Eyes grossly normal to inspection, PERRL and conjunctivae and sclerae normal  HENT: No oral lesions  NECK: Supple  RESP: Normal breathing pattern  CV: No edema  LYMPHATICS: normal ant/post cervical and supraclavicular nodes  ABDOMEN: Bilateral nephrostomy tube, ileal diversion  MS: extremities normal- no gross deformities noted  SKIN: Warm  Neuro: Awake coherent      Data   Lab Results   Component Value Date    WBC 7.4 01/07/2022    WBC 8.2 01/06/2022    WBC 7.9 01/05/2022    WBC 11.8 (H) 01/04/2022    WBC 10.9 12/12/2021    HGB 8.4 (L) 01/08/2022    HGB 8.9 (L) 01/07/2022    HGB 9.3 (L) 01/06/2022    HGB 9.6 (L) 01/05/2022    HGB 11.2 (L) 01/04/2022    HCT 27.4 (L) 01/07/2022    HCT 29.1 (L) 01/06/2022    HCT 32.2 (L) 01/05/2022    HCT 35.2 (L) 01/04/2022    HCT 35.8 (L) 12/12/2021     01/07/2022     01/06/2022     01/05/2022     01/04/2022     12/12/2021     01/08/2022     01/07/2022     01/07/2022     01/06/2022     01/05/2022    POTASSIUM 3.1 (L) 01/08/2022    POTASSIUM 3.2 (L) 01/07/2022    POTASSIUM 3.1 (L) 01/07/2022    POTASSIUM 2.8 (LL) 01/07/2022    POTASSIUM 2.7 (LL) 01/07/2022    CHLORIDE 106 01/08/2022    CHLORIDE 106 01/07/2022    CHLORIDE 104 01/07/2022    CHLORIDE 109 (H) 01/06/2022    CHLORIDE 112 (H) 01/05/2022    CO2 24 01/08/2022    CO2 23 01/07/2022    CO2 26 01/07/2022    CO2 19 (L) 01/06/2022    CO2 15 (L) 01/05/2022    BUN 68 (H) 01/08/2022    BUN 75 (H) 01/07/2022    BUN  85 (H) 01/07/2022     (H) 01/06/2022     (H) 01/05/2022    CR 4.38 (H) 01/08/2022    CR 4.59 (H) 01/07/2022    CR 5.39 (H) 01/07/2022    CR 6.30 (HH) 01/06/2022    CR 6.64 (HH) 01/05/2022     (H) 01/08/2022     (H) 01/08/2022     (H) 01/08/2022     (H) 01/07/2022     (H) 01/07/2022    AST 23 01/07/2022    AST 14 01/05/2022    AST 42 (H) 12/12/2021    AST 39 12/11/2021    AST 40 12/10/2021    ALT <9 01/07/2022    ALT 15 01/05/2022    ALT 36 12/12/2021    ALT 31 12/11/2021    ALT 30 12/10/2021    ALKPHOS 66 01/07/2022    ALKPHOS 82 01/05/2022    ALKPHOS 109 12/12/2021    ALKPHOS 91 12/11/2021    ALKPHOS 86 12/10/2021    BILITOTAL 0.4 01/07/2022    BILITOTAL 0.7 01/05/2022    BILITOTAL 0.8 12/12/2021    BILITOTAL 0.8 12/11/2021    BILITOTAL 0.9 12/10/2021    INR 1.27 (H) 01/04/2022     MICRO:  01/08/2022 1355 01/08/2022 1401 Blood Culture Peripheral Blood [[id number]]   Peripheral Blood    In process Component Value   No component results              01/08/2022 1355 01/08/2022 1401 Blood Culture Peripheral Blood [[id number]]   Peripheral Blood    In process Component Value   No component results              01/05/2022 1259 01/07/2022 2046 Anaerobic Bacterial Culture Routine [[id number]]   Aspirate from Kidney, Right    Preliminary result Component Value   Culture No anaerobic organisms isolated after 2 days P              01/05/2022 1259 01/08/2022 1322 Aspirate Aerobic Bacterial Culture Routine [[id number]]   (Abnormal)   Aspirate from Kidney, Right    Final result Component Value   Culture 3+ Enterococcus faecium Abnormal     Susceptibilities done on previous cultures    3+ Candida parapsilosis complex Abnormal     Susceptibilities not routinely done              01/05/2022 1259 01/07/2022 2046 Anaerobic Bacterial Culture Routine [[id number]]   Aspirate from Kidney, Left    Preliminary result Component Value   Culture No anaerobic organisms isolated after 2  "days P              01/05/2022 1259 01/08/2022 1257 Aspirate Aerobic Bacterial Culture Routine [[id number]]    (Abnormal)   Aspirate from Kidney, Left    Final result Component Value   Culture 4+ Enterococcus faecium VRE Abnormal     3+ Candida parapsilosis complex Abnormal     Susceptibilities not routinely done         Susceptibility     Enterococcus faecium VRE     RITA     Ampicillin >=32.0 ug/mL Resistant     Gentamicin Synergy Susceptible... Susceptible 1     Linezolid 2.0 ug/mL Susceptible     Penicillin >=64.0 ug/mL Resistant     Quinupristin/Dalfopristin 4.0 ug/mL Resistant     Vancomycin >=32.0 ug/mL Resistant              1 No high level gentamicin resistance found - therefore combination therapy with an aminoglycoside may be indicated for serious enterococcal infections such as bacteremia and endocarditis.        Susceptibility Comments    Enterococcus faecium VRE   Antibiotics listed as \"No Interpretation\" have no regulatory guidelines for susceptibility/resistance available.   VRE requires contact precautions.            RADIOLOGY:    IR Nephrostomy Tube Placement Bilateral    Result Date: 1/5/2022  [place] [facility] LOCATION: [facility] DATE: 1/5/2022 PROCEDURE: 1.  BILATERAL PERCUTANEOUS NEPHROSTOMY PLACEMENT WITH IMAGING GUIDANCE. 2.  MODERATE SEDATION. INTERVENTIONAL RADIOLOGIST: León Adams MD. INDICATION: 76-year-old male with bilateral ureteral obstruction status post cystectomy with ileal diversion. Bilateral nephrostomy placement is requested. CONSENT: The risks, benefits and alternatives of the stated procedure were discussed with the patient  in detail. All questions were answered. Informed consent was given to proceed with the procedure. MODERATE SEDATION: Versed 1 mg IV; Fentanyl 25 mcg IV.  Under physician supervision, Versed and fentanyl were administered for moderate sedation. Pulse oximetry, heart rate and blood pressure were continuously monitored by an " independent trained observer. The physician spent 15 minutes of face-to-face sedation time with the patient. CONTRAST: 15 mL Omnipaque 350. ANTIBIOTICS: None. ADDITIONAL MEDICATIONS: None. FLUOROSCOPIC TIME: 0.5 minutes. RADIATION DOSE: Air Kerma: 34 mGy. COMPLICATIONS: No immediate complications. STERILE BARRIER TECHNIQUE: Maximum sterile barrier technique was used. Cutaneous antisepsis was performed at the operative site with application of 2% chlorhexidine and large sterile drape. Prior to the procedure, the  and assistant performed hand hygiene and wore hat, mask, sterile gown, and sterile gloves during the entire procedure. PROCEDURE:  Using real-time ultrasound guidance, an 18-gauge trocar needle was inserted into a posterior midpole calyx of the right kidney from a subcostal approach. Over wire exchange was made for a 10 Latvian nephrostomy. The loop was formed within the renal pelvis  and attached to gravity drainage. Using real-time ultrasound guidance, an 18-gauge trocar needle was inserted into a posterior lower pole calyx of the left kidney from a subcostal approach. Over wire exchange was made for a 10 [place]vian nephrostomy. The loop was formed within the renal pelvis and attached to gravity drainage. FINDINGS: Ultrasound demonstrates bilateral moderate hydronephrosis. Permanent images were stored of each kidney. The right antegrade nephrostogram confirms hydronephrosis. The completion image shows the nephrostomy with its loop in the right renal pelvis. The left antegrade nephrostogram confirms hydronephrosis. The completion image shows the nephrostomy with its loop in the left renal pelvis.     IMPRESSION:  1.  Successful bilateral percutaneous nephrostomy placement, as detailed above. 2.  A urine specimen was sent from each kidney for microbiology analysis.     CT Abdomen Pelvis w/o Contrast    Result Date: 1/6/2022  EXAM: CT ABDOMEN PELVIS W/O CONTRAST LOCATION: [facility]  HOSPITAL DATE/TIME: 1/6/2022 8:28 PM INDICATION: Abdominal pain, acute, nonlocalized COMPARISON: CT 01/04/2020 02/11/1721 TECHNIQUE: CT scan of the abdomen and pelvis was performed without IV contrast. Multiplanar reformats were obtained. Dose reduction techniques were used. CONTRAST: None. FINDINGS: LOWER CHEST: Small hiatal hernia. Mild coronary artery calcifications. HEPATOBILIARY: Nonvisualized gallbladder. Unremarkable unenhanced liver and bile ducts. PANCREAS: Stable atrophy. SPLEEN: Normal. ADRENAL GLANDS: Normal. KIDNEYS/BLADDER: Interval placement of bilateral percutaneous nephrostomy drains. No residual hydronephrosis or hydroureter. Stable small exophytic lower right renal cyst. No follow-up is indicated. Post cystectomy with right lower quadrant ileal conduit. BOWEL: Duodenal diverticulum. Stable chronic patulous small bowel segment at the anastomosis. Otherwise, normal caliber small bowel. Colonic diverticulosis without evidence of acute diverticulitis. Prominent gas-filled segments of colon with mild layering fluid. Mild to moderate fecal retention within the distal sigmoid colon and rectum. No free air or free fluid. LYMPH NODES: Normal. VASCULATURE: Mild to moderate atherosclerosis. PELVIC ORGANS: Post prostatectomy. MUSCULOSKELETAL: Bilateral total hip arthroplasties. Osseous demineralization. Spinal degenerative changes.     IMPRESSION: 1.  Interval placement of bilateral previous nephrostomy drains. No residual hydronephrosis or hydroureter. 2.  Mild to moderate stool burden within the distal sigmoid colon and rectum suggesting constipation. Associated prominent upstream gas containing segments of colon. No small bowel obstruction.     CT Abdomen Pelvis w/o Contrast    Result Date: 1/4/2022  EXAM: CT ABDOMEN PELVIS W/O CONTRAST LOCATION: [facility] DATE/TIME: 1/4/2022 5:33 PM INDICATION: Recent urostomy, acute renal failure. COMPARISON: CT AP 11/17/2021. TECHNIQUE: CT scan  of the abdomen and pelvis was performed without IV contrast. Multiplanar reformats were obtained. Dose reduction techniques were used. CONTRAST: None. FINDINGS: LOWER CHEST: Normal. HEPATOBILIARY: Normal. PANCREAS: Normal. SPLEEN: Normal. ADRENAL GLANDS: Normal. KIDNEYS/BLADDER: Interval cystectomy and ileal conduit urinary diversion with right lower quadrant ostomy. Moderate right hydronephrosis and mild right proximal ureterectasis similar to prior. Interval development left perinephric edema, moderate hydronephrosis and mild ureterectasis to the level of the ureteral ileal conduit anastomosis. The ileal conduit is decompressed and normal in appearance. Small benign cyst right kidney. BOWEL: Colonic diverticulosis. No bowel obstruction or inflammatory change. LYMPH NODES: Normal. VASCULATURE: Unremarkable. PELVIC ORGANS: Prostatectomy. MUSCULOSKELETAL: Bones are demineralized. Bilateral hip arthroplasties.     IMPRESSION: 1.  Interval cystectomy and ileal conduit urinary diversion with right lower quadrant ostomy. 2.  Moderate right hydronephrosis and mild right proximal ureterectasis similar to prior. 3.  Interval development left perinephric edema moderate hydronephrosis and mild ureterectasis that tapers to the level of the ureteral ileal conduit anastomosis with cause of obstruction not evident.     CT Head w/o Contrast    Result Date: 1/6/2022  EXAM: CT HEAD W/O CONTRAST LOCATION: [facility] DATE/TIME: 1/6/2022 10:39 PM INDICATION: Nausea, weakness COMPARISON: None. TECHNIQUE: Routine CT Head without IV contrast. Multiplanar reformats. Dose reduction techniques were used. FINDINGS: INTRACRANIAL CONTENTS: No intracranial hemorrhage, extraaxial collection, or mass effect.  No CT evidence of acute infarct. Moderate presumed chronic small vessel ischemic changes. Moderate generalized volume loss. No hydrocephalus. VISUALIZED ORBITS/SINUSES/MASTOIDS: No intraorbital abnormality. No  paranasal sinus mucosal disease. No middle ear or mastoid effusion. BONES/SOFT TISSUES: No acute abnormality.     IMPRESSION: 1.  No acute intracranial process.    XR Abdomen Upright Only    Result Date: 1/6/2022  EXAM: XR ABDOMEN UPRIGHT ONLY LOCATION: [facility] DATE/TIME: 1/6/2022 4:45 PM INDICATION: umbilical pain COMPARISON: CT 01/04/2022 in nephrostomy tube placement 01/05/2022     IMPRESSION: Bilateral percutaneous nephrostomy drainage tubes which appear to be in satisfactory positions. Mild diffuse gaseous distention of the colon suspicious for an ileus. Follow-up is recommended. No definite free air. Bilateral total hip arthroplasties.

## 2022-01-08 NOTE — PROGRESS NOTES
Progress Note    Assessment/Plan  Marcelo Valerio is a 76 year old male with history of bladder cancer status post cystectomy and ileal diversion in December 2021 admitted on 1/4/2022.  Patient has also been taking over-the-counter Advil to help sleep he complained of nausea, fatigue and decreased urine output.  He was sent to the ED after labs in the clinic showed elevated creatinine.     1.  Acute kidney injury on chronic kidney disease stage 4  CT abdomen and pelvis showed moderate right hydronephrosis and mild right proximal ureterectasis similar to prior; interval development left perinephric edema moderate hydronephrosis and mild ureterectasis that tapers to the level of the ureteral ileal conduit anastomosis with cause of obstruction not evident.  Urology input appreciated  --Avoid NSAIDs  S/p bilateral nephrostomy tube placement on 1/5  Creatinine is improving--daily down to 4.38  today  --continue PT OT eval     2.  Positive urine culture from the renal pelvis 1/5 at the time of nephrostomy tubes placement--growing VRE and yeast  --Initially treated with IV Zosyn then switched to IV daptomycin on 1/8 due to VRE  Added oral fluconazole for 2 weeks on 1/7.  Pharmacy to adjust dose based on GFR  -- Procalcitonin is elevated 0.92, thereby favoring treating positive urine cultures with antimicrobial agents.  -- Awaiting ID consultation due to VRE    3.  Non-anion gap metabolic acidosis  Secondary to renal failure  Resolved and therefore DC bicarb drip    4.  Type 2 diabetes mellitus with long-term insulin use  --suboptimal control  On dulaglutide once a week. hold in the hospital  Continue Lantus to once daily given kidney failure  NovoLog sliding scale  -decrease mealtime insulin ratio of 2 is to 15     5.  Essential hypertension  --controlled.   Continue to hold amlodipine. Changed atenolol to metoprolol due to renal failure     6.  Glaucoma  Continue Xalatan     Anemia likely due to CKD 4 and iron  "deficiency  --low transferrin saturation of 8.  --started on IV iron/Venofer by renal consult  -- Hemoglobin stable    Hypokalemia--mild  --now off bicarb drip and  Started 1/2 normal saline on 1/7. Now off  --continue replacement of potassium    Hypomagnesemia  -- Order magnesium replacement protocol  Magnesium   Date Value Ref Range Status   01/08/2022 1.7 (L) 1.8 - 2.6 mg/dL Final         Periumbilical abdominal pain spasmodic arm 1/6  -- Resolved with 2 bowel movements after Dulcolax suppository was ordered  -- X-ray abdomen shows mild ileus but clinically improving  --Diarrhea has resolved.  Order stool softeners    Anisocoria likely chronic due to previous cataract surgery  -- CT head ordered by resident.  No acute process  -- No further work-up necessary.  DC neurochecks    Barriers to discharge: Improvement in creatinine, IV antibiotics    Anticipated discharge date: 1/11      Subjective  Urine culture positive for VRE.  Started on IV daptomycin.  ID consulted by nocturnist.  No diarrhea today.  Creatinine is improving.  Blood sugars are suboptimally controlled.  Magnesium is low and therefore started on magnesium protocol      Patient denies any nausea vomiting.      Objective    /61 (BP Location: Right arm)   Pulse 61   Temp 98.6  F (37  C) (Oral)   Resp 16   Ht 1.803 m (5' 11\")   Wt 98.7 kg (217 lb 11.2 oz)   SpO2 97%   BMI 30.36 kg/m    Weight:   Wt Readings from Last 5 Encounters:   01/08/22 98.7 kg (217 lb 11.2 oz)   12/06/21 107.6 kg (237 lb 4.8 oz)   11/22/21 105.6 kg (232 lb 14.4 oz)   03/22/21 108.9 kg (240 lb)   02/01/21 108.9 kg (240 lb)       I/O last 3 completed shifts:  In: 1070 [P.O.:720; I.V.:350]  Out: 4095 [Urine:495; Drains:3600]  I/O this shift:  In: 560 [P.O.:560]  Out: 825 [Urine:50; Drains:775]          Physical Exam  Awake  No tremors of the outstretched hand  S1-S2 normal  Clear urine bilateral nephrostomy bag  Lungs are clear to auscultation  Abdomen is soft, bowel " sounds are positive, lexy-colored urine in urostomy  No generalized skin rash  No leg edema  No joint tenderness    Pertinent Labs  ----------------------  Recent Labs   Lab 01/08/22  1219 01/08/22  0823 01/08/22  0557 01/07/22  2357 01/07/22  2044 01/07/22  1848 01/07/22  0804 01/07/22  0639 01/06/22  0815 01/06/22  0631 01/05/22  0943 01/05/22  0648   NA  --   --  140 140  --   --   --  143  --  143   < > 137   POTASSIUM  --   --  3.1* 3.1*  3.2*  --  2.8*   < > 2.7*  --  3.4*   < > 4.1   CO2  --   --  24 23  --   --   --  26  --  19*   < > 10*   BUN  --   --  68* 75*  --   --   --  85*  --  101*   < > 109*   CR  --   --  4.38* 4.59*  --   --   --  5.39*  --  6.30*   < > 6.85*   MAG  --   --  1.7*  --   --   --   --  1.9  --  2.1  --   --    * 131* 140* 153*   < >  --    < > 159*   < > 176*   < > 200*   ALBUMIN  --   --   --  1.9*  --   --   --   --   --   --   --  2.2*   BILITOTAL  --   --   --  0.4  --   --   --   --   --   --   --  0.7   ALKPHOS  --   --   --  66  --   --   --   --   --   --   --  82   ALT  --   --   --  <9  --   --   --   --   --   --   --  15   AST  --   --   --  23  --   --   --   --   --   --   --  14    < > = values in this interval not displayed.     Recent Labs   Lab 01/08/22  0557 01/07/22  0639 01/06/22  0631 01/05/22 0648   WBC  --  7.4 8.2 7.9   HGB 8.4* 8.9* 9.3* 9.6*   HCT  --  27.4* 29.1* 32.2*   PLT  --  152 188 200     Recent Labs   Lab 01/04/22 2126   INR 1.27*     Glucose Values Latest Ref Rng & Units 1/4/2022 1/5/2022 1/5/2022 1/6/2022 1/7/2022 1/7/2022 1/8/2022   Bedside Glucose (mg/dl )  - -- -- -- -- -- -- --   GLUCOSE 70 - 125 mg/dL 195(H) 200(H) 145(H) 176(H) 159(H) 153(H) 140(H)   Some recent data might be hidden         Pertinent Radiology   Radiology Results: Personally reviewed impression/s  CT Abdomen Pelvis w/o Contrast    Result Date: 1/4/2022  EXAM: CT ABDOMEN PELVIS W/O CONTRAST LOCATION: [facility] DATE/TIME: 1/4/2022  5:33 PM INDICATION: Recent urostomy, acute renal failure. COMPARISON: CT AP 11/17/2021. TECHNIQUE: CT scan of the abdomen and pelvis was performed without IV contrast. Multiplanar reformats were obtained. Dose reduction techniques were used. CONTRAST: None. FINDINGS: LOWER CHEST: Normal. HEPATOBILIARY: Normal. PANCREAS: Normal. SPLEEN: Normal. ADRENAL GLANDS: Normal. KIDNEYS/BLADDER: Interval cystectomy and ileal conduit urinary diversion with right lower quadrant ostomy. Moderate right hydronephrosis and mild right proximal ureterectasis similar to prior. Interval development left perinephric edema, moderate hydronephrosis and mild ureterectasis to the level of the ureteral ileal conduit anastomosis. The ileal conduit is decompressed and normal in appearance. Small benign cyst right kidney. BOWEL: Colonic diverticulosis. No bowel obstruction or inflammatory change. LYMPH NODES: Normal. VASCULATURE: Unremarkable. PELVIC ORGANS: Prostatectomy. MUSCULOSKELETAL: Bones are demineralized. Bilateral hip arthroplasties.     IMPRESSION: 1.  Interval cystectomy and ileal conduit urinary diversion with right lower quadrant ostomy. 2.  Moderate right hydronephrosis and mild right proximal ureterectasis similar to prior. 3.  Interval development left perinephric edema moderate hydronephrosis and mild ureterectasis that tapers to the level of the ureteral ileal conduit anastomosis with cause of obstruction not evident.     EKG Results: not reviewed.

## 2022-01-08 NOTE — SIGNIFICANT EVENT
Paged for microbiology results, VRE isolated from kidney aspirate from 1/5/2022(resistant to Vanco and ampicillin].  Changed Zosyn to daptomycin.  Check CK and CMP.  Primary team to follow-up with further management.      David Ward MD

## 2022-01-09 ENCOUNTER — APPOINTMENT (OUTPATIENT)
Dept: PHYSICAL THERAPY | Facility: HOSPITAL | Age: 77
DRG: 683 | End: 2022-01-09
Payer: MEDICARE

## 2022-01-09 ENCOUNTER — APPOINTMENT (OUTPATIENT)
Dept: OCCUPATIONAL THERAPY | Facility: HOSPITAL | Age: 77
DRG: 683 | End: 2022-01-09
Payer: MEDICARE

## 2022-01-09 LAB
ANION GAP SERPL CALCULATED.3IONS-SCNC: 10 MMOL/L (ref 5–18)
BUN SERPL-MCNC: 55 MG/DL (ref 8–28)
CALCIUM SERPL-MCNC: 7.9 MG/DL (ref 8.5–10.5)
CHLORIDE BLD-SCNC: 106 MMOL/L (ref 98–107)
CK SERPL-CCNC: 157 U/L (ref 30–190)
CO2 SERPL-SCNC: 22 MMOL/L (ref 22–31)
CREAT SERPL-MCNC: 3.56 MG/DL (ref 0.7–1.3)
GFR SERPL CREATININE-BSD FRML MDRD: 17 ML/MIN/1.73M2
GLUCOSE BLD-MCNC: 136 MG/DL (ref 70–125)
GLUCOSE BLDC GLUCOMTR-MCNC: 123 MG/DL (ref 70–99)
GLUCOSE BLDC GLUCOMTR-MCNC: 176 MG/DL (ref 70–99)
GLUCOSE BLDC GLUCOMTR-MCNC: 193 MG/DL (ref 70–99)
GLUCOSE BLDC GLUCOMTR-MCNC: 298 MG/DL (ref 70–99)
HGB BLD-MCNC: 8.7 G/DL (ref 13.3–17.7)
MAGNESIUM SERPL-MCNC: 1.6 MG/DL (ref 1.8–2.6)
MAGNESIUM SERPL-MCNC: 1.7 MG/DL (ref 1.8–2.6)
POTASSIUM BLD-SCNC: 3.4 MMOL/L (ref 3.5–5)
POTASSIUM BLD-SCNC: 3.6 MMOL/L (ref 3.5–5)
POTASSIUM BLD-SCNC: 3.9 MMOL/L (ref 3.5–5)
SODIUM SERPL-SCNC: 138 MMOL/L (ref 136–145)

## 2022-01-09 PROCEDURE — 99233 SBSQ HOSP IP/OBS HIGH 50: CPT | Performed by: INTERNAL MEDICINE

## 2022-01-09 PROCEDURE — 97530 THERAPEUTIC ACTIVITIES: CPT | Mod: GP

## 2022-01-09 PROCEDURE — 82550 ASSAY OF CK (CPK): CPT | Performed by: INTERNAL MEDICINE

## 2022-01-09 PROCEDURE — 250N000011 HC RX IP 250 OP 636: Performed by: INTERNAL MEDICINE

## 2022-01-09 PROCEDURE — 36415 COLL VENOUS BLD VENIPUNCTURE: CPT | Performed by: INTERNAL MEDICINE

## 2022-01-09 PROCEDURE — 97535 SELF CARE MNGMENT TRAINING: CPT | Mod: GO

## 2022-01-09 PROCEDURE — 85018 HEMOGLOBIN: CPT | Performed by: INTERNAL MEDICINE

## 2022-01-09 PROCEDURE — 84132 ASSAY OF SERUM POTASSIUM: CPT | Performed by: INTERNAL MEDICINE

## 2022-01-09 PROCEDURE — 250N000013 HC RX MED GY IP 250 OP 250 PS 637: Performed by: HOSPITALIST

## 2022-01-09 PROCEDURE — 99232 SBSQ HOSP IP/OBS MODERATE 35: CPT | Performed by: INTERNAL MEDICINE

## 2022-01-09 PROCEDURE — 250N000013 HC RX MED GY IP 250 OP 250 PS 637: Performed by: INTERNAL MEDICINE

## 2022-01-09 PROCEDURE — 120N000001 HC R&B MED SURG/OB

## 2022-01-09 PROCEDURE — 97116 GAIT TRAINING THERAPY: CPT | Mod: GP

## 2022-01-09 PROCEDURE — 258N000003 HC RX IP 258 OP 636: Performed by: INTERNAL MEDICINE

## 2022-01-09 PROCEDURE — 80048 BASIC METABOLIC PNL TOTAL CA: CPT | Performed by: INTERNAL MEDICINE

## 2022-01-09 PROCEDURE — 83735 ASSAY OF MAGNESIUM: CPT | Performed by: INTERNAL MEDICINE

## 2022-01-09 RX ORDER — POTASSIUM CHLORIDE 1500 MG/1
20 TABLET, EXTENDED RELEASE ORAL ONCE
Status: COMPLETED | OUTPATIENT
Start: 2022-01-09 | End: 2022-01-09

## 2022-01-09 RX ADMIN — METOPROLOL TARTRATE 12.5 MG: 25 TABLET, FILM COATED ORAL at 08:25

## 2022-01-09 RX ADMIN — IRON SUCROSE 300 MG: 20 INJECTION, SOLUTION INTRAVENOUS at 08:20

## 2022-01-09 RX ADMIN — LINEZOLID 600 MG: 600 TABLET, FILM COATED ORAL at 20:20

## 2022-01-09 RX ADMIN — METOPROLOL TARTRATE 12.5 MG: 25 TABLET, FILM COATED ORAL at 20:20

## 2022-01-09 RX ADMIN — ASPIRIN 81 MG: 81 TABLET, COATED ORAL at 08:24

## 2022-01-09 RX ADMIN — INSULIN ASPART 8 UNITS: 100 INJECTION, SOLUTION INTRAVENOUS; SUBCUTANEOUS at 17:35

## 2022-01-09 RX ADMIN — DOCUSATE SODIUM 50 MG AND SENNOSIDES 8.6 MG 1 TABLET: 8.6; 5 TABLET, FILM COATED ORAL at 20:20

## 2022-01-09 RX ADMIN — LATANOPROST 1 DROP: 50 SOLUTION OPHTHALMIC at 20:27

## 2022-01-09 RX ADMIN — POTASSIUM CHLORIDE 20 MEQ: 1500 TABLET, EXTENDED RELEASE ORAL at 08:30

## 2022-01-09 RX ADMIN — FAMOTIDINE 10 MG: 10 TABLET ORAL at 20:20

## 2022-01-09 RX ADMIN — INSULIN ASPART 4 UNITS: 100 INJECTION, SOLUTION INTRAVENOUS; SUBCUTANEOUS at 17:35

## 2022-01-09 RX ADMIN — INSULIN ASPART 1 UNITS: 100 INJECTION, SOLUTION INTRAVENOUS; SUBCUTANEOUS at 13:21

## 2022-01-09 RX ADMIN — FLUCONAZOLE 200 MG: 100 TABLET ORAL at 08:24

## 2022-01-09 RX ADMIN — FAMOTIDINE 10 MG: 10 TABLET ORAL at 08:25

## 2022-01-09 RX ADMIN — LINEZOLID 600 MG: 600 TABLET, FILM COATED ORAL at 08:25

## 2022-01-09 RX ADMIN — BRIMONIDINE TARTRATE 1 DROP: 1.5 SOLUTION OPHTHALMIC at 20:20

## 2022-01-09 RX ADMIN — BRIMONIDINE TARTRATE 1 DROP: 1.5 SOLUTION OPHTHALMIC at 08:25

## 2022-01-09 ASSESSMENT — ACTIVITIES OF DAILY LIVING (ADL)
ADLS_ACUITY_SCORE: 14

## 2022-01-09 NOTE — PROGRESS NOTES
"RENAL PROGRESS NOTE - [facility]        CC: f/u [facility]INMA[facility]/CKD 4    Subjective: Patient sitting up at edge of bed. NO sob. NO pain. Eating well. No other issues. Renal function improving.          ASSESSMENT:   77 yo male with h/o bladder cancer s/p cystectomy/ileal diversion in December, CKD 4 with creat of 1.8, HTN, DM2 admit from clinic with [facility]INMA[facility], b/l hdyronephrosis     PLAN:  1. [facility]/CKD4 - baseline CKD 4 likely due to DM2, HTN and now obstructive CHINMAY due to b/l hydronephrosis post cystectomy/ileal diversion for bladder cancer.  Was taking Advil PTA, may have further contributed to CHINMAY  -Slowly improving.   -s/p b/l PNT placement with good urine output  -expect CHINMYA will be slow to improve with advanced age, underlying CKD, likely subacute obstruction  -no acute dialysis indications now but will monitor urine output, serial labs closely to determine need for dialysis    2. B/l Hydro - post-cystectomy, ileal diversion  -now s/p b/l  PNT placement   -urology and IR following  -consideration for ureteral stent noted    3. Metabolic acidosis - due to CHINMAY  -resolved    4. Anemia - expect due to recent surgery, CKD, may see further decline with hydration  -serial hb  - iron stores low, will give venofer      5. Hypokalemia - likely some post obstructive diuresis  -replaced  -monitor daily nohemi koenig,   [facility]  [phone]    Objective    PHYSICAL EXAM  BP (!) 147/65 (BP Location: Right arm)   Pulse 64   Temp 98.2  F (36.8  C) (Oral)   Resp 20   Ht 1.803 m (5' 11\")   Wt 98.7 kg (217 lb 11.2 oz)   SpO2 97%   BMI 30.36 kg/m    I/O last 3 completed shifts:  In: 1043 [P.O.:1040; I.V.:3]  Out: 3100 [Urine:100; Drains:3000]  Wt Readings from Last 3 Encounters:   01/08/22 98.7 kg (217 lb 11.2 oz)   12/06/21 107.6 kg (237 lb 4.8 oz)   11/22/21 105.6 kg (232 lb 14.4 oz)       GENERAL: nad  HEENT:normocephalic, atraumatic  CARDIOVASCULAR:  Trace edema  PULMONARY: No " cyanosis  GASTROINTESTINAL: ND  MSK: diffuse muscle atrophy, warm  NEURO: Alert, no gross focal findings  PSYCHIATRIC: Adequate mood and interaction  SKIN: Pale, no jaundice, no rash.    LABORATORIES  Recent Labs   Lab 01/09/22  0705 01/08/22  0557 01/07/22  0639 01/06/22  0631 01/05/22  0648   WBC  --   --  7.4 8.2 7.9   HGB 8.7* 8.4* 8.9* 9.3* 9.6*   HCT  --   --  27.4* 29.1* 32.2*   PLT  --   --  152 188 200     Recent Labs   Lab 01/09/22  0705 01/08/22  0557 01/07/22  2357 01/05/22  1833 01/05/22  0648    140 140   < > 137   CO2 22 24 23   < > 10*   BUN 55* 68* 75*   < > 109*   ALKPHOS  --   --  66  --  82   ALT  --   --  <9  --  15   AST  --   --  23  --  14    < > = values in this interval not displayed.         MEDICATIONS    [Held by provider] amLODIPine  2.5 mg Oral BID     aspirin  81 mg Oral Daily     brimonidine  1 drop Both Eyes BID     famotidine  10 mg Oral BID     fluconazole  200 mg Oral Daily     insulin aspart   Subcutaneous QAM AC     insulin aspart   Subcutaneous Daily with lunch     insulin aspart   Subcutaneous Daily with supper     insulin aspart  1-7 Units Subcutaneous TID AC     insulin glargine  20 Units Subcutaneous At Bedtime     iron sucrose  300 mg Intravenous Every Other Day     latanoprost  1 drop Both Eyes At Bedtime     linezolid  600 mg Oral Q12H JIMI     metoprolol tartrate  12.5 mg Oral BID     senna-docusate  1 tablet Oral At Bedtime     sodium chloride (PF)  3 mL Intracatheter Q8H

## 2022-01-09 NOTE — PLAN OF CARE
Problem: Pain (Urinary Diversion)  Goal: Acceptable Pain Control  Outcome: Improving   Pt denies pain this evening shift.  Encouraged pt to walk in hallways this evening pt declined.    Problem: Adult Inpatient Plan of Care  Goal: Optimal Comfort and Wellbeing  Outcome: Improving   Pt son was visiting this afternoon, pt mood was good during visit.

## 2022-01-09 NOTE — PLAN OF CARE
Problem: Adult Inpatient Plan of Care  Goal: Plan of Care Review  Outcome: Improving  Flowsheets (Taken 1/9/2022 1507)  Plan of Care Reviewed With: patient     Problem: OT General Care Plan  Goal: Transfer (OT)  Description: Transfer (OT)  Outcome: Improving     Problem: Infection (Urinary Diversion)  Goal: Absence of Infection Signs and Symptoms  Outcome: Improving     Problem: OT General Care Plan  Goal: Transfer (OT)  Description: Transfer (OT)  Outcome: Improving   Patient reporting strength improving.  Ambulated hallways x2 steady gait with SBA and walker.    Problem: Adult Inpatient Plan of Care  Goal: Absence of Hospital-Acquired Illness or Injury  Intervention: Identify and Manage Fall Risk  Recent Flowsheet Documentation  Taken 1/9/2022 0801 by Ladonna Jolly RN  Safety Promotion/Fall Prevention:   activity supervised   nonskid shoes/slippers when out of bed   mobility aid in reach   patient and family education  PO management per ID to treat urinary infection. Kidney function continues to improve.  I & O's monitored good output from bilateral neph tubes.     Problem: Pain (Urinary Diversion)  Goal: Acceptable Pain Control  Intervention: Prevent or Manage Pain  Recent Flowsheet Documentation  Taken 1/9/2022 0801 by Ladonna Jolly, RN  Pain Management Interventions: declines   Patient denies pain.

## 2022-01-09 NOTE — PROGRESS NOTES
Place of Service:  [facility]     Reason for follow up: Urinary tract infection bilateral hydronephrosis status post cystectomy with ileal conduit and now bilateral nephrostomy tubes.  Renal failure    SUBJECTIVE:  Events: Reports largely feels stable overnight    Patient reports no acute events overnight.  Largely feels well.  Bilaterally the nephrostomy tubes are draining comfortable at this time.    OBJECTIVE:  PHYSICAL EXAM:  Temp: 97.9  F (36.6  C) Temp src: Oral BP: 130/71 Pulse: 64   Resp: 20 SpO2: 98 % O2 Device: None (Room air)    General: NAD, alert, cooperative  Bilateral nephrostomy tubes in place draining clear urine.    LABS:  Creatinine   Date Value Ref Range Status   01/09/2022 3.56 (H) 0.70 - 1.30 mg/dL Final     CBC RESULTS:   Recent Labs   Lab Test 01/09/22  0705 01/08/22  0557 01/07/22  0639   WBC  --   --  7.4   RBC  --   --  3.00*   HGB 8.7*   < > 8.9*   HCT  --   --  27.4*   MCV  --   --  91   MCH  --   --  29.7   MCHC  --   --  32.5   RDW  --   --  14.0   PLT  --   --  152    < > = values in this interval not displayed.       UA:  UA RESULTS:  Recent Labs   Lab Test 01/04/22  1903   COLOR Light Yellow   APPEARANCE Turbid*   URINEGLC Negative   URINEBILI Negative   URINEKETONE Negative   SG 1.012   UBLD 0.1 mg/dL*   URINEPH 8.5*   PROTEIN 100 *   NITRITE Negative   LEUKEST 500 Rao/uL*   RBCU 4*   WBCU 16*         Cultures:  Urine urine culture reviewed Enterococcus and Candida drug resistance pattern is noted by infectious disease.  Antibiotic per infectious disease.      ASSESSMENT/PLAN:  Marcelo Valerio is being seen by [facility] for status post cystoprostatectomy now with acute renal failure secondary to ureteral obstruction and bilateral nephrostomy tubes with pressure noted from the left nephrostomy tube.  Positive culture with antibiotics per infectious disease.  Plan continue the nephrostomy tubes and consideration for antegrade nephrostogram in the near future.   Potential for antegrade ureteral stent placement.  That can potentially be done as a outpatient.    Glenn Sebastian MD  [facility]   [phone]

## 2022-01-09 NOTE — PLAN OF CARE
Problem: Pain (Urinary Diversion)  Goal: Acceptable Pain Control  Outcome: Improving     Problem: Urine Elimination Impaired (Urinary Diversion)  Goal: Effective Urinary Elimination  Outcome: Improving   Pt denies pain. Up with assist using walker and gait belt. Bilateral Neph tubes and urostomy intact with adequate urine output. On oral antibiotics. Pt was calm and pleasant and cooperative with cares.

## 2022-01-09 NOTE — PROGRESS NOTES
Progress Note    Assessment/Plan  Marcelo Valerio is a 76 year old male with history of bladder cancer status post cystectomy and ileal diversion in December 2021 admitted on 1/4/2022.  Patient has also been taking over-the-counter Advil to help sleep he complained of nausea, fatigue and decreased urine output.  He was sent to the ED after labs in the clinic showed elevated creatinine.     1.  Acute kidney injury on chronic kidney disease stage 4  CT abdomen and pelvis showed moderate right hydronephrosis and mild right proximal ureterectasis similar to prior; interval development left perinephric edema moderate hydronephrosis and mild ureterectasis that tapers to the level of the ureteral ileal conduit anastomosis with cause of obstruction not evident.  Urology input appreciated.   --Avoid NSAIDs  S/p bilateral nephrostomy tube placement on 1/5.  May need antegrade ureteral stent placement as outpatient in the future  Creatinine is improving--daily down to 3.56  today  --continue PT OT eval     2. bilateral pyelonephritis with positive urine culture from the renal pelvis 1/5 at the time of nephrostomy tubes placement--growing VRE and Candida  --Initially treated with IV Zosyn then switched to IV daptomycin on 1/8 due to VRE  Added oral fluconazole for 2 weeks on 1/7.  Pharmacy to adjust dose based on GFR  -- Procalcitonin is elevated 0.92, thereby favoring treating positive urine cultures with antimicrobial agents.  -Patient was given 1 dose of fosfomycin and then switched to Zyvox by ID on 1/8.  Would need 14 days of antimicrobial agents    3.  Non-anion gap metabolic acidosis  Secondary to renal failure  Resolved and therefore DC bicarb drip  -- Patient is on drip    4.  Type 2 diabetes mellitus with long-term insulin use  --Improved control  On dulaglutide once a week. hold in the hospital  Continue Lantus to once daily given kidney failure  NovoLog sliding scale  Continue decrease mealtime insulin ratio of 2 is  "to 15     5.  Essential hypertension  --controlled.   Continue to hold amlodipine. Changed atenolol to metoprolol due to renal failure.  Change to metoprolol succinate 25 mg at discharge     6.  Glaucoma  Continue Xalatan     Anemia likely due to CKD 4 and iron deficiency  --Globin is stable  --low transferrin saturation of 8.  --started on IV iron/Venofer by renal consult.  Oral iron at discharge  -- Hemoglobin stable    Hypokalemia--mild  --now off bicarb drip and  Started 1/2 normal saline on 1/7. Now off  --continue replacement of potassium    Hypomagnesemia  --Continue magnesium replacement protocol  Magnesium   Date Value Ref Range Status   01/09/2022 1.6 (L) 1.8 - 2.6 mg/dL Final   --May need oral replacement at discharge      Periumbilical abdominal pain spasmodic arm 1/6  -- Resolved with 2 bowel movements after Dulcolax suppository was ordered  -- X-ray abdomen shows mild ileus but clinically improving  --Diarrhea has resolved.  Continue stool softeners    Anisocoria likely chronic due to previous cataract surgery  -- CT head ordered by resident.  No acute process  -- No further work-up necessary.  OH neurochecks    Barriers to discharge: Improvement in creatinine,     Anticipated discharge date: 1/10      Subjective  Creatinine is improving.  Blood sugars are controlled.  Magnesium is low and therefore started on magnesium protocol.  Patient changed from IV to p.o. antibiotic by ID.       Patient denies any nausea vomiting.      Objective    BP (!) 147/65 (BP Location: Right arm)   Pulse 64   Temp 98.2  F (36.8  C) (Oral)   Resp 20   Ht 1.803 m (5' 11\")   Wt 98.7 kg (217 lb 11.2 oz)   SpO2 97%   BMI 30.36 kg/m    Weight:   Wt Readings from Last 5 Encounters:   01/08/22 98.7 kg (217 lb 11.2 oz)   12/06/21 107.6 kg (237 lb 4.8 oz)   11/22/21 105.6 kg (232 lb 14.4 oz)   03/22/21 108.9 kg (240 lb)   02/01/21 108.9 kg (240 lb)       I/O last 3 completed shifts:  In: 1043 [P.O.:1040; I.V.:3]  Out: 3100 " [Urine:100; Drains:3000]  I/O this shift:  In: 360 [P.O.:360]  Out: 825 [Drains:825]          Physical Exam  Awake  No tremors of the outstretched hand  S1-S2 normal  Clear urine bilateral nephrostomy bag  Lungs are clear to auscultation  Abdomen is soft, bowel sounds are positive, lexy-colored urine in urostomy  No generalized skin rash  No leg edema  No joint tenderness    Pertinent Labs  ----------------------  Recent Labs   Lab 01/09/22  1157 01/09/22  0805 01/09/22  0705 01/08/22  2337 01/08/22  1707 01/08/22  1612 01/08/22  0823 01/08/22  0557 01/07/22  2357 01/05/22  0943 01/05/22  0648   NA  --   --  138  --   --   --   --  140 140   < > 137   POTASSIUM  --   --  3.4* 3.6  --  3.4*  --  3.1* 3.1*  3.2*   < > 4.1   CO2  --   --  22  --   --   --   --  24 23   < > 10*   BUN  --   --  55*  --   --   --   --  68* 75*   < > 109*   CR  --   --  3.56*  --   --   --   --  4.38* 4.59*   < > 6.85*   MAG  --   --  1.6* 1.7*  --   --   --  1.7*  --    < >  --    * 123* 136*  --    < >  --    < > 140* 153*   < > 200*   ALBUMIN  --   --   --   --   --   --   --   --  1.9*  --  2.2*   BILITOTAL  --   --   --   --   --   --   --   --  0.4  --  0.7   ALKPHOS  --   --   --   --   --   --   --   --  66  --  82   ALT  --   --   --   --   --   --   --   --  <9  --  15   AST  --   --   --   --   --   --   --   --  23  --  14    < > = values in this interval not displayed.     Recent Labs   Lab 01/09/22  0705 01/08/22  0557 01/07/22  0639 01/06/22  0631 01/05/22  0648   WBC  --   --  7.4 8.2 7.9   HGB 8.7* 8.4* 8.9* 9.3* 9.6*   HCT  --   --  27.4* 29.1* 32.2*   PLT  --   --  152 188 200     Recent Labs   Lab 01/04/22  2126   INR 1.27*     Glucose Values Latest Ref Rng & Units 1/5/2022 1/5/2022 1/6/2022 1/7/2022 1/7/2022 1/8/2022 1/9/2022   Bedside Glucose (mg/dl )  - -- -- -- -- -- -- --   GLUCOSE 70 - 125 mg/dL 200(H) 145(H) 176(H) 159(H) 153(H) 140(H) 136(H)   Some recent data might be hidden         Pertinent  Radiology   Radiology Results: Personally reviewed impression/s  CT Abdomen Pelvis w/o Contrast    Result Date: 1/4/2022  EXAM: CT ABDOMEN PELVIS W/O CONTRAST LOCATION: [facility] DATE/TIME: 1/4/2022 5:33 PM INDICATION: Recent urostomy, acute renal failure. COMPARISON: CT AP 11/17/2021. TECHNIQUE: CT scan of the abdomen and pelvis was performed without IV contrast. Multiplanar reformats were obtained. Dose reduction techniques were used. CONTRAST: None. FINDINGS: LOWER CHEST: Normal. HEPATOBILIARY: Normal. PANCREAS: Normal. SPLEEN: Normal. ADRENAL GLANDS: Normal. KIDNEYS/BLADDER: Interval cystectomy and ileal conduit urinary diversion with right lower quadrant ostomy. Moderate right hydronephrosis and mild right proximal ureterectasis similar to prior. Interval development left perinephric edema, moderate hydronephrosis and mild ureterectasis to the level of the ureteral ileal conduit anastomosis. The ileal conduit is decompressed and normal in appearance. Small benign cyst right kidney. BOWEL: Colonic diverticulosis. No bowel obstruction or inflammatory change. LYMPH NODES: Normal. VASCULATURE: Unremarkable. PELVIC ORGANS: Prostatectomy. MUSCULOSKELETAL: Bones are demineralized. Bilateral hip arthroplasties.     IMPRESSION: 1.  Interval cystectomy and ileal conduit urinary diversion with right lower quadrant ostomy. 2.  Moderate right hydronephrosis and mild right proximal ureterectasis similar to prior. 3.  Interval development left perinephric edema moderate hydronephrosis and mild ureterectasis that tapers to the level of the ureteral ileal conduit anastomosis with cause of obstruction not evident.     EKG Results: not reviewed.

## 2022-01-09 NOTE — PROGRESS NOTES
[facility]    Infectious Disease Progress Note    Date of Service (when I saw the patient): 01/09/2022     Assessment & Plan   Marcelo Valerio is a 76 year old male who was admitted on 1/4/2022.     1. Pyelonephritis, status post bilateral nephrostomy tube placement, obstructive, improving  2. Cultures with VRE and Candida parapsilosis  3. Creatinine improving  4. History of bladder cancer status post cystectomy and ileal diversion in December 2021 admitted 1/4/2022 with mid nausea, fatigue, decreased urine output and elevated creatinine, improving  5. Tolerating linezolid    Recommendations    1. Linezolid, received fosfomycin x1  2. Fluconazole  3. Pharm.D. and primary team to assess for drug interactions and any side effects.  Patient tolerating medication so far  4. Duration of antimicrobials 10 to 14 days minimum, may need longer duration depending on clinical response  5. Daptomycin was stopped on 1/8/2022  6. Monitor CBC, CMP, blood cultures  7. Patient updated  8. ID will follow    Lindsay Benites MD  [facility]  [phone]        Interval History   Doing better, no pains  Tolerating linezolid no side effects      Physical Exam   Temp: 98.2  F (36.8  C) Temp src: Oral BP: (!) 147/65 Pulse: 64   Resp: 20 SpO2: 97 % O2 Device: None (Room air)    Vitals:    01/04/22 1529 01/05/22 1718 01/08/22 0725   Weight: 101.6 kg (224 lb) 102.4 kg (225 lb 11.2 oz) 98.7 kg (217 lb 11.2 oz)     Vital Signs with Ranges  Temp:  [97.8  F (36.6  C)-98.2  F (36.8  C)] 98.2  F (36.8  C)  Pulse:  [] 64  Resp:  [16-20] 20  BP: (123-147)/(65-76) 147/65  SpO2:  [95 %-99 %] 97 %    Constitutional: Awake, alert, cooperative, no apparent distress  Lungs: Normal breathing pattern  Cardiovascular: No significant edema  Abdomen: Nontender, bilateral nephrostomy tube with clear urine output on right darker on left  Skin: No rashes, no cyanosis, no edema  Neuro: Coherent, answering  questions    Medications       [Held by provider] amLODIPine  2.5 mg Oral BID     aspirin  81 mg Oral Daily     brimonidine  1 drop Both Eyes BID     famotidine  10 mg Oral BID     fluconazole  200 mg Oral Daily     insulin aspart   Subcutaneous QAM AC     insulin aspart   Subcutaneous Daily with lunch     insulin aspart   Subcutaneous Daily with supper     insulin aspart  1-7 Units Subcutaneous TID AC     insulin glargine  20 Units Subcutaneous At Bedtime     iron sucrose  300 mg Intravenous Every Other Day     latanoprost  1 drop Both Eyes At Bedtime     linezolid  600 mg Oral Q12H JIMI     metoprolol tartrate  12.5 mg Oral BID     senna-docusate  1 tablet Oral At Bedtime     sodium chloride (PF)  3 mL Intracatheter Q8H       Data   All microbiology laboratory data reviewed.  Recent Labs   Lab Test 01/09/22  0705 01/08/22  0557 01/07/22  0639 01/06/22  0631 01/05/22  0648   WBC  --   --  7.4 8.2 7.9   HGB 8.7* 8.4* 8.9* 9.3* 9.6*   HCT  --   --  27.4* 29.1* 32.2*   MCV  --   --  91 90 101*   PLT  --   --  152 188 200     Recent Labs   Lab Test 01/09/22  0705 01/08/22  0557 01/07/22  2357   CR 3.56* 4.38* 4.59*         01/08/2022 1355 01/09/2022 0846 Blood Culture Peripheral Blood [[id number]]   Peripheral Blood    Preliminary result Component Value   Culture No growth after 12 hours P              01/08/2022 1355 01/09/2022 0846 Blood Culture Peripheral Blood [[id number]]    Peripheral Blood    Preliminary result Component Value   Culture No growth after 12 hours P              01/05/2022 1259 01/08/2022 2046 Anaerobic Bacterial Culture Routine [[id number]]   Aspirate from Kidney, Right    Preliminary result Component Value   Culture No anaerobic organisms isolated after 3 days P              01/05/2022 1259 01/08/2022 1322 Aspirate Aerobic Bacterial Culture Routine [[id number]]   (Abnormal)   Aspirate from Kidney, Right    Final result Component Value   Culture 3+ Enterococcus faecium Abnormal      "Susceptibilities done on previous cultures    3+ Candida parapsilosis complex Abnormal     Susceptibilities not routinely done              01/05/2022 1259 01/08/2022 2046 Anaerobic Bacterial Culture Routine [[id number]]   Aspirate from Kidney, Left    Preliminary result Component Value   Culture No anaerobic organisms isolated after 3 days P              01/05/2022 1259 01/08/2022 1257 Aspirate Aerobic Bacterial Culture Routine [[id number]]    (Abnormal)   Aspirate from Kidney, Left    Final result Component Value   Culture 4+ Enterococcus faecium VRE Abnormal     3+ Candida parapsilosis complex Abnormal     Susceptibilities not routinely done         Susceptibility     Enterococcus faecium VRE     RITA     Ampicillin >=32.0 ug/mL Resistant     Gentamicin Synergy Susceptible... Susceptible 1     Linezolid 2.0 ug/mL Susceptible     Penicillin >=64.0 ug/mL Resistant     Quinupristin/Dalfopristin 4.0 ug/mL Resistant     Vancomycin >=32.0 ug/mL Resistant              1 No high level gentamicin resistance found - therefore combination therapy with an aminoglycoside may be indicated for serious enterococcal infections such as bacteremia and endocarditis.        Susceptibility Comments    Enterococcus faecium VRE   Antibiotics listed as \"No Interpretation\" have no regulatory guidelines for susceptibility/resistance available.   VRE requires contact precautions.            RADIOLOGY:    IR Nephrostomy Tube Placement Bilateral    Result Date: 1/5/2022  [place] [facility] LOCATION: [facility] DATE: 1/5/2022 PROCEDURE: 1.  BILATERAL PERCUTANEOUS NEPHROSTOMY PLACEMENT WITH IMAGING GUIDANCE. 2.  MODERATE SEDATION. INTERVENTIONAL RADIOLOGIST: León Adams MD. INDICATION: 76-year-old male with bilateral ureteral obstruction status post cystectomy with ileal diversion. Bilateral nephrostomy placement is requested. CONSENT: The risks, benefits and alternatives of the stated procedure were discussed with " the patient  in detail. All questions were answered. Informed consent was given to proceed with the procedure. MODERATE SEDATION: Versed 1 mg IV; Fentanyl 25 mcg IV.  Under physician supervision, Versed and fentanyl were administered for moderate sedation. Pulse oximetry, heart rate and blood pressure were continuously monitored by an independent trained observer. The physician spent 15 minutes of face-to-face sedation time with the patient. CONTRAST: 15 mL Omnipaque 350. ANTIBIOTICS: None. ADDITIONAL MEDICATIONS: None. FLUOROSCOPIC TIME: 0.5 minutes. RADIATION DOSE: Air Kerma: 34 mGy. COMPLICATIONS: No immediate complications. STERILE BARRIER TECHNIQUE: Maximum sterile barrier technique was used. Cutaneous antisepsis was performed at the operative site with application of 2% chlorhexidine and large sterile drape. Prior to the procedure, the  and assistant performed hand hygiene and wore hat, mask, sterile gown, and sterile gloves during the entire procedure. PROCEDURE:  Using real-time ultrasound guidance, an 18-gauge trocar needle was inserted into a posterior midpole calyx of the right kidney from a subcostal approach. Over wire exchange was made for a 10 Faroese nephrostomy. The loop was formed within the renal pelvis  and attached to gravity drainage. Using real-time ultrasound guidance, an 18-gauge trocar needle was inserted into a posterior lower pole calyx of the left kidney from a subcostal approach. Over wire exchange was made for a 10 Faroese nephrostomy. The loop was formed within the renal pelvis and attached to gravity drainage. FINDINGS: Ultrasound demonstrates bilateral moderate hydronephrosis. Permanent images were stored of each kidney. The right antegrade nephrostogram confirms hydronephrosis. The completion image shows the nephrostomy with its loop in the right renal pelvis. The left antegrade nephrostogram confirms hydronephrosis. The completion image shows the nephrostomy with its loop in  the left renal pelvis.     IMPRESSION:  1.  Successful bilateral percutaneous nephrostomy placement, as detailed above. 2.  A urine specimen was sent from each kidney for microbiology analysis.     CT Abdomen Pelvis w/o Contrast    Result Date: 1/6/2022  EXAM: CT ABDOMEN PELVIS W/O CONTRAST LOCATION: [facility] DATE/TIME: 1/6/2022 8:28 PM INDICATION: Abdominal pain, acute, nonlocalized COMPARISON: CT 01/04/2020 02/11/1721 TECHNIQUE: CT scan of the abdomen and pelvis was performed without IV contrast. Multiplanar reformats were obtained. Dose reduction techniques were used. CONTRAST: None. FINDINGS: LOWER CHEST: Small hiatal hernia. Mild coronary artery calcifications. HEPATOBILIARY: Nonvisualized gallbladder. Unremarkable unenhanced liver and bile ducts. PANCREAS: Stable atrophy. SPLEEN: Normal. ADRENAL GLANDS: Normal. KIDNEYS/BLADDER: Interval placement of bilateral percutaneous nephrostomy drains. No residual hydronephrosis or hydroureter. Stable small exophytic lower right renal cyst. No follow-up is indicated. Post cystectomy with right lower quadrant ileal conduit. BOWEL: Duodenal diverticulum. Stable chronic patulous small bowel segment at the anastomosis. Otherwise, normal caliber small bowel. Colonic diverticulosis without evidence of acute diverticulitis. Prominent gas-filled segments of colon with mild layering fluid. Mild to moderate fecal retention within the distal sigmoid colon and rectum. No free air or free fluid. LYMPH NODES: Normal. VASCULATURE: Mild to moderate atherosclerosis. PELVIC ORGANS: Post prostatectomy. MUSCULOSKELETAL: Bilateral total hip arthroplasties. Osseous demineralization. Spinal degenerative changes.     IMPRESSION: 1.  Interval placement of bilateral previous nephrostomy drains. No residual hydronephrosis or hydroureter. 2.  Mild to moderate stool burden within the distal sigmoid colon and rectum suggesting constipation. Associated prominent upstream gas  containing segments of colon. No small bowel obstruction.     CT Abdomen Pelvis w/o Contrast    Result Date: 1/4/2022  EXAM: CT ABDOMEN PELVIS W/O CONTRAST LOCATION: [facility] DATE/TIME: 1/4/2022 5:33 PM INDICATION: Recent urostomy, acute renal failure. COMPARISON: CT AP 11/17/2021. TECHNIQUE: CT scan of the abdomen and pelvis was performed without IV contrast. Multiplanar reformats were obtained. Dose reduction techniques were used. CONTRAST: None. FINDINGS: LOWER CHEST: Normal. HEPATOBILIARY: Normal. PANCREAS: Normal. SPLEEN: Normal. ADRENAL GLANDS: Normal. KIDNEYS/BLADDER: Interval cystectomy and ileal conduit urinary diversion with right lower quadrant ostomy. Moderate right hydronephrosis and mild right proximal ureterectasis similar to prior. Interval development left perinephric edema, moderate hydronephrosis and mild ureterectasis to the level of the ureteral ileal conduit anastomosis. The ileal conduit is decompressed and normal in appearance. Small benign cyst right kidney. BOWEL: Colonic diverticulosis. No bowel obstruction or inflammatory change. LYMPH NODES: Normal. VASCULATURE: Unremarkable. PELVIC ORGANS: Prostatectomy. MUSCULOSKELETAL: Bones are demineralized. Bilateral hip arthroplasties.     IMPRESSION: 1.  Interval cystectomy and ileal conduit urinary diversion with right lower quadrant ostomy. 2.  Moderate right hydronephrosis and mild right proximal ureterectasis similar to prior. 3.  Interval development left perinephric edema moderate hydronephrosis and mild ureterectasis that tapers to the level of the ureteral ileal conduit anastomosis with cause of obstruction not evident.     CT Head w/o Contrast    Result Date: 1/6/2022  EXAM: CT HEAD W/O CONTRAST LOCATION: [facility] DATE/TIME: 1/6/2022 10:39 PM INDICATION: Nausea, weakness COMPARISON: None. TECHNIQUE: Routine CT Head without IV contrast. Multiplanar reformats. Dose reduction techniques  were used. FINDINGS: INTRACRANIAL CONTENTS: No intracranial hemorrhage, extraaxial collection, or mass effect.  No CT evidence of acute infarct. Moderate presumed chronic small vessel ischemic changes. Moderate generalized volume loss. No hydrocephalus. VISUALIZED ORBITS/SINUSES/MASTOIDS: No intraorbital abnormality. No paranasal sinus mucosal disease. No middle ear or mastoid effusion. BONES/SOFT TISSUES: No acute abnormality.     IMPRESSION: 1.  No acute intracranial process.    XR Abdomen Upright Only    Result Date: 1/6/2022  EXAM: XR ABDOMEN UPRIGHT ONLY LOCATION: [facility] DATE/TIME: 1/6/2022 4:45 PM INDICATION: umbilical pain COMPARISON: CT 01/04/2022 in nephrostomy tube placement 01/05/2022     IMPRESSION: Bilateral percutaneous nephrostomy drainage tubes which appear to be in satisfactory positions. Mild diffuse gaseous distention of the colon suspicious for an ileus. Follow-up is recommended. No definite free air. Bilateral total hip arthroplasties.

## 2022-01-10 VITALS
HEART RATE: 69 BPM | BODY MASS INDEX: 30.48 KG/M2 | WEIGHT: 217.7 LBS | DIASTOLIC BLOOD PRESSURE: 70 MMHG | HEIGHT: 71 IN | RESPIRATION RATE: 16 BRPM | OXYGEN SATURATION: 99 % | TEMPERATURE: 97.4 F | SYSTOLIC BLOOD PRESSURE: 127 MMHG

## 2022-01-10 LAB
ANION GAP SERPL CALCULATED.3IONS-SCNC: 11 MMOL/L (ref 5–18)
BUN SERPL-MCNC: 43 MG/DL (ref 8–28)
CALCIUM SERPL-MCNC: 8.3 MG/DL (ref 8.5–10.5)
CHLORIDE BLD-SCNC: 106 MMOL/L (ref 98–107)
CO2 SERPL-SCNC: 22 MMOL/L (ref 22–31)
CREAT SERPL-MCNC: 3.05 MG/DL (ref 0.7–1.3)
GFR SERPL CREATININE-BSD FRML MDRD: 20 ML/MIN/1.73M2
GLUCOSE BLD-MCNC: 116 MG/DL (ref 70–125)
GLUCOSE BLDC GLUCOMTR-MCNC: 124 MG/DL (ref 70–99)
HOLD SPECIMEN: NORMAL
MAGNESIUM SERPL-MCNC: 1.5 MG/DL (ref 1.8–2.6)
POTASSIUM BLD-SCNC: 3.5 MMOL/L (ref 3.5–5)
SODIUM SERPL-SCNC: 139 MMOL/L (ref 136–145)

## 2022-01-10 PROCEDURE — 250N000013 HC RX MED GY IP 250 OP 250 PS 637: Performed by: INTERNAL MEDICINE

## 2022-01-10 PROCEDURE — 250N000013 HC RX MED GY IP 250 OP 250 PS 637: Performed by: HOSPITALIST

## 2022-01-10 PROCEDURE — 99232 SBSQ HOSP IP/OBS MODERATE 35: CPT | Performed by: INTERNAL MEDICINE

## 2022-01-10 PROCEDURE — 36415 COLL VENOUS BLD VENIPUNCTURE: CPT | Performed by: INTERNAL MEDICINE

## 2022-01-10 PROCEDURE — 80048 BASIC METABOLIC PNL TOTAL CA: CPT | Performed by: INTERNAL MEDICINE

## 2022-01-10 PROCEDURE — 83735 ASSAY OF MAGNESIUM: CPT | Performed by: COLON & RECTAL SURGERY

## 2022-01-10 PROCEDURE — 99239 HOSP IP/OBS DSCHRG MGMT >30: CPT | Performed by: INTERNAL MEDICINE

## 2022-01-10 PROCEDURE — 999N000197 HC STATISTIC WOC PT EDUCATION, 0-15 MIN

## 2022-01-10 RX ORDER — MAGNESIUM CHLORIDE 71.5 G/G
1 TABLET ORAL DAILY
Qty: 30 TABLET | Refills: 1 | Status: SHIPPED | OUTPATIENT
Start: 2022-01-10 | End: 2022-02-09

## 2022-01-10 RX ORDER — MAGNESIUM OXIDE 400 MG/1
400 TABLET ORAL 2 TIMES DAILY
Status: DISCONTINUED | OUTPATIENT
Start: 2022-01-10 | End: 2022-01-10 | Stop reason: HOSPADM

## 2022-01-10 RX ORDER — FAMOTIDINE 10 MG
10 TABLET ORAL 2 TIMES DAILY
Qty: 30 TABLET | Refills: 1 | Status: SHIPPED | OUTPATIENT
Start: 2022-01-10

## 2022-01-10 RX ORDER — FLUCONAZOLE 200 MG/1
200 TABLET ORAL DAILY
Qty: 10 TABLET | Refills: 0 | Status: SHIPPED | OUTPATIENT
Start: 2022-01-11 | End: 2022-01-21

## 2022-01-10 RX ORDER — FERROUS SULFATE 325(65) MG
325 TABLET ORAL
Qty: 30 TABLET | Refills: 1 | Status: SHIPPED | OUTPATIENT
Start: 2022-01-10

## 2022-01-10 RX ORDER — METOPROLOL SUCCINATE 25 MG/1
25 TABLET, EXTENDED RELEASE ORAL DAILY
Qty: 30 TABLET | Refills: 3 | Status: SHIPPED | OUTPATIENT
Start: 2022-01-10

## 2022-01-10 RX ORDER — LINEZOLID 600 MG/1
600 TABLET, FILM COATED ORAL EVERY 12 HOURS
Qty: 20 TABLET | Refills: 0 | Status: SHIPPED | OUTPATIENT
Start: 2022-01-10 | End: 2022-01-20

## 2022-01-10 RX ORDER — POTASSIUM CHLORIDE 750 MG/1
10 TABLET, EXTENDED RELEASE ORAL ONCE
Status: COMPLETED | OUTPATIENT
Start: 2022-01-10 | End: 2022-01-10

## 2022-01-10 RX ADMIN — FAMOTIDINE 10 MG: 10 TABLET ORAL at 09:02

## 2022-01-10 RX ADMIN — POTASSIUM CHLORIDE 10 MEQ: 750 TABLET, EXTENDED RELEASE ORAL at 09:03

## 2022-01-10 RX ADMIN — MAGNESIUM OXIDE TAB 400 MG (241.3 MG ELEMENTAL MG) 400 MG: 400 (241.3 MG) TAB at 09:03

## 2022-01-10 RX ADMIN — LINEZOLID 600 MG: 600 TABLET, FILM COATED ORAL at 09:03

## 2022-01-10 RX ADMIN — METOPROLOL TARTRATE 12.5 MG: 25 TABLET, FILM COATED ORAL at 09:03

## 2022-01-10 RX ADMIN — FLUCONAZOLE 200 MG: 100 TABLET ORAL at 09:02

## 2022-01-10 RX ADMIN — ASPIRIN 81 MG: 81 TABLET, COATED ORAL at 09:02

## 2022-01-10 RX ADMIN — BRIMONIDINE TARTRATE 1 DROP: 1.5 SOLUTION OPHTHALMIC at 09:41

## 2022-01-10 ASSESSMENT — ACTIVITIES OF DAILY LIVING (ADL)
ADLS_ACUITY_SCORE: 14
ADLS_ACUITY_SCORE: 12
ADLS_ACUITY_SCORE: 14

## 2022-01-10 NOTE — PLAN OF CARE
Care Management Discharge Note    Discharge Date: 01/10/2022       Discharge Disposition: Home,Home Care    Discharge Services: None    Discharge DME: None    Discharge Transportation: family or friend will provide    Private pay costs discussed: Not applicable    PAS Confirmation Code:    Patient/family educated on Medicare website which has current facility and service quality ratings:      Education Provided on the Discharge Plan:    Persons Notified of Discharge Plans: spoke with dtr.  Patient/Family in Agreement with the Plan: yes    Handoff Referral Completed: Yes    Additional Information:  Called pt. Wife to check on Home Care agency, and ended up speaking to dtr. Wife was on her way here. She stated it was [facility] and I was able to confirm that with  HC in [place], [place]. Dtr. Stated she and pt. Wife are concerned about the Neph tubes that pt. Is now going home with. Stated I would make sure bedside RN does good teaching with pt. And wife on cares of those for when he goes home and that HC RN will also be a resource for pt. And family. Did speak to bedside RN and she stated teaching will be done.    Opal Guzman, RN

## 2022-01-10 NOTE — PROGRESS NOTES
[facility]    Infectious Disease Progress Note    Date of Service (when I saw the patient): 01/10/2022     Assessment & Plan   Marcelo Valerio is a 76 year old male who was admitted on 1/4/2022.     1. Pyelonephritis, status post bilateral nephrostomy tube placement, obstructive, improving  2. Cultures with VRE and Candida parapsilosis  3. Creatinine improving  4. History of bladder cancer status post cystectomy and ileal diversion in December 2021 admitted 1/4/2022 with mid nausea, fatigue, decreased urine output and elevated creatinine, improving  5. Tolerating linezolid    Recommendations    1. Linezolid,ten more days  2. Fluconazole also ten more days  3. Pharm.D. and primary team to assess for drug interactions and any side effects.  Patient tolerating medication so far  4. Duration of antimicrobials 10 further days  5. Daptomycin was stopped on 1/8/2022  6. Monitor CBC, CMP, blood cultures  7. Patient updated  Ok to go home, d/w Ronaldo MONSALVE in person    NATALIE Espinal MD  [facility]  [phone]        Interval History   Doing better, no pains  No drug side effects reported, labs reviewed  Spoke to pt and wife.        Physical Exam   Temp: 97.4  F (36.3  C) Temp src: Oral BP: 127/70 Pulse: 69   Resp: 16 SpO2: 99 % O2 Device: None (Room air)    Vitals:    01/04/22 1529 01/05/22 1718 01/08/22 0725   Weight: 101.6 kg (224 lb) 102.4 kg (225 lb 11.2 oz) 98.7 kg (217 lb 11.2 oz)     Vital Signs with Ranges  Temp:  [97.4  F (36.3  C)-97.8  F (36.6  C)] 97.4  F (36.3  C)  Pulse:  [64-82] 69  Resp:  [16-18] 16  BP: (119-127)/(57-70) 127/70  SpO2:  [97 %-99 %] 99 %    Constitutional: Awake, alert, cooperative, no apparent distress  Lungs: Normal breathing pattern  Cardiovascular: No significant edema  Abdomen: Nontender, bilateral nephrostomy tube with clear urine output on right darker on left  Skin: No rashes, no cyanosis, no edema  Neuro: Coherent, answering  questions    Medications       [Held by provider] amLODIPine  2.5 mg Oral BID     aspirin  81 mg Oral Daily     brimonidine  1 drop Both Eyes BID     famotidine  10 mg Oral BID     fluconazole  200 mg Oral Daily     insulin aspart   Subcutaneous QAM AC     insulin aspart   Subcutaneous Daily with lunch     insulin aspart   Subcutaneous Daily with supper     insulin aspart  1-7 Units Subcutaneous TID AC     insulin glargine  20 Units Subcutaneous At Bedtime     iron sucrose  300 mg Intravenous Every Other Day     latanoprost  1 drop Both Eyes At Bedtime     linezolid  600 mg Oral Q12H JIMI     magnesium oxide  400 mg Oral BID     metoprolol tartrate  12.5 mg Oral BID     senna-docusate  1 tablet Oral At Bedtime     sodium chloride (PF)  3 mL Intracatheter Q8H       Data   All microbiology laboratory data reviewed.  Recent Labs   Lab Test 01/09/22  0705 01/08/22  0557 01/07/22  0639 01/06/22  0631 01/05/22  0648   WBC  --   --  7.4 8.2 7.9   HGB 8.7* 8.4* 8.9* 9.3* 9.6*   HCT  --   --  27.4* 29.1* 32.2*   MCV  --   --  91 90 101*   PLT  --   --  152 188 200     Recent Labs   Lab Test 01/10/22  0703 01/09/22  0705 01/08/22  0557   CR 3.05* 3.56* 4.38*         01/08/2022 1355 01/09/2022 0846 Blood Culture Peripheral Blood [[id number]]   Peripheral Blood    Preliminary result Component Value   Culture No growth after 12 hours P              01/08/2022 1355 01/09/2022 0846 Blood Culture Peripheral Blood [[id number]]    Peripheral Blood    Preliminary result Component Value   Culture No growth after 12 hours P              01/05/2022 1259 01/08/2022 2046 Anaerobic Bacterial Culture Routine [[id number]]   Aspirate from Kidney, Right    Preliminary result Component Value   Culture No anaerobic organisms isolated after 3 days P              01/05/2022 1259 01/08/2022 1322 Aspirate Aerobic Bacterial Culture Routine [[id number]]   (Abnormal)   Aspirate from Kidney, Right    Final result Component Value   Culture 3+  "Enterococcus faecium Abnormal     Susceptibilities done on previous cultures    3+ Candida parapsilosis complex Abnormal     Susceptibilities not routinely done              01/05/2022 1259 01/08/2022 2046 Anaerobic Bacterial Culture Routine [[id number]]   Aspirate from Kidney, Left    Preliminary result Component Value   Culture No anaerobic organisms isolated after 3 days P              01/05/2022 1259 01/08/2022 1257 Aspirate Aerobic Bacterial Culture Routine [[id number]]    (Abnormal)   Aspirate from Kidney, Left    Final result Component Value   Culture 4+ Enterococcus faecium VRE Abnormal     3+ Candida parapsilosis complex Abnormal     Susceptibilities not routinely done         Susceptibility     Enterococcus faecium VRE     RITA     Ampicillin >=32.0 ug/mL Resistant     Gentamicin Synergy Susceptible... Susceptible 1     Linezolid 2.0 ug/mL Susceptible     Penicillin >=64.0 ug/mL Resistant     Quinupristin/Dalfopristin 4.0 ug/mL Resistant     Vancomycin >=32.0 ug/mL Resistant              1 No high level gentamicin resistance found - therefore combination therapy with an aminoglycoside may be indicated for serious enterococcal infections such as bacteremia and endocarditis.        Susceptibility Comments    Enterococcus faecium VRE   Antibiotics listed as \"No Interpretation\" have no regulatory guidelines for susceptibility/resistance available.   VRE requires contact precautions.            RADIOLOGY:    IR Nephrostomy Tube Placement Bilateral    Result Date: 1/5/2022  [facility] LOCATION: [facility] DATE: 1/5/2022 PROCEDURE: 1.  BILATERAL PERCUTANEOUS NEPHROSTOMY PLACEMENT WITH IMAGING GUIDANCE. 2.  MODERATE SEDATION. INTERVENTIONAL RADIOLOGIST: León Adams MD. INDICATION: 76-year-old male with bilateral ureteral obstruction status post cystectomy with ileal diversion. Bilateral nephrostomy placement is requested. CONSENT: The risks, benefits and alternatives of the " stated procedure were discussed with the patient  in detail. All questions were answered. Informed consent was given to proceed with the procedure. MODERATE SEDATION: Versed 1 mg IV; Fentanyl 25 mcg IV.  Under physician supervision, Versed and fentanyl were administered for moderate sedation. Pulse oximetry, heart rate and blood pressure were continuously monitored by an independent trained observer. The physician spent 15 minutes of face-to-face sedation time with the patient. CONTRAST: 15 mL Omnipaque 350. ANTIBIOTICS: None. ADDITIONAL MEDICATIONS: None. FLUOROSCOPIC TIME: 0.5 minutes. RADIATION DOSE: Air Kerma: 34 mGy. COMPLICATIONS: No immediate complications. STERILE BARRIER TECHNIQUE: Maximum sterile barrier technique was used. Cutaneous antisepsis was performed at the operative site with application of 2% chlorhexidine and large sterile drape. Prior to the procedure, the  and assistant performed hand hygiene and wore hat, mask, sterile gown, and sterile gloves during the entire procedure. PROCEDURE:  Using real-time ultrasound guidance, an 18-gauge trocar needle was inserted into a posterior midpole calyx of the right kidney from a subcostal approach. Over wire exchange was made for a 10 [place] nephrostomy. The loop was formed within the renal pelvis  and attached to gravity drainage. Using real-time ultrasound guidance, an 18-gauge trocar needle was inserted into a posterior lower pole calyx of the left kidney from a subcostal approach. Over wire exchange was made for a 10 [place] nephrostomy. The loop was formed within the renal pelvis and attached to gravity drainage. FINDINGS: Ultrasound demonstrates bilateral moderate hydronephrosis. Permanent images were stored of each kidney. The right antegrade nephrostogram confirms hydronephrosis. The completion image shows the nephrostomy with its loop in the right renal pelvis. The left antegrade nephrostogram confirms hydronephrosis. The completion image  shows the nephrostomy with its loop in the left renal pelvis.     IMPRESSION:  1.  Successful bilateral percutaneous nephrostomy placement, as detailed above. 2.  A urine specimen was sent from each kidney for microbiology analysis.     CT Abdomen Pelvis w/o Contrast    Result Date: 1/6/2022  EXAM: CT ABDOMEN PELVIS W/O CONTRAST LOCATION: [facility] DATE/TIME: 1/6/2022 8:28 PM INDICATION: Abdominal pain, acute, nonlocalized COMPARISON: CT 01/04/2020 02/11/1721 TECHNIQUE: CT scan of the abdomen and pelvis was performed without IV contrast. Multiplanar reformats were obtained. Dose reduction techniques were used. CONTRAST: None. FINDINGS: LOWER CHEST: Small hiatal hernia. Mild coronary artery calcifications. HEPATOBILIARY: Nonvisualized gallbladder. Unremarkable unenhanced liver and bile ducts. PANCREAS: Stable atrophy. SPLEEN: Normal. ADRENAL GLANDS: Normal. KIDNEYS/BLADDER: Interval placement of bilateral percutaneous nephrostomy drains. No residual hydronephrosis or hydroureter. Stable small exophytic lower right renal cyst. No follow-up is indicated. Post cystectomy with right lower quadrant ileal conduit. BOWEL: Duodenal diverticulum. Stable chronic patulous small bowel segment at the anastomosis. Otherwise, normal caliber small bowel. Colonic diverticulosis without evidence of acute diverticulitis. Prominent gas-filled segments of colon with mild layering fluid. Mild to moderate fecal retention within the distal sigmoid colon and rectum. No free air or free fluid. LYMPH NODES: Normal. VASCULATURE: Mild to moderate atherosclerosis. PELVIC ORGANS: Post prostatectomy. MUSCULOSKELETAL: Bilateral total hip arthroplasties. Osseous demineralization. Spinal degenerative changes.     IMPRESSION: 1.  Interval placement of bilateral previous nephrostomy drains. No residual hydronephrosis or hydroureter. 2.  Mild to moderate stool burden within the distal sigmoid colon and rectum suggesting  constipation. Associated prominent upstream gas containing segments of colon. No small bowel obstruction.     CT Abdomen Pelvis w/o Contrast    Result Date: 1/4/2022  EXAM: CT ABDOMEN PELVIS W/O CONTRAST LOCATION: [facility] DATE/TIME: 1/4/2022 5:33 PM INDICATION: Recent urostomy, acute renal failure. COMPARISON: CT AP 11/17/2021. TECHNIQUE: CT scan of the abdomen and pelvis was performed without IV contrast. Multiplanar reformats were obtained. Dose reduction techniques were used. CONTRAST: None. FINDINGS: LOWER CHEST: Normal. HEPATOBILIARY: Normal. PANCREAS: Normal. SPLEEN: Normal. ADRENAL GLANDS: Normal. KIDNEYS/BLADDER: Interval cystectomy and ileal conduit urinary diversion with right lower quadrant ostomy. Moderate right hydronephrosis and mild right proximal ureterectasis similar to prior. Interval development left perinephric edema, moderate hydronephrosis and mild ureterectasis to the level of the ureteral ileal conduit anastomosis. The ileal conduit is decompressed and normal in appearance. Small benign cyst right kidney. BOWEL: Colonic diverticulosis. No bowel obstruction or inflammatory change. LYMPH NODES: Normal. VASCULATURE: Unremarkable. PELVIC ORGANS: Prostatectomy. MUSCULOSKELETAL: Bones are demineralized. Bilateral hip arthroplasties.     IMPRESSION: 1.  Interval cystectomy and ileal conduit urinary diversion with right lower quadrant ostomy. 2.  Moderate right hydronephrosis and mild right proximal ureterectasis similar to prior. 3.  Interval development left perinephric edema moderate hydronephrosis and mild ureterectasis that tapers to the level of the ureteral ileal conduit anastomosis with cause of obstruction not evident.     CT Head w/o Contrast    Result Date: 1/6/2022  EXAM: CT HEAD W/O CONTRAST LOCATION: [facility] DATE/TIME: 1/6/2022 10:39 PM INDICATION: Nausea, weakness COMPARISON: None. TECHNIQUE: Routine CT Head without IV contrast.  Multiplanar reformats. Dose reduction techniques were used. FINDINGS: INTRACRANIAL CONTENTS: No intracranial hemorrhage, extraaxial collection, or mass effect.  No CT evidence of acute infarct. Moderate presumed chronic small vessel ischemic changes. Moderate generalized volume loss. No hydrocephalus. VISUALIZED ORBITS/SINUSES/MASTOIDS: No intraorbital abnormality. No paranasal sinus mucosal disease. No middle ear or mastoid effusion. BONES/SOFT TISSUES: No acute abnormality.     IMPRESSION: 1.  No acute intracranial process.    XR Abdomen Upright Only    Result Date: 1/6/2022  EXAM: XR ABDOMEN UPRIGHT ONLY LOCATION: [facility] DATE/TIME: 1/6/2022 4:45 PM INDICATION: umbilical pain COMPARISON: CT 01/04/2022 in nephrostomy tube placement 01/05/2022     IMPRESSION: Bilateral percutaneous nephrostomy drainage tubes which appear to be in satisfactory positions. Mild diffuse gaseous distention of the colon suspicious for an ileus. Follow-up is recommended. No definite free air. Bilateral total hip arthroplasties.

## 2022-01-10 NOTE — PROGRESS NOTES
"RENAL PROGRESS NOTE - [facility]        CC: f/u [facility]/CKD 4    Subjective: Up in chair. Seen with wife. No sob or pain. Eating alright. No other concerns. Discussed renal follow up. No other concerns.        ASSESSMENT:   75 yo male with h/o bladder cancer s/p cystectomy/ileal diversion in December, CKD 4 with creat of 1.8, HTN, DM2 admit from clinic with [facility], b/l hdyronephrosis     PLAN:  1. [facility]/CKD4 - baseline CKD 4 likely due to DM2, HTN and now obstructive [facility] due to b/l hydronephrosis post cystectomy/ileal diversion for bladder cancer.  Was taking Advil PTA, may have further contributed to [facility]  -Slowly improving.   -s/p b/l PNT placement with good urine output  -expect [facility] will be slow to improve with advanced age, underlying CKD, likely subacute obstruction  -Will have clinic call for renal follow up. Need BMP with PCP this week.     2. B/l Hydro - post-cystectomy, ileal diversion  -now s/p b/l  PNT placement   -urology and IR following  -consideration for ureteral stent noted    3. Metabolic acidosis - due to [facility]  -resolved    4. Anemia - expect due to recent surgery, CKD, may see further decline with hydration  -serial hb  - iron stores low, will give venofer      5. Hypokalemia - likely some post obstructive diuresis  -replaced  -monitor daily nohemi koenig DO  [facility]  [phone]    Objective    PHYSICAL EXAM  /70 (BP Location: Left arm)   Pulse 69   Temp 97.4  F (36.3  C) (Oral)   Resp 16   Ht 1.803 m (5' 11\")   Wt 98.7 kg (217 lb 11.2 oz)   SpO2 99%   BMI 30.36 kg/m    I/O last 3 completed shifts:  In: 1160 [P.O.:1160]  Out: 2275 [Urine:175; Drains:2100]  Wt Readings from Last 3 Encounters:   01/08/22 98.7 kg (217 lb 11.2 oz)   12/06/21 107.6 kg (237 lb 4.8 oz)   11/22/21 105.6 kg (232 lb 14.4 oz)       GENERAL: nad  HEENT:normocephalic, atraumatic  CARDIOVASCULAR:  Trace edema  PULMONARY: No cyanosis  GASTROINTESTINAL: ND  MSK: diffuse muscle atrophy, " warm  NEURO: Alert, no gross focal findings  PSYCHIATRIC: Adequate mood and interaction  SKIN: Pale, no jaundice, no rash.    LABORATORIES  Recent Labs   Lab 01/09/22  0705 01/08/22  0557 01/07/22  0639 01/06/22  0631 01/05/22  0648   WBC  --   --  7.4 8.2 7.9   HGB 8.7* 8.4* 8.9* 9.3* 9.6*   HCT  --   --  27.4* 29.1* 32.2*   PLT  --   --  152 188 200     Recent Labs   Lab 01/10/22  0703 01/09/22  0705 01/08/22  0557 01/07/22  2357 01/05/22  1833 01/05/22  0648    138 140 140   < > 137   CO2 22 22 24 23   < > 10*   BUN 43* 55* 68* 75*   < > 109*   ALKPHOS  --   --   --  66  --  82   ALT  --   --   --  <9  --  15   AST  --   --   --  23  --  14    < > = values in this interval not displayed.         MEDICATIONS    [Held by provider] amLODIPine  2.5 mg Oral BID     aspirin  81 mg Oral Daily     brimonidine  1 drop Both Eyes BID     famotidine  10 mg Oral BID     fluconazole  200 mg Oral Daily     insulin aspart   Subcutaneous QAM AC     insulin aspart   Subcutaneous Daily with lunch     insulin aspart   Subcutaneous Daily with supper     insulin aspart  1-7 Units Subcutaneous TID AC     insulin glargine  20 Units Subcutaneous At Bedtime     iron sucrose  300 mg Intravenous Every Other Day     latanoprost  1 drop Both Eyes At Bedtime     linezolid  600 mg Oral Q12H JIMI     magnesium oxide  400 mg Oral BID     metoprolol tartrate  12.5 mg Oral BID     senna-docusate  1 tablet Oral At Bedtime     sodium chloride (PF)  3 mL Intracatheter Q8H

## 2022-01-10 NOTE — PLAN OF CARE
RN discussed discharge instructions with patient.  Patient verbalized understanding and voiced no concerns,.  AVS provided, prescriptions e-scripted to home pharmacy.  Belongings returned to patient.  Patient to discharge home with family.

## 2022-01-10 NOTE — PROGRESS NOTES
"[facility] stoma assessment Urostomy     Chart review completed. Looks like pouch that was applied on 1/6 by [facility] did not have any leaking issues. Extra pouch provided today for pouch change if needed- majority of urine is coming through neph tubes, not urosotmy, so this likely contributing to pouch adherence as well. Continue routine pouch changes. [facility] will see later this week. See below for previous assessment.    Allergies:  Zinc Acetate  Nickel  Sulfa (Sulfonamide Antibiotics) [Sulfa Drugs]    Height:  Height: 180.3 cm (5' 11\")    Weight:   Weight: 102.4 kg (225 lb 11.2 oz)    Past Medical History:  Past Medical History:   Diagnosis Date     Basal cell carcinoma      Bladder tumor      Cancer (H)     Prostate     Chronic kidney disease      CKD (chronic kidney disease)      DM2 (diabetes mellitus, type 2) (H)      Glaucoma      History of gout      HTN (hypertension)      Hyperlipidemia      Kidney stone      Lung nodule      Malignant neoplasm of urinary bladder, unspecified site (H)      Metabolic syndrome      Obesity      Osteoarthritis of knee      Psoriasis      Restless legs syndrome        Labs:  Recent Labs   Lab Test 01/06/22  0631 01/05/22  0648 01/04/22  1903   HGB 9.3* 9.6*  --    ALBUMIN  --  2.2*  --    CULTURE  --   --  >100,000 CFU/mL Mixture of urogenital mely       Thaddeus:  Thaddeus Score: 17      INTAKE  Type of Stoma: Permanent Urostomy  Anticipated date of takedown: NA  Diagnosis Pertinent to Stoma:Bladder Cancer   Type of Surgery: Urostomy   Surgeon:Hilaria   Pertinent Information: Patient frequently leaking with firm 2 piece urostomy pouch as stoma is directly in a fold.     ASSESSMENT  Urostomy     Stoma Size: Oval 1 x 1-1/8 inches  Protrusion: Flush  Vascularity: Pink  Mucocutaneous Juncture: Separation one small area of separation at 1 o'clock  Peristomal Skin: Intact  Stoma lays directly in a fold.     Was wearing 2 piece high output pouch as they were running out of supplies so fast. Has " been using strip paste and regular paste to attempt to maintain seal, using belt as well.      TREATMENT/EQUIPMENT  Applied: stoma powder into separation and Cavilon no-sting skin barrier    Supplies: [employer] #[id number] and barrier ring. This pouch is in WOC office only- not available in storeroom. If pouch leaks, please notify WOC. If overnight, leave VM on WOC line and replace with 2 piece pouch from store room.    Instructions Given: WOC Role and Pouching Products: Showed pouching system     Nursing care provided was coordinated care with RN    Discussed plan of care with nurse, patient and spouse.    Outcomes and treatment recommendations are to To contain output, To be knowledgable with pouch change/emptying before discharge and To be independant with pouch change/emptying before discharge    Actions taken by WOC RN: 5 minutes of education, WOC Discharge recommendations entered and signed up for [facility] [employer].    Planned Follow Up: Early next week.    Plan for next visit: Reassess stoma/peristomal skin and Patient to change pouch with assist

## 2022-01-10 NOTE — PLAN OF CARE
Problem: Adult Inpatient Plan of Care  Goal: Patient-Specific Goal (Individualized)  Outcome: Improving  Problem: Adult Inpatient Plan of Care  Goal: Absence of Hospital-Acquired Illness or Injury  Outcome: Improving  Intervention: Identify and Manage Fall Risk  Recent Flowsheet Documentation  Taken 1/9/2022 1730 by Gertrude Bangura RN  Safety Promotion/Fall Prevention:   activity supervised   assistive device/personal items within reach     Problem: Adult Inpatient Plan of Care  Goal: Optimal Comfort and Wellbeing  Outcome: Improving     Problem: OT General Care Plan  Goal: Toilet Transfer/Toileting (OT)  Description: Toilet Transfer/Toileting (OT)  Outcome: Improving    Patient reporting strength improving. Pt up to bathroom with assist of one, gait belt and rolling walker.    Pt on oral antibiotics for infection. Kidney function continues to improve, potassium within normal limits no replacement this evening shift.    I & O's monitored good output from bilateral neph tubes. Pt had wet brief due to urostomy not being capped.

## 2022-01-10 NOTE — PROGRESS NOTES
[facility] MEDICINE  DISCHARGE SUMMARY     Primary Care Physician: Urbano Cedeno  Admission Date: 1/4/2022   Discharge Provider: Michael Higgins MD Discharge Date: 1/10/2022   Diet: As given below   Code Status: Full Code   Activity: As tolerated        Condition at Discharge: Stable     REASON FOR PRESENTATION(See Admission Note for Details)   Nausea fatigue decreased urinary output and elevated creatinine    PRINCIPAL & ACTIVE DISCHARGE DIAGNOSES   Acute kidney injury on chronic kidney disease stage IV bilateral hydronephrosis  Bilateral pyelonephritis  Non-anion gap metabolic acidosis  Insulin-dependent diabetes  Essential hypertension  Glaucoma  Anemia of chronic disease  Hypokalemia  Hypomagnesemia  Periumbilical abdominal pain  Anisocoria  SIGNIFICANT FINDINGS (Imaging, labs):   IR Nephrostomy Tube Placement Bilateral    Result Date: 1/5/2022  [place] RAD[facility]OLOGY LOCATION: [facility] DATE: 1/5/2022 PROCEDURE: 1.  BILATERAL PERCUTANEOUS NEPHROSTOMY PLACEMENT WITH IMAGING GUIDANCE. 2.  MODERATE SEDATION. INTERVENTIONAL RADIOLOGIST: León Adams MD. INDICATION: 76-year-old male with bilateral ureteral obstruction status post cystectomy with ileal diversion. Bilateral nephrostomy placement is requested. CONSENT: The risks, benefits and alternatives of the stated procedure were discussed with the patient  in detail. All questions were answered. Informed consent was given to proceed with the procedure. MODERATE SEDATION: Versed 1 mg IV; Fentanyl 25 mcg IV.  Under physician supervision, Versed and fentanyl were administered for moderate sedation. Pulse oximetry, heart rate and blood pressure were continuously monitored by an independent trained observer. The physician spent 15 minutes of face-to-face sedation time with the patient. CONTRAST: 15 mL Omnipaque 350. ANTIBIOTICS: None. ADDITIONAL MEDICATIONS: None. FLUOROSCOPIC TIME: 0.5 minutes. RADIATION  DOSE: Air Kerma: 34 mGy. COMPLICATIONS: No immediate complications. STERILE BARRIER TECHNIQUE: Maximum sterile barrier technique was used. Cutaneous antisepsis was performed at the operative site with application of 2% chlorhexidine and large sterile drape. Prior to the procedure, the  and assistant performed hand hygiene and wore hat, mask, sterile gown, and sterile gloves during the entire procedure. PROCEDURE:  Using real-time ultrasound guidance, an 18-gauge trocar needle was inserted into a posterior midpole calyx of the right kidney from a subcostal approach. Over wire exchange was made for a 10 [place] nephrostomy. The loop was formed within the renal pelvis  and attached to gravity drainage. Using real-time ultrasound guidance, an 18-gauge trocar needle was inserted into a posterior lower pole calyx of the left kidney from a subcostal approach. Over wire exchange was made for a 10 [place] nephrostomy. The loop was formed within the renal pelvis and attached to gravity drainage. FINDINGS: Ultrasound demonstrates bilateral moderate hydronephrosis. Permanent images were stored of each kidney. The right antegrade nephrostogram confirms hydronephrosis. The completion image shows the nephrostomy with its loop in the right renal pelvis. The left antegrade nephrostogram confirms hydronephrosis. The completion image shows the nephrostomy with its loop in the left renal pelvis.     IMPRESSION:  1.  Successful bilateral percutaneous nephrostomy placement, as detailed above. 2.  A urine specimen was sent from each kidney for microbiology analysis.     CT Abdomen Pelvis w/o Contrast    Result Date: 1/4/2022  EXAM: CT ABDOMEN PELVIS W/O CONTRAST LOCATION: [facility] DATE/TIME: 1/4/2022 5:33 PM INDICATION: Recent urostomy, acute renal failure. COMPARISON: CT AP 11/17/2021. TECHNIQUE: CT scan of the abdomen and pelvis was performed without IV contrast. Multiplanar reformats were obtained. Dose  reduction techniques were used. CONTRAST: None. FINDINGS: LOWER CHEST: Normal. HEPATOBILIARY: Normal. PANCREAS: Normal. SPLEEN: Normal. ADRENAL GLANDS: Normal. KIDNEYS/BLADDER: Interval cystectomy and ileal conduit urinary diversion with right lower quadrant ostomy. Moderate right hydronephrosis and mild right proximal ureterectasis similar to prior. Interval development left perinephric edema, moderate hydronephrosis and mild ureterectasis to the level of the ureteral ileal conduit anastomosis. The ileal conduit is decompressed and normal in appearance. Small benign cyst right kidney. BOWEL: Colonic diverticulosis. No bowel obstruction or inflammatory change. LYMPH NODES: Normal. VASCULATURE: Unremarkable. PELVIC ORGANS: Prostatectomy. MUSCULOSKELETAL: Bones are demineralized. Bilateral hip arthroplasties.     IMPRESSION: 1.  Interval cystectomy and ileal conduit urinary diversion with right lower quadrant ostomy. 2.  Moderate right hydronephrosis and mild right proximal ureterectasis similar to prior. 3.  Interval development left perinephric edema moderate hydronephrosis and mild ureterectasis that tapers to the level of the ureteral ileal conduit anastomosis with cause of obstruction not evident.       PENDING LABS         PROCEDURES ( this hospitalization only)          RECOMMENDATIONS TO OUTPATIENT PROVIDER FOR F/U VISIT     Follow-up with urology and nephrology in 2 to 3 weeks    DISPOSITION     Home with home care    SUMMARY OF HOSPITAL COURSE:      Marcelo Valerio is a 76 year old male with history of bladder cancer status post cystectomy and ileal diversion in December 2021 admitted on 1/4/2022.  Patient has also been taking over-the-counter Advil to help sleep he complained of nausea, fatigue and decreased urine output.  He was sent to the ED after labs in the clinic showed elevated creatinine.     1.  Acute kidney injury on chronic kidney disease stage 4  CT abdomen and pelvis showed moderate right  hydronephrosis and mild right proximal ureterectasis similar to prior; interval development left perinephric edema moderate hydronephrosis and mild ureterectasis that tapers to the level of the ureteral ileal conduit anastomosis with cause of obstruction not evident.  Urology input appreciated.   --Avoid NSAIDs  S/p bilateral nephrostomy tube placement on 1/5.  May need antegrade ureteral stent placement as outpatient in the future  Creatinine is improving--daily down to 3.06  today  --continue PT OT eval     2. bilateral pyelonephritis with positive urine culture from the renal pelvis 1/5 at the time of nephrostomy tubes placement--growing VRE and Candida  --Initially treated with IV Zosyn then switched to IV daptomycin on 1/8 due to VRE  Added oral fluconazole for 2 weeks on 1/7.  Pharmacy to adjust dose based on GFR  -- Procalcitonin is elevated 0.92, thereby favoring treating positive urine cultures with antimicrobial agents.  -Patient was given 1 dose of fosfomycin and then switched to Zyvox by ID on 1/8.  Would need 10 more days days of antimicrobial agents to complete a 14-day course     3.  Non-anion gap metabolic acidosis  Secondary to renal failure  Resolved and therefore DC bicarb drip  -- Patient is on drip     4.  Type 2 diabetes mellitus with long-term insulin use  --Improved control  On dulaglutide once a week. hold in the hospital  Improved control Lantus to once daily given kidney failure  NovoLog sliding scale  Change to home meds at discharge     5.  Essential hypertension  --controlled.   Continue to hold amlodipine. Changed atenolol to metoprolol due to renal failure.  Change to metoprolol succinate 25 mg at discharge     6.  Glaucoma  Continue Xalatan     Anemia likely due to CKD 4 and iron deficiency  --Globin is stable  --low transferrin saturation of 8.  --started on IV iron/Venofer by renal consult.  Oral iron at discharge  -- Hemoglobin stable     Hypokalemia--mild  --now off bicarb drip  and  Started 1/2 normal saline on 1/7. Now off  --continue replacement of potassium     Hypomagnesemia  --Continue magnesium replacement protocol        Magnesium   Date Value Ref Range Status   01/09/2022 1.6 (L) 1.8 - 2.6 mg/dL Final   --May need oral replacement at discharge        Periumbilical abdominal pain spasmodic arm 1/6  -- Resolved with 2 bowel movements after Dulcolax suppository was ordered  -- X-ray abdomen shows mild ileus but clinically improving  --Diarrhea has resolved.  Continue stool softeners     Anisocoria likely chronic due to previous cataract surgery  -- CT head ordered by resident.  No acute process  -- No further work-up necessary.  DC neurochecks      Discharge Medications with Med changes:        Review of your medicines      START taking      Dose / Directions   famotidine 10 MG tablet  Commonly known as: PEPCID  Used for: Gastroesophageal reflux disease with esophagitis, unspecified whether hemorrhage      Dose: 10 mg  Take 1 tablet (10 mg) by mouth 2 times daily  Quantity: 30 tablet  Refills: 1     ferrous sulfate 325 (65 Fe) MG tablet  Commonly known as: FEROSUL  Used for: Iron deficiency anemia, unspecified iron deficiency anemia type      Dose: 325 mg  Take 1 tablet (325 mg) by mouth daily (with breakfast)  Quantity: 30 tablet  Refills: 1     fluconazole 200 MG tablet  Commonly known as: DIFLUCAN  Indication: candiduria  Used for: Pyelonephritis      Dose: 200 mg  Start taking on: January 11, 2022  Take 1 tablet (200 mg) by mouth daily for 10 days  Quantity: 10 tablet  Refills: 0     linezolid 600 MG tablet  Commonly known as: ZYVOX  Indication: Urinary Tract Infection  Used for: Pyelonephritis      Dose: 600 mg  Take 1 tablet (600 mg) by mouth every 12 hours for 10 days  Quantity: 20 tablet  Refills: 0     metoprolol succinate ER 25 MG 24 hr tablet  Commonly known as: TOPROL-XL      Dose: 25 mg  Take 1 tablet (25 mg) by mouth daily  Quantity: 30 tablet  Refills: 3     Slow-Mag  71.5-119 MG Tbec  Used for: Hypomagnesemia  Generic drug: Magnesium Cl-Calcium Carbonate      Dose: 1 tablet  Take 1 tablet by mouth daily  Quantity: 30 tablet  Refills: 1        CONTINUE these medicines which have NOT CHANGED      Dose / Directions   acetaminophen 325 MG tablet  Commonly known as: TYLENOL  Used for: Malignant neoplasm of overlapping sites of bladder (H)      Dose: 325-650 mg  Take 1-2 tablets (325-650 mg) by mouth every 4 hours as needed for mild pain or pain  Quantity: 30 tablet  Refills: 0     amLODIPine 2.5 MG tablet  Commonly known as: NORVASC  Used for: Benign essential hypertension      Dose: 2.5 mg  Take 1 tablet (2.5 mg) by mouth 2 times daily  Quantity: 60 tablet  Refills: 0     amoxicillin 500 MG capsule  Commonly known as: AMOXIL      Dose: 2,000 mg  [AMOXICILLIN (AMOXIL) 500 MG CAPSULE] Take 2,000 mg by mouth as needed. Prior to dental procedures  Refills: 0     aspirin 81 MG EC tablet  Commonly known as: ASA  Used for: Malignant neoplasm of overlapping sites of bladder (H)      Dose: 81 mg  Take 1 tablet (81 mg) by mouth daily  Quantity: 15 tablet  Refills: 0     brimonidine 0.15 % ophthalmic solution  Commonly known as: ALPHAGAN-P      Dose: 1 drop  [BRIMONIDINE (ALPHAGAN) 0.15 % OPHTHALMIC SOLUTION] Administer 1 drop to both eyes 2 (two) times a day.  Refills: 0     calcipotriene 0.005 % external cream  Commonly known as: DOVONOX      Dose: 1 Application  [CALCIPOTRIENE (DOVONOX) 0.005 % CREAM] Apply 1 application topically daily as needed.  Refills: 0     Dulaglutide 3 MG/0.5ML Sopn      Dose: 3 mg  Inject 3 mg Subcutaneous once a week Mondays  Refills: 0     insulin glargine 100 UNIT/ML pen      Dose: 20 Units  Inject 20 Units Subcutaneous 2 times daily  Refills: 0     latanoprost 0.005 % ophthalmic solution  Commonly known as: XALATAN      Dose: 1 drop  [LATANOPROST (XALATAN) 0.005 % OPHTHALMIC SOLUTION] Administer 1 drop to both eyes at bedtime.  Refills: 0     polyethylene  glycol 17 GM/Dose powder  Commonly known as: MIRALAX  Used for: Malignant neoplasm of overlapping sites of bladder (H)      Dose: 17 g  Take 17 g by mouth daily Discontinue if stools are loose  Quantity: 255 g  Refills: 0     simvastatin 20 MG tablet  Commonly known as: ZOCOR      Dose: 20 mg  Take 20 mg by mouth At Bedtime  Refills: 0     triamcinolone 0.1 % external cream  Commonly known as: KENALOG      Dose: 1 Application  Apply 1 Application topically See Admin Instructions Two times a day on Saturdays and Sundays  Refills: 0        STOP taking    atenolol 50 MG tablet  Commonly known as: TENORMIN              Where to get your medicines      These medications were sent to [facility], [place] - [address]  [address] , [place] [address]    Phone: [phone]   famotidine 10 MG tablet  ferrous sulfate 325 (65 Fe) MG tablet  fluconazole 200 MG tablet  linezolid 600 MG tablet  metoprolol succinate ER 25 MG 24 hr tablet  Slow-Mag 71.5-119 MG Tbec             Rationale for medication changes:    Zyvox for VRE in the urine culture  Fluconazole for Candida in the urine  Atenolol changed to metoprolol due to kidney failure  Magnesium chloride for hypomagnesemia  Iron sulfate for iron deficiency anemia  Famotidine for GERD        Consults   Infectious disease, urology and nephrology      Immunizations given this encounter     Immunization History   Administered Date(s) Administered     COVID-19,PF,Moderna 01/29/2021, 02/26/2021, 11/15/2021     Influenza, Quad, High Dose, Pf, 65yr+ (Fluzone HD) 11/15/2021          Anticoagulation Information      Recent INR results:   Recent Labs   Lab Test 01/04/22 2126   INR 1.27*     Warfarin doses (if applicable) or name of other anticoagulant: Not applicable      Discharge Orders     Discharge Procedure Orders   Home Care PT Referral for Hospital Discharge   Referral Priority: Routine: Next available opening Referral Type: Home Health Therapies  & Aides   Number of Visits Requested: 1     Home care nursing referral   Referral Priority: Routine: Next available opening Referral Type: Home Health Therapies & Aides   Number of Visits Requested: 1     Home Care OT Referral for Hospital Discharge   Referral Priority: Routine: Next available opening Referral Type: Consultation   Number of Visits Requested: 1     Follow-up and recommended labs and tests    Order Comments: Follow up with primary care provider, Urbano Cedeno, within 7 days for hospital follow- up.  The following labs/tests are recommended: Basic metabolic profile, magnesium and hemoglobin.    Follow-up with urology and nephrology in 2 to 3 weeks    Follow-up with interventional radiology in 3 months for exchange     Activity   Order Comments: Your activity upon discharge: activity as tolerated     Order Specific Question Answer Comments   Is discharge order? Yes      MD face to face encounter   Order Comments: Documentation of Face to Face and Certification for Home Health Services    I certify that patient: Marcelo Valerio is under my care and that I, or a nurse practitioner or physician's assistant working with me, had a face-to-face encounter that meets the physician face-to-face encounter requirements with this patient on: 1/10/2022.    This encounter with the patient was in whole, or in part, for the following medical condition, which is the primary reason for home health care: Patient has been weakened due to pyelonephritis acute renal failure with bilateral nephrosis status post bilateral nephrostomy tubes and would benefit from home RN, PT OT and HHA.  Patient also has ileal conduit which requires assistance from home RN    I certify that, based on my findings, the following services are medically necessary home health services: Nursing, Occupational Therapy, Physical Therapy, and HHA.    My clinical findings support the need for the above services because: Nurse is needed: For complex  aftercare of surgical procedures because the patient needs instruction and cannot perform care on their own due to: Weakness and bilateral nephrostomy tube.., Occupational Therapy Services are needed to assess and treat cognitive ability and address ADL safety due to impairment in weakness and gait instability., and Physical Therapy Services are needed to assess and treat the following functional impairments: Weakness and gait instability.    Further, I certify that my clinical findings support that this patient is homebound (i.e. absences from home require considerable and taxing effort and are for medical reasons or [facility] services or infrequently or of short duration when for other reasons) because: Requires assistance of another person or specialized equipment to access medical services because patient: Is unable to walk greater than 50  feet without rest...    Based on the above findings. I certify that this patient is confined to the home and needs intermittent skilled nursing care, physical therapy and/or speech therapy.  The patient is under my care, and I have initiated the establishment of the plan of care.  This patient will be followed by a physician who will periodically review the plan of care.  Physician/Provider to provide follow up care: Urbano Cedeno    Attending hospital physician (the Medicare certified [facility] provider): Michael Higgins MD  Physician Signature: See electronic signature associated with these discharge orders.  Date: 1/10/2022     Reason for your hospital stay   Order Comments: Nausea, fatigue, decreased urine output and elevated creatinine     Diet   Order Comments: Follow this diet upon discharge: Orders Placed This Encounter      Combination Diet Moderate Consistent Carb (60 g CHO per Meal) Diet; Low Saturated Fat Na <2400mg Diet     Order Specific Question Answer Comments   Is discharge order? Yes      Examination     Vital Signs in last 24 hours:   Vital signs:  Temp:  "97.4  F (36.3  C) Temp src: Oral BP: 127/70 Pulse: 69   Resp: 16 SpO2: 99 % O2 Device: None (Room air)   Height: 180.3 cm (5' 11\") Weight: 98.7 kg (217 lb 11.2 oz)  Estimated body mass index is 30.36 kg/m  as calculated from the following:    Height as of this encounter: 1.803 m (5' 11\").    Weight as of this encounter: 98.7 kg (217 lb 11.2 oz).        General appearance: alert, appears stated age and cooperative   Clear urine bilateral nephrostomy tube    Please see EMR for more detailed significant labs, imaging, consultant notes etc.  Total time spent on discharge: 35 minutes    Michael Higgins MD   [facility]: Ph:[phone]    CC:Urbano Cedeno    "

## 2022-01-10 NOTE — PLAN OF CARE
Occupational Therapy Discharge Summary    Reason for therapy discharge:    Discharged to home with family    Progress towards therapy goal(s). See goals on Care Plan in [facility] electronic health record for goal details.  Goals met    Therapy recommendation(s):    No further therapy is recommended.

## 2022-01-10 NOTE — PLAN OF CARE
Physical Therapy Discharge Summary    Reason for therapy discharge:    Discharged to home.    Progress towards therapy goal(s). See goals on Care Plan in [facility] electronic health record for goal details.  Goals met    Therapy recommendation(s):    Continued therapy is recommended.  Rationale/Recommendations:  home PT to continue progression toward PLOF.    Radha Rossi, PT

## 2022-01-10 NOTE — PROGRESS NOTES
Place of Service:  [facility]     Reason for follow up: Urinary tract infection bilateral hydronephrosis status post cystectomy with ileal conduit 12/6/021 and now bilateral nephrostomy tubes 1/5/2022. [facility]    SUBJECTIVE:  Events:  Patient reports no acute events overnight, overall he is feeling well.    Bilaterally the nephrostomy tubes are draining and patient with some output from ileal conduit. No fevers, nausea or vomiting.     OBJECTIVE:  PHYSICAL EXAM:  Temp: 97.4  F (36.3  C) Temp src: Oral BP: 127/70 Pulse: 69   Resp: 16 SpO2: 99 % O2 Device: None (Room air)    General: NAD, alert, cooperative. Sitting in chair  Respiratory: no shortness of breath  GI: abdomin soft, non tender.Ileal conduit with clear yellow urine in bag, stoma well appearing  Bilateral nephrostomy tubes in place draining clear urine, appropriately secured to gown      LABS:  Creatinine   Date Value Ref Range Status   01/10/2022 3.05 (H) 0.70 - 1.30 mg/dL Final     Lab Results   Component Value Date    WBC 7.4 01/07/2022     Lab Results   Component Value Date    RBC 3.00 01/07/2022     Lab Results   Component Value Date    HGB 8.7 01/09/2022     Lab Results   Component Value Date     01/07/2022       UA:  UA RESULTS:  Recent Labs   Lab Test 01/04/22  1903   COLOR Light Yellow   APPEARANCE Turbid*   URINEGLC Negative   URINEBILI Negative   URINEKETONE Negative   SG 1.012   UBLD 0.1 mg/dL*   URINEPH 8.5*   PROTEIN 100 *   NITRITE Negative   LEUKEST 500 Rao/uL*   RBCU 4*   WBCU 16*         Cultures:  Right kidney aspirate  3+ Enterococcus faecium Abnormal     Susceptibilities done on previous cultures   3+ Candida parapsilosis complex Abnormal       Urine culture: mixed urogenital mely     Blood culture:No growth after 1 day    ASSESSMENT/PLAN:  Marcelo ML Valerio is being seen by [facility] for status post cystoprostatectomy admitted with acute renal failure secondary to ureteral obstruction and bilateral nephrostomy tubes  with pressure noted from the left nephrostomy tube.   - Creatinine continues to improve.   -Positive urine cultures: Urine urine culture reviewed Enterococcus and Candida drug resistance pattern is noted by infectious disease. Apprecite continued antibiotic per infectious disease  -Continue the nephrostomy tubes at this time. Will discuss further with Dr. Manrique recommendations on consideration for antegrade nephrostogram with potential for antegrade ureteral stent placement.    -Urology will continue to follow    Debi Oates PA-C  [facility]  [phone]

## 2022-01-10 NOTE — PLAN OF CARE
Problem: Adult Inpatient Plan of Care  Goal: Readiness for Transition of Care  Outcome: Improving     Problem: Risk for Delirium  Goal: Improved Sleep  Outcome: Improving     Patient slept well through the night. No complaints of pain or discomfort this shift. Urostomy is intact. L side drain had large output this shift.

## 2022-01-11 ENCOUNTER — PATIENT OUTREACH (OUTPATIENT)
Dept: CARE COORDINATION | Facility: CLINIC | Age: 77
End: 2022-01-11
Payer: COMMERCIAL

## 2022-01-11 DIAGNOSIS — Z71.89 OTHER SPECIFIED COUNSELING: ICD-10-CM

## 2022-01-11 NOTE — PROGRESS NOTES
"Clinic Care Coordination Contact  [facility]: Post-Discharge Note  SITUATION                                                      Admission:    Admission Date: 01/04/22   Reason for Admission: Acute renal failure superimposed on stage 3b chronic kidney disease  Discharge:   Discharge Date: 01/10/22  Discharge Diagnosis: Acute renal failure superimposed on stage 3b chronic kidney disease    BACKGROUND                                                      Marcelo Valerio is a 76 year old male who was sent to the emergency department due to elevated creatinine.  Past medical history of prostate cancer, bladder tumor, status post robotic assisted cystectomy ileal conduit creation on December 6, 2021, hypertension, diabetes, dyslipidemia, glaucoma, gout, obesity, knee osteoarthritis, restless leg syndrome.  Patient denies fevers, chills, chest pain, shortness of breath or palpitations.  He has noted decreased urine output in the ileostomy bag without blood.  Laboratory work-up showed creatinine 7.52, carbon dioxide 8.  Nephrology recommended bicarb drip.  CT scan showed interval development left perinephric edema, moderate hydronephrosis and mild ureterectasis that tapers to the level of the ureteral ileal conduit anastomosis.  Patient was evaluated by urology and recommended bilateral nephrostomy tubes and may need antegrade stent placement.       ASSESSMENT      Enrollment  Primary Care Care Coordination Status: Not a Candidate (PCP is with [facility])    Discharge Assessment  How are you doing now that you are home?: \"Fine\"  How are your symptoms? (Red Flag symptoms escalate to triage hotline per guidelines): Unchanged  Do you feel your condition is stable enough to be safe at home until your provider visit?: Yes  Does the patient have their discharge instructions? : Yes  Does the patient have questions regarding their discharge instructions? : No  Were you started on any new medications or were there " changes to any of your previous medications? : Yes  Does the patient have all of their medications?: Yes  Do you have questions regarding any of your medications? : No  Do you have all of your needed medical supplies or equipment (DME)?  (i.e. oxygen tank, CPAP, cane, etc.): Yes  Discharge follow-up appointment scheduled within 14 calendar days? : Yes  Discharge Follow Up Appointment Scheduled with?: Primary Care Provider    Post-op (TOSHA CTA Only)  If the patient had a surgery or procedure, do they have any questions for a nurse?: No      PLAN                                                      Outpatient Plan:    Follow up with primary care provider, Urbano Cedeno, within 7 days for hospital follow- up. The following labs/tests are  recommended: Basic metabolic profile, magnesium and hemoglobin.  Follow-up with urology and nephrology in 2 to 3 weeks  Follow-up with interventional radiology in 3 months for exchange    No future appointments.      For any urgent concerns, please contact our 24 hour nurse triage line: [phone] ([phone])         TOSHA Lockwood  [phone]  [facility]

## 2022-01-12 LAB
BACTERIA ASPIRATE CULT: NORMAL
BACTERIA ASPIRATE CULT: NORMAL

## 2022-01-12 NOTE — DISCHARGE SUMMARY
[facility] MEDICINE  DISCHARGE SUMMARY      Primary Care Physician: Urbano Cedeno                                              Admission Date: 1/4/2022   Discharge Provider: Michael Higgins MD Discharge Date: 1/10/2022   Diet: As given below    Code Status: Full Code   Activity: As tolerated           Condition at Discharge: Stable     REASON FOR PRESENTATION(See Admission Note for Details)   Nausea fatigue decreased urinary output and elevated creatinine     PRINCIPAL & ACTIVE DISCHARGE DIAGNOSES   Acute kidney injury on chronic kidney disease stage IV bilateral hydronephrosis  Bilateral pyelonephritis  Non-anion gap metabolic acidosis  Insulin-dependent diabetes  Essential hypertension  Glaucoma  Anemia of chronic disease  Hypokalemia  Hypomagnesemia  Periumbilical abdominal pain  Anisocoria  SIGNIFICANT FINDINGS (Imaging, labs):   IR Nephrostomy Tube Placement Bilateral     Result Date: 1/5/2022  [place] [facility] LOCATION: [facility] DATE: 1/5/2022 PROCEDURE: 1.  BILATERAL PERCUTANEOUS NEPHROSTOMY PLACEMENT WITH IMAGING GUIDANCE. 2.  MODERATE SEDATION. INTERVENTIONAL RADIOLOGIST: León Adams MD. INDICATION: 76-year-old male with bilateral ureteral obstruction status post cystectomy with ileal diversion. Bilateral nephrostomy placement is requested. CONSENT: The risks, benefits and alternatives of the stated procedure were discussed with the patient  in detail. All questions were answered. Informed consent was given to proceed with the procedure. MODERATE SEDATION: Versed 1 mg IV; Fentanyl 25 mcg IV.  Under physician supervision, Versed and fentanyl were administered for moderate sedation. Pulse oximetry, heart rate and blood pressure were continuously monitored by an independent trained observer. The physician spent 15 minutes of face-to-face sedation time with the patient. CONTRAST: 15 mL Omnipaque 350. ANTIBIOTICS: None. ADDITIONAL MEDICATIONS:  None. FLUOROSCOPIC TIME: 0.5 minutes. RADIATION DOSE: Air Kerma: 34 mGy. COMPLICATIONS: No immediate complications. STERILE BARRIER TECHNIQUE: Maximum sterile barrier technique was used. Cutaneous antisepsis was performed at the operative site with application of 2% chlorhexidine and large sterile drape. Prior to the procedure, the  and assistant performed hand hygiene and wore hat, mask, sterile gown, and sterile gloves during the entire procedure. PROCEDURE:  Using real-time ultrasound guidance, an 18-gauge trocar needle was inserted into a posterior midpole calyx of the right kidney from a subcostal approach. Over wire exchange was made for a 10 [place] nephrostomy. The loop was formed within the renal pelvis  and attached to gravity drainage. Using real-time ultrasound guidance, an 18-gauge trocar needle was inserted into a posterior lower pole calyx of the left kidney from a subcostal approach. Over wire exchange was made for a 10 [place] nephrostomy. The loop was formed within the renal pelvis and attached to gravity drainage. FINDINGS: Ultrasound demonstrates bilateral moderate hydronephrosis. Permanent images were stored of each kidney. The right antegrade nephrostogram confirms hydronephrosis. The completion image shows the nephrostomy with its loop in the right renal pelvis. The left antegrade nephrostogram confirms hydronephrosis. The completion image shows the nephrostomy with its loop in the left renal pelvis.      IMPRESSION:  1.  Successful bilateral percutaneous nephrostomy placement, as detailed above. 2.  A urine specimen was sent from each kidney for microbiology analysis.      CT Abdomen Pelvis w/o Contrast     Result Date: 1/4/2022  EXAM: CT ABDOMEN PELVIS W/O CONTRAST LOCATION: [facility] DATE/TIME: 1/4/2022 5:33 PM INDICATION: Recent urostomy, acute renal failure. COMPARISON: CT AP 11/17/2021. TECHNIQUE: CT scan of the abdomen and pelvis was performed without IV  contrast. Multiplanar reformats were obtained. Dose reduction techniques were used. CONTRAST: None. FINDINGS: LOWER CHEST: Normal. HEPATOBILIARY: Normal. PANCREAS: Normal. SPLEEN: Normal. ADRENAL GLANDS: Normal. KIDNEYS/BLADDER: Interval cystectomy and ileal conduit urinary diversion with right lower quadrant ostomy. Moderate right hydronephrosis and mild right proximal ureterectasis similar to prior. Interval development left perinephric edema, moderate hydronephrosis and mild ureterectasis to the level of the ureteral ileal conduit anastomosis. The ileal conduit is decompressed and normal in appearance. Small benign cyst right kidney. BOWEL: Colonic diverticulosis. No bowel obstruction or inflammatory change. LYMPH NODES: Normal. VASCULATURE: Unremarkable. PELVIC ORGANS: Prostatectomy. MUSCULOSKELETAL: Bones are demineralized. Bilateral hip arthroplasties.      IMPRESSION: 1.  Interval cystectomy and ileal conduit urinary diversion with right lower quadrant ostomy. 2.  Moderate right hydronephrosis and mild right proximal ureterectasis similar to prior. 3.  Interval development left perinephric edema moderate hydronephrosis and mild ureterectasis that tapers to the level of the ureteral ileal conduit anastomosis with cause of obstruction not evident.         PENDING LABS            PROCEDURES ( this hospitalization only)             RECOMMENDATIONS TO OUTPATIENT PROVIDER FOR F/U VISIT      Follow-up with urology and nephrology in 2 to 3 weeks     DISPOSITION      Home with home care     SUMMARY OF HOSPITAL COURSE:       Marcelo Valerio is a 76 year old male with history of bladder cancer status post cystectomy and ileal diversion in December 2021 admitted on 1/4/2022.  Patient has also been taking over-the-counter Advil to help sleep he complained of nausea, fatigue and decreased urine output.  He was sent to the ED after labs in the clinic showed elevated creatinine.     1.  Acute kidney injury on chronic kidney  disease stage 4  CT abdomen and pelvis showed moderate right hydronephrosis and mild right proximal ureterectasis similar to prior; interval development left perinephric edema moderate hydronephrosis and mild ureterectasis that tapers to the level of the ureteral ileal conduit anastomosis with cause of obstruction not evident.  Urology input appreciated.   --Avoid NSAIDs  S/p bilateral nephrostomy tube placement on 1/5.  May need antegrade ureteral stent placement as outpatient in the future  Creatinine is improving--daily down to 3.06  today  --continue PT OT eval     2. bilateral pyelonephritis with positive urine culture from the renal pelvis 1/5 at the time of nephrostomy tubes placement--growing VRE and Candida  --Initially treated with IV Zosyn then switched to IV daptomycin on 1/8 due to VRE  Added oral fluconazole for 2 weeks on 1/7.  Pharmacy to adjust dose based on GFR  -- Procalcitonin is elevated 0.92, thereby favoring treating positive urine cultures with antimicrobial agents.  -Patient was given 1 dose of fosfomycin and then switched to Zyvox by ID on 1/8.  Would need 10 more days days of antimicrobial agents to complete a 14-day course     3.  Non-anion gap metabolic acidosis  Secondary to renal failure  Resolved and therefore DC bicarb drip  -- Patient is on drip     4.  Type 2 diabetes mellitus with long-term insulin use  --Improved control  On dulaglutide once a week. hold in the hospital  Improved control Lantus to once daily given kidney failure  NovoLog sliding scale  Change to home meds at discharge     5.  Essential hypertension  --controlled.   Continue to hold amlodipine. Changed atenolol to metoprolol due to renal failure.  Change to metoprolol succinate 25 mg at discharge     6.  Glaucoma  Continue Xalatan     Anemia likely due to CKD 4 and iron deficiency  --Globin is stable  --low transferrin saturation of 8.  --started on IV iron/Venofer by renal consult.  Oral iron at discharge  --  Hemoglobin stable     Hypokalemia--mild  --now off bicarb drip and  Started 1/2 normal saline on 1/7. Now off  --continue replacement of potassium     Hypomagnesemia  --Continue magnesium replacement protocol            Magnesium   Date Value Ref Range Status   01/09/2022 1.6 (L) 1.8 - 2.6 mg/dL Final   --May need oral replacement at discharge        Periumbilical abdominal pain spasmodic arm 1/6  -- Resolved with 2 bowel movements after Dulcolax suppository was ordered  -- X-ray abdomen shows mild ileus but clinically improving  --Diarrhea has resolved.  Continue stool softeners     Anisocoria likely chronic due to previous cataract surgery  -- CT head ordered by resident.  No acute process  -- No further work-up necessary.  SHAYLA neuroccks        Discharge Medications with Med changes:              Review of your medicines           START taking      Dose / Directions   famotidine 10 MG tablet  Commonly known as: PEPCID  Used for: Gastroesophageal reflux disease with esophagitis, unspecified whether hemorrhage       Dose: 10 mg  Take 1 tablet (10 mg) by mouth 2 times daily  Quantity: 30 tablet  Refills: 1      ferrous sulfate 325 (65 Fe) MG tablet  Commonly known as: FEROSUL  Used for: Iron deficiency anemia, unspecified iron deficiency anemia type       Dose: 325 mg  Take 1 tablet (325 mg) by mouth daily (with breakfast)  Quantity: 30 tablet  Refills: 1      fluconazole 200 MG tablet  Commonly known as: DIFLUCAN  Indication: candiduria  Used for: Pyelonephritis       Dose: 200 mg  Start taking on: January 11, 2022  Take 1 tablet (200 mg) by mouth daily for 10 days  Quantity: 10 tablet  Refills: 0      linezolid 600 MG tablet  Commonly known as: ZYVOX  Indication: Urinary Tract Infection  Used for: Pyelonephritis       Dose: 600 mg  Take 1 tablet (600 mg) by mouth every 12 hours for 10 days  Quantity: 20 tablet  Refills: 0      metoprolol succinate ER 25 MG 24 hr tablet  Commonly known as: TOPROL-XL       Dose: 25  mg  Take 1 tablet (25 mg) by mouth daily  Quantity: 30 tablet  Refills: 3      Slow-Mag 71.5-119 MG Tbec  Used for: Hypomagnesemia  Generic drug: Magnesium Cl-Calcium Carbonate       Dose: 1 tablet  Take 1 tablet by mouth daily  Quantity: 30 tablet  Refills: 1                  CONTINUE these medicines which have NOT CHANGED      Dose / Directions   acetaminophen 325 MG tablet  Commonly known as: TYLENOL  Used for: Malignant neoplasm of overlapping sites of bladder (H)       Dose: 325-650 mg  Take 1-2 tablets (325-650 mg) by mouth every 4 hours as needed for mild pain or pain  Quantity: 30 tablet  Refills: 0      amLODIPine 2.5 MG tablet  Commonly known as: NORVASC  Used for: Benign essential hypertension       Dose: 2.5 mg  Take 1 tablet (2.5 mg) by mouth 2 times daily  Quantity: 60 tablet  Refills: 0      amoxicillin 500 MG capsule  Commonly known as: AMOXIL       Dose: 2,000 mg  [AMOXICILLIN (AMOXIL) 500 MG CAPSULE] Take 2,000 mg by mouth as needed. Prior to dental procedures  Refills: 0      aspirin 81 MG EC tablet  Commonly known as: ASA  Used for: Malignant neoplasm of overlapping sites of bladder (H)       Dose: 81 mg  Take 1 tablet (81 mg) by mouth daily  Quantity: 15 tablet  Refills: 0      brimonidine 0.15 % ophthalmic solution  Commonly known as: ALPHAGAN-P       Dose: 1 drop  [BRIMONIDINE (ALPHAGAN) 0.15 % OPHTHALMIC SOLUTION] Administer 1 drop to both eyes 2 (two) times a day.  Refills: 0      calcipotriene 0.005 % external cream  Commonly known as: DOVONOX       Dose: 1 Application  [CALCIPOTRIENE (DOVONOX) 0.005 % CREAM] Apply 1 application topically daily as needed.  Refills: 0      Dulaglutide 3 MG/0.5ML Sopn       Dose: 3 mg  Inject 3 mg Subcutaneous once a week Mondays  Refills: 0      insulin glargine 100 UNIT/ML pen       Dose: 20 Units  Inject 20 Units Subcutaneous 2 times daily  Refills: 0      latanoprost 0.005 % ophthalmic solution  Commonly known as: XALATAN       Dose: 1 drop  [LATANOPROST  (XALATAN) 0.005 % OPHTHALMIC SOLUTION] Administer 1 drop to both eyes at bedtime.  Refills: 0      polyethylene glycol 17 GM/Dose powder  Commonly known as: MIRALAX  Used for: Malignant neoplasm of overlapping sites of bladder (H)       Dose: 17 g  Take 17 g by mouth daily Discontinue if stools are loose  Quantity: 255 g  Refills: 0      simvastatin 20 MG tablet  Commonly known as: ZOCOR       Dose: 20 mg  Take 20 mg by mouth At Bedtime  Refills: 0      triamcinolone 0.1 % external cream  Commonly known as: KENALOG       Dose: 1 Application  Apply 1 Application topically See Admin Instructions Two times a day on Saturdays and Sundays  Refills: 0                      STOP taking    atenolol 50 MG tablet  Commonly known as: TENORMIN                             Where to get your medicines             These medications were sent to [facility] [place], [place] - [address]  [address] , [facility] [address]     Phone: [phone]     famotidine 10 MG tablet    ferrous sulfate 325 (65 Fe) MG tablet    fluconazole 200 MG tablet    linezolid 600 MG tablet    metoprolol succinate ER 25 MG 24 hr tablet    Slow-Mag 71.5-119 MG Tbec                  Rationale for medication changes:    Zyvox for VRE in the urine culture  Fluconazole for Candida in the urine  Atenolol changed to metoprolol due to kidney failure  Magnesium chloride for hypomagnesemia  Iron sulfate for iron deficiency anemia  Famotidine for GERD           Consults   Infectious disease, urology and nephrology        Immunizations given this encounter           Immunization History   Administered Date(s) Administered     COVID-19,PF,Moderna 01/29/2021, 02/26/2021, 11/15/2021     Influenza, Quad, High Dose, Pf, 65yr+ (Fluzone HD) 11/15/2021            Anticoagulation Information       Recent INR results:       Recent Labs   Lab Test 01/04/22 2126   INR 1.27*      Warfarin doses (if applicable) or name of other anticoagulant: Not  applicable        Discharge Orders          Discharge Procedure Orders   Home Care PT Referral for Hospital Discharge   Referral Priority: Routine: Next available opening Referral Type: Home Health Therapies & Aides   Number of Visits Requested: 1          Home care nursing referral   Referral Priority: Routine: Next available opening Referral Type: Home Health Therapies & Aides   Number of Visits Requested: 1          Home Care OT Referral for Hospital Discharge   Referral Priority: Routine: Next available opening Referral Type: Consultation   Number of Visits Requested: 1      Follow-up and recommended labs and tests    Order Comments: Follow up with primary care provider, Urbano Cedeno, within 7 days for hospital follow- up.  The following labs/tests are recommended: Basic metabolic profile, magnesium and hemoglobin.     Follow-up with urology and nephrology in 2 to 3 weeks     Follow-up with interventional radiology in 3 months for exchange          Activity   Order Comments: Your activity upon discharge: activity as tolerated      Order Specific Question Answer Comments   Is discharge order? Yes            MD face to face encounter   Order Comments: Documentation of Face to Face and Certification for Home Health Services     I certify that patient: Marcelo Valerio is under my care and that I, or a nurse practitioner or physician's assistant working with me, had a face-to-face encounter that meets the physician face-to-face encounter requirements with this patient on: 1/10/2022.     This encounter with the patient was in whole, or in part, for the following medical condition, which is the primary reason for home health care: Patient has been weakened due to pyelonephritis acute renal failure with bilateral nephrosis status post bilateral nephrostomy tubes and would benefit from home RN, PT OT and HHA.  Patient also has ileal conduit which requires assistance from home RN     I certify that, based on my  findings, the following services are medically necessary home health services: Nursing, Occupational Therapy, Physical Therapy, and HHA.     My clinical findings support the need for the above services because: Nurse is needed: For complex aftercare of surgical procedures because the patient needs instruction and cannot perform care on their own due to: Weakness and bilateral nephrostomy tube.., Occupational Therapy Services are needed to assess and treat cognitive ability and address ADL safety due to impairment in weakness and gait instability., and Physical Therapy Services are needed to assess and treat the following functional impairments: Weakness and gait instability.     Further, I certify that my clinical findings support that this patient is homebound (i.e. absences from home require considerable and taxing effort and are for medical reasons or [employer] services or infrequently or of short duration when for other reasons) because: Requires assistance of another person or specialized equipment to access medical services because patient: Is unable to walk greater than 50  feet without rest...     Based on the above findings. I certify that this patient is confined to the home and needs intermittent skilled nursing care, physical therapy and/or speech therapy.  The patient is under my care, and I have initiated the establishment of the plan of care.  This patient will be followed by a physician who will periodically review the plan of care.  Physician/Provider to provide follow up care: Urbano Cedeno     Attending hospital physician (the Medicare certified [place] provider): Michael Higgins MD  Physician Signature: See electronic signature associated with these discharge orders.  Date: 1/10/2022          Reason for your hospital stay   Order Comments: Nausea, fatigue, decreased urine output and elevated creatinine          Diet   Order Comments: Follow this diet upon discharge: Orders Placed This  "Encounter      Combination Diet Moderate Consistent Carb (60 g CHO per Meal) Diet; Low Saturated Fat Na <2400mg Diet      Order Specific Question Answer Comments   Is discharge order? Yes        Examination      Vital Signs in last 24 hours:   Vital signs:  Temp: 97.4  F (36.3  C) Temp src: Oral BP: 127/70 Pulse: 69   Resp: 16 SpO2: 99 % O2 Device: None (Room air)   Height: 180.3 cm (5' 11\") Weight: 98.7 kg (217 lb 11.2 oz)  Estimated body mass index is 30.36 kg/m  as calculated from the following:    Height as of this encounter: 1.803 m (5' 11\").    Weight as of this encounter: 98.7 kg (217 lb 11.2 oz).           General appearance: alert, appears stated age and cooperative   Clear urine bilateral nephrostomy tube     Please see EMR for more detailed significant labs, imaging, consultant notes etc.  Total time spent on discharge: 35 minutes     Michael Higgins MD   [facility]: Ph:[phone]     CC:Urbano Cedeno      "

## 2022-01-13 LAB
BACTERIA BLD CULT: NO GROWTH
BACTERIA BLD CULT: NO GROWTH

## 2022-01-29 ENCOUNTER — APPOINTMENT (OUTPATIENT)
Dept: CT IMAGING | Facility: HOSPITAL | Age: 77
DRG: 689 | End: 2022-01-29
Attending: HOSPITALIST
Payer: MEDICARE

## 2022-01-29 ENCOUNTER — HOSPITAL ENCOUNTER (INPATIENT)
Facility: HOSPITAL | Age: 77
LOS: 4 days | Discharge: HOME HEALTH | DRG: 689 | End: 2022-02-02
Attending: EMERGENCY MEDICINE | Admitting: HOSPITALIST
Payer: MEDICARE

## 2022-01-29 ENCOUNTER — APPOINTMENT (OUTPATIENT)
Dept: CT IMAGING | Facility: HOSPITAL | Age: 77
DRG: 689 | End: 2022-01-29
Attending: EMERGENCY MEDICINE
Payer: MEDICARE

## 2022-01-29 DIAGNOSIS — G93.41 SEPSIS WITH ENCEPHALOPATHY WITHOUT SEPTIC SHOCK, DUE TO UNSPECIFIED ORGANISM (H): ICD-10-CM

## 2022-01-29 DIAGNOSIS — N18.9 CHRONIC RENAL IMPAIRMENT, UNSPECIFIED CKD STAGE: ICD-10-CM

## 2022-01-29 DIAGNOSIS — R65.20 SEPSIS WITH ENCEPHALOPATHY WITHOUT SEPTIC SHOCK, DUE TO UNSPECIFIED ORGANISM (H): ICD-10-CM

## 2022-01-29 DIAGNOSIS — A41.9 SEPSIS WITH ENCEPHALOPATHY WITHOUT SEPTIC SHOCK, DUE TO UNSPECIFIED ORGANISM (H): ICD-10-CM

## 2022-01-29 DIAGNOSIS — N39.0 URINARY TRACT INFECTION WITH HEMATURIA, SITE UNSPECIFIED: ICD-10-CM

## 2022-01-29 DIAGNOSIS — R31.9 URINARY TRACT INFECTION WITH HEMATURIA, SITE UNSPECIFIED: ICD-10-CM

## 2022-01-29 DIAGNOSIS — D64.9 ANEMIA, UNSPECIFIED TYPE: ICD-10-CM

## 2022-01-29 PROBLEM — N18.4 CKD (CHRONIC KIDNEY DISEASE) STAGE 4, GFR 15-29 ML/MIN (H): Status: ACTIVE | Noted: 2022-01-29

## 2022-01-29 PROBLEM — G93.40 ACUTE ENCEPHALOPATHY: Status: ACTIVE | Noted: 2022-01-29

## 2022-01-29 PROBLEM — C67.8 MALIGNANT NEOPLASM OF OVERLAPPING SITES OF BLADDER (H): Status: ACTIVE | Noted: 2021-12-06

## 2022-01-29 LAB
ALBUMIN SERPL-MCNC: 2.7 G/DL (ref 3.5–5)
ALBUMIN UR-MCNC: 100 MG/DL
ALP SERPL-CCNC: 224 U/L (ref 45–120)
ALT SERPL W P-5'-P-CCNC: 79 U/L (ref 0–45)
AMMONIA PLAS-SCNC: 16 UMOL/L (ref 11–35)
AMORPH CRY #/AREA URNS HPF: ABNORMAL /HPF
ANION GAP SERPL CALCULATED.3IONS-SCNC: 12 MMOL/L (ref 5–18)
APPEARANCE UR: ABNORMAL
AST SERPL W P-5'-P-CCNC: 97 U/L (ref 0–40)
BACTERIA #/AREA URNS HPF: ABNORMAL /HPF
BASOPHILS # BLD AUTO: 0 10E3/UL (ref 0–0.2)
BASOPHILS NFR BLD AUTO: 0 %
BILIRUB DIRECT SERPL-MCNC: 0.3 MG/DL
BILIRUB SERPL-MCNC: 0.7 MG/DL (ref 0–1)
BILIRUB UR QL STRIP: NEGATIVE
BUN SERPL-MCNC: 34 MG/DL (ref 8–28)
C REACTIVE PROTEIN LHE: 20.6 MG/DL (ref 0–0.8)
CALCIUM SERPL-MCNC: 10.1 MG/DL (ref 8.5–10.5)
CHLORIDE BLD-SCNC: 100 MMOL/L (ref 98–107)
CO2 SERPL-SCNC: 22 MMOL/L (ref 22–31)
COLOR UR AUTO: ABNORMAL
CREAT SERPL-MCNC: 2.19 MG/DL (ref 0.7–1.3)
EOSINOPHIL # BLD AUTO: 0 10E3/UL (ref 0–0.7)
EOSINOPHIL NFR BLD AUTO: 0 %
ERYTHROCYTE [DISTWIDTH] IN BLOOD BY AUTOMATED COUNT: 13.6 % (ref 10–15)
GFR SERPL CREATININE-BSD FRML MDRD: 30 ML/MIN/1.73M2
GLUCOSE BLD-MCNC: 178 MG/DL (ref 70–125)
GLUCOSE UR STRIP-MCNC: NEGATIVE MG/DL
HCT VFR BLD AUTO: 26.5 % (ref 40–53)
HGB BLD-MCNC: 8.2 G/DL (ref 13.3–17.7)
HGB UR QL STRIP: ABNORMAL
HOLD SPECIMEN: NORMAL
IMM GRANULOCYTES # BLD: 0.2 10E3/UL
IMM GRANULOCYTES NFR BLD: 2 %
KETONES UR STRIP-MCNC: NEGATIVE MG/DL
LACTATE SERPL-SCNC: 0.7 MMOL/L (ref 0.7–2)
LACTATE SERPL-SCNC: 2.2 MMOL/L (ref 0.7–2)
LEUKOCYTE ESTERASE UR QL STRIP: ABNORMAL
LYMPHOCYTES # BLD AUTO: 2.9 10E3/UL (ref 0.8–5.3)
LYMPHOCYTES NFR BLD AUTO: 27 %
MCH RBC QN AUTO: 28.5 PG (ref 26.5–33)
MCHC RBC AUTO-ENTMCNC: 30.9 G/DL (ref 31.5–36.5)
MCV RBC AUTO: 92 FL (ref 78–100)
MONOCYTES # BLD AUTO: 1.6 10E3/UL (ref 0–1.3)
MONOCYTES NFR BLD AUTO: 15 %
MUCOUS THREADS #/AREA URNS LPF: PRESENT /LPF
NEUTROPHILS # BLD AUTO: 6 10E3/UL (ref 1.6–8.3)
NEUTROPHILS NFR BLD AUTO: 56 %
NITRATE UR QL: NEGATIVE
NRBC # BLD AUTO: 0 10E3/UL
NRBC BLD AUTO-RTO: 0 /100
PH UR STRIP: 6 [PH] (ref 5–7)
PLATELET # BLD AUTO: 336 10E3/UL (ref 150–450)
POTASSIUM BLD-SCNC: 4.9 MMOL/L (ref 3.5–5)
PROCALCITONIN SERPL-MCNC: 0.68 NG/ML (ref 0–0.49)
PROT SERPL-MCNC: 7.3 G/DL (ref 6–8)
RBC # BLD AUTO: 2.88 10E6/UL (ref 4.4–5.9)
RBC URINE: 0 /HPF
SARS-COV-2 RNA RESP QL NAA+PROBE: NEGATIVE
SODIUM SERPL-SCNC: 134 MMOL/L (ref 136–145)
SP GR UR STRIP: 1.02 (ref 1–1.03)
SQUAMOUS EPITHELIAL: <1 /HPF
UROBILINOGEN UR STRIP-MCNC: <2 MG/DL
WBC # BLD AUTO: 10.6 10E3/UL (ref 4–11)
WBC CLUMPS #/AREA URNS HPF: PRESENT /HPF
WBC URINE: 88 /HPF

## 2022-01-29 PROCEDURE — 87086 URINE CULTURE/COLONY COUNT: CPT | Performed by: EMERGENCY MEDICINE

## 2022-01-29 PROCEDURE — 70450 CT HEAD/BRAIN W/O DYE: CPT

## 2022-01-29 PROCEDURE — 87040 BLOOD CULTURE FOR BACTERIA: CPT | Performed by: EMERGENCY MEDICINE

## 2022-01-29 PROCEDURE — 81001 URINALYSIS AUTO W/SCOPE: CPT | Performed by: EMERGENCY MEDICINE

## 2022-01-29 PROCEDURE — 250N000011 HC RX IP 250 OP 636: Performed by: HOSPITALIST

## 2022-01-29 PROCEDURE — 36415 COLL VENOUS BLD VENIPUNCTURE: CPT | Performed by: STUDENT IN AN ORGANIZED HEALTH CARE EDUCATION/TRAINING PROGRAM

## 2022-01-29 PROCEDURE — C9803 HOPD COVID-19 SPEC COLLECT: HCPCS

## 2022-01-29 PROCEDURE — 250N000011 HC RX IP 250 OP 636: Performed by: EMERGENCY MEDICINE

## 2022-01-29 PROCEDURE — 86140 C-REACTIVE PROTEIN: CPT | Performed by: EMERGENCY MEDICINE

## 2022-01-29 PROCEDURE — 83605 ASSAY OF LACTIC ACID: CPT | Performed by: EMERGENCY MEDICINE

## 2022-01-29 PROCEDURE — 96361 HYDRATE IV INFUSION ADD-ON: CPT

## 2022-01-29 PROCEDURE — 82140 ASSAY OF AMMONIA: CPT | Performed by: EMERGENCY MEDICINE

## 2022-01-29 PROCEDURE — 74176 CT ABD & PELVIS W/O CONTRAST: CPT

## 2022-01-29 PROCEDURE — 85025 COMPLETE CBC W/AUTO DIFF WBC: CPT | Performed by: EMERGENCY MEDICINE

## 2022-01-29 PROCEDURE — 84145 PROCALCITONIN (PCT): CPT | Performed by: EMERGENCY MEDICINE

## 2022-01-29 PROCEDURE — 250N000013 HC RX MED GY IP 250 OP 250 PS 637: Performed by: HOSPITALIST

## 2022-01-29 PROCEDURE — 87635 SARS-COV-2 COVID-19 AMP PRB: CPT | Performed by: EMERGENCY MEDICINE

## 2022-01-29 PROCEDURE — 96365 THER/PROPH/DIAG IV INF INIT: CPT

## 2022-01-29 PROCEDURE — 36415 COLL VENOUS BLD VENIPUNCTURE: CPT | Performed by: EMERGENCY MEDICINE

## 2022-01-29 PROCEDURE — 93005 ELECTROCARDIOGRAM TRACING: CPT | Performed by: EMERGENCY MEDICINE

## 2022-01-29 PROCEDURE — 258N000003 HC RX IP 258 OP 636: Performed by: EMERGENCY MEDICINE

## 2022-01-29 PROCEDURE — 82248 BILIRUBIN DIRECT: CPT | Performed by: EMERGENCY MEDICINE

## 2022-01-29 PROCEDURE — 120N000001 HC R&B MED SURG/OB

## 2022-01-29 PROCEDURE — 99285 EMERGENCY DEPT VISIT HI MDM: CPT | Mod: 25

## 2022-01-29 PROCEDURE — 80053 COMPREHEN METABOLIC PANEL: CPT | Performed by: EMERGENCY MEDICINE

## 2022-01-29 PROCEDURE — 99223 1ST HOSP IP/OBS HIGH 75: CPT | Performed by: HOSPITALIST

## 2022-01-29 PROCEDURE — 96372 THER/PROPH/DIAG INJ SC/IM: CPT

## 2022-01-29 PROCEDURE — 96367 TX/PROPH/DG ADDL SEQ IV INF: CPT

## 2022-01-29 RX ORDER — MAGNESIUM OXIDE 400 MG/1
400 TABLET ORAL DAILY
Status: DISCONTINUED | OUTPATIENT
Start: 2022-01-30 | End: 2022-02-02 | Stop reason: HOSPADM

## 2022-01-29 RX ORDER — CEFTRIAXONE 1 G/1
1 INJECTION, POWDER, FOR SOLUTION INTRAMUSCULAR; INTRAVENOUS ONCE
Status: DISCONTINUED | OUTPATIENT
Start: 2022-01-29 | End: 2022-01-29 | Stop reason: ALTCHOICE

## 2022-01-29 RX ORDER — PIPERACILLIN SODIUM, TAZOBACTAM SODIUM 3; .375 G/15ML; G/15ML
3.38 INJECTION, POWDER, LYOPHILIZED, FOR SOLUTION INTRAVENOUS EVERY 8 HOURS
Status: DISCONTINUED | OUTPATIENT
Start: 2022-01-30 | End: 2022-02-01

## 2022-01-29 RX ORDER — NICOTINE POLACRILEX 4 MG
15-30 LOZENGE BUCCAL
Status: DISCONTINUED | OUTPATIENT
Start: 2022-01-29 | End: 2022-02-02 | Stop reason: HOSPADM

## 2022-01-29 RX ORDER — PIPERACILLIN SODIUM, TAZOBACTAM SODIUM 3; .375 G/15ML; G/15ML
3.38 INJECTION, POWDER, LYOPHILIZED, FOR SOLUTION INTRAVENOUS ONCE
Status: COMPLETED | OUTPATIENT
Start: 2022-01-29 | End: 2022-01-29

## 2022-01-29 RX ORDER — DEXTROSE MONOHYDRATE 25 G/50ML
25-50 INJECTION, SOLUTION INTRAVENOUS
Status: DISCONTINUED | OUTPATIENT
Start: 2022-01-29 | End: 2022-02-02 | Stop reason: HOSPADM

## 2022-01-29 RX ORDER — LINEZOLID 2 MG/ML
600 INJECTION, SOLUTION INTRAVENOUS ONCE
Status: COMPLETED | OUTPATIENT
Start: 2022-01-29 | End: 2022-01-29

## 2022-01-29 RX ORDER — BACLOFEN 5 MG/1
5 TABLET ORAL 3 TIMES DAILY PRN
Status: ON HOLD | COMMUNITY
End: 2022-02-02

## 2022-01-29 RX ORDER — HEPARIN SODIUM 5000 [USP'U]/.5ML
5000 INJECTION, SOLUTION INTRAVENOUS; SUBCUTANEOUS EVERY 12 HOURS
Status: DISCONTINUED | OUTPATIENT
Start: 2022-01-30 | End: 2022-02-02 | Stop reason: HOSPADM

## 2022-01-29 RX ORDER — BRIMONIDINE TARTRATE 1.5 MG/ML
1 SOLUTION/ DROPS OPHTHALMIC 2 TIMES DAILY
Status: DISCONTINUED | OUTPATIENT
Start: 2022-01-30 | End: 2022-02-02 | Stop reason: HOSPADM

## 2022-01-29 RX ORDER — ASPIRIN 81 MG/1
81 TABLET ORAL DAILY
Status: DISCONTINUED | OUTPATIENT
Start: 2022-01-30 | End: 2022-02-02 | Stop reason: HOSPADM

## 2022-01-29 RX ORDER — METOPROLOL SUCCINATE 25 MG/1
25 TABLET, EXTENDED RELEASE ORAL DAILY
Status: DISCONTINUED | OUTPATIENT
Start: 2022-01-30 | End: 2022-02-02 | Stop reason: HOSPADM

## 2022-01-29 RX ORDER — LINEZOLID 600 MG/1
600 TABLET, FILM COATED ORAL EVERY 12 HOURS SCHEDULED
Status: DISCONTINUED | OUTPATIENT
Start: 2022-01-30 | End: 2022-01-31

## 2022-01-29 RX ORDER — INSULIN GLARGINE 100 [IU]/ML
15 INJECTION, SOLUTION SUBCUTANEOUS 2 TIMES DAILY
Status: DISCONTINUED | OUTPATIENT
Start: 2022-01-29 | End: 2022-01-29

## 2022-01-29 RX ORDER — FAMOTIDINE 10 MG
10 TABLET ORAL 2 TIMES DAILY
Status: DISCONTINUED | OUTPATIENT
Start: 2022-01-29 | End: 2022-02-02 | Stop reason: HOSPADM

## 2022-01-29 RX ORDER — LATANOPROST 50 UG/ML
1 SOLUTION/ DROPS OPHTHALMIC AT BEDTIME
Status: DISCONTINUED | OUTPATIENT
Start: 2022-01-29 | End: 2022-02-02 | Stop reason: HOSPADM

## 2022-01-29 RX ADMIN — HEPARIN SODIUM 5000 UNITS: 5000 INJECTION, SOLUTION INTRAVENOUS; SUBCUTANEOUS at 23:40

## 2022-01-29 RX ADMIN — SODIUM CHLORIDE 1000 ML: 9 INJECTION, SOLUTION INTRAVENOUS at 20:13

## 2022-01-29 RX ADMIN — FAMOTIDINE 10 MG: 10 TABLET ORAL at 23:37

## 2022-01-29 RX ADMIN — LATANOPROST 1 DROP: 50 SOLUTION OPHTHALMIC at 23:38

## 2022-01-29 RX ADMIN — LINEZOLID 600 MG: 600 INJECTION, SOLUTION INTRAVENOUS at 22:40

## 2022-01-29 RX ADMIN — PIPERACILLIN SODIUM AND TAZOBACTAM SODIUM 3.38 G: 3; .375 INJECTION, POWDER, LYOPHILIZED, FOR SOLUTION INTRAVENOUS at 21:25

## 2022-01-29 ASSESSMENT — ACTIVITIES OF DAILY LIVING (ADL)
ADLS_ACUITY_SCORE: 9
ADLS_ACUITY_SCORE: 9

## 2022-01-30 ENCOUNTER — APPOINTMENT (OUTPATIENT)
Dept: OCCUPATIONAL THERAPY | Facility: HOSPITAL | Age: 77
DRG: 689 | End: 2022-01-30
Attending: HOSPITALIST
Payer: MEDICARE

## 2022-01-30 LAB
ATRIAL RATE - MUSE: 93 BPM
DIASTOLIC BLOOD PRESSURE - MUSE: 63 MMHG
GLUCOSE BLDC GLUCOMTR-MCNC: 101 MG/DL (ref 70–99)
GLUCOSE BLDC GLUCOMTR-MCNC: 126 MG/DL (ref 70–99)
GLUCOSE BLDC GLUCOMTR-MCNC: 153 MG/DL (ref 70–99)
GLUCOSE BLDC GLUCOMTR-MCNC: 183 MG/DL (ref 70–99)
GLUCOSE BLDC GLUCOMTR-MCNC: 193 MG/DL (ref 70–99)
HOLD SPECIMEN: NORMAL
HOLD SPECIMEN: NORMAL
INTERPRETATION ECG - MUSE: NORMAL
P AXIS - MUSE: 14 DEGREES
PLATELET # BLD AUTO: 269 10E3/UL (ref 150–450)
PR INTERVAL - MUSE: 172 MS
QRS DURATION - MUSE: 70 MS
QT - MUSE: 326 MS
QTC - MUSE: 405 MS
R AXIS - MUSE: -16 DEGREES
SYSTOLIC BLOOD PRESSURE - MUSE: 125 MMHG
T AXIS - MUSE: -7 DEGREES
VENTRICULAR RATE- MUSE: 93 BPM

## 2022-01-30 PROCEDURE — 120N000001 HC R&B MED SURG/OB

## 2022-01-30 PROCEDURE — 250N000011 HC RX IP 250 OP 636: Performed by: HOSPITALIST

## 2022-01-30 PROCEDURE — 99232 SBSQ HOSP IP/OBS MODERATE 35: CPT | Performed by: HOSPITALIST

## 2022-01-30 PROCEDURE — 85049 AUTOMATED PLATELET COUNT: CPT | Performed by: HOSPITALIST

## 2022-01-30 PROCEDURE — 96372 THER/PROPH/DIAG INJ SC/IM: CPT

## 2022-01-30 PROCEDURE — 99207 PR CDG-MDM COMPONENT: MEETS MODERATE - DOWN CODED: CPT | Performed by: HOSPITALIST

## 2022-01-30 PROCEDURE — 97535 SELF CARE MNGMENT TRAINING: CPT | Mod: GO

## 2022-01-30 PROCEDURE — 250N000013 HC RX MED GY IP 250 OP 250 PS 637: Performed by: HOSPITALIST

## 2022-01-30 PROCEDURE — 250N000012 HC RX MED GY IP 250 OP 636 PS 637: Performed by: HOSPITALIST

## 2022-01-30 PROCEDURE — 250N000009 HC RX 250: Performed by: HOSPITALIST

## 2022-01-30 PROCEDURE — 87086 URINE CULTURE/COLONY COUNT: CPT | Performed by: HOSPITALIST

## 2022-01-30 PROCEDURE — 87106 FUNGI IDENTIFICATION YEAST: CPT | Performed by: HOSPITALIST

## 2022-01-30 PROCEDURE — 96376 TX/PRO/DX INJ SAME DRUG ADON: CPT

## 2022-01-30 PROCEDURE — 97165 OT EVAL LOW COMPLEX 30 MIN: CPT | Mod: GO

## 2022-01-30 PROCEDURE — 36415 COLL VENOUS BLD VENIPUNCTURE: CPT | Performed by: HOSPITALIST

## 2022-01-30 RX ORDER — ACETAMINOPHEN 325 MG/1
650 TABLET ORAL EVERY 4 HOURS PRN
Status: DISCONTINUED | OUTPATIENT
Start: 2022-01-30 | End: 2022-02-02 | Stop reason: HOSPADM

## 2022-01-30 RX ORDER — BISACODYL 10 MG
10 SUPPOSITORY, RECTAL RECTAL DAILY PRN
Status: DISCONTINUED | OUTPATIENT
Start: 2022-01-30 | End: 2022-02-02 | Stop reason: HOSPADM

## 2022-01-30 RX ORDER — POLYETHYLENE GLYCOL 3350 17 G/17G
17 POWDER, FOR SOLUTION ORAL DAILY PRN
Status: DISCONTINUED | OUTPATIENT
Start: 2022-01-30 | End: 2022-02-02 | Stop reason: HOSPADM

## 2022-01-30 RX ORDER — CARBOXYMETHYLCELLULOSE SODIUM 5 MG/ML
1 SOLUTION/ DROPS OPHTHALMIC 3 TIMES DAILY PRN
Status: DISCONTINUED | OUTPATIENT
Start: 2022-01-30 | End: 2022-02-02 | Stop reason: HOSPADM

## 2022-01-30 RX ORDER — LANOLIN ALCOHOL/MO/W.PET/CERES
3 CREAM (GRAM) TOPICAL
Status: DISCONTINUED | OUTPATIENT
Start: 2022-01-30 | End: 2022-02-02 | Stop reason: HOSPADM

## 2022-01-30 RX ORDER — ONDANSETRON 2 MG/ML
4 INJECTION INTRAMUSCULAR; INTRAVENOUS EVERY 6 HOURS PRN
Status: DISCONTINUED | OUTPATIENT
Start: 2022-01-30 | End: 2022-02-02 | Stop reason: HOSPADM

## 2022-01-30 RX ORDER — CALCIUM CARBONATE 500 MG/1
500 TABLET, CHEWABLE ORAL 3 TIMES DAILY PRN
Status: DISCONTINUED | OUTPATIENT
Start: 2022-01-30 | End: 2022-02-02 | Stop reason: HOSPADM

## 2022-01-30 RX ADMIN — BRIMONIDINE TARTRATE 1 DROP: 1.5 SOLUTION OPHTHALMIC at 07:59

## 2022-01-30 RX ADMIN — MAGNESIUM OXIDE TAB 400 MG (241.3 MG ELEMENTAL MG) 400 MG: 400 (241.3 MG) TAB at 08:00

## 2022-01-30 RX ADMIN — HEPARIN SODIUM 5000 UNITS: 5000 INJECTION, SOLUTION INTRAVENOUS; SUBCUTANEOUS at 11:39

## 2022-01-30 RX ADMIN — BRIMONIDINE TARTRATE 1 DROP: 1.5 SOLUTION OPHTHALMIC at 20:03

## 2022-01-30 RX ADMIN — LINEZOLID 600 MG: 600 TABLET, FILM COATED ORAL at 20:03

## 2022-01-30 RX ADMIN — FAMOTIDINE 10 MG: 10 TABLET ORAL at 08:00

## 2022-01-30 RX ADMIN — LATANOPROST 1 DROP: 50 SOLUTION OPHTHALMIC at 20:21

## 2022-01-30 RX ADMIN — INSULIN ASPART 1 UNITS: 100 INJECTION, SOLUTION INTRAVENOUS; SUBCUTANEOUS at 11:48

## 2022-01-30 RX ADMIN — PIPERACILLIN SODIUM AND TAZOBACTAM SODIUM 3.38 G: 3; .375 INJECTION, POWDER, LYOPHILIZED, FOR SOLUTION INTRAVENOUS at 04:15

## 2022-01-30 RX ADMIN — INSULIN GLARGINE 15 UNITS: 100 INJECTION, SOLUTION SUBCUTANEOUS at 00:05

## 2022-01-30 RX ADMIN — LINEZOLID 600 MG: 600 TABLET, FILM COATED ORAL at 08:46

## 2022-01-30 RX ADMIN — FAMOTIDINE 10 MG: 10 TABLET ORAL at 20:03

## 2022-01-30 RX ADMIN — PIPERACILLIN SODIUM AND TAZOBACTAM SODIUM 3.38 G: 3; .375 INJECTION, POWDER, LYOPHILIZED, FOR SOLUTION INTRAVENOUS at 11:40

## 2022-01-30 RX ADMIN — METOPROLOL SUCCINATE 25 MG: 25 TABLET, EXTENDED RELEASE ORAL at 08:00

## 2022-01-30 RX ADMIN — PIPERACILLIN SODIUM AND TAZOBACTAM SODIUM 3.38 G: 3; .375 INJECTION, POWDER, LYOPHILIZED, FOR SOLUTION INTRAVENOUS at 20:05

## 2022-01-30 RX ADMIN — ASPIRIN 81 MG: 81 TABLET, COATED ORAL at 08:00

## 2022-01-30 ASSESSMENT — ACTIVITIES OF DAILY LIVING (ADL)
ADLS_ACUITY_SCORE: 9
ADLS_ACUITY_SCORE: 9
ADLS_ACUITY_SCORE: 10
ADLS_ACUITY_SCORE: 9
ADLS_ACUITY_SCORE: 9
DIFFICULTY_COMMUNICATING: NO
ADLS_ACUITY_SCORE: 9
ADLS_ACUITY_SCORE: 9
TOILETING_ISSUES: NO
EQUIPMENT_CURRENTLY_USED_AT_HOME: WALKER, STANDARD
PREVIOUS_RESPONSIBILITIES: MEAL PREP;HOUSEKEEPING;LAUNDRY;MEDICATION MANAGEMENT;SHOPPING;FINANCES;DRIVING
DRESSING/BATHING: BATHING DIFFICULTY, ASSISTANCE 1 PERSON;DRESSING DIFFICULTY, ASSISTANCE 1 PERSON
ADLS_ACUITY_SCORE: 9
ADLS_ACUITY_SCORE: 9
ADLS_ACUITY_SCORE: 10
ADLS_ACUITY_SCORE: 9
ADLS_ACUITY_SCORE: 10
ADLS_ACUITY_SCORE: 10
DIFFICULTY_EATING/SWALLOWING: NO
ADLS_ACUITY_SCORE: 9
ADLS_ACUITY_SCORE: 10
ADLS_ACUITY_SCORE: 9
ADLS_ACUITY_SCORE: 10
ADLS_ACUITY_SCORE: 10
DRESSING/BATHING_DIFFICULTY: YES
ADLS_ACUITY_SCORE: 9
ADLS_ACUITY_SCORE: 10
ADLS_ACUITY_SCORE: 10
ADLS_ACUITY_SCORE: 9
FALL_HISTORY_WITHIN_LAST_SIX_MONTHS: NO

## 2022-01-30 NOTE — PROGRESS NOTES
Occupational Therapy      01/30/22 1440   Quick Adds   Type of Visit Initial Occupational Therapy Evaluation   Living Environment   People in home spouse   Current Living Arrangements house   Home Accessibility stairs to enter home   Number of Stairs, Main Entrance 1   Stair Railings, Main Entrance railings safe and in good condition   Transportation Anticipated family or friend will provide   Living Environment Comments W/I shower w/ SC/GB   Self-Care   Usual Activity Tolerance good   Current Activity Tolerance moderate   Regular Exercise No   Equipment Currently Used at Home walker, standard   Activity/Exercise/Self-Care Comment Ind. w/ dressing/bathing/toileting   Instrumental Activities of Daily Living (IADL)   Previous Responsibilities meal prep;housekeeping;laundry;medication management;shopping;finances;driving   Disability/Function   Hearing Difficulty or Deaf no   Wear Glasses or Blind no   Concentrating, Remembering or Making Decisions Difficulty yes   Difficulty Communicating no   Difficulty Eating/Swallowing no   Walking or Climbing Stairs Difficulty yes   Walking or Climbing Stairs ambulation difficulty, requires equipment;stair climbing difficulty, assistance 1 person;transferring difficulty, assistance 1 person   Mobility Management walker   Dressing/Bathing Difficulty no   Toileting issues no   Doing Errands Independently Difficulty (such as shopping) yes   Errands Management wife   Fall history within last six months no   General Information   Onset of Illness/Injury or Date of Surgery 01/29/22   Referring Physician Savanna Mcguire MD    Patient/Family Therapy Goal Statement (OT) to d/c home    Performance Patterns (Routines, Roles, Habits) UTI, sepsis w/ encephalopathy hx prostate CA, bladder CA s/p cystectomy w/ ilieal diverson recent pyelo s/p bilateral neph tubes.    Existing Precautions/Restrictions fall;other (see comments)  (Contact-VRE)   Cognitive Status Examination   Orientation Status  person;time   Range of Motion Comprehensive   General Range of Motion no range of motion deficits identified   Strength Comprehensive (MMT)   General Manual Muscle Testing (MMT) Assessment no strength deficits identified   Bed Mobility   Bed Mobility supine-sit;sit-supine   Supine-Sit Grainger (Bed Mobility) contact guard;verbal cues   Sit-Supine Grainger (Bed Mobility) contact guard;verbal cues   Assistive Device (Bed Mobility) bed rails   Transfers   Transfers sit-stand transfer;toilet transfer   Transfer Skill: Bed to Chair/Chair to Bed   Bed-Chair Grainger (Transfers) contact guard;verbal cues;supervision   Assistive Device (Bed-Chair Transfers) standard walker   Sit-Stand Transfer   Sit-Stand Grainger (Transfers) contact guard;verbal cues;supervision   Assistive Device (Sit-Stand Transfers) walker, standard   Toilet Transfer   Type (Toilet Transfer) stand-sit;sit-stand   Assistive Device (Toilet Transfer) walker, standard   Balance   Balance Assessment sit to stand balance;standing balance: static   Sit-to-Stand Balance good balance   Standing Balance: Static good balance   Activities of Daily Living   BADL Assessment upper body dressing;toileting;grooming;lower body dressing   Upper Body Dressing Assessment   Grainger Level (Upper Body Dressing) minimum assist (75% patient effort);verbal cues;supervision   Position (Upper Body Dressing) unsupported sitting   Lower Body Dressing Assessment   Grainger Level (Lower Body Dressing) verbal cues;supervision   Position (Lower Body Dressing) unsupported sitting   Grooming Assessment   Grainger Level (Grooming) contact guard assist;verbal cues;supervision   Position (Grooming) unsupported sitting   Toileting   Grainger Level (Toileting) contact guard assist;verbal cues;supervision   Position (Toileting) unsupported sitting   Clinical Impression   Criteria for Skilled Therapeutic Interventions Met (OT) yes;skilled treatment is necessary    OT Diagnosis Decreased balance, decreased cognition/safety awarness, LB dressing, toileting    OT Problem List-Impairments impacting ADL problems related to;activity tolerance impaired;cognition;balance;mobility   Assessment of Occupational Performance 3-5 Performance Deficits   Identified Performance Deficits toileting, LB dressing, balance, safety awareness    Planned Therapy Interventions (OT) ADL retraining;balance training;IADL retraining;strengthening   Clinical Decision Making Complexity (OT) low complexity   Therapy Frequency (OT) Daily   Predicted Duration of Therapy 3   Risk & Benefits of therapy have been explained evaluation/treatment results reviewed;patient   OT Discharge Planning    OT Discharge Recommendation (DC Rec) Home with assist   OT Rationale for DC Rec Pt requiring assist x1 for trnf/mobility/self cares,if d/c home pt lives w/ spouse and may require assist for bathing/dressing/toileting upon d/c home.l   Total Evaluation Time (Minutes)   Total Evaluation Time (Minutes) 5

## 2022-01-30 NOTE — ED NOTES
Output for nephrostomy tune on the right side 200 ml, left side 150 ml and urostomy bag 50 ml at this time.

## 2022-01-30 NOTE — PROGRESS NOTES
"[facility]    Medicine Progress Note - Hospitalist Service       Date of Admission:  1/29/2022    Assessment & Plan            Marcelo Valerio is a 76 year old male admitted on 1/29/2022. He has history of prostate cancer, bladder cancer status post ileal conduit, recent issues with pyelonephritis and hydronephrosis with bilateral nephrostomy tubes placed on 1/5, diabetes, hypertension presents for evaluation of altered mental status found to have probable UTI. Hospital Day: 2     #Acute encephalopathy  Suspected toxic/metabolic encephalopathy secondary to UTI, also with new med baclofen (holding)  Seems improved today  CT head negative  Supportive care, PT/OT    #UTI  No sepsis  Patient with complex anatomy, recent cystectomy/ileal conduit back in December.  Then hospitalized 3 weeks ago for pyelonephritis and had bilateral hydronephrosis and perinephric fluid collections, bilateral PNT's placed on 1/5.  History of recent UTI with vancomycin and penicillin resistant Enterococcus faecium and Candida  Patient started on linezolid and Zosyn. He just completed previous course of linezolid ~1/20.  Urine culture sent on admission, I am unsure source of urine, states \"voided urine\" which anatomically he does not do  Send urine cultures from each PNT as well as urostomy  Blood culture no growth to date    #CKD 4  Baseline Cr ~2  During recent hospitalization, was up to 7.5.  Creatinine has been downtrending since that time, most recently here at 2.1  Avoid nephrotoxins    #IDDM  Home regimen Trulicity, Lantus 20 units twice daily  Decrease Lantus to 15 units once daily  Novolog carb count 1:15  Novolog sliding scale    #HTN  Home metoprolol    #Glaucoma  Home eye drops  History of anisocoria from eye surgery in past    #Iron deficiency anemia  Continue home ferrous sulfate    #Heart health  Beti ASA    #GERD, home Pepcid  #Hypomagnesemia, noted during recent admission.  Continue home " mag  #Hyperlipidemia, hold home simvastatin       Diet: Combination Diet Moderate Consistent Carb (60 g CHO per Meal) Diet    DVT Prophylaxis: Moderate risk. SQH   Bob Catheter: Not present  Central Lines: None  Code Status: Full Code    Disposition Plan   Disposition: Home when ready, likely multiple days  Discharge barriers: IV abx, culture results  Medically ready to discharge today: No  Estimated discharge date: 02/01/2022     The patient's care was discussed with the Patient and Patient's Family.    Savanna Mcguire MD  Hospitalist Service  [facility]  Text page via [facility] Paging/Directory      Clinically Significant Risk Factors Present on Admission              # Platelet Defect: home medication list includes an antiplatelet medication   # Diabetes, type II: last A1C 7.5 % (Ref range: <=5.6 %)  # Obesity: last Body mass index is 30.27 kg/m .      ____________        Physical Exam   Vital Signs: Temp: 97.9  F (36.6  C) Temp src: Oral BP: 129/73 Pulse: 84   Resp: 18 SpO2: 100 % O2 Device: None (Room air)    Weight: 217 lbs 0 oz  General: in no apparent distress, non-toxic and alert male lying in hospital bed oriented x3  HEENT: Head normocephalic atraumatic, oral mucosa moist. Sclerae anicteric  CV: Regular rhythm, normal rate, no murmurs  Resp: No wheezes, no rales or rhonchi, no focal consolidations  GI: Belly soft, nondistended, nontender, bowel sounds present  Skin: area of ecchymosis mid left abdomen  Extremities: No peripheral edema  Psych: Normal affect, mood euthymic  Neuro: CNII-XII grossly intact, moving all 4 extremities      Data   Recent Results (from the past 24 hour(s))   UA with Microscopic reflex to Culture    Collection Time: 01/29/22  6:30 PM    Specimen: Urine, Clean Catch   Result Value Ref Range    Color Urine Light Yellow Colorless, Straw, Light Yellow, Yellow    Appearance Urine Turbid (A) Clear    Glucose Urine Negative Negative mg/dL    Bilirubin Urine Negative  Negative    Ketones Urine Negative Negative mg/dL    Specific Gravity Urine 1.016 1.001 - 1.030    Blood Urine 0.03 mg/dL (A) Negative    pH Urine 6.0 5.0 - 7.0    Protein Albumin Urine 100  (A) Negative mg/dL    Urobilinogen Urine <2.0 <2.0 mg/dL    Nitrite Urine Negative Negative    Leukocyte Esterase Urine 500 Rao/uL (A) Negative    Bacteria Urine Few (A) None Seen /HPF    WBC Clumps Urine Present (A) None Seen /HPF    Mucus Urine Present (A) None Seen /LPF    Amorphous Crystals Urine Few (A) None Seen /HPF    RBC Urine 0 <=2 /HPF    WBC Urine 88 (H) <=5 /HPF    Squamous Epithelials Urine <1 <=1 /HPF   Blood Culture Peripheral Blood    Collection Time: 01/29/22  6:34 PM    Specimen: Peripheral Blood   Result Value Ref Range    Culture No growth after 12 hours    Basic metabolic panel    Collection Time: 01/29/22  6:35 PM   Result Value Ref Range    Sodium 134 (L) 136 - 145 mmol/L    Potassium 4.9 3.5 - 5.0 mmol/L    Chloride 100 98 - 107 mmol/L    Carbon Dioxide (CO2) 22 22 - 31 mmol/L    Anion Gap 12 5 - 18 mmol/L    Urea Nitrogen 34 (H) 8 - 28 mg/dL    Creatinine 2.19 (H) 0.70 - 1.30 mg/dL    Calcium 10.1 8.5 - 10.5 mg/dL    Glucose 178 (H) 70 - 125 mg/dL    GFR Estimate 30 (L) >60 mL/min/1.73m2   Blood Culture Peripheral Blood    Collection Time: 01/29/22  6:35 PM    Specimen: Peripheral Blood   Result Value Ref Range    Culture No growth after 12 hours    Extra Blue Top Tube    Collection Time: 01/29/22  6:35 PM   Result Value Ref Range    Hold Specimen JIC    Extra Red Top Tube    Collection Time: 01/29/22  6:35 PM   Result Value Ref Range    Hold Specimen JIC    Extra Green Top (Lithium Heparin) Tube    Collection Time: 01/29/22  6:35 PM   Result Value Ref Range    Hold Specimen JIC    Extra Purple Top Tube    Collection Time: 01/29/22  6:35 PM   Result Value Ref Range    Hold Specimen JIC    Extra Green Top (Lithium Heparin) ON ICE    Collection Time: 01/29/22  6:35 PM   Result Value Ref Range    Hold  Specimen [facility]    CBC with platelets and differential    Collection Time: 01/29/22  6:35 PM   Result Value Ref Range    WBC Count 10.6 4.0 - 11.0 10e3/uL    RBC Count 2.88 (L) 4.40 - 5.90 10e6/uL    Hemoglobin 8.2 (L) 13.3 - 17.7 g/dL    Hematocrit 26.5 (L) 40.0 - 53.0 %    MCV 92 78 - 100 fL    MCH 28.5 26.5 - 33.0 pg    MCHC 30.9 (L) 31.5 - 36.5 g/dL    RDW 13.6 10.0 - 15.0 %    Platelet Count 336 150 - 450 10e3/uL    % Neutrophils 56 %    % Lymphocytes 27 %    % Monocytes 15 %    % Eosinophils 0 %    % Basophils 0 %    % Immature Granulocytes 2 %    NRBCs per 100 WBC 0 <1 /100    Absolute Neutrophils 6.0 1.6 - 8.3 10e3/uL    Absolute Lymphocytes 2.9 0.8 - 5.3 10e3/uL    Absolute Monocytes 1.6 (H) 0.0 - 1.3 10e3/uL    Absolute Eosinophils 0.0 0.0 - 0.7 10e3/uL    Absolute Basophils 0.0 0.0 - 0.2 10e3/uL    Absolute Immature Granulocytes 0.2 <=0.4 10e3/uL    Absolute NRBCs 0.0 10e3/uL   CRP inflammation    Collection Time: 01/29/22  6:35 PM   Result Value Ref Range    CRP 20.6 (H) 0.0-<0.8 mg/dL   Lactic acid whole blood    Collection Time: 01/29/22  6:35 PM   Result Value Ref Range    Lactic Acid 2.2 (H) 0.7 - 2.0 mmol/L   Hepatic function panel    Collection Time: 01/29/22  6:35 PM   Result Value Ref Range    Bilirubin Total 0.7 0.0 - 1.0 mg/dL    Bilirubin Direct 0.3 <=0.5 mg/dL    Protein Total 7.3 6.0 - 8.0 g/dL    Albumin 2.7 (L) 3.5 - 5.0 g/dL    Alkaline Phosphatase 224 (H) 45 - 120 U/L    AST 97 (H) 0 - 40 U/L    ALT 79 (H) 0 - 45 U/L   Procalcitonin    Collection Time: 01/29/22  7:38 PM   Result Value Ref Range    Procalcitonin 0.68 (H) 0.00 - 0.49 ng/mL   Ammonia (on ice)    Collection Time: 01/29/22  7:38 PM   Result Value Ref Range    Ammonia 16 11 - 35 umol/L   ECG 12-LEAD WITH MUSE (LHE)    Collection Time: 01/29/22  7:49 PM   Result Value Ref Range    Systolic Blood Pressure 125 mmHg    Diastolic Blood Pressure 63 mmHg    Ventricular Rate 93 BPM    Atrial Rate 93 BPM    HI Interval 172 ms    QRS  Duration 70 ms     ms    QTc 405 ms    P Axis 14 degrees    R AXIS -16 degrees    T Axis -7 degrees    Interpretation ECG       Sinus rhythm  Voltage criteria for left ventricular hypertrophy  Inferior infarct , age undetermined  Abnormal ECG  No previous ECGs available  Confirmed by SEE ED PROVIDER NOTE FOR, ECG INTERPRETATION ([id number]),  JAIMIE CRAMER ([id number]) on 1/30/2022 10:04:49 AM     Asymptomatic COVID-19 Virus (Coronavirus) by PCR Nasopharyngeal    Collection Time: 01/29/22  8:16 PM    Specimen: Nasopharyngeal; Swab   Result Value Ref Range    SARS CoV2 PCR Negative Negative   Lactic acid whole blood    Collection Time: 01/29/22  9:20 PM   Result Value Ref Range    Lactic Acid 0.7 0.7 - 2.0 mmol/L   Glucose by meter    Collection Time: 01/30/22 12:04 AM   Result Value Ref Range    GLUCOSE BY METER POCT 153 (H) 70 - 99 mg/dL   Glucose by meter    Collection Time: 01/30/22  7:56 AM   Result Value Ref Range    GLUCOSE BY METER POCT 101 (H) 70 - 99 mg/dL   Platelet count    Collection Time: 01/30/22  8:14 AM   Result Value Ref Range    Platelet Count 269 150 - 450 10e3/uL   Extra Red Top Tube    Collection Time: 01/30/22  8:14 AM   Result Value Ref Range    Hold Specimen JIC    Extra Green Top (Lithium Heparin) Tube    Collection Time: 01/30/22  8:14 AM   Result Value Ref Range    Hold Specimen JIC    Glucose by meter    Collection Time: 01/30/22 11:46 AM   Result Value Ref Range    GLUCOSE BY METER POCT 183 (H) 70 - 99 mg/dL     ____________  Interval History   Data reviewed today: I reviewed all medications, new labs and imaging results over the last 24 hours. I personally reviewed no images or EKG's today.  Patient no particular complaints.  Denies any pain other than positional in the bed.  Urine cultures collected.    I called and updated wife Nely

## 2022-01-30 NOTE — ED NOTES
Spoke with pt's spouse Nely over the phone and updated her on pt's status. Nely's phone number is [phone].

## 2022-01-30 NOTE — H&P
Admission History and Physical   Marcelo Valerio,  1945, MRN [id number]    [facility]    PCP: Urbano Cedeno, [phone]          Extended Emergency Contact Information  Primary Emergency Contact: Nely Valerio  Address: [address] [place] [id number] [facility]  Home Phone: [phone]  Mobile Phone: [phone]  Relation: Spouse  Secondary Emergency Contact: LETICIA VALERIO  Mobile Phone: [phone]  Relation: Son       Assessment and Plan     76M with history of prostate cancer, bladder CA s/p robotic assisted cystectomy ileal conduit creation on 2021, hypertension, diabetes, dyslipidemia, glaucoma, gout, obesity, knee osteoarthritis, restless leg syndrome who presents with confusion x 3 days in setting of recently starting baclofen and likely recurrent complicated UTI.      #Complicated UTI in setting of bladder CA s/p cystectomy with ileal diversion, recent pyelo s/p bilateral neph tube  #hx VRE and candida  #quite elevated CRP.  Recent back pain may be indicative of pyelo  -linezolid, zosyn, monitor cultures  -start with CT A/P given report of back pain prior to admission.  -consider spine imaging depending on culture data and CT A/P    #Acute encephalopathy - could be baclofen or infection related or both.  Stop baclofen, treat infection.     #CKD4 - monitor    #DM2 -   -home regimen: lantus 20BID, dulaglutide  -inpatient: lantus 15BID, sliding scale insulin.     #HTN - toprol-xl  #HLD - hold statin for now incase needs fluconazole again  #glaucoma - eye drops        Clinically Significant Risk Factors Present on Admission              # Platelet Defect: home medication list includes an antiplatelet medication   # Diabetes, type II: last A1C 7.5 % (Ref range: <=5.6 %)  # Obesity: last Body mass index is 30.27 kg/m .        Checklist:  Code Status:   full  Diet:   DM  Bob Catheter: Not present  Central Lines: None  DVT px:  Heparin SQ         Advanced Care Planning  I anticipate the patient will be admitted to the hospital for at least 2 midnights for the evaluation and treatment of the conditions discussed above.     Chief Complaint: confusion     HPI:    Marcelo Valerio is a 76 year old male with history of prostate cancer, bladder CA s/p robotic assisted cystectomy ileal conduit creation on December 6, 2021, hypertension, diabetes, dyslipidemia, glaucoma, gout, obesity, knee osteoarthritis, restless leg syndrome who presents with confusion x 3 days.  Wed during the day developed back pain.  Went to ER and starting on baclofen.  Thurs AM started to have confusion with hallucinations.  Thursday stopped baclofen after a total of 4 doses.  Continues to be confused though maybe slightly better.      Unable to get a clear history on the character and location of the back pain on Wed.  Mr. Valerio initially reported that it was higher up, but then later said just above the R kidney.  Mrs. Valerio reported it was the whole back.    Wife also reports low grade temp to 100.3 in the last few days, concentrated malodorous urine from ileostomy.      The back pain seems to have subsided.    In the ER: +UA and given dose of zyvox + zosyn. 1L NS    History is provided by patient       Physical Exam:  Temp:  [99.1  F (37.3  C)] 99.1  F (37.3  C)  Pulse:  [] 92  Resp:  [20-25] 25  BP: (124-149)/(63-77) 128/76  SpO2:  [96 %-98 %] 96 %  /76   Pulse 92   Temp 99.1  F (37.3  C) (Temporal)   Resp 25   Wt 98.4 kg (217 lb)   SpO2 96%   BMI 30.27 kg/m       General:  Alert, cooperative, no distress,  Appears stated age  Neurologic:  Disoriented to details prior to admission, some visual disturbances/hallucinations (recognizes it's not correct).  Facial symmetry preserved, fluent speech. Moves all 4 spontaneously  Psych: calm, mood and affect appropriate to situation  HEENT:  Anicteric, MMM, unremarkable dentition  CV: RRR no MRG, normal S1 and S2, no  edema  Lungs: CTAB.  Easyrespirations  Abd: soft, NT, normoactive BS.  No flank TTP.  Nl appearing output from bilateral neph tube sites.  ileostomy  Skin: no rashes noted on exposed skin. Color and turgor normal  MSK: no central or paraspinal TTP  Central Lines and Tubes: None (no ng, CVC, feeding tubes)         Pertinent Test Findings  Radiology Results (results reviewed):   Results for orders placed or performed during the hospital encounter of 01/29/22   Head CT w/o contrast    Impression    IMPRESSION:  1. No acute intracranial process.       EKG (personally reviewed): normal sinus rhythm and no acute ischemic changes      Medical History  Past Medical History:   Diagnosis Date     Basal cell carcinoma      Bladder tumor      Cancer (H)     Prostate     Chronic kidney disease      CKD (chronic kidney disease)      DM2 (diabetes mellitus, type 2) (H)      Glaucoma      History of gout      HTN (hypertension)      Hyperlipidemia      Kidney stone      Lung nodule      Malignant neoplasm of urinary bladder, unspecified site (H)      Metabolic syndrome      Obesity      Osteoarthritis of knee      Psoriasis      Restless legs syndrome         Surgical History  Past Surgical History:   Procedure Laterality Date     APPENDECTOMY       ARTHROPLASTY REVISION HIP Left      BASAL CELL CARCINOMA EXCISION      back     CARPAL TUNNEL RELEASE RT/LT       CATARACT EXTRACTION       COMBINED CYSTOSCOPY, RETROGRADES, EXCHANGE STENT URETER(S) Right 11/22/2021    Procedure: CYSTOSCOPY, RIGHT RETROGRADE PYELOGRAM, RIGHT URETERAL STENT EXCHANGE;  Surgeon: Fam Manrique MD;  Location[place] [facility] [place] [place]     CYSTECTOMY, ROBOT-ASSISTED, LAPAROSCOPIC, USING DA LARISSA SI, WITH URETEROILEAL CONDUIT CREATION N/A 12/6/2021    Procedure: CYSTOSCOPY, RIGHT URETEROSCOPY, RIGHT STENT REMOVAL ROBOTIC ASSISTED RADICAL CYSTECTOMY, CREATION OF ILEAL CONDUIT, EXTENSIVE LYSIS OF ADHESIONS;  Surgeon: Fam Manrique MD;  Location: [facility]  Main OR     CYSTOSCOPY, FULGURATE BLADDER TUMOR, COMBINED Bilateral 09/08/2021    Procedure: CYSTOSCOPY, SELECTIVE CYTOLOGIES, BLADDER BIOPSIES AND FULGURATION, BILATERAL RETROGRADE PYELOGRAMS, RIGHT URETEROSCOPY , RIGHT URERETAL STENT PLACEMENT;  Surgeon: Fam Manrique MD;  [facility]     CYSTOSCOPY, TRANSURETHRAL RESECTION (TUR) TUMOR BLADDER, COMBINED N/A 02/01/2021    Procedure: CYSTOSCOPY SELECTIVE CYTOLOGIES, BLADDER BIOPSY FULGURATION, MEATAL DILATION;  Surgeon: Fam Manrique MD;  Location: [facility] OR;  Service: Urology     INGUINAL HERNIA REPAIR Bilateral      IR NEPHROSTOMY TUBE PLACEMENT BILATERAL  1/5/2022     JOINT REPLACEMENT Bilateral     bilateral CARLI, left TKA     DC CYSTOSCOPY,REMV CALCULUS,SIMPLE Right 03/22/2021    Procedure: CYSTOSCOPY, RIGHT RETROGRADE PYELOGRAM, RIGHT URETEROSCOPY, URETERAL BIOPSY, RIGHT URETERAL STENT PLACEMENT;  Surgeon: Fam Manrique MD;  Location: [facility];  Service: Urology     DC CYSTOURETHROSCOPY,BIOPSY N/A 10/01/2019    Procedure: CYSTOSCOPY, WITH BLADDER BIOPSIES and Fulgeration;  Surgeon: Jose Murcia MD;  Location: [facility];  Service: Urology     DC CYSTOURETHROSCOPY,URETER CATHETER Bilateral 10/01/2019    Procedure: BILATERAL RETROGRADE PYELOGRAMS;  Surgeon: Jose Murcia MD;  Location: [facility];  Service: Urology     PROSTATECTOMY      Radical suprapubic     RELEASE CARPAL TUNNEL       TONSILLECTOMY       TOTAL KNEE ARTHROPLASTY       VASECTOMY            Social History  Social History     Tobacco Use     Smoking status: Former Smoker     Packs/day: 0.00     Quit date: 8/1/2018     Years since quitting: 3.4     Smokeless tobacco: Never Used     Tobacco comment: Cigars and Pipes   Substance Use Topics     Alcohol use: Yes     Comment: Alcoholic Drinks/day: occassional      Drug use: Not Currently              Family Hx: reviewed hx in Care Everywhere on [facility]  "page.  Not relevant to current admission.  Cerebrovascular Disease Birth Father        Diabetes, Type II Birth Father        Heart Disease Birth Mother        Hyperlipidemia Brother        Rheumatic Fever Brother        Obstructive Sleep Apnea Son 1         Relation Name Status Comments   Birth Father     (Age 74) DM2. Seizures vs TIA's. Infection post hip surgery.   Birth Mother     (Age 92) HTN. Heart disease.   Brother    Alive Hyperlipidemia. History Rheumatic Fever.   Daughter 1   Alive     Daughter 2   Alive     Daughter 3   Alive     Son 1   Alive Twin   Son 2   Alive \"Twin         Prior to Admission Medications   Prior to Admission Medications   Prescriptions Last Dose Informant Patient Reported? Taking?   Baclofen (LIORESAL) 5 MG tablet 2022 at Unknown time  Yes Yes   Sig: Take 5 mg by mouth 3 times daily as needed for muscle spasms   Dulaglutide 3 MG/0.5ML SOPN 2022 at Unknown time  Yes Yes   Sig: Inject 3 mg Subcutaneous once a week    Magnesium Cl-Calcium Carbonate (SLOW-MAG) 71.5-119 MG TBEC 2022 at Unknown time  No Yes   Sig: Take 1 tablet by mouth daily   acetaminophen (TYLENOL) 325 MG tablet Unknown at Unknown time  No Yes   Sig: Take 1-2 tablets (325-650 mg) by mouth every 4 hours as needed for mild pain or pain   amoxicillin (AMOXIL) 500 MG capsule Unknown at Unknown time  Yes Yes   Sig: [AMOXICILLIN (AMOXIL) 500 MG CAPSULE] Take 2,000 mg by mouth as needed. Prior to dental procedures   aspirin (ASA) 81 MG EC tablet 2022 at Unknown time  No Yes   Sig: Take 1 tablet (81 mg) by mouth daily   brimonidine (ALPHAGAN) 0.15 % ophthalmic solution 2022 at AM  Yes Yes   Sig: [BRIMONIDINE (ALPHAGAN) 0.15 % OPHTHALMIC SOLUTION] Administer 1 drop to both eyes 2 (two) times a day.   calcipotriene (DOVONOX) 0.005 % cream Unknown at Unknown time  Yes Yes   Sig: [CALCIPOTRIENE (DOVONOX) 0.005 % CREAM] Apply 1 application topically daily as needed.   famotidine " (PEPCID) 10 MG tablet 1/29/2022 at AM  No Yes   Sig: Take 1 tablet (10 mg) by mouth 2 times daily   ferrous sulfate (FEROSUL) 325 (65 Fe) MG tablet 1/29/2022 at Unknown time  No Yes   Sig: Take 1 tablet (325 mg) by mouth daily (with breakfast)   insulin glargine (BASAGLAR KWIKPEN) 100 unit/mL (3 mL) pen 1/29/2022 at AM  Yes Yes   Sig: Inject 20 Units Subcutaneous 2 times daily    latanoprost (XALATAN) 0.005 % ophthalmic solution 1/28/2022 at Unknown time  Yes Yes   Sig: [LATANOPROST (XALATAN) 0.005 % OPHTHALMIC SOLUTION] Administer 1 drop to both eyes at bedtime.   metoprolol succinate ER (TOPROL-XL) 25 MG 24 hr tablet 1/29/2022 at Unknown time  No Yes   Sig: Take 1 tablet (25 mg) by mouth daily   polyethylene glycol (MIRALAX) 17 GM/Dose powder Unknown at Unknown time  No Yes   Sig: Take 17 g by mouth daily Discontinue if stools are loose   Patient taking differently: Take 17 g by mouth daily as needed Discontinue if stools are loose   simvastatin (ZOCOR) 20 MG tablet 1/28/2022 at Unknown time  Yes Yes   Sig: Take 20 mg by mouth At Bedtime    triamcinolone (KENALOG) 0.1 % cream Unknown at Unknown time  Yes Yes   Sig: Apply 1 Application topically daily as needed Two times a day on Saturdays and Sundays      Facility-Administered Medications: None          Review of Systems:    10point review of systems negative except as listed in HPI      Pertinent Labs  Lab Results: personally reviewed.     Most Recent 3 CBC's:  Recent Labs   Lab Test 01/29/22  1835 01/09/22  0705 01/08/22  0557 01/07/22  0639 01/06/22  0631   WBC 10.6  --   --  7.4 8.2   HGB 8.2* 8.7* 8.4* 8.9* 9.3*   MCV 92  --   --  91 90     --   --  152 188     Most Recent 3 BMP's:  Recent Labs   Lab Test 01/29/22  1835 01/10/22  0811 01/10/22  0703 01/09/22  1643 01/09/22  1343 01/09/22  0805 01/09/22  0705   *  --  139  --   --   --  138   POTASSIUM 4.9  --  3.5  --  3.9  --  3.4*   CHLORIDE 100  --  106  --   --   --  106   CO2 22  --  22  --    --   --  22   BUN 34*  --  43*  --   --   --  55*   CR 2.19*  --  3.05*  --   --   --  3.56*   ANIONGAP 12  --  11  --   --   --  10   JERI 10.1  --  8.3*  --   --   --  7.9*   * 124* 116   < >  --    < > 136*    < > = values in this interval not displayed.     Most Recent 2 LFT's:  Recent Labs   Lab Test 01/07/22  2357 01/05/22  0648   AST 23 14   ALT <9 15   ALKPHOS 66 82   BILITOTAL 0.4 0.7     Most Recent 3 INR's:  Recent Labs   Lab Test 01/04/22  2126   INR 1.27*     Most Recent 3 Troponin's:No lab results found.    Elier Chang MD  Internal Medicine Hospitalist  1/29/2022  9:29 PM

## 2022-01-30 NOTE — ED NOTES
"He is doing well. He has been sleeping but easily woken. He talks about being here because he has some altered mental state right now and \"they are trying to figure that out.\" He denies pain or SOB at this time. His blood glucose was 101 this morning. He had Lantus seven hours ago. Advised the provider.  "

## 2022-01-30 NOTE — ED TRIAGE NOTES
Pt has bladder ca, had bladder removed in dec.  Has  Stents in both ureters.  Pt has been confused.  Started on baclofen, took 1 Wednesday and 3 Thursday and the delerium started.  Wife here with pt

## 2022-01-30 NOTE — ED PROVIDER NOTES
Emergency Department Encounter     Evaluation Date & Time:   2022  6:35 PM    CHIEF COMPLAINT:  Altered Mental Status      Triage Note:Pt has bladder ca, had bladder removed in dec.  Has  Stents in both ureters.  Pt has been confused.  Started on baclofen, took 1 Wednesday and 3 Thursday and the delerium started.  Wife here with pt        Impression and Plan       FINAL IMPRESSION:    ICD-10-CM    1. Sepsis with encephalopathy without septic shock, due to unspecified organism (H)  A41.9     R65.20     G93.40    2. Urinary tract infection with hematuria, site unspecified  N39.0     R31.9    3. Chronic renal impairment, unspecified CKD stage  N18.9    4. Anemia, unspecified type  D64.9     chronic         ED COURSE & MEDICAL DECISION MAKIN:42 PM I met with the patient, obtained history, performed an initial exam, and discussed options and plan for diagnostics and treatment here in the ED.   8:59 PM I spoke with Dr. Chang, hospitalist, who accepts the patient for admission.    76 year old male, history of bladder cancer s/p cystectomy with ureteroileal conduit creation (2021), CKD, DM, HTN, HLD and obesity, who presents for evaluation of altered mental status.    He was seen at an outside facility a few days ago for severe back pain. Evaluation was unremarkable and he was started on Baclofen. After taking 4 doses he developed confusion. For example, he thought they lived in a different house, he thought their daughter made them lunch (although she is in [place]), he is having difficulty checking his blood sugars and is seeing birds that are not present. He stopped taking the Baclofen with last dose on Thursday (2 days ago), but there has been only minimal improvement in his confusion.     He denies headache. He reports some intermittent upper back pain that occurs only with cough. He also has been having diarrhea, ~2 times daily; no associated abdominal pain, N/V. He had a low-grade fever today (TMax  99).  No chest pain, SOB, URI symptoms.     Neuro exam is nonfocal, however given confusion and underlying cancer, head CT was performed and unremarkable.    EKG performed and demonstrated NSR with inferior Q waves and no ischemic changes.    Labs suggestive of sepsis with elevated lactate (2.2) and procalcitonin (0.68).  He has no leukocytosis (WBC 10.6), however CRP is elevated to 20.6.  Given advanced age with unknown cardiac function, he was given judicious IV fluids.  After 1L NS, lactate was rechecked and had normalized (0.7).    Urine is suggestive of infection with 500 LE, few bacteria, 88 WBCs with WBC clumps.  Blood and urine cultures pending and patient was given IV antibiotics.  Given history of VRE, he was given a dose of IV linezolid as well as IV Zosyn.    Covid test negative.    Labs otherwise remarkable for chronic and stable kidney disease (creatinine 2.19 with GFR 30) with no significant electrolyte derangements.  He has chronic and stable anemia (Hb 8.2).  Hepatic panel remarkable for mildly elevated enzymes (AST 97, ALT 79) with elevated alk phos (224 - possibly related to his underlying bladder cancer).    Patient most likely with septic encephalopathy.  Patient admitted to hospitalist service.  Patient hemodynamically stable throughout ED course.      At the conclusion of the encounter I discussed the results of all the tests and the disposition. The questions were answered. The patient and family acknowledged understanding and were agreeable with the care plan.      MEDICATIONS GIVEN IN THE EMERGENCY DEPARTMENT:  Medications   aspirin EC tablet 81 mg (81 mg Oral Given 1/30/22 0800)   brimonidine (ALPHAGAN-P) 0.15 % ophthalmic solution 1 drop (1 drop Both Eyes Given 1/30/22 4573)   famotidine (PEPCID) tablet 10 mg (10 mg Oral Given 1/30/22 0800)   latanoprost (XALATAN) 0.005 % ophthalmic solution 1 drop (1 drop Both Eyes Given 1/29/22 4162)   magnesium oxide (MAG-OX) tablet 400 mg (400 mg Oral  Given 1/30/22 0800)   metoprolol succinate ER (TOPROL-XL) 24 hr tablet 25 mg (25 mg Oral Given 1/30/22 0800)   piperacillin-tazobactam (ZOSYN) 3.375 g vial to attach to  mL bag (3.375 g Intravenous Given 1/30/22 0415)   linezolid (ZYVOX) tablet 600 mg (600 mg Oral Given 1/30/22 0846)   insulin aspart (NovoLOG) injection (RAPID ACTING) (1 Units Subcutaneous Not Given 1/30/22 0851)   glucose gel 15-30 g (has no administration in time range)     Or   dextrose 50 % injection 25-50 mL (has no administration in time range)     Or   glucagon injection 1 mg (has no administration in time range)   heparin ANTICOAGULANT injection 5,000 Units (5,000 Units Subcutaneous Given 1/29/22 2340)   insulin glargine (LANTUS PEN) injection 15 Units (has no administration in time range)   0.9% sodium chloride BOLUS (0 mLs Intravenous Stopped 1/29/22 2117)   linezolid (ZYVOX) infusion 600 mg (0 mg Intravenous Stopped 1/29/22 2349)   piperacillin-tazobactam (ZOSYN) 3.375 g vial to attach to  mL bag (0 g Intravenous Stopped 1/29/22 2155)       NEW PRESCRIPTIONS STARTED AT TODAY'S ED VISIT:  New Prescriptions    No medications on file       HPI     HPI     Marcelo Valerio is a 76 year old male, history of bladder cancer s/p cystectomy with ureteroileal conduit creation (12/2021), CKD, DM, HTN, HLD and obesity, who presents to this ED via walk-in for evaluation of altered mental status.    He was seen at an outside facility a few days ago for severe back pain. Evaluation was unremarkable and he was started on Baclofen. He took 1 tab on Wednesday and 3 tabs on Thursday. After taking his last dose on Thursday, he developed confusion. For example, he thought they lived in a different house, even though they have been living in their current house for 5 years. He also thought that their daughter, whom lives in [place], made them tacos for lunch. He has been seeing birds that are not really present. He also has had difficulty checking his  blood sugars.  He stopped taking the Baclofen Thursday but there has been only minimal improvement in his confusion. He denies headaches.     He otherwise reports intermittent upper back pain that occurs only with cough. He also has been having diarrhea, ~2 times daily; no associated abdominal pain, N/V. Stools are a little dark, however this is normal for him as he takes iron supplements. He had a low-grade fever today (TMax 99).  No chest pain, SOB, URI symptoms.     Per chart review: Patient was admitted to [facility] 1/4/2022-1/10/2022 (6 days) for nausea, fatigue, decreased urinary output, and elevated creatine. Patient was diagnosed with acute renal failure superimposed on stage 3b chronic kidney disease.    REVIEW OF SYSTEMS:  All other systems reviewed and are negative.      Medical History     Past Medical History:   Diagnosis Date     Basal cell carcinoma      Bladder tumor      Cancer (H)      Chronic kidney disease      CKD (chronic kidney disease)      DM2 (diabetes mellitus, type 2) (H)      Glaucoma      History of gout      HTN (hypertension)      Hyperlipidemia      Kidney stone      Lung nodule      Malignant neoplasm of urinary bladder, unspecified site (H)      Metabolic syndrome      Obesity      Osteoarthritis of knee      Psoriasis      Restless legs syndrome        Past Surgical History:   Procedure Laterality Date     APPENDECTOMY       ARTHROPLASTY REVISION HIP Left      BASAL CELL CARCINOMA EXCISION      back     CARPAL TUNNEL RELEASE RT/LT       CATARACT EXTRACTION       COMBINED CYSTOSCOPY, RETROGRADES, EXCHANGE STENT URETER(S) Right 11/22/2021    Procedure: CYSTOSCOPY, RIGHT RETROGRADE PYELOGRAM, RIGHT URETERAL STENT EXCHANGE;  Surgeon: Fam Manrique MD;  [place] [facility] [place]     CYSTECTOMY, ROBOT-ASSISTED, LAPAROSCOPIC, USING DA LARISSA SI, WITH URETEROILEAL CONDUIT CREATION N/A 12/6/2021    Procedure: CYSTOSCOPY, RIGHT URETEROSCOPY, RIGHT STENT REMOVAL ROBOTIC  ASSISTED RADICAL CYSTECTOMY, CREATION OF ILEAL CONDUIT, EXTENSIVE LYSIS OF ADHESIONS;  Surgeon: Fam Manrique MD;  Location: [facility]     CYSTOSCOPY, FULGURATE BLADDER TUMOR, COMBINED Bilateral 09/08/2021    Procedure: CYSTOSCOPY, SELECTIVE CYTOLOGIES, BLADDER BIOPSIES AND FULGURATION, BILATERAL RETROGRADE PYELOGRAMS, RIGHT URETEROSCOPY , RIGHT URERETAL STENT PLACEMENT;  Surgeon: Fam Manrique MD;  Location: [facility]     CYSTOSCOPY, TRANSURETHRAL RESECTION (TUR) TUMOR BLADDER, COMBINED N/A 02/01/2021    Procedure: CYSTOSCOPY SELECTIVE CYTOLOGIES, BLADDER BIOPSY FULGURATION, MEATAL DILATION;  Surgeon: Fam Manrique MD;  Location: [facility];  Service: Urology     INGUINAL HERNIA REPAIR Bilateral      IR NEPHROSTOMY TUBE PLACEMENT BILATERAL  1/5/2022     JOINT REPLACEMENT Bilateral     bilateral CARLI, left TKA     NY CYSTOSCOPY,REMV CALCULUS,SIMPLE Right 03/22/2021    Procedure: CYSTOSCOPY, RIGHT RETROGRADE PYELOGRAM, RIGHT URETEROSCOPY, URETERAL BIOPSY, RIGHT URETERAL STENT PLACEMENT;  Surgeon: Fam Manrique MD;  Location: [facility];  Service: Urology     NY CYSTOURETHROSCOPY,BIOPSY N/A 10/01/2019    Procedure: CYSTOSCOPY, WITH BLADDER BIOPSIES and Fulgeration;  Surgeon: Jose Murcia MD;  Location: [facility] [place];  Service: Urology     NY CYSTOURETHROSCOPY,URETER CATHETER Bilateral 10/01/2019    Procedure: BILATERAL RETROGRADE PYELOGRAMS;  Surgeon: Jose Murcia MD;  Location: [facility] [place];  Service: Urology     PROSTATECTOMY      Radical suprapubic     RELEASE CARPAL TUNNEL       TONSILLECTOMY       TOTAL KNEE ARTHROPLASTY       VASECTOMY         No family history on file.    Social History     Tobacco Use     Smoking status: Former Smoker     Packs/day: 0.00     Quit date: 8/1/2018     Years since quitting: 3.5     Smokeless tobacco: Never Used     Tobacco comment: Cigars and Pipes   Substance Use Topics     Alcohol use: Yes      Comment: Alcoholic Drinks/day: occassional      Drug use: Not Currently       acetaminophen (TYLENOL) 325 MG tablet  amoxicillin (AMOXIL) 500 MG capsule  aspirin (ASA) 81 MG EC tablet  Baclofen (LIORESAL) 5 MG tablet  brimonidine (ALPHAGAN) 0.15 % ophthalmic solution  calcipotriene (DOVONOX) 0.005 % cream  Dulaglutide 3 MG/0.5ML SOPN  famotidine (PEPCID) 10 MG tablet  ferrous sulfate (FEROSUL) 325 (65 Fe) MG tablet  insulin glargine (BASAGLAR KWIKPEN) 100 unit/mL (3 mL) pen  latanoprost (XALATAN) 0.005 % ophthalmic solution  Magnesium Cl-Calcium Carbonate (SLOW-MAG) 71.5-119 MG TBEC  metoprolol succinate ER (TOPROL-XL) 25 MG 24 hr tablet  polyethylene glycol (MIRALAX) 17 GM/Dose powder  simvastatin (ZOCOR) 20 MG tablet  triamcinolone (KENALOG) 0.1 % cream        Physical Exam     First Vitals:  Patient Vitals for the past 24 hrs:   BP Temp Temp src Pulse Resp SpO2 Weight   01/30/22 0930 123/57 -- -- 84 18 96 % --   01/30/22 0915 -- -- -- 79 18 97 % --   01/30/22 0900 -- -- -- 77 21 98 % --   01/30/22 0845 -- -- -- 69 21 100 % --   01/30/22 0830 -- -- -- 68 18 99 % --   01/30/22 0815 -- -- -- 74 20 99 % --   01/30/22 0800 139/73 -- -- 79 18 100 % --   01/30/22 0745 -- -- -- 70 17 100 % --   01/30/22 0730 -- -- -- 71 17 100 % --   01/30/22 0715 -- -- -- 69 16 99 % --   01/30/22 0700 (!) 143/69 -- -- 77 19 97 % --   01/30/22 0640 -- -- -- 76 24 100 % --   01/30/22 0600 138/67 -- -- 76 17 100 % --   01/30/22 0530 -- -- -- 72 19 100 % --   01/30/22 0515 -- -- -- 76 20 96 % --   01/30/22 0500 118/56 -- -- 75 21 100 % --   01/30/22 0455 -- -- -- 71 18 100 % --   01/30/22 0440 -- -- -- 75 16 100 % --   01/30/22 0403 -- -- -- 71 19 100 % --   01/30/22 0402 -- -- -- 73 23 100 % --   01/30/22 0400 109/59 -- -- 74 24 99 % --   01/30/22 0320 -- -- -- 68 19 99 % --   01/30/22 0300 122/72 -- -- 77 22 100 % --   01/30/22 0200 113/64 -- -- 73 16 100 % --   01/30/22 0131 -- -- -- 74 25 98 % --   01/30/22 0130 -- -- -- 78 21 98 % --    22 0115 -- -- -- 69 17 98 % --   22 0045 -- -- -- 72 18 97 % --   22 0015 -- -- -- 80 30 97 % --   22 0000 108/59 -- -- 82 24 98 % --   22 2340 -- -- -- 85 24 98 % --   22 2320 -- -- -- 76 19 97 % --   22 2310 -- -- -- 81 21 98 % --   22 2300 116/54 -- -- 88 -- 98 % --   22 2250 123/73 -- -- 80 19 98 % --   220 109/68 -- -- 88 23 97 % --   22 124/73 -- -- 86 20 97 % --   22 133/69 -- -- 93 27 97 % --   22 123/68 -- -- 88 25 96 % --   22 128/76 -- -- 92 -- 96 % --   22 128/66 -- -- 94 29 98 % --   22 128/67 -- -- 90 23 98 % --   22 129/69 -- -- 91 25 98 % --   22 124/64 -- -- 92 23 98 % --   22 135/63 -- -- 93 -- 97 % --   22 1810 (!) 149/77 99.1  F (37.3  C) Temporal 100 20 98 % 98.4 kg (217 lb)       PHYSICAL EXAM:   Physical Exam    GENERAL: Awake, alert.  In no acute distress.   HEENT: Normocephalic, atraumatic. Minimal anisocoria (left pupil less than 1mm larger than the right pupil). Pupils round and reactive. Conjunctiva normal. EOMI.  NECK: No stridor.  PULMONARY: Symmetrical breath sounds without distress.  Lungs clear to auscultation bilaterally without wheezes, rhonchi or rales.  CARDIO: Borderline tachycardic rate with regular rhythm.  No significant murmur, rub or gallop.  Radial pulses strong and symmetrical.  ABDOMINAL: Abdomen soft, non-distended and non-tender to palpation.  No CVAT, BL.  EXTREMITIES: No lower extremity swelling or edema.      NEURO: Alert and oriented to person, place and time, although had difficulty initially with his .  Cranial nerves grossly intact.  No focal motor deficit.  PSYCH: Normal mood and affect.  SKIN: No rashes.     Results     LAB:  All pertinent labs reviewed and interpreted  Labs Ordered and Resulted from Time of ED Arrival to Time of ED Departure   ROUTINE UA WITH MICROSCOPIC REFLEX TO CULTURE  - Abnormal       Result Value    Color Urine Light Yellow      Appearance Urine Turbid (*)     Glucose Urine Negative      Bilirubin Urine Negative      Ketones Urine Negative      Specific Gravity Urine 1.016      Blood Urine 0.03 mg/dL (*)     pH Urine 6.0      Protein Albumin Urine 100  (*)     Urobilinogen Urine <2.0      Nitrite Urine Negative      Leukocyte Esterase Urine 500 Rao/uL (*)     Bacteria Urine Few (*)     WBC Clumps Urine Present (*)     Mucus Urine Present (*)     Amorphous Crystals Urine Few (*)     RBC Urine 0      WBC Urine 88 (*)     Squamous Epithelials Urine <1     BASIC METABOLIC PANEL - Abnormal    Sodium 134 (*)     Potassium 4.9      Chloride 100      Carbon Dioxide (CO2) 22      Anion Gap 12      Urea Nitrogen 34 (*)     Creatinine 2.19 (*)     Calcium 10.1      Glucose 178 (*)     GFR Estimate 30 (*)    CBC WITH PLATELETS AND DIFFERENTIAL - Abnormal    WBC Count 10.6      RBC Count 2.88 (*)     Hemoglobin 8.2 (*)     Hematocrit 26.5 (*)     MCV 92      MCH 28.5      MCHC 30.9 (*)     RDW 13.6      Platelet Count 336      % Neutrophils 56      % Lymphocytes 27      % Monocytes 15      % Eosinophils 0      % Basophils 0      % Immature Granulocytes 2      NRBCs per 100 WBC 0      Absolute Neutrophils 6.0      Absolute Lymphocytes 2.9      Absolute Monocytes 1.6 (*)     Absolute Eosinophils 0.0      Absolute Basophils 0.0      Absolute Immature Granulocytes 0.2      Absolute NRBCs 0.0     CRP INFLAMMATION - Abnormal    CRP 20.6 (*)    LACTIC ACID WHOLE BLOOD - Abnormal    Lactic Acid 2.2 (*)    PROCALCITONIN - Abnormal    Procalcitonin 0.68 (*)    HEPATIC FUNCTION PANEL - Abnormal    Bilirubin Total 0.7      Bilirubin Direct 0.3      Protein Total 7.3      Albumin 2.7 (*)     Alkaline Phosphatase 224 (*)     AST 97 (*)     ALT 79 (*)    GLUCOSE BY METER - Abnormal    GLUCOSE BY METER POCT 153 (*)    GLUCOSE BY METER - Abnormal    GLUCOSE BY METER POCT 101 (*)    AMMONIA - Normal     Ammonia 16     COVID-19 VIRUS (CORONAVIRUS) BY PCR - Normal    SARS CoV2 PCR Negative     LACTIC ACID WHOLE BLOOD - Normal    Lactic Acid 0.7     PLATELET COUNT - Normal    Platelet Count 269     GLUCOSE MONITOR NURSING POCT   GLUCOSE MONITOR NURSING POCT   GLUCOSE MONITOR NURSING POCT   GLUCOSE MONITOR NURSING POCT   GLUCOSE MONITOR NURSING POCT   BLOOD CULTURE   BLOOD CULTURE   URINE CULTURE   URINE CULTURE   URINE CULTURE   URINE CULTURE       RADIOLOGY:  CT Abdomen Pelvis w/o Contrast   Final Result   IMPRESSION:    1.  Bilateral nephrostomy tubes in situ. No residual hydroureteronephrosis.   2.  Status post cystectomy with right lower quadrant ileal conduit.   3.  Mild constipation. Diverticulosis. No diverticulitis, colitis, or obstruction.   4.  Interval development of small right pleural effusion.   5.  Atherosclerotic vascular disease.         Head CT w/o contrast   Final Result   IMPRESSION:   1. No acute intracranial process.        EC2022, 19:49; NSR with rate of 93 bpm; normal intervals; normal conduction; inferior Q waves; no ST-T wave elevation consistent with ACS or pericarditis; no previous EKG available for comparison    EKG independently reviewed and interpreted by Ashley Rodgers MD      [facility] Documentation       CMS Diagnoses:   The patient has signs of Severe Sepsis        If one the following conditions is present, a 30 mL/kg bolus is recommended as part of the 6 hour bundle (IBW can be used for BMI >30, or document refusal/contraindication):      1.   Initial hypotension  defined as 2 bps < 90 or map < 65 in the 6hrs before or 6hrs after time zero.     2.  Lactate >4.     The patient has signs of Severe Sepsis as evidenced by:    1. 2 SIRS criteria, AND  2. Suspected infection, AND   3. Organ dysfunction: Lactic Acidosis with value >2.0 and Acute encephalopathy due to sepsis    Time severe sepsis diagnosis confirmed: 18:35 22 as this was the time when Lactate  "resulted, and the level was > 2.0    3 Hour Severe Sepsis Bundle Completion:    1. Initial Lactic Acid Result:   Recent Labs   Lab Test 01/29/22 2120 01/29/22  1835   LACT 0.7 2.2*     2. Blood Cultures before Antibiotics: Yes  3. Broad Spectrum Antibiotics Administered:  yes       Anti-infectives (From admission through now)    Start     Dose/Rate Route Frequency Ordered Stop    01/30/22 0900  linezolid (ZYVOX) tablet 600 mg         600 mg Oral EVERY 12 HOURS SCHEDULED 01/29/22 2214 01/30/22 0400  piperacillin-tazobactam (ZOSYN) 3.375 g vial to attach to  mL bag        Note to Pharmacy: For [facility], [facility] and [facility]: For Zosyn-naive patients, use the \"Zosyn initial dose + extended infusion\" order panel.    3.375 g  over 240 Minutes Intravenous EVERY 8 HOURS 01/29/22 2214 01/29/22 2130  piperacillin-tazobactam (ZOSYN) 3.375 g vial to attach to  mL bag         3.375 g  over 30 Minutes Intravenous ONCE 01/29/22 2102 01/29/22 2155 01/29/22 2030  linezolid (ZYVOX) infusion 600 mg         600 mg  over 60 Minutes Intravenous ONCE 01/29/22 1959 01/29/22 2349          4. Fluid volume administered in ED:  Full bolus NOT administered due to unknown cardiac function with advanced age; 1 L NS given ml of IV fluids given    BMI Readings from Last 1 Encounters:   01/29/22 30.27 kg/m      30 mL/kg fluids based on weight: 2,950 mL  30 mL/kg fluids based on IBW (must be >= 60 inches tall): 2,260 mL                Severe Sepsis reassessment:  1. Repeat Lactic Acid Level: 0.7  2. MAP>65 after initial IVF bolus, will continue to monitor fluid status and vital signs        I, Shayy Lane, am serving as a scribe to document services personally performed by Ashley Rodgers MD based on my observation and the provider's statements to me. IAshley MD attest that Shayy Lane is acting in a scribe capacity, has observed my performance of the services and has documented them in accordance with my direction.    Ashley" MD Ana Maria  Emergency Medicine  [facility] EMERGENCY DEPARTMENT         Ashley Rodgers MD  01/30/22 4958

## 2022-01-30 NOTE — ED NOTES
[facility] ED Handoff Report    ED Chief Complaint: acute confusion    ED Diagnosis:  (A41.9,  R65.20,  G93.40) Sepsis with encephalopathy without septic shock, due to unspecified organism (H)  Comment:   Plan:     (N39.0,  R31.9) Urinary tract infection with hematuria, site unspecified  Comment:   Plan:     (N18.9) Chronic renal impairment, unspecified CKD stage  Comment:   Plan:     (D64.9) Anemia, unspecified type  Comment: chronic  Plan:        PMH:    Past Medical History:   Diagnosis Date     Basal cell carcinoma      Bladder tumor      Cancer (H)     Prostate     Chronic kidney disease      CKD (chronic kidney disease)      DM2 (diabetes mellitus, type 2) (H)      Glaucoma      History of gout      HTN (hypertension)      Hyperlipidemia      Kidney stone      Lung nodule      Malignant neoplasm of urinary bladder, unspecified site (H)      Metabolic syndrome      Obesity      Osteoarthritis of knee      Psoriasis      Restless legs syndrome         Code Status:  Prior     Falls Risk: Yes Band: Applied    Current Living Situation/Residence: lives with a significant other     Elimination Status: Continent: Yes     Activity Level: SBA    Patients Preferred Language:  English     Needed: No    Vital Signs:  /63   Pulse 77   Temp 99.1  F (37.3  C) (Temporal)   Resp 11   Wt 98.4 kg (217 lb)   SpO2 100%   BMI 30.27 kg/m       Cardiac Rhythm:     Pain Score: 3/10    Is the Patient Confused:  Yes    Last Food or Drink: 01/30/22 at 1000    Focused Assessment:      76 year old male, history of bladder cancer s/p cystectomy with ureteroileal conduit creation (12/2021), CKD, DM, HTN, HLD and obesity, who presents to this ED via walk-in for evaluation of altered mental status.     He was seen at an outside facility a few days ago for severe back pain. Evaluation was unremarkable and he was started on Baclofen. He took 1 tab on Wednesday and 3 tabs on Thursday. After taking his last dose on  Thursday, he developed confusion.  He stopped taking the Baclofen Thursday but there has been only minimal improvement in his confusion.   Neuro: Acute confusion Oriented to self  IV/drain: PIV right arm, Right and Left nephrostomy, urostomy  VSS: VSS on RA  Resp: Clear    Cardio: WDL.   GI/: Neph tubes  Pain/Discomfort: Chronic back pain 3/10  Activity: Up with assist     Tests Performed: Done: Labs and Imaging    Treatments Provided:      Family Dynamics/Concerns: No    Family Updated On Visitor Policy: Yes    Plan of Care Communicated to Family: Yes    Who Was Updated about Plan of Care: wife and son    Belongings Checklist Done and Signed by Patient: Yes    Medications sent with patient: on clip board    Covid: asymptomatic , negative    Additional Information:     RN: Collins Rogers   1/30/2022 11:27 AM

## 2022-01-30 NOTE — PHARMACY-ADMISSION MEDICATION HISTORY
Pharmacy Note - Admission Medication History    Pertinent Provider Information: Amlodipine placed on hold with last discharge, discontinued by primary care provider and BP has remained WNL. Baclofen added this week.     ______________________________________________________________________    Prior To Admission (PTA) med list completed and updated in EMR.       PTA Med List   Medication Sig Last Dose     acetaminophen (TYLENOL) 325 MG tablet Take 1-2 tablets (325-650 mg) by mouth every 4 hours as needed for mild pain or pain Unknown at Unknown time     amoxicillin (AMOXIL) 500 MG capsule [AMOXICILLIN (AMOXIL) 500 MG CAPSULE] Take 2,000 mg by mouth as needed. Prior to dental procedures Unknown at Unknown time     aspirin (ASA) 81 MG EC tablet Take 1 tablet (81 mg) by mouth daily 1/29/2022 at Unknown time     Baclofen (LIORESAL) 5 MG tablet Take 5 mg by mouth 3 times daily as needed for muscle spasms 1/27/2022 at Unknown time     brimonidine (ALPHAGAN) 0.15 % ophthalmic solution [BRIMONIDINE (ALPHAGAN) 0.15 % OPHTHALMIC SOLUTION] Administer 1 drop to both eyes 2 (two) times a day. 1/29/2022 at AM     calcipotriene (DOVONOX) 0.005 % cream [CALCIPOTRIENE (DOVONOX) 0.005 % CREAM] Apply 1 application topically daily as needed. Unknown at Unknown time     Dulaglutide 3 MG/0.5ML SOPN Inject 3 mg Subcutaneous once a week Mondays 1/24/2022 at Unknown time     famotidine (PEPCID) 10 MG tablet Take 1 tablet (10 mg) by mouth 2 times daily 1/29/2022 at AM     ferrous sulfate (FEROSUL) 325 (65 Fe) MG tablet Take 1 tablet (325 mg) by mouth daily (with breakfast) 1/29/2022 at Unknown time     insulin glargine (BASAGLAR KWIKPEN) 100 unit/mL (3 mL) pen Inject 20 Units Subcutaneous 2 times daily  1/29/2022 at AM     latanoprost (XALATAN) 0.005 % ophthalmic solution [LATANOPROST (XALATAN) 0.005 % OPHTHALMIC SOLUTION] Administer 1 drop to both eyes at bedtime. 1/28/2022 at Unknown time     Magnesium Cl-Calcium Carbonate (SLOW-MAG)  71.5-119 MG TBEC Take 1 tablet by mouth daily 1/29/2022 at Unknown time     metoprolol succinate ER (TOPROL-XL) 25 MG 24 hr tablet Take 1 tablet (25 mg) by mouth daily 1/29/2022 at Unknown time     polyethylene glycol (MIRALAX) 17 GM/Dose powder Take 17 g by mouth daily Discontinue if stools are loose (Patient taking differently: Take 17 g by mouth daily as needed Discontinue if stools are loose) Unknown at Unknown time     simvastatin (ZOCOR) 20 MG tablet Take 20 mg by mouth At Bedtime  1/28/2022 at Unknown time     triamcinolone (KENALOG) 0.1 % cream Apply 1 Application topically daily as needed Two times a day on Saturdays and Sundays Unknown at Unknown time       Information source(s): Patient, Family member, Hospital records and [facility]  Method of interview communication: in-person    Summary of Changes to PTA Med List  New: baclofen  Discontinued: amlodipine    Patient was asked about OTC/herbal products specifically.  PTA med list reflects this.    Allergies were reviewed, assessed, and updated with the patient.      Patient did not bring any medications to the hospital and can't retrieve from home. No multi-dose medications are available for use during hospital stay.     The information provided in this note is only as accurate as the sources available at the time of the update(s).    Thank you for the opportunity to participate in the care of this patient.    Opal Soto [facility]  1/29/2022 9:29 PM

## 2022-01-31 ENCOUNTER — TRANSFERRED RECORDS (OUTPATIENT)
Dept: HEALTH INFORMATION MANAGEMENT | Facility: CLINIC | Age: 77
End: 2022-01-31
Payer: COMMERCIAL

## 2022-01-31 LAB
ABO/RH(D): NORMAL
ALBUMIN SERPL-MCNC: 2 G/DL (ref 3.5–5)
ALP SERPL-CCNC: 154 U/L (ref 45–120)
ALT SERPL W P-5'-P-CCNC: 43 U/L (ref 0–45)
ANION GAP SERPL CALCULATED.3IONS-SCNC: 7 MMOL/L (ref 5–18)
ANTIBODY SCREEN: NEGATIVE
AST SERPL W P-5'-P-CCNC: 37 U/L (ref 0–40)
BACTERIA UR CULT: ABNORMAL
BASOPHILS # BLD AUTO: 0 10E3/UL (ref 0–0.2)
BASOPHILS NFR BLD AUTO: 0 %
BILIRUB DIRECT SERPL-MCNC: 0.2 MG/DL
BILIRUB SERPL-MCNC: 0.5 MG/DL (ref 0–1)
BLD PROD TYP BPU: NORMAL
BLOOD COMPONENT TYPE: NORMAL
BUN SERPL-MCNC: 24 MG/DL (ref 8–28)
C REACTIVE PROTEIN LHE: 10.6 MG/DL (ref 0–0.8)
CALCIUM SERPL-MCNC: 8.5 MG/DL (ref 8.5–10.5)
CHLORIDE BLD-SCNC: 105 MMOL/L (ref 98–107)
CO2 SERPL-SCNC: 24 MMOL/L (ref 22–31)
CODING SYSTEM: NORMAL
CREAT SERPL-MCNC: 1.92 MG/DL (ref 0.7–1.3)
CROSSMATCH: NORMAL
EOSINOPHIL # BLD AUTO: 0.1 10E3/UL (ref 0–0.7)
EOSINOPHIL NFR BLD AUTO: 2 %
ERYTHROCYTE [DISTWIDTH] IN BLOOD BY AUTOMATED COUNT: 13.8 % (ref 10–15)
GFR SERPL CREATININE-BSD FRML MDRD: 35 ML/MIN/1.73M2
GLUCOSE BLD-MCNC: 110 MG/DL (ref 70–125)
GLUCOSE BLDC GLUCOMTR-MCNC: 122 MG/DL (ref 70–99)
GLUCOSE BLDC GLUCOMTR-MCNC: 122 MG/DL (ref 70–99)
GLUCOSE BLDC GLUCOMTR-MCNC: 132 MG/DL (ref 70–99)
GLUCOSE BLDC GLUCOMTR-MCNC: 160 MG/DL (ref 70–99)
GLUCOSE BLDC GLUCOMTR-MCNC: 164 MG/DL (ref 70–99)
HCT VFR BLD AUTO: 23 % (ref 40–53)
HGB BLD-MCNC: 6.9 G/DL (ref 13.3–17.7)
IMM GRANULOCYTES # BLD: 0.1 10E3/UL
IMM GRANULOCYTES NFR BLD: 2 %
ISSUE DATE AND TIME: NORMAL
LYMPHOCYTES # BLD AUTO: 1.6 10E3/UL (ref 0.8–5.3)
LYMPHOCYTES NFR BLD AUTO: 28 %
MCH RBC QN AUTO: 28.2 PG (ref 26.5–33)
MCHC RBC AUTO-ENTMCNC: 30 G/DL (ref 31.5–36.5)
MCV RBC AUTO: 94 FL (ref 78–100)
MONOCYTES # BLD AUTO: 0.8 10E3/UL (ref 0–1.3)
MONOCYTES NFR BLD AUTO: 15 %
NEUTROPHILS # BLD AUTO: 3.2 10E3/UL (ref 1.6–8.3)
NEUTROPHILS NFR BLD AUTO: 53 %
NRBC # BLD AUTO: 0 10E3/UL
NRBC BLD AUTO-RTO: 0 /100
PLATELET # BLD AUTO: 285 10E3/UL (ref 150–450)
POTASSIUM BLD-SCNC: 4.2 MMOL/L (ref 3.5–5)
PROT SERPL-MCNC: 5.7 G/DL (ref 6–8)
RBC # BLD AUTO: 2.45 10E6/UL (ref 4.4–5.9)
SODIUM SERPL-SCNC: 136 MMOL/L (ref 136–145)
SPECIMEN EXPIRATION DATE: NORMAL
UNIT ABO/RH: NORMAL
UNIT NUMBER: NORMAL
UNIT STATUS: NORMAL
UNIT TYPE ISBT: 7300
WBC # BLD AUTO: 5.8 10E3/UL (ref 4–11)

## 2022-01-31 PROCEDURE — 250N000011 HC RX IP 250 OP 636: Performed by: HOSPITALIST

## 2022-01-31 PROCEDURE — 86140 C-REACTIVE PROTEIN: CPT | Performed by: HOSPITALIST

## 2022-01-31 PROCEDURE — 99207 PR CDG-MDM COMPONENT: MEETS MODERATE - DOWN CODED: CPT | Performed by: HOSPITALIST

## 2022-01-31 PROCEDURE — 999N000127 HC STATISTIC PERIPHERAL IV START W US GUIDANCE

## 2022-01-31 PROCEDURE — P9016 RBC LEUKOCYTES REDUCED: HCPCS | Performed by: HOSPITALIST

## 2022-01-31 PROCEDURE — 82248 BILIRUBIN DIRECT: CPT | Performed by: HOSPITALIST

## 2022-01-31 PROCEDURE — 99232 SBSQ HOSP IP/OBS MODERATE 35: CPT | Performed by: HOSPITALIST

## 2022-01-31 PROCEDURE — 86901 BLOOD TYPING SEROLOGIC RH(D): CPT | Performed by: HOSPITALIST

## 2022-01-31 PROCEDURE — 86923 COMPATIBILITY TEST ELECTRIC: CPT | Performed by: HOSPITALIST

## 2022-01-31 PROCEDURE — 250N000013 HC RX MED GY IP 250 OP 250 PS 637: Performed by: HOSPITALIST

## 2022-01-31 PROCEDURE — 85025 COMPLETE CBC W/AUTO DIFF WBC: CPT | Performed by: HOSPITALIST

## 2022-01-31 PROCEDURE — 120N000001 HC R&B MED SURG/OB

## 2022-01-31 PROCEDURE — 80053 COMPREHEN METABOLIC PANEL: CPT | Performed by: HOSPITALIST

## 2022-01-31 PROCEDURE — 36415 COLL VENOUS BLD VENIPUNCTURE: CPT | Performed by: HOSPITALIST

## 2022-01-31 RX ADMIN — PIPERACILLIN SODIUM AND TAZOBACTAM SODIUM 3.38 G: 3; .375 INJECTION, POWDER, LYOPHILIZED, FOR SOLUTION INTRAVENOUS at 22:16

## 2022-01-31 RX ADMIN — INSULIN ASPART 1 UNITS: 100 INJECTION, SOLUTION INTRAVENOUS; SUBCUTANEOUS at 12:57

## 2022-01-31 RX ADMIN — HEPARIN SODIUM 5000 UNITS: 5000 INJECTION, SOLUTION INTRAVENOUS; SUBCUTANEOUS at 00:19

## 2022-01-31 RX ADMIN — PIPERACILLIN SODIUM AND TAZOBACTAM SODIUM 3.38 G: 3; .375 INJECTION, POWDER, LYOPHILIZED, FOR SOLUTION INTRAVENOUS at 04:32

## 2022-01-31 RX ADMIN — LINEZOLID 600 MG: 600 TABLET, FILM COATED ORAL at 08:23

## 2022-01-31 RX ADMIN — LATANOPROST 1 DROP: 50 SOLUTION OPHTHALMIC at 22:17

## 2022-01-31 RX ADMIN — MAGNESIUM OXIDE TAB 400 MG (241.3 MG ELEMENTAL MG) 400 MG: 400 (241.3 MG) TAB at 08:23

## 2022-01-31 RX ADMIN — BRIMONIDINE TARTRATE 1 DROP: 1.5 SOLUTION OPHTHALMIC at 20:43

## 2022-01-31 RX ADMIN — INSULIN ASPART 1 UNITS: 100 INJECTION, SOLUTION INTRAVENOUS; SUBCUTANEOUS at 18:14

## 2022-01-31 RX ADMIN — FAMOTIDINE 10 MG: 10 TABLET ORAL at 08:23

## 2022-01-31 RX ADMIN — METOPROLOL SUCCINATE 25 MG: 25 TABLET, EXTENDED RELEASE ORAL at 08:25

## 2022-01-31 RX ADMIN — PIPERACILLIN SODIUM AND TAZOBACTAM SODIUM 3.38 G: 3; .375 INJECTION, POWDER, LYOPHILIZED, FOR SOLUTION INTRAVENOUS at 13:50

## 2022-01-31 RX ADMIN — HEPARIN SODIUM 5000 UNITS: 5000 INJECTION, SOLUTION INTRAVENOUS; SUBCUTANEOUS at 13:12

## 2022-01-31 RX ADMIN — ASPIRIN 81 MG: 81 TABLET, COATED ORAL at 08:22

## 2022-01-31 RX ADMIN — BRIMONIDINE TARTRATE 1 DROP: 1.5 SOLUTION OPHTHALMIC at 08:25

## 2022-01-31 RX ADMIN — FAMOTIDINE 10 MG: 10 TABLET ORAL at 20:43

## 2022-01-31 ASSESSMENT — ACTIVITIES OF DAILY LIVING (ADL)
ADLS_ACUITY_SCORE: 12
ADLS_ACUITY_SCORE: 10
ADLS_ACUITY_SCORE: 12
ADLS_ACUITY_SCORE: 10
ADLS_ACUITY_SCORE: 10
ADLS_ACUITY_SCORE: 11
ADLS_ACUITY_SCORE: 12
ADLS_ACUITY_SCORE: 10
ADLS_ACUITY_SCORE: 12
ADLS_ACUITY_SCORE: 10
ADLS_ACUITY_SCORE: 12
ADLS_ACUITY_SCORE: 10
ADLS_ACUITY_SCORE: 13
ADLS_ACUITY_SCORE: 10
ADLS_ACUITY_SCORE: 10
ADLS_ACUITY_SCORE: 12
ADLS_ACUITY_SCORE: 12
ADLS_ACUITY_SCORE: 10
DEPENDENT_IADLS:: CLEANING;COOKING;LAUNDRY;SHOPPING;MEAL PREPARATION;MEDICATION MANAGEMENT;TRANSPORTATION

## 2022-01-31 NOTE — PROGRESS NOTES
"CLINICAL NUTRITION SERVICES - ASSESSMENT NOTE     Nutrition Prescription    RECOMMENDATIONS FOR MDs/PROVIDERS TO ORDER:    Malnutrition Status:    Severe    Recommendations already ordered by Registered Dietitian (RD):  Encourage po intake (75% or more of meals)    Future/Additional Recommendations:  Follow po intake, weight, labs     REASON FOR ASSESSMENT  Marcelo Valerio is a/an 76 year old male assessed by the dietitian for Admission Nutrition Risk Screen for positive weight loss PTA  Pt with a past medical history of prostate cancer, bladder cancer s/p ileal conduit, recent issues with pyelonephritis and hydronephrosis with bilateral nephrostomy tubes placed on 1/5.  Diabetes, hypertension presents for evaluation of altered mental status found to have probable UTI    NUTRITION HISTORY  Pt states he tried 1 liquid nutritional supplement at home and disliked it-he will not take any nutritional supplements here.  He has had poor po intake since beginning of December. He states he was in the hospital and the food was terrible  He states he went from 240 lb to 209 lb in Dec-Jan    CURRENT NUTRITION ORDERS  Diet: Consistent CHO (60 g at each meal)  Intake/Tolerance: Pt states he ate almost 100% breakfast this am (did not eat the fruit), he ate 75% lunch today    LABS  Labs reviewed: Glu 164, Na 136, K 4.2, Cr 1.92, Alb 2, alk phos 154, ALT 43, AST 37, ammonia 16, CRP 10.6    MEDICATIONS  Medications reviewed: pepcid, novolog, lantus, Mag-Ox, zosyn    ANTHROPOMETRICS  Height: 5'11\"  Most Recent Weight: 98.4 kg (217 lb)    IBW: 78.2 kg (172 lb)  BMI: Obesity Grade I BMI 30-34.9  Weight History:   Wt Readings from Last 10 Encounters:   01/29/22 98.4 kg (217 lb)   01/08/22 98.7 kg (217 lb 11.2 oz)   12/06/21 107.6 kg (237 lb 4.8 oz)   11/22/21 105.6 kg (232 lb 14.4 oz)   03/22/21 108.9 kg (240 lb)   02/01/21 108.9 kg (240 lb)   09/30/19 113.4 kg (250 lb)   Per above, weight has decreased 20 lb in 7 weeks (8.4%) which is " clinically significant and severe    Dosing Weight: 83.3 kg    ASSESSED NUTRITION NEEDS  Estimated Energy Needs: 6783-2497 kcals/day (25 - 30 kcals/kg)  Justification: Maintenance  Estimated Protein Needs: 66-83 grams protein/day (0.8 - 1 grams of pro/kg)  Justification: Maintenance  Estimated Fluid Needs: 9942-2104 mL/day (25 - 30 mL/kg)   Justification: Maintenance    PHYSICAL FINDINGS  See malnutrition section below.  Skin/ Thaddeus=21  GI: WDL/ passing flatus per nurse    MALNUTRITION:  % Weight Loss:  > 7.5% in 3 months (severe malnutrition)  % Intake:  </= 50% for >/= 5 days (severe malnutrition)  Subcutaneous Fat Loss:  None observed  Muscle Loss:  None observed  Fluid Retention:  None noted    Malnutrition Diagnosis: Severe malnutrition  In Context of:  Acute illness or injury    NUTRITION DIAGNOSIS  Malnutrition related to acute illness as evidenced by inadequate oral intake (< 50% >= 5 days) and 8.4% weight loss in almost 2 months      INTERVENTIONS  Implementation  Nutrition Education: Patient declined   Continue current diet rx  Encouraged him to consume 75% or more at mealtimes    Goals  Patient to consume % of nutritionally adequate meals three times per day, or the equivalent with supplements/snacks.     Monitoring/Evaluation  Progress toward goals will be monitored and evaluated per protocol.

## 2022-01-31 NOTE — PROGRESS NOTES
[facility]    Medicine Progress Note - Hospitalist Service       Date of Admission:  1/29/2022    Assessment & Plan            Marcelo Valerio is a 76 year old male admitted on 1/29/2022. He has history of prostate cancer, bladder cancer status post ileal conduit, recent issues with pyelonephritis and hydronephrosis with bilateral nephrostomy tubes placed on 1/5, diabetes, hypertension presents for evaluation of altered mental status found to have probable UTI. Hospital Day: 3     #Acute encephalopathy  Suspected toxic/metabolic encephalopathy secondary to UTI, also with new med baclofen (holding- per family he definitely seemed more confused after just 3-4 doses of this so family were already holding this)  Seems improved today  CT head negative  Supportive care, PT/OT    #UTI  No sepsis  Patient with complex anatomy, recent cystectomy/ileal conduit back in December by Dr Manrique.  Then hospitalized 3 weeks ago for pyelonephritis and had bilateral hydronephrosis and perinephric fluid collections, bilateral PNT's placed on 1/5.  History of recent UTI with vancomycin and penicillin resistant Enterococcus faecium and Candida.  Patient started on linezolid and Zosyn. He just completed previous course of linezolid ~1/20.  Urine from ileal conduit growing ampicillin resistant Klebsiella sensitive to Zosyn, no sign of VRE.  Discontinue linezolid, continue Zosyn.  There was question last time of whether he might need some ureteral stents.  We will ask urology to evaluate.  Blood cultures no growth to date.  Cultures from both PNTs no growth to date.    #Normocytic anemia  History of iron deficiency and takes ferrous sulfate at home, got Venofer in hospital recently  Preop Hgb in Dec was 13, after surgery down to 11.2  Here Hgb 8.2-->6.9  Denies any s/s active bleeding  Suspect that with ongoing infections, recent ARF/CKD he is not able to replenish blood count on his own too well  Transfuse 1 unit  pRBC  May need EPO in future  Continue home ferrous sulfate  Recheck Hgb in AM  Colonoscopy as outpatient might be a good idea with the iron deficiency. Wife thinks maybe he has never had one    #CKD 4  Baseline Cr ~2  During recent hospitalization, was up to 7.5 due to obstruction.  Creatinine has been downtrending since that time, today 1.9  Avoid nephrotoxins  Supposed to follow up with [facility] as outpatient    #IDDM  Home regimen Trulicity, Lantus 20 units twice daily  Lantus 15 units once daily for now  Novolog carb count 1:15 and sliding scale    #HTN  Home metoprolol. Recently taken off of losartan    #Glaucoma  Home eye drops  History of anisocoria from eye surgery in past    #Heart health  Home ASA    #Severe malnutrition  Malnutrition:    - Level of malnutrition: Severe   May have lost 31 lbs in past 2 months  Refuses supplements in the hospital apparently  RD following    #GERD, home Pepcid  #Hypomagnesemia, noted during recent admission.  Continue home mag  #Hyperlipidemia, hold home simvastatin       Diet: Combination Diet Moderate Consistent Carb (60 g CHO per Meal) Diet    DVT Prophylaxis: Moderate risk. SQH   Bob Catheter: Not present  Central Lines: None  Code Status: Full Code    Disposition Plan   Disposition: Home when ready, likely multiple days  Discharge barriers: IV abx, culture results  Medically ready to discharge today: No  Estimated discharge date: 02/02/2022     The patient's care was discussed with the Patient and Patient's Family.    Savanna Mcguire MD  Hospitalist Service  [facility]  Text page via [employer] Paging/Directory      Clinically Significant Risk Factors Present on Admission              # Diabetes, type II: last A1C 7.5 % (Ref range: <=5.6 %)  # Obesity: last Body mass index is 30.27 kg/m .  # Severe Malnutrition: based on nutrition assessment     ____________        Physical Exam   Vital Signs: Temp: 98.2  F (36.8  C) Temp src: Oral BP:  116/62 Pulse: 87   Resp: 16 SpO2: 98 % O2 Device: None (Room air)    Weight: 217 lbs 0 oz  General: in no apparent distress, non-toxic and alert male sitting in bedside chair oriented x3  HEENT: Head normocephalic atraumatic, oral mucosa moist. Sclerae anicteric  Skin: area of ecchymosis mid left abdomen  Extremities: No peripheral edema  Psych: Normal affect, mood euthymic  Neuro: CNII-XII grossly intact, moving all 4 extremities      Data   Recent Results (from the past 24 hour(s))   Glucose by meter    Collection Time: 01/30/22  5:54 PM   Result Value Ref Range    GLUCOSE BY METER POCT 126 (H) 70 - 99 mg/dL   Glucose by meter    Collection Time: 01/30/22 10:09 PM   Result Value Ref Range    GLUCOSE BY METER POCT 193 (H) 70 - 99 mg/dL   Glucose by meter    Collection Time: 01/31/22  3:04 AM   Result Value Ref Range    GLUCOSE BY METER POCT 122 (H) 70 - 99 mg/dL   Adult Type and Screen    Collection Time: 01/31/22  6:25 AM   Result Value Ref Range    ABO/RH(D) B POS     Antibody Screen Negative Negative    SPECIMEN EXPIRATION DATE [id number]    Hepatic function panel    Collection Time: 01/31/22  6:26 AM   Result Value Ref Range    Bilirubin Total 0.5 0.0 - 1.0 mg/dL    Bilirubin Direct 0.2 <=0.5 mg/dL    Protein Total 5.7 (L) 6.0 - 8.0 g/dL    Albumin 2.0 (L) 3.5 - 5.0 g/dL    Alkaline Phosphatase 154 (H) 45 - 120 U/L    AST 37 0 - 40 U/L    ALT 43 0 - 45 U/L   Basic metabolic panel    Collection Time: 01/31/22  6:26 AM   Result Value Ref Range    Sodium 136 136 - 145 mmol/L    Potassium 4.2 3.5 - 5.0 mmol/L    Chloride 105 98 - 107 mmol/L    Carbon Dioxide (CO2) 24 22 - 31 mmol/L    Anion Gap 7 5 - 18 mmol/L    Urea Nitrogen 24 8 - 28 mg/dL    Creatinine 1.92 (H) 0.70 - 1.30 mg/dL    Calcium 8.5 8.5 - 10.5 mg/dL    Glucose 110 70 - 125 mg/dL    GFR Estimate 35 (L) >60 mL/min/1.73m2   CRP inflammation    Collection Time: 01/31/22  6:26 AM   Result Value Ref Range    CRP 10.6 (H) 0.0-<0.8 mg/dL   CBC with  platelets and differential    Collection Time: 01/31/22  6:26 AM   Result Value Ref Range    WBC Count 5.8 4.0 - 11.0 10e3/uL    RBC Count 2.45 (L) 4.40 - 5.90 10e6/uL    Hemoglobin 6.9 (LL) 13.3 - 17.7 g/dL    Hematocrit 23.0 (L) 40.0 - 53.0 %    MCV 94 78 - 100 fL    MCH 28.2 26.5 - 33.0 pg    MCHC 30.0 (L) 31.5 - 36.5 g/dL    RDW 13.8 10.0 - 15.0 %    Platelet Count 285 150 - 450 10e3/uL    % Neutrophils 53 %    % Lymphocytes 28 %    % Monocytes 15 %    % Eosinophils 2 %    % Basophils 0 %    % Immature Granulocytes 2 %    NRBCs per 100 WBC 0 <1 /100    Absolute Neutrophils 3.2 1.6 - 8.3 10e3/uL    Absolute Lymphocytes 1.6 0.8 - 5.3 10e3/uL    Absolute Monocytes 0.8 0.0 - 1.3 10e3/uL    Absolute Eosinophils 0.1 0.0 - 0.7 10e3/uL    Absolute Basophils 0.0 0.0 - 0.2 10e3/uL    Absolute Immature Granulocytes 0.1 <=0.4 10e3/uL    Absolute NRBCs 0.0 10e3/uL   Glucose by meter    Collection Time: 01/31/22  8:22 AM   Result Value Ref Range    GLUCOSE BY METER POCT 132 (H) 70 - 99 mg/dL   Prepare red blood cells (unit)    Collection Time: 01/31/22 10:15 AM   Result Value Ref Range    CROSSMATCH Compatible     UNIT ABO/RH B Pos     Unit Number [id number]     Unit Status Issued     Blood Component Type Red Blood Cells     Product Code [id number]     CODING SYSTEM [id number]     UNIT TYPE ISBT 7300     ISSUE DATE AND TIME [id number]    Glucose by meter    Collection Time: 01/31/22 11:45 AM   Result Value Ref Range    GLUCOSE BY METER POCT 164 (H) 70 - 99 mg/dL     ____________  Interval History   Data reviewed today: I reviewed all medications, new labs and imaging results over the last 24 hours. I personally reviewed no images or EKG's today.  Patient states doing fine today.  Consent completed for pRBC  Not ready for discharge    I called and updated his wife, Nely

## 2022-01-31 NOTE — CONSULTS
[facility] CONSULTATION    Type of Consult: inpatient  Place of Service: [facility]   Reason for consult: Urinary tract infection  Request for consult by: Dr. Mcguire    History of present illness:   Marcelo Valerio is a 76 year old male that was admitted for Urinary tract infection with confusion. Urology is being consulted for UTI. History obtained through patient, heather, and chart review.     Patient reports increased confusion at home which prompted him to go to the ED. Symptoms started about 5 days ago, and have since improved. He is receiving Zosyn currently. Patient endorses back pain near PNT sites if positions himself incorrectly in bed. Patient denies nausea, vomiting, fevers, and chills. Denies deep flank or back pain. Denies abdominal pain near incisions. Does report leakage from penis.     Patient of Dr. Manrique. S/p cystectomy with ileal conduit, December 2021. He was hospitalized 3 weeks ago for pyelonephritis and had bilateral hydro and perinephric fluid collections, bilateral PNT's placed on 1/5.  History of recent UTI with vancomycin and penicillin resistant Enterococcus faecium and Candida. Patient completed previous course of linezolid around 1/20/22. Bilateral PNT growing different organisms than urine from urostomy.    Past Medical History:  Past Medical History:   Diagnosis Date     Basal cell carcinoma      Bladder tumor      Cancer (H)     Prostate     Chronic kidney disease      CKD (chronic kidney disease)      DM2 (diabetes mellitus, type 2) (H)      Glaucoma      History of gout      HTN (hypertension)      Hyperlipidemia      Kidney stone      Lung nodule      Malignant neoplasm of urinary bladder, unspecified site (H)      Metabolic syndrome      Obesity      Osteoarthritis of knee      Psoriasis      Restless legs syndrome        Past Surgical History:  Past Surgical History:   Procedure Laterality Date     APPENDECTOMY       ARTHROPLASTY REVISION HIP Left      BASAL  CELL CARCINOMA EXCISION      back     CARPAL TUNNEL RELEASE RT/LT       CATARACT EXTRACTION       COMBINED CYSTOSCOPY, RETROGRADES, EXCHANGE STENT URETER(S) Right 11/22/2021    Procedure: CYSTOSCOPY, RIGHT RETROGRADE PYELOGRAM, RIGHT URETERAL STENT EXCHANGE;  Surgeon: Fam Manrique MD;  Location: [facility] [place]     CYSTECTOMY, ROBOT-ASSISTED, LAPAROSCOPIC, USING DA LARISSA SI, WITH URETEROILEAL CONDUIT CREATION N/A 12/6/2021    Procedure: CYSTOSCOPY, RIGHT URETEROSCOPY, RIGHT STENT REMOVAL ROBOTIC ASSISTED RADICAL CYSTECTOMY, CREATION OF ILEAL CONDUIT, EXTENSIVE LYSIS OF ADHESIONS;  Surgeon: Fam Manrique MD;  Location: [facility] [place] OR     CYSTOSCOPY, FULGURATE BLADDER TUMOR, COMBINED Bilateral 09/08/2021    Procedure: CYSTOSCOPY, SELECTIVE CYTOLOGIES, BLADDER BIOPSIES AND FULGURATION, BILATERAL RETROGRADE PYELOGRAMS, RIGHT URETEROSCOPY , RIGHT URERETAL STENT PLACEMENT;  Surgeon: Fam Manrique MD;  Location: [facility]     CYSTOSCOPY, TRANSURETHRAL RESECTION (TUR) TUMOR BLADDER, COMBINED N/A 02/01/2021    Procedure: CYSTOSCOPY SELECTIVE CYTOLOGIES, BLADDER BIOPSY FULGURATION, MEATAL DILATION;  Surgeon: Fam Manrique MD;  Location: [facility];  Service: Urology     INGUINAL HERNIA REPAIR Bilateral      IR NEPHROSTOMY TUBE PLACEMENT BILATERAL  1/5/2022     JOINT REPLACEMENT Bilateral     bilateral CARLI, left TKA     NC CYSTOSCOPY,REMV CALCULUS,SIMPLE Right 03/22/2021    Procedure: CYSTOSCOPY, RIGHT RETROGRADE PYELOGRAM, RIGHT URETEROSCOPY, URETERAL BIOPSY, RIGHT URETERAL STENT PLACEMENT;  Surgeon: Fam Manrique MD;  Location: [facility];  Service: Urology     NC CYSTOURETHROSCOPY,BIOPSY N/A 10/01/2019    Procedure: CYSTOSCOPY, WITH BLADDER BIOPSIES and Fulgeration;  Surgeon: Jose Murcia MD;  [place] [facility] [place];  Service: Urology     NC CYSTOURETHROSCOPY,URETER CATHETER Bilateral 10/01/2019    Procedure: BILATERAL RETROGRADE PYELOGRAMS;  Surgeon:  Jose Murcia MD;  Location: [facility] [place];  Service: Urology     PROSTATECTOMY      Radical suprapubic     RELEASE CARPAL TUNNEL       TONSILLECTOMY       TOTAL KNEE ARTHROPLASTY       VASECTOMY         Social History:  Social History     Socioeconomic History     Marital status:      Spouse name: Not on file     Number of children: Not on file     Years of education: Not on file     Highest education level: Not on file   Occupational History     Not on file   Tobacco Use     Smoking status: Former Smoker     Packs/day: 0.00     Quit date: 8/1/2018     Years since quitting: 3.5     Smokeless tobacco: Never Used     Tobacco comment: Cigars and Pipes   Substance and Sexual Activity     Alcohol use: Yes     Comment: Alcoholic Drinks/day: occassional      Drug use: Not Currently     Sexual activity: Not on file   Other Topics Concern     Not on file   Social History Narrative     Not on file     Social Determinants of Health     Financial Resource Strain: Not on file   Food Insecurity: Not on file   Transportation Needs: Not on file   Physical Activity: Not on file   Stress: Not on file   Social Connections: Not on file   Intimate Partner Violence: Not on file   Housing Stability: Not on file       Medications:  Current Facility-Administered Medications   Medication     acetaminophen (TYLENOL) tablet 650 mg     aspirin EC tablet 81 mg     bisacodyl (DULCOLAX) Suppository 10 mg     brimonidine (ALPHAGAN-P) 0.15 % ophthalmic solution 1 drop     calcium carbonate (TUMS) chewable tablet 500 mg     carboxymethylcellulose PF (REFRESH PLUS) 0.5 % ophthalmic solution 1 drop     glucose gel 15-30 g    Or     dextrose 50 % injection 25-50 mL    Or     glucagon injection 1 mg     famotidine (PEPCID) tablet 10 mg     heparin ANTICOAGULANT injection 5,000 Units     insulin aspart (NovoLOG) injection (RAPID ACTING)     insulin aspart (NovoLOG) injection (RAPID ACTING)     insulin glargine (LANTUS PEN)  injection 15 Units     latanoprost (XALATAN) 0.005 % ophthalmic solution 1 drop     magnesium oxide (MAG-OX) tablet 400 mg     melatonin tablet 3 mg     metoprolol succinate ER (TOPROL-XL) 24 hr tablet 25 mg     ondansetron (ZOFRAN) injection 4 mg     piperacillin-tazobactam (ZOSYN) 3.375 g vial to attach to  mL bag     polyethylene glycol (MIRALAX) Packet 17 g     sodium chloride (OCEAN) 0.65 % nasal spray 1 spray       Allergies:   Allergies   Allergen Reactions     Zinc Acetate Itching     Nickel      Sulfa (Sulfonamide Antibiotics) [Sulfa Drugs] Unknown     Childhood reaction       Review of Systems:   A full 12 point review of systems was taken and is negative aside from what is noted above in the HPI    Physical Exam:  Temp:  [98  F (36.7  C)-98.9  F (37.2  C)] 98.4  F (36.9  C)  Pulse:  [76-96] 86  Resp:  [16-20] 16  BP: (104-133)/(62-68) 125/66  SpO2:  [93 %-100 %] 93 %  General: NAD, alert, cooperative  Head: normocephalic, without abnormality / atraumatic  Abdomen: soft, non tender, non distended. no suprapubic fullness/tenderness. no CVA tenderness noted. Abdominal incisions healing well.  Geniturinary: ileal conduit pink and healthy with some mucous on stoma, urine draining freely into bag. Bilateral PNT in place, draining clear yellow urine without clot and minimal sediment. R and L PNT without erythema or drainage, suture intact.  Musculoskeletal: moves all 4 extremities equally.  Psychological: alert and oriented, answers questions appropriately      Labs:   Creatinine   Date Value Ref Range Status   01/31/2022 1.92 (H) 0.70 - 1.30 mg/dL Final       Lab Results   Component Value Date    WBC 5.8 01/31/2022     Lab Results   Component Value Date    HGB 6.9 01/31/2022     Lab Results   Component Value Date     01/31/2022       UA RESULTS:  Recent Labs   Lab Test 01/29/22  1830   COLOR Light Yellow   APPEARANCE Turbid*   URINEGLC Negative   URINEBILI Negative   URINEKETONE Negative   SG 1.016    UBLD 0.03 mg/dL*   URINEPH 6.0   PROTEIN 100 *   NITRITE Negative   LEUKEST 500 Rao/uL*   RBCU 0   WBCU 88*       Urine culture:   urostomy urine: Culture in progress    50,000-100,000 CFU/mL Klebsiella pneumoniae Abnormal     10,000-50,000 CFU/mL Enterococcus faecalis Abnormal       Nephrostomy, Left: Culture in progress    >100,000 CFU/mL Klebsiella oxytoca Abnormal     10,000-50,000 CFU/mL Yeast Abnormal       Nephrostomy, Right: Culture in progress    50,000-100,000 CFU/mL Klebsiella oxytoca Abnormal     10,000-50,000 CFU/mL Yeast Abnormal            Lab Results: personally reviewed     Imaging:  EXAM: CT ABDOMEN PELVIS W/O CONTRAST  LOCATION: [facility]  DATE/TIME: 1/29/2022 10:38 PM     INDICATION: Complicated UTI, sepsis.  COMPARISON: 01/06/2022.  TECHNIQUE: CT scan of the abdomen and pelvis was performed without IV contrast. Multiplanar reformats were obtained. Dose reduction techniques were used.  CONTRAST: None.     FINDINGS:   LOWER CHEST: Small right pleural effusion with compressive atelectasis.  HEPATOBILIARY: Normal.  PANCREAS: Normal.  SPLEEN: Normal.  ADRENAL GLANDS: Normal.  KIDNEYS/BLADDER: Bilateral nephrostomy tubes with tip seen in renal pelvis. No residual hydroureteronephrosis. Status post cystectomy with right lower quadrant ileal conduit.  BOWEL: Diverticulosis. No diverticulitis, colitis, or obstruction.  LYMPH NODES: Normal.  VASCULATURE: Atherosclerotic vascular disease. No aneurysm.  PELVIC ORGANS: Normal.  MUSCULOSKELETAL: Bilateral total hip arthroplasties. No evidence of hardware loosening or failure.                                                                   IMPRESSION:   1.  Bilateral nephrostomy tubes in situ. No residual hydroureteronephrosis.  2.  Status post cystectomy with right lower quadrant ileal conduit.  3.  Mild constipation. Diverticulosis. No diverticulitis, colitis, or obstruction.  4.  Interval development of small right pleural  effusion.  5.  Atherosclerotic vascular disease.    I have personally reviewed the imaging reports above.     Assessment / Plan : Marcelo Valerio is being seen by [facility] for Urinary tract infection.    - K.pneumoniae and Enterococcus UTI from urostomy urine. Bilateral PNTs growing K. oxyoca and yeast. Bilateral PNT not due for exchange until March, consider exchange sooner given yeast. Placed ID consult in regard to appropriate antibiotic regimen and recommendations for stent exchange. Patient remains afebrile. WBC 5.8.  - Creatinine with mild improvement after PNT placement, 1.92 today. Avoid nephrotoxins and monitor.  - Consideration for antegrade bilateral ureteral stent placement after treating acute infection.  - Nurse communication placed to change brief and bilateral PNT dressings today and prn.   - Old records reviewed - [place] [facility].    Thank you for consulting [facility] regarding this patient's care. Please contact us with questions or concerns.     Karen Storm PA-C  [facility]   [phone]

## 2022-01-31 NOTE — PLAN OF CARE
Problem: Adult Inpatient Plan of Care  Goal: Optimal Comfort and Wellbeing  Outcome: Improving   Pt alert and oriented x3, confused on the year. Confusing notably clearing up.  Up with SBA and walker. Bilateral nephrostomy tubes and urostomy, cloudy urine noted. Denies pain. IV antibiotics given per order. Call light within reach, calls appropriately, bed alarm on. VSS, will continue to monitor.

## 2022-01-31 NOTE — PLAN OF CARE
Vitals sign have been stable.  Afebrile.  Confusion is clearing per patient.  Marcelo is still having some difficulty with recalling the date.    HGB noted to be 6.9 today.  1 unit PRBC administration started this shift.  Tolerating well so far without suspected transfusion reaction.  Asymptomatic related to anemia.    Up in chair today/walking short distances in room.  Mild weakness noted.  Uses a walked.  PT/OT following.    Patient is receiving Linezolid and Zosyn.  Urine cultures are pending.        Brook Mantilla RN

## 2022-01-31 NOTE — CONSULTS
Care Management Initial Consult    General Information  Assessment completed with: Spouse or significant other, Nely  Type of CM/SW Visit: Initial Assessment    Primary Care Provider verified and updated as needed: Yes   Readmission within the last 30 days: other (see comments)   Return Category: Post-op/Post-procedure complication  Reason for Consult: discharge planning  Advance Care Planning:            Communication Assessment  Patient's communication style: spoken language (English or Bilingual)    Hearing Difficulty or Deaf: no   Wear Glasses or Blind: yes    Cognitive  Cognitive/Neuro/Behavioral: WDL  Level of Consciousness: confused (forgetful)     Orientation: disoriented to,time  Mood/Behavior: cooperative,calm  Best Language: 0 - No aphasia  Speech: spontaneous,clear    Living Environment:   People in home: spouse  Nely  Current living Arrangements: house      Able to return to prior arrangements: yes       Family/Social Support:  Care provided by: self,spouse/significant other  Provides care for: no one  Marital Status:   Wife  Nely       Description of Support System: Supportive,Involved    Support Assessment: Adequate family and caregiver support,Adequate social supports    Current Resources:   Patient receiving home care services: Yes  Skilled Home Care Services: Skilled Nursing,Physicial Therapy,Occupational Therapy  Community Resources: Home Care  Equipment currently used at home: glucometer,shower chair  Supplies currently used at home: Diabetic Supplies    Employment/Financial:  Employment Status: retired        Financial Concerns:             Lifestyle & Psychosocial Needs:  Social Determinants of Health     Tobacco Use: Medium Risk     Smoking Tobacco Use: Former Smoker     Smokeless Tobacco Use: Never Used   Alcohol Use: Not on file   Financial Resource Strain: Not on file   Food Insecurity: Not on file   Transportation Needs: Not on file   Physical Activity: Not on file   Stress: Not  on file   Social Connections: Not on file   Intimate Partner Violence: Not on file   Depression: Not on file   Housing Stability: Not on file       Functional Status:  Prior to admission patient needed assistance:   Dependent ADLs:: Ambulation-walker,Bathing  Dependent IADLs:: Cleaning,Cooking,Laundry,Shopping,Meal Preparation,Medication Management,Transportation       Mental Health Status:          Chemical Dependency Status:                Values/Beliefs:  Spiritual, Cultural Beliefs, Roman [employer] Practices, Values that affect care:                 Additional Information:  Spoke with wife Nely. Prior to pt. Last surgery he was completely independent and still driving. She now has to assist him to get in shower and he currently is not driving. Open to [facility] for RN, PT and OT.    Opal Guzman RN

## 2022-01-31 NOTE — PLAN OF CARE
Problem: Adult Inpatient Plan of Care  Goal: Optimal Comfort and Wellbeing  Outcome: No Change   Pt is alert and oriented. Slightly confused to time. Denies any pain or discomfort. Nephrostomy tube output cloudy and lexy in color. Bg 122. Getting iv antibiotics.  Uneventful shift.  Suzanne Atkinson RN

## 2022-02-01 ENCOUNTER — APPOINTMENT (OUTPATIENT)
Dept: PHYSICAL THERAPY | Facility: HOSPITAL | Age: 77
DRG: 689 | End: 2022-02-01
Attending: HOSPITALIST
Payer: MEDICARE

## 2022-02-01 LAB
ANION GAP SERPL CALCULATED.3IONS-SCNC: 9 MMOL/L (ref 5–18)
BACTERIA UR CULT: ABNORMAL
BUN SERPL-MCNC: 22 MG/DL (ref 8–28)
CALCIUM SERPL-MCNC: 8.5 MG/DL (ref 8.5–10.5)
CHLORIDE BLD-SCNC: 104 MMOL/L (ref 98–107)
CO2 SERPL-SCNC: 23 MMOL/L (ref 22–31)
CREAT SERPL-MCNC: 1.95 MG/DL (ref 0.7–1.3)
ERYTHROCYTE [DISTWIDTH] IN BLOOD BY AUTOMATED COUNT: 14.6 % (ref 10–15)
GFR SERPL CREATININE-BSD FRML MDRD: 35 ML/MIN/1.73M2
GLUCOSE BLD-MCNC: 98 MG/DL (ref 70–125)
GLUCOSE BLDC GLUCOMTR-MCNC: 116 MG/DL (ref 70–99)
GLUCOSE BLDC GLUCOMTR-MCNC: 144 MG/DL (ref 70–99)
GLUCOSE BLDC GLUCOMTR-MCNC: 150 MG/DL (ref 70–99)
GLUCOSE BLDC GLUCOMTR-MCNC: 181 MG/DL (ref 70–99)
HCT VFR BLD AUTO: 27.6 % (ref 40–53)
HGB BLD-MCNC: 8.6 G/DL (ref 13.3–17.7)
MAGNESIUM SERPL-MCNC: 1.8 MG/DL (ref 1.8–2.6)
MCH RBC QN AUTO: 28.3 PG (ref 26.5–33)
MCHC RBC AUTO-ENTMCNC: 31.2 G/DL (ref 31.5–36.5)
MCV RBC AUTO: 91 FL (ref 78–100)
PLATELET # BLD AUTO: 321 10E3/UL (ref 150–450)
POTASSIUM BLD-SCNC: 4.3 MMOL/L (ref 3.5–5)
RBC # BLD AUTO: 3.04 10E6/UL (ref 4.4–5.9)
SODIUM SERPL-SCNC: 136 MMOL/L (ref 136–145)
WBC # BLD AUTO: 5.4 10E3/UL (ref 4–11)

## 2022-02-01 PROCEDURE — 250N000013 HC RX MED GY IP 250 OP 250 PS 637: Performed by: HOSPITALIST

## 2022-02-01 PROCEDURE — 99222 1ST HOSP IP/OBS MODERATE 55: CPT | Performed by: INTERNAL MEDICINE

## 2022-02-01 PROCEDURE — 99232 SBSQ HOSP IP/OBS MODERATE 35: CPT | Performed by: INTERNAL MEDICINE

## 2022-02-01 PROCEDURE — 36415 COLL VENOUS BLD VENIPUNCTURE: CPT | Performed by: HOSPITALIST

## 2022-02-01 PROCEDURE — 250N000013 HC RX MED GY IP 250 OP 250 PS 637: Performed by: INTERNAL MEDICINE

## 2022-02-01 PROCEDURE — 82310 ASSAY OF CALCIUM: CPT | Performed by: HOSPITALIST

## 2022-02-01 PROCEDURE — 83735 ASSAY OF MAGNESIUM: CPT | Performed by: INTERNAL MEDICINE

## 2022-02-01 PROCEDURE — 97116 GAIT TRAINING THERAPY: CPT | Mod: GP | Performed by: PHYSICAL THERAPIST

## 2022-02-01 PROCEDURE — 97162 PT EVAL MOD COMPLEX 30 MIN: CPT | Mod: GP | Performed by: PHYSICAL THERAPIST

## 2022-02-01 PROCEDURE — 250N000011 HC RX IP 250 OP 636: Performed by: HOSPITALIST

## 2022-02-01 PROCEDURE — 120N000001 HC R&B MED SURG/OB

## 2022-02-01 PROCEDURE — 85027 COMPLETE CBC AUTOMATED: CPT | Performed by: HOSPITALIST

## 2022-02-01 RX ORDER — BACITRACIN ZINC 500 [USP'U]/G
OINTMENT TOPICAL 3 TIMES DAILY
Status: DISCONTINUED | OUTPATIENT
Start: 2022-02-01 | End: 2022-02-01

## 2022-02-01 RX ORDER — SIMVASTATIN 10 MG
20 TABLET ORAL AT BEDTIME
Status: DISCONTINUED | OUTPATIENT
Start: 2022-02-01 | End: 2022-02-02 | Stop reason: HOSPADM

## 2022-02-01 RX ORDER — POLYETHYLENE GLYCOL 3350 17 G/17G
17 POWDER, FOR SOLUTION ORAL DAILY
Status: DISCONTINUED | OUTPATIENT
Start: 2022-02-01 | End: 2022-02-01

## 2022-02-01 RX ADMIN — SIMVASTATIN 20 MG: 10 TABLET, FILM COATED ORAL at 20:46

## 2022-02-01 RX ADMIN — FAMOTIDINE 10 MG: 10 TABLET ORAL at 20:46

## 2022-02-01 RX ADMIN — MAGNESIUM OXIDE TAB 400 MG (241.3 MG ELEMENTAL MG) 400 MG: 400 (241.3 MG) TAB at 09:21

## 2022-02-01 RX ADMIN — INSULIN ASPART 1 UNITS: 100 INJECTION, SOLUTION INTRAVENOUS; SUBCUTANEOUS at 17:33

## 2022-02-01 RX ADMIN — METOPROLOL SUCCINATE 25 MG: 25 TABLET, EXTENDED RELEASE ORAL at 09:21

## 2022-02-01 RX ADMIN — HEPARIN SODIUM 5000 UNITS: 5000 INJECTION, SOLUTION INTRAVENOUS; SUBCUTANEOUS at 12:47

## 2022-02-01 RX ADMIN — BRIMONIDINE TARTRATE 1 DROP: 1.5 SOLUTION OPHTHALMIC at 20:54

## 2022-02-01 RX ADMIN — PIPERACILLIN SODIUM AND TAZOBACTAM SODIUM 3.38 G: 3; .375 INJECTION, POWDER, LYOPHILIZED, FOR SOLUTION INTRAVENOUS at 06:15

## 2022-02-01 RX ADMIN — FAMOTIDINE 10 MG: 10 TABLET ORAL at 09:21

## 2022-02-01 RX ADMIN — BRIMONIDINE TARTRATE 1 DROP: 1.5 SOLUTION OPHTHALMIC at 09:31

## 2022-02-01 RX ADMIN — LATANOPROST 1 DROP: 50 SOLUTION OPHTHALMIC at 20:46

## 2022-02-01 RX ADMIN — ASPIRIN 81 MG: 81 TABLET, COATED ORAL at 09:21

## 2022-02-01 RX ADMIN — INSULIN ASPART 1 UNITS: 100 INJECTION, SOLUTION INTRAVENOUS; SUBCUTANEOUS at 12:44

## 2022-02-01 RX ADMIN — HEPARIN SODIUM 5000 UNITS: 5000 INJECTION, SOLUTION INTRAVENOUS; SUBCUTANEOUS at 00:52

## 2022-02-01 ASSESSMENT — ACTIVITIES OF DAILY LIVING (ADL)
ADLS_ACUITY_SCORE: 14
ADLS_ACUITY_SCORE: 14
ADLS_ACUITY_SCORE: 13
ADLS_ACUITY_SCORE: 14
ADLS_ACUITY_SCORE: 14
ADLS_ACUITY_SCORE: 13
ADLS_ACUITY_SCORE: 14
ADLS_ACUITY_SCORE: 13
ADLS_ACUITY_SCORE: 14
ADLS_ACUITY_SCORE: 14
ADLS_ACUITY_SCORE: 13
ADLS_ACUITY_SCORE: 14
ADLS_ACUITY_SCORE: 13
ADLS_ACUITY_SCORE: 14
ADLS_ACUITY_SCORE: 13
ADLS_ACUITY_SCORE: 14
ADLS_ACUITY_SCORE: 13
ADLS_ACUITY_SCORE: 14
ADLS_ACUITY_SCORE: 13
ADLS_ACUITY_SCORE: 13

## 2022-02-01 NOTE — PROGRESS NOTES
[facility]    Medicine Progress Note - Hospitalist Service    Date of Admission:  1/29/2022    Assessment & Plan            Marcelo Valerio is a 76 year old male admitted on 1/29/2022. He has history of prostate cancer, bladder cancer status post ileal conduit, recent issues with pyelonephritis and hydronephrosis with bilateral nephrostomy tubes placed on 1/5/22, diabetes, hypertension presents for evaluation of altered mental status found to have probable UTI     1.Acute encephalopathy  -Suspected toxic/metabolic encephalopathy secondary to UTI, AND with new med baclofen (holding- per family he definitely seemed more confused after just 3-4 doses of this so family were already holding this)  -Seems improved each day  -CT head negative  -Supportive care, PT/OT     2.UTI  -No sepsis  -Patient with complex anatomy, recent cystectomy/ileal conduit back in December by Dr Manrique.  Then hospitalized 3 weeks ago for pyelonephritis and had bilateral hydronephrosis and perinephric fluid collections, bilateral PNT's placed on 1/5/22.  -History of recent UTI with vancomycin and penicillin resistant Enterococcus faecium and Candida.  -Patient started on linezolid and Zosyn. He just completed previous course of linezolid ~1/20.    -Urine from ileal conduit growing ampicillin resistant Klebsiella sensitive to Zosyn, no sign of VRE.  Discontinue linezolid, continue Zosyn.  -Urology and ID seeing--ID as of 2/1/22 stopped antibiotics and want to observe  -Blood cultures no growth to date.  -Cultures from both PNTs no growth to date.     3.Normocytic anemia  History of iron deficiency and takes ferrous sulfate at home, got Venofer in hospital recently  -Preop Hgb in Dec was 13, after surgery down to 11.2  -Here Hgb 8.2-->6.9, got 1 unit pRBC and now up to 8.6  -Denies any s/s active bleeding  -May need EPO in future  -Continue home ferrous sulfate  -Colonoscopy as outpatient might be a good idea with the  iron deficiency. Wife thinks maybe he has never had one     4.CKD 4  -Baseline Cr ~2, today 1.95  -During recent hospitalization, was up to 7.5 due to obstruction.  -Creatinine has been downtrending since that time, today 1.9  -Avoid nephrotoxins  -Supposed to follow up with [facility] as outpatient     5.IDDM  -Home regimen Trulicity, Lantus 20 units twice daily  -Lantus 15 units once daily for now  -Novolog carb count 1:15 and sliding scale     6.HTN  -Home metoprolol. Recently taken off of losartan     7.Glaucoma  -Home eye drops  -History of anisocoria from eye surgery in past     8.Heart health  -Home ASA     9.Severe malnutrition  Malnutrition:    - Level of malnutrition: Severe   May have lost 31 lbs in past 2 months  -Refuses supplements in the hospital apparently  -RD following     10.GERD, home Pepcid    11.Hypomagnesemia, noted during recent admission.  Continue home mag    12.Hyperlipidemia  -restart simvastatin    13.Obesity  -BMI 30                    Diet: Combination Diet Moderate Consistent Carb (60 g CHO per Meal) Diet    DVT Prophylaxis: Heparin SQ  Bob Catheter: Not present  Central Lines: None  Cardiac Monitoring: None  Code Status: Full Code      Disposition Plan   Expected Discharge: 02/02/2022     Anticipated discharge location:  Awaiting care coordination huddle  Delays:     Lab Result Pending            The patient's care was discussed with the Care Coordinator/ and Patient. I called wife to update at 1336.    Karina Cain MD  Hospitalist Service  [facility]  Securely message with the [employer] Web Console (learn more here)  Text page via [employer] Paging/Directory         ______________________________________________________________________    Interval History   He notes he feels well  No pain  No new complaints  Eating ok    Data reviewed today: I reviewed all medications, new labs and imaging results over the last 24 hours. I personally  reviewed no images or EKG's today.    Physical Exam   Vital Signs: Temp: 98.2  F (36.8  C) Temp src: Oral BP: 106/69 Pulse: 82   Resp: 18 SpO2: 97 % O2 Device: None (Room air)    Weight: 217 lbs 0 oz  Constitutional: awake, alert, cooperative and no apparent distress  Eyes: sclera clear  Respiratory: No increased work of breathing, good air exchange, clear to auscultation bilaterally, no crackles or wheezing  Cardiovascular: Normal apical impulse, regular rate and rhythm, normal S1 and S2, no S3 or S4, and no murmur noted  GI: normal bowel sounds, soft, non-distended and non-tender  Skin: no bruising or bleeding  Musculoskeletal: no lower extremity pitting edema present  Neurologic: Mental Status Exam:  Level of Alertness:   awake  Neuropsychiatric: General: normal, calm and normal eye contact    Data   Recent Labs   Lab 02/01/22  0813 02/01/22  0605 01/31/22  2157 01/31/22  0822 01/31/22  0626 01/30/22  1146 01/30/22  0814 01/30/22  0004 01/29/22  1835   WBC  --  5.4  --   --  5.8  --   --   --  10.6   HGB  --  8.6*  --   --  6.9*  --   --   --  8.2*   MCV  --  91  --   --  94  --   --   --  92   PLT  --  321  --   --  285  --  269  --  336   NA  --  136  --   --  136  --   --   --  134*   POTASSIUM  --  4.3  --   --  4.2  --   --   --  4.9   CHLORIDE  --  104  --   --  105  --   --   --  100   CO2  --  23  --   --  24  --   --   --  22   BUN  --  22  --   --  24  --   --   --  34*   CR  --  1.95*  --   --  1.92*  --   --   --  2.19*   ANIONGAP  --  9  --   --  7  --   --   --  12   JERI  --  8.5  --   --  8.5  --   --   --  10.1   * 98 122*   < > 110   < >  --    < > 178*   ALBUMIN  --   --   --   --  2.0*  --   --   --  2.7*   PROTTOTAL  --   --   --   --  5.7*  --   --   --  7.3   BILITOTAL  --   --   --   --  0.5  --   --   --  0.7   ALKPHOS  --   --   --   --  154*  --   --   --  224*   ALT  --   --   --   --  43  --   --   --  79*   AST  --   --   --   --  37  --   --   --  97*    < > = values in this  interval not displayed.     No results found for this or any previous visit (from the past 24 hour(s)).

## 2022-02-01 NOTE — PLAN OF CARE
Problem: Adult Inpatient Plan of Care  Goal: Readiness for Transition of Care  Outcome: Improving     Problem: Risk for Delirium  Goal: Improved Sleep  Outcome: Improving     Problem: Hypertension Comorbidity  Goal: Blood Pressure in Desired Range  Outcome: Improving     Cares clustered to promote sleep. Patient called because his urostomy had leaked an unmeasured amount. Urine is clear yellow. No complaints of pain or discomfort. Zosyn currently running. Patient is A&O x 4

## 2022-02-01 NOTE — PLAN OF CARE
Occupational Therapy Discharge Summary    Reason for therapy discharge:    Patient/family request discontinuation of services.    Progress towards therapy goal(s). See goals on Care Plan in Epic electronic health record for goal details.  Goals not met.  Barriers to achieving goals:   Patient and family declining need for OT services at this time.  .    Therapy recommendation(s):    Patient would benefit from further OT intervention, however patient and his wife feel it is not necessary at this time.  Patient would like to focus on physical therapy during hospitalization. Patient and his wife have no concerns about managing at home at patient's current functional level.

## 2022-02-01 NOTE — PLAN OF CARE
[id number]....Pt is alert and oriented x4, assist of 1 with the walker, no c/o pain. IV ABX was discontinued, BG= 116 and 181 mg/dL, s/s insulin given. Pt has Urostomy in RLQ, bilateral Nephrostomy tubes, all tubes are patent, outputs have been charted. Will continue to monitor.

## 2022-02-01 NOTE — PROGRESS NOTES
"   02/01/22 0739   Quick Adds   Type of Visit Initial PT Evaluation   Living Environment   People in home spouse   Current Living Arrangements house   Home Accessibility stairs to enter home   Number of Stairs, Main Entrance 1  (small 4\" step)   Stair Railings, Main Entrance railings safe and in good condition   Self-Care   Equipment Currently Used at Home walker, rolling   Disability/Function   Walking or Climbing Stairs Difficulty yes   Walking or Climbing Stairs ambulation difficulty, requires equipment   Mobility Management uses FWW at baseline   Dressing/Bathing Difficulty yes   Dressing/Bathing bathing difficulty, assistance 1 person;dressing difficulty, assistance 1 person   Dressing/Bathing Management Spouse assists as needed.   Toileting issues no   Doing Errands Independently Difficulty (such as shopping) yes   Errands Management Wife assists.   Fall history within last six months no   Change in Functional Status Since Onset of Current Illness/Injury yes   General Information   Onset of Illness/Injury or Date of Surgery 01/29/22   Referring Physician Savanna Mcguire MD   Patient/Family Therapy Goals Statement (PT) None stated.   Pertinent History of Current Problem (include personal factors and/or comorbidities that impact the POC) Pt admitted with complicated UTI.   Existing Precautions/Restrictions fall  (bilateral neph tubes)   Pain Assessment   Patient Currently in Pain No   Range of Motion (ROM)   ROM Quick Adds ROM WFL   Strength   Manual Muscle Testing Quick Adds Strength WFL   Bed Mobility   Bed Mobility supine-sit;sit-supine   Supine-Sit Kings (Bed Mobility) supervision;verbal cues   Bed Mobility Limitations decreased ability to use legs for bridging/pushing   Impairments Contributing to Impaired Bed Mobility impaired balance;decreased strength   Assistive Device (Bed Mobility) bed rails   Comment (Bed Mobility) Uses LEs to assist with momentum.   Transfers   Transfers sit-stand transfer "   Transfer Safety Concerns Noted decreased weight-shifting ability   Impairments Contributing to Impaired Transfers impaired balance;decreased strength   Sit-Stand Transfer   Sit-Stand Lando (Transfers) contact guard;verbal cues   Assistive Device (Sit-Stand Transfers) walker, front-wheeled   Gait/Stairs (Locomotion)   Lando Level (Gait) contact guard;verbal cues   Assistive Device (Gait) walker, front-wheeled   Distance in Feet (Required for LE Total Joints) 350'   Pattern (Gait) step-through   Deviations/Abnormal Patterns (Gait) tania decreased;gait speed decreased   Clinical Impression   Criteria for Skilled Therapeutic Intervention yes, treatment indicated   PT Diagnosis (PT) impaired functional mobility   Influenced by the following impairments decreased strength, impaired balance   Functional limitations due to impairments decreased endurance, unsteady gait   Clinical Presentation Stable/Uncomplicated   Clinical Presentation Rationale Pt close to baseline with functional mobility.    Clinical Decision Making (Complexity) low complexity   Therapy Frequency (PT) Daily   Predicted Duration of Therapy Intervention (days/wks) 7 days   Planned Therapy Interventions (PT) balance training;bed mobility training;gait training;home exercise program;neuromuscular re-education;patient/family education;strengthening;stair training;transfer training   Risk & Benefits of therapy have been explained evaluation/treatment results reviewed;participants voiced agreement with care plan;participants included;patient   PT Discharge Planning    PT Discharge Recommendation (DC Rec) home with assist;home with home care physical therapy  (resume home PT)   PT Rationale for DC Rec Pt lives with spouse who can assist as needed. Moving well with SBA to CGA.   Total Evaluation Time   Total Evaluation Time (Minutes) 15     Tahira Urrutia, PT  2/1/2022

## 2022-02-01 NOTE — PROGRESS NOTES
Care Management Follow Up    Length of Stay (days): 3    Expected Discharge Date: 02/02/2022     Concerns to be Addressed:       Patient plan of care discussed at interdisciplinary rounds: No    Anticipated Discharge Disposition:  TBD     Anticipated Discharge Services:    Anticipated Discharge DME:      Patient/family educated on Medicare website which has current facility and service quality ratings:    Education Provided on the Discharge Plan:    Patient/Family in Agreement with the Plan:      Referrals Placed by CM/SW:    Private pay costs discussed: Not applicable    Additional Information:  Patient being followed by ID. IV antibiotic discontinued. Will continue to monitor for medical progression and discharge needs.        Michelle Molina RN

## 2022-02-01 NOTE — PROGRESS NOTES
Place of Service:  [facility]     Reason for follow up: urinary tract infection    SUBJECTIVE:  Events: no acute events overnight    Patient reports that he is feeling well. Denies fevers, chills, nausea, pain or discomfort.     Patient of Dr. Manrique. S/p cystectomy with ileal conduit, December 2021. Bilateral PNT's placed on 1/5 for hydronephrosis. ID following and recommend discontinuing antibiotics with patient improvement and no need to change PNT or stents at this time.     OBJECTIVE:  PHYSICAL EXAM:  Temp: 98.2  F (36.8  C) Temp src: Oral BP: 106/69 Pulse: 82   Resp: 18 SpO2: 97 % O2 Device: None (Room air)    General: NAD, alert, cooperative  Head: normocephalic, without abnormality / atraumatic  Abdomen: soft, non tender, non distended. no suprapubic fullness/tenderness. no CVA tenderness noted. Abdominal incisions healing well.  Geniturinary: ileal conduit pink and healthy with some mucous on stoma, urine draining freely into bag. Bilateral PNT in place, draining clear yellow urine without clot and minimal sediment. R and L PNT without erythema or drainage, suture intact.  Musculoskeletal: moves all 4 extremities equally.  Psychological: alert and oriented, answers questions appropriately    LABS:  Creatinine   Date Value Ref Range Status   02/01/2022 1.95 (H) 0.70 - 1.30 mg/dL Final     WBC Count   Date Value Ref Range Status   02/01/2022 5.4 4.0 - 11.0 10e3/uL Final     Hemoglobin   Date Value Ref Range Status   02/01/2022 8.6 (L) 13.3 - 17.7 g/dL Final   ]  Platelet Count   Date Value Ref Range Status   02/01/2022 321 150 - 450 10e3/uL Final       Lab Results: personally reviewed.     ASSESSMENT/PLAN:  Marcelo Valerio is being seen by [facility] for urinary tract infection.    - Patient with improvement today. ID following and recommending to discontinue antibiotics given likely colonization verse infection. No indication to exchange PNT or ureteral stents at this time. Appreciate ID  following. Patient afebrile. WBC 5.4 today.   - Creatinine 1.95 today. Avoid nephrotoxins.  - Consideration for antegrade bilateral ureteral stents in future. We will have our office call patient to reschedule appointment that is scheduled for tomorrow.   - urology will sign off. Please do not hesitate to call or re consult with urological concerns.        Karen Storm PA-C  [facility]   [phone]

## 2022-02-01 NOTE — CONSULTS
Consultation - Infectious Disease  [facility]  Marcelo Valerio,  1945, MRN [id number]    Admitting Dx: Urinary tract infection with hematuria, site unspecified [N39.0, R31.9]  Anemia, unspecified type [D64.9]  Chronic renal impairment, unspecified CKD stage [N18.9]  Sepsis with encephalopathy without septic shock, due to unspecified organism (H) [A41.9, R65.20, G93.40]    PCP: Urbano Cedeno, [phone]       ASSESSMENT   76-year-old man with a history of bladder cancer status post cystectomy and ileal conduit placement, complicated by hydronephrosis requiring bilateral ureteral stents and bilateral nephrostomy tubes who is admitted with confusion.    1. Confusion.  Recently started on baclofen for back pain, then developed confusion and hallucinations afterwards.  Concern for possible UTI with positive urine cultures (from urostomy and nephrostomy tubes).  However no other signs of infection such as fevers or leukocytosis.  CRP is nonspecific  2. Bacteriuria.  Difficult to interpret significance of bacteria in the urine as the ileostomy will always show bacteria from colonization.  Also, urostomy tubes are easily colonized, therefore difficult to interpret whether bacteria are colonization or infection.  Since the patient has improved since admission and did not have any fever or leukocytosis, it is hard to argue in favor of infection.    Principal Problem:    Urinary tract infection with hematuria, site unspecified  Active Problems:    Malignant neoplasm of overlapping sites of bladder (H)    Glaucoma    HTN (hypertension)    Uncontrolled type 2 diabetes mellitus with diabetic polyneuropathy, with long-term current use of insulin (H)    Sepsis with encephalopathy without septic shock, due to unspecified organism (H)    CKD (chronic kidney disease) stage 4, GFR 15-29 ml/min (H)    Acute encephalopathy       PLAN   -Low likelihood that infection had anything to do with the patient's  confusion  -Now that the patient is showing improvement, would recommend stopping antibiotics  -Despite colonization of his devices, there is not an immediate need to change his stents or nephrostomy tubes    Thank you for this consult. Will follow.    Francis Mendez MD  [facility]  Direct messaging: [email or web address] Paging  On-Call ID provider: [phone], option: 9      ===========================================      Chief Complaint   Urinary tract infection with hematuria, site unspecified       HPI     We have been requested by Karina Cain MD to evaluate Marcelo Valerio for the above.    History obtained by patient and medical record    Marcelo Valerio is a 76 year old man with a history of bladder cancer, status post ileal conduit placement, chronic disease, diabetes, hypertension, hyperlipidemia, psoriasis who is admitted with acute onset of confusion.  Reportedly earlier last week he developed mid back pain.  He was seen in clinic and started on baclofen.  Immediately after starting the new medication he began having confusion and hallucinations.  He was brought into the ER where he was afebrile.  Urine culture from his ostomy grew Klebsiella oxytoca.  Blood cultures have been negative.  Subsequent urine cultures from his nephrostomy tubes and his urostomy are growing Klebsiella oxytoca, yeast, and Enterococcus faecalis.  The patient had nephrostomy tubes placed about 4 weeks ago due to hydroureteronephrosis.  Previous cultures have grown VRE and Candida prophylaxis.    Today the patient is without complaints.  He says he has been constipated for the past 2 days and had a bowel movement this morning.  He is able to walk around the unit with assistance this morning without any pain.  He denies any fevers or chills.          Review of Systems   Ten systems reviewed and negative except for what is noted in the HPI         Medical History  Past Medical History:   Diagnosis Date      Basal cell carcinoma      Bladder tumor      Cancer (H)     Prostate     Chronic kidney disease      CKD (chronic kidney disease)      DM2 (diabetes mellitus, type 2) (H)      Glaucoma      History of gout      HTN (hypertension)      Hyperlipidemia      Kidney stone      Lung nodule      Malignant neoplasm of urinary bladder, unspecified site (H)      Metabolic syndrome      Obesity      Osteoarthritis of knee      Psoriasis      Restless legs syndrome     Surgical History  He  has a past surgical history that includes appendectomy; Basal Cell Carcinoma Excision; Release carpal tunnel; Arthroplasty revision hip (Left); Total Knee Arthroplasty; Prostatectomy; Inguinal Hernia Repair (Bilateral); Vasectomy; joint replacement (Bilateral); Tonsillectomy; Pr Cystourethroscopy,Biopsy (N/A, 10/01/2019); Pr Cystourethroscopy,Ureter Catheter (Bilateral, 10/01/2019); Cystoscopy, transurethral resection (TUR) tumor bladder, combined (N/A, 02/01/2021); Pr Cystoscopy,Remv Calculus,Simple (Right, 03/22/2021); Cystoscopy, fulgurate bladder tumor, combined (Bilateral, 09/08/2021); Combined cystoscopy, retrogrades, exchange stent ureter(s) (Right, 11/22/2021); Cataract Extraction; carpal tunnel release rt/lt; Cystectomy, Robot-Assisted, Laparoscopic, Using Da Affectv Si, With Ureteroileal Conduit Creation (N/A, 12/6/2021); and IR Nephrostomy Tube Placement Bilateral (1/5/2022).     Social History  Reviewed, and he  reports that he quit smoking about 3 years ago. He smoked 0.00 packs per day. He has never used smokeless tobacco. He reports current alcohol use. He reports previous drug use.  Social History     Social History Narrative     Not on file     Family History    No recent family infections            Allergies     Allergies   Allergen Reactions     Zinc Acetate Itching     Nickel      Sulfa (Sulfonamide Antibiotics) [Sulfa Drugs] Unknown     Childhood reaction         Antibiotics   Zosyn 1/29-    Previous:  Linezolid  1/29-1/31      Physical Exam     Temp:  [98  F (36.7  C)-98.9  F (37.2  C)] 98.2  F (36.8  C)  Pulse:  [71-92] 92  Resp:  [16-18] 18  BP: (106-125)/(62-78) 114/70  SpO2:  [93 %-100 %] 97 %    /70   Pulse 92   Temp 98.2  F (36.8  C) (Oral)   Resp 18   Wt 98.4 kg (217 lb)   SpO2 97%   BMI 30.27 kg/m      GENERAL:  well-developed, well-nourished, sitting in chair in no acute distress.   HENT:  Head is normocephalic, atraumatic.  EYES:  Eyes have anicteric sclerae without conjunctival injection or stigmata of endocarditis.   NECK:  Supple.  LUNGS:  Clear to auscultation.  CARDIOVASCULAR:  Regular rate and rhythm with no murmurs, gallops or rubs.  ABDOMEN:  Normal bowel sounds, soft, nontender.  Urostomy in place.  Bilateral nephrostomy tubes with clear urine output.  EXT: Extremities warm and without edema.  SKIN:  No acute rashes.  Patchy areas of psoriasis on the right arm and healing lesions on the leg.  NEUROLOGIC: Alert and oriented x3      Cultures   1/29 urine culture (voided?):  Greater than 100,000 colonies Klebsiella oxytoca  1/29 blood cultures x2: No growth to date  1/30 left nephrostomy tube: Greater than 100,000 colonies Klebsiella oxytoca, 10-50,000 colonies yeast  1/30 right nephrostomy tube: ,000 colonies Klebsiella oxytoca, 10-50,000 colonies yeast  1/30 urostomy culture: ,000 colonies Klebsiella pneumoniae, 10-50,000 colonies Enterococcus faecalis      Laboratory results     Recent Labs   Lab 02/01/22  0605 01/31/22  0626 01/30/22  0814 01/29/22  1835   WBC 5.4 5.8  --  10.6   HGB 8.6* 6.9*  --  8.2*    285 269 336       Recent Labs   Lab 02/01/22  0605 01/31/22  0626 01/29/22  1835    136 134*   CO2 23 24 22   BUN 22 24 34*   ALBUMIN  --  2.0* 2.7*   ALKPHOS  --  154* 224*   ALT  --  43 79*   AST  --  37 97*       Recent Labs   Lab 01/31/22  0626 01/29/22  1835   CRP 10.6* 20.6*           Imaging   Radiology results reviewed    CT Abdomen Pelvis w/o  Contrast    Result Date: 1/29/2022  EXAM: CT ABDOMEN PELVIS W/O CONTRAST LOCATION: [facility] DATE/TIME: 1/29/2022 10:38 PM INDICATION: Complicated UTI, sepsis. COMPARISON: 01/06/2022. TECHNIQUE: CT scan of the abdomen and pelvis was performed without IV contrast. Multiplanar reformats were obtained. Dose reduction techniques were used. CONTRAST: None. FINDINGS: LOWER CHEST: Small right pleural effusion with compressive atelectasis. HEPATOBILIARY: Normal. PANCREAS: Normal. SPLEEN: Normal. ADRENAL GLANDS: Normal. KIDNEYS/BLADDER: Bilateral nephrostomy tubes with tip seen in renal pelvis. No residual hydroureteronephrosis. Status post cystectomy with right lower quadrant ileal conduit. BOWEL: Diverticulosis. No diverticulitis, colitis, or obstruction. LYMPH NODES: Normal. VASCULATURE: Atherosclerotic vascular disease. No aneurysm. PELVIC ORGANS: Normal. MUSCULOSKELETAL: Bilateral total hip arthroplasties. No evidence of hardware loosening or failure.     IMPRESSION: 1.  Bilateral nephrostomy tubes in situ. No residual hydroureteronephrosis. 2.  Status post cystectomy with right lower quadrant ileal conduit. 3.  Mild constipation. Diverticulosis. No diverticulitis, colitis, or obstruction. 4.  Interval development of small right pleural effusion. 5.  Atherosclerotic vascular disease.     Head CT w/o contrast    Result Date: 1/29/2022  EXAM: CT HEAD W/O CONTRAST LOCATION: [facility] DATE/TIME: 1/29/2022 7:16 PM INDICATION: Confusion COMPARISON: 01/06/2022 TECHNIQUE: Routine CT Head without IV contrast. Multiplanar reformats. Dose reduction techniques were used. FINDINGS: INTRACRANIAL CONTENTS: No intracranial hemorrhage, extraaxial collection, or mass effect. No CT evidence of acute infarct. Moderate presumed chronic small vessel ischemic changes. Moderate generalized volume loss. No hydrocephalus. VISUALIZED ORBITS/SINUSES/MASTOIDS: No intraorbital abnormality. No  paranasal sinus mucosal disease. No middle ear or mastoid effusion. BONES/SOFT TISSUES: No acute abnormality.     IMPRESSION: 1. No acute intracranial process.      Data reviewed today: I reviewed all medications, new labs and imaging results over the last 24 hours. I personally reviewed the abdominal CT image(s) showing no hydronephrosis.  The patient's care was discussed with the Bedside Nurse and Patient.

## 2022-02-01 NOTE — PLAN OF CARE
Problem: Adult Inpatient Plan of Care  Goal: Optimal Comfort and Wellbeing  Outcome: Improving     Problem: Diabetes Comorbidity  Goal: Blood Glucose Level Within Targeted Range  Outcome: Improving     Problem: Hypertension Comorbidity  Goal: Blood Pressure in Desired Range  Outcome: Improving     Pt VSS this shift, /75 at 16:01 and 123/78 at 20:22. Pt denies pain this shift, alert and oriented x 4. Blood glucose 160 at dinner time, 122 at HS. Urostomy appliance/pouch changed by RN, stoma WDL, ranjit-stomal skin clean, intact. Bilateral PNT dressings changed, no drainage noted at either site. Left nephrostomy output of 300 ml this shift, right nephrostomy output of 500 ml; 150 ml out of urostomy. Urology following pt, ID consult ordered by urologist this AM.     Dahlia Pizarro RN 10:34 PM 1/31/2022

## 2022-02-02 ENCOUNTER — APPOINTMENT (OUTPATIENT)
Dept: PHYSICAL THERAPY | Facility: HOSPITAL | Age: 77
DRG: 689 | End: 2022-02-02
Payer: MEDICARE

## 2022-02-02 VITALS
SYSTOLIC BLOOD PRESSURE: 119 MMHG | TEMPERATURE: 97.7 F | HEART RATE: 81 BPM | BODY MASS INDEX: 30.27 KG/M2 | OXYGEN SATURATION: 100 % | DIASTOLIC BLOOD PRESSURE: 78 MMHG | WEIGHT: 217 LBS | RESPIRATION RATE: 18 BRPM

## 2022-02-02 LAB
ANION GAP SERPL CALCULATED.3IONS-SCNC: 9 MMOL/L (ref 5–18)
BUN SERPL-MCNC: 22 MG/DL (ref 8–28)
CALCIUM SERPL-MCNC: 8.8 MG/DL (ref 8.5–10.5)
CHLORIDE BLD-SCNC: 107 MMOL/L (ref 98–107)
CO2 SERPL-SCNC: 24 MMOL/L (ref 22–31)
CREAT SERPL-MCNC: 2.16 MG/DL (ref 0.7–1.3)
ERYTHROCYTE [DISTWIDTH] IN BLOOD BY AUTOMATED COUNT: 14.4 % (ref 10–15)
GFR SERPL CREATININE-BSD FRML MDRD: 31 ML/MIN/1.73M2
GLUCOSE BLD-MCNC: 107 MG/DL (ref 70–125)
GLUCOSE BLDC GLUCOMTR-MCNC: 124 MG/DL (ref 70–99)
GLUCOSE BLDC GLUCOMTR-MCNC: 153 MG/DL (ref 70–99)
HCT VFR BLD AUTO: 29.4 % (ref 40–53)
HGB BLD-MCNC: 8.8 G/DL (ref 13.3–17.7)
MAGNESIUM SERPL-MCNC: 1.9 MG/DL (ref 1.8–2.6)
MCH RBC QN AUTO: 27.8 PG (ref 26.5–33)
MCHC RBC AUTO-ENTMCNC: 29.9 G/DL (ref 31.5–36.5)
MCV RBC AUTO: 93 FL (ref 78–100)
PLATELET # BLD AUTO: 369 10E3/UL (ref 150–450)
POTASSIUM BLD-SCNC: 4.4 MMOL/L (ref 3.5–5)
RBC # BLD AUTO: 3.17 10E6/UL (ref 4.4–5.9)
SODIUM SERPL-SCNC: 140 MMOL/L (ref 136–145)
WBC # BLD AUTO: 5.5 10E3/UL (ref 4–11)

## 2022-02-02 PROCEDURE — 250N000011 HC RX IP 250 OP 636: Performed by: HOSPITALIST

## 2022-02-02 PROCEDURE — 83735 ASSAY OF MAGNESIUM: CPT | Performed by: INTERNAL MEDICINE

## 2022-02-02 PROCEDURE — 97116 GAIT TRAINING THERAPY: CPT | Mod: GP

## 2022-02-02 PROCEDURE — 36415 COLL VENOUS BLD VENIPUNCTURE: CPT | Performed by: INTERNAL MEDICINE

## 2022-02-02 PROCEDURE — 85027 COMPLETE CBC AUTOMATED: CPT | Performed by: INTERNAL MEDICINE

## 2022-02-02 PROCEDURE — 80048 BASIC METABOLIC PNL TOTAL CA: CPT | Performed by: INTERNAL MEDICINE

## 2022-02-02 PROCEDURE — 99232 SBSQ HOSP IP/OBS MODERATE 35: CPT | Performed by: INTERNAL MEDICINE

## 2022-02-02 PROCEDURE — 250N000013 HC RX MED GY IP 250 OP 250 PS 637: Performed by: HOSPITALIST

## 2022-02-02 PROCEDURE — 99239 HOSP IP/OBS DSCHRG MGMT >30: CPT | Performed by: INTERNAL MEDICINE

## 2022-02-02 RX ORDER — INSULIN GLARGINE 100 [IU]/ML
15 INJECTION, SOLUTION SUBCUTANEOUS DAILY
Qty: 15 ML | Refills: 0
Start: 2022-02-02

## 2022-02-02 RX ADMIN — MAGNESIUM OXIDE TAB 400 MG (241.3 MG ELEMENTAL MG) 400 MG: 400 (241.3 MG) TAB at 08:47

## 2022-02-02 RX ADMIN — FAMOTIDINE 10 MG: 10 TABLET ORAL at 08:47

## 2022-02-02 RX ADMIN — METOPROLOL SUCCINATE 25 MG: 25 TABLET, EXTENDED RELEASE ORAL at 08:46

## 2022-02-02 RX ADMIN — BRIMONIDINE TARTRATE 1 DROP: 1.5 SOLUTION OPHTHALMIC at 08:47

## 2022-02-02 RX ADMIN — HEPARIN SODIUM 5000 UNITS: 5000 INJECTION, SOLUTION INTRAVENOUS; SUBCUTANEOUS at 12:31

## 2022-02-02 RX ADMIN — HEPARIN SODIUM 5000 UNITS: 5000 INJECTION, SOLUTION INTRAVENOUS; SUBCUTANEOUS at 00:45

## 2022-02-02 RX ADMIN — ASPIRIN 81 MG: 81 TABLET, COATED ORAL at 08:46

## 2022-02-02 RX ADMIN — INSULIN ASPART 1 UNITS: 100 INJECTION, SOLUTION INTRAVENOUS; SUBCUTANEOUS at 12:25

## 2022-02-02 ASSESSMENT — ACTIVITIES OF DAILY LIVING (ADL)
ADLS_ACUITY_SCORE: 12
ADLS_ACUITY_SCORE: 14
ADLS_ACUITY_SCORE: 14
ADLS_ACUITY_SCORE: 12
ADLS_ACUITY_SCORE: 14
ADLS_ACUITY_SCORE: 12
ADLS_ACUITY_SCORE: 14
ADLS_ACUITY_SCORE: 12
ADLS_ACUITY_SCORE: 14
ADLS_ACUITY_SCORE: 14
ADLS_ACUITY_SCORE: 12
ADLS_ACUITY_SCORE: 12

## 2022-02-02 NOTE — DISCHARGE SUMMARY
[facility] MEDICINE  DISCHARGE SUMMARY     Primary Care Physician: Urbano Cedeno  Admission Date: 1/29/2022   Discharge Provider: Karina Cain MD Discharge Date: 2/2/2022   Diet:   Active Diet and Nourishment Order   Procedures     Combination Diet Moderate Consistent Carb (60 g CHO per Meal) Diet     Diet       Code Status: Full Code   Activity: DCACTIVITY: Activity as tolerated        Condition at Discharge: Stable     REASON FOR PRESENTATION(See Admission Note for Details)   confustion    PRINCIPAL & ACTIVE DISCHARGE DIAGNOSES     Principal Problem:    Urinary tract infection with hematuria, site unspecified  Active Problems:    Malignant neoplasm of overlapping sites of bladder (H)    Glaucoma    HTN (hypertension)    Uncontrolled type 2 diabetes mellitus with diabetic polyneuropathy, with long-term current use of insulin (H)    Sepsis with encephalopathy without septic shock, due to unspecified organism (H)    CKD (chronic kidney disease) stage 4, GFR 15-29 ml/min (H)    Acute encephalopathy      PENDING LABS     Unresulted Labs Ordered in the Past 30 Days of this Admission     Date and Time Order Name Status Description    1/29/2022  6:16 PM Blood Culture Peripheral Blood Preliminary     1/29/2022  6:16 PM Blood Culture Peripheral Blood Preliminary             PROCEDURES ( this hospitalization only)          RECOMMENDATIONS TO OUTPATIENT PROVIDER FOR F/U VISIT     Follow-up Appointments     Follow-up and recommended labs and tests       Follow up with primary care provider, Urbano Cedeno, within 3-4 days   for hospital follow- up.  The following labs/tests are recommended: BMP,   CBC.    Follow up with Kidney specialist 2 weeks  Follow up Urology               DISPOSITION     Home    SUMMARY OF HOSPITAL COURSE:        Marcelo Valerio is a 76 year old male admitted on 1/29/2022. He has history of prostate cancer, bladder cancer status post ileal conduit, recent  issues with pyelonephritis and hydronephrosis with bilateral nephrostomy tubes placed on 1/5/22, diabetes, hypertension presents for evaluation of altered mental status found to have probable UTI     1.Acute encephalopathy  -Suspected toxic/metabolic encephalopathy secondary to UTI, AND with new med baclofen (holding- per family he definitely seemed more confused after just 3-4 doses of this so family were already holding this)  -Seems improved each day  -CT head negative  -Supportive care, PT/OT  -If ID ok with home, possible home today     2.UTI  -No sepsis  -Patient with complex anatomy, recent cystectomy/ileal conduit back in December by Dr Manrique.  Then hospitalized 3 weeks ago for pyelonephritis and had bilateral hydronephrosis and perinephric fluid collections, bilateral PNT's placed on 1/5/22.  -History of recent UTI with vancomycin and penicillin resistant Enterococcus faecium and Candida.  -Patient started on linezolid and Zosyn. He just completed previous course of linezolid ~1/20.    -Urine from ileal conduit growing ampicillin resistant Klebsiella sensitive to Zosyn, no sign of VRE.  Discontinue linezolid, continue Zosyn.  -Urology and ID seeing--ID as of 2/1/22 stopped antibiotics and want to observe  -Blood cultures no growth to date.  -Cultures from both PNTs no growth to date.     3.Normocytic anemia  History of iron deficiency and takes ferrous sulfate at home, got Venofer in hospital recently  -Preop Hgb in Dec was 13, after surgery down to 11.2  -Here Hgb 8.2-->6.9, got 1 unit pRBC and now up to 8.8  -Denies any s/s active bleeding  -May need EPO in future  -Continue home ferrous sulfate  -Colonoscopy as outpatient might be a good idea with the iron deficiency. Wife thinks maybe he has never had one     4.CKD 4  -Baseline Cr ~2, today 2.16  -During recent hospitalization, was up to 7.5 due to obstruction.  -Creatinine has been downtrending since that time, today 2  -Avoid  nephrotoxins  -Supposed to follow up with [facility] as outpatient     5.IDDM  -Home regimen Trulicity, Lantus 20 units twice daily  -Lantus 15 units once daily for now  -Novolog carb count 1:15 and sliding scale     6.HTN  -Home metoprolol. Recently taken off of losartan     7.Glaucoma  -Home eye drops  -History of anisocoria from eye surgery in past     8.Heart health  -Home ASA     9.Severe malnutrition  Malnutrition:    - Level of malnutrition: Severe   May have lost 31 lbs in past 2 months  -Refuses supplements in the hospital apparently  -RD following     10.GERD, home Pepcid     11.Hypomagnesemia, noted during recent admission.  Continue home mag     12.Hyperlipidemia  -restart simvastatin     13.Obesity  -BMI 30     I called wife to update at 1341 and  Update on insulin dose change    Discharge Medications with Med changes:     Current Discharge Medication List      CONTINUE these medications which have CHANGED    Details   insulin glargine (BASAGLAR KWIKPEN) 100 UNIT/ML pen Inject 15 Units Subcutaneous daily  Qty: 15 mL, Refills: 0    Comments: If Basaglar is not covered by insurance, may substitute Lantus or Semglee or other insulin glargine product per insurance preference at same dose and frequency.    Associated Diagnoses: Uncontrolled type 2 diabetes mellitus with diabetic polyneuropathy, with long-term current use of insulin (H)         CONTINUE these medications which have NOT CHANGED    Details   acetaminophen (TYLENOL) 325 MG tablet Take 1-2 tablets (325-650 mg) by mouth every 4 hours as needed for mild pain or pain  Qty: 30 tablet, Refills: 0    Associated Diagnoses: Malignant neoplasm of overlapping sites of bladder (H)      amoxicillin (AMOXIL) 500 MG capsule [AMOXICILLIN (AMOXIL) 500 MG CAPSULE] Take 2,000 mg by mouth as needed. Prior to dental procedures      aspirin (ASA) 81 MG EC tablet Take 1 tablet (81 mg) by mouth daily  Qty: 15 tablet, Refills: 0    Associated Diagnoses:  Malignant neoplasm of overlapping sites of bladder (H)      brimonidine (ALPHAGAN) 0.15 % ophthalmic solution [BRIMONIDINE (ALPHAGAN) 0.15 % OPHTHALMIC SOLUTION] Administer 1 drop to both eyes 2 (two) times a day.      calcipotriene (DOVONOX) 0.005 % cream [CALCIPOTRIENE (DOVONOX) 0.005 % CREAM] Apply 1 application topically daily as needed.      Dulaglutide 3 MG/0.5ML SOPN Inject 3 mg Subcutaneous once a week Mondays      famotidine (PEPCID) 10 MG tablet Take 1 tablet (10 mg) by mouth 2 times daily  Qty: 30 tablet, Refills: 1    Associated Diagnoses: Gastroesophageal reflux disease with esophagitis, unspecified whether hemorrhage      ferrous sulfate (FEROSUL) 325 (65 Fe) MG tablet Take 1 tablet (325 mg) by mouth daily (with breakfast)  Qty: 30 tablet, Refills: 1    Associated Diagnoses: Iron deficiency anemia, unspecified iron deficiency anemia type      latanoprost (XALATAN) 0.005 % ophthalmic solution [LATANOPROST (XALATAN) 0.005 % OPHTHALMIC SOLUTION] Administer 1 drop to both eyes at bedtime.      Magnesium Cl-Calcium Carbonate (SLOW-MAG) 71.5-119 MG TBEC Take 1 tablet by mouth daily  Qty: 30 tablet, Refills: 1    Associated Diagnoses: Hypomagnesemia      metoprolol succinate ER (TOPROL-XL) 25 MG 24 hr tablet Take 1 tablet (25 mg) by mouth daily  Qty: 30 tablet, Refills: 3    Associated Diagnoses: Primary hypertension      polyethylene glycol (MIRALAX) 17 GM/Dose powder Take 17 g by mouth daily Discontinue if stools are loose  Qty: 255 g, Refills: 0    Associated Diagnoses: Malignant neoplasm of overlapping sites of bladder (H)      simvastatin (ZOCOR) 20 MG tablet Take 20 mg by mouth At Bedtime       triamcinolone (KENALOG) 0.1 % cream Apply 1 Application topically daily as needed Two times a day on Saturdays and Sundays         STOP taking these medications       Baclofen (LIORESAL) 5 MG tablet Comments:   Reason for Stopping:                     Rationale for medication changes:      Long acting insulin to  15 units q day  No baclofen --stay off, will list as allergy         Consults       OCCUPATIONAL THERAPY ADULT IP CONSULT  PHYSICAL THERAPY ADULT IP CONSULT  UROLOGY IP CONSULT  SOCIAL WORK IP CONSULT  INFECTIOUS DISEASES IP CONSULT    Immunizations given this encounter     Most Recent Immunizations   Administered Date(s) Administered     COVID-19,PF,Moderna 11/15/2021     Influenza, Quad, High Dose, Pf, 65yr+ (Fluzone HD) 11/15/2021           Anticoagulation Information      Recent INR results: No results for input(s): INR in the last 168 hours.  Warfarin doses (if applicable) or name of other anticoagulant: none      SIGNIFICANT IMAGING FINDINGS     Results for orders placed or performed during the hospital encounter of 01/29/22   Head CT w/o contrast    Impression    IMPRESSION:  1. No acute intracranial process.   CT Abdomen Pelvis w/o Contrast    Impression    IMPRESSION:   1.  Bilateral nephrostomy tubes in situ. No residual hydroureteronephrosis.  2.  Status post cystectomy with right lower quadrant ileal conduit.  3.  Mild constipation. Diverticulosis. No diverticulitis, colitis, or obstruction.  4.  Interval development of small right pleural effusion.  5.  Atherosclerotic vascular disease.         SIGNIFICANT LABORATORY FINDINGS     Most Recent 3 CBC's:Recent Labs   Lab Test 02/02/22  0624 02/01/22  0605 01/31/22  0626   WBC 5.5 5.4 5.8   HGB 8.8* 8.6* 6.9*   MCV 93 91 94    321 285     Most Recent 3 BMP's:Recent Labs   Lab Test 02/02/22  1156 02/02/22  0833 02/02/22  0624 02/01/22  0813 02/01/22  0605 01/31/22  0822 01/31/22  0626   NA  --   --  140  --  136  --  136   POTASSIUM  --   --  4.4  --  4.3  --  4.2   CHLORIDE  --   --  107  --  104  --  105   CO2  --   --  24  --  23  --  24   BUN  --   --  22  --  22  --  24   CR  --   --  2.16*  --  1.95*  --  1.92*   ANIONGAP  --   --  9  --  9  --  7   JERI  --   --  8.8  --  8.5  --  8.5   * 124* 107   < > 98   < > 110    < > = values in this  interval not displayed.       IR Nephrostomy Tube Placement Bilateral    Result Date: 1/5/2022  [facility] LOCATION: [facility] DATE: 1/5/2022 PROCEDURE: 1.  BILATERAL PERCUTANEOUS NEPHROSTOMY PLACEMENT WITH IMAGING GUIDANCE. 2.  MODERATE SEDATION. INTERVENTIONAL RADIOLOGIST: León Adams MD. INDICATION: 76-year-old male with bilateral ureteral obstruction status post cystectomy with ileal diversion. Bilateral nephrostomy placement is requested. CONSENT: The risks, benefits and alternatives of the stated procedure were discussed with the patient  in detail. All questions were answered. Informed consent was given to proceed with the procedure. MODERATE SEDATION: Versed 1 mg IV; Fentanyl 25 mcg IV.  Under physician supervision, Versed and fentanyl were administered for moderate sedation. Pulse oximetry, heart rate and blood pressure were continuously monitored by an independent trained observer. The physician spent 15 minutes of face-to-face sedation time with the patient. CONTRAST: 15 mL Omnipaque 350. ANTIBIOTICS: None. ADDITIONAL MEDICATIONS: None. FLUOROSCOPIC TIME: 0.5 minutes. RADIATION DOSE: Air Kerma: 34 mGy. COMPLICATIONS: No immediate complications. STERILE BARRIER TECHNIQUE: Maximum sterile barrier technique was used. Cutaneous antisepsis was performed at the operative site with application of 2% chlorhexidine and large sterile drape. Prior to the procedure, the  and assistant performed hand hygiene and wore hat, mask, sterile gown, and sterile gloves during the entire procedure. PROCEDURE:  Using real-time ultrasound guidance, an 18-gauge trocar needle was inserted into a posterior midpole calyx of the right kidney from a subcostal approach. Over wire exchange was made for a 10 [place] nephrostomy. The loop was formed within the renal pelvis  and attached to gravity drainage. Using real-time ultrasound guidance, an 18-gauge trocar needle was inserted into a posterior  lower pole calyx of the left kidney from a subcostal approach. Over wire exchange was made for a 10 [place]ian nephrostomy. The loop was formed within the renal pelvis and attached to gravity drainage. FINDINGS: Ultrasound demonstrates bilateral moderate hydronephrosis. Permanent images were stored of each kidney. The right antegrade nephrostogram confirms hydronephrosis. The completion image shows the nephrostomy with its loop in the right renal pelvis. The left antegrade nephrostogram confirms hydronephrosis. The completion image shows the nephrostomy with its loop in the left renal pelvis.     IMPRESSION:  1.  Successful bilateral percutaneous nephrostomy placement, as detailed above. 2.  A urine specimen was sent from each kidney for microbiology analysis.     CT Abdomen Pelvis w/o Contrast    Result Date: 1/29/2022  EXAM: CT ABDOMEN PELVIS W/O CONTRAST LOCATION: [facility] DATE/TIME: 1/29/2022 10:38 PM INDICATION: Complicated UTI, sepsis. COMPARISON: 01/06/2022. TECHNIQUE: CT scan of the abdomen and pelvis was performed without IV contrast. Multiplanar reformats were obtained. Dose reduction techniques were used. CONTRAST: None. FINDINGS: LOWER CHEST: Small right pleural effusion with compressive atelectasis. HEPATOBILIARY: Normal. PANCREAS: Normal. SPLEEN: Normal. ADRENAL GLANDS: Normal. KIDNEYS/BLADDER: Bilateral nephrostomy tubes with tip seen in renal pelvis. No residual hydroureteronephrosis. Status post cystectomy with right lower quadrant ileal conduit. BOWEL: Diverticulosis. No diverticulitis, colitis, or obstruction. LYMPH NODES: Normal. VASCULATURE: Atherosclerotic vascular disease. No aneurysm. PELVIC ORGANS: Normal. MUSCULOSKELETAL: Bilateral total hip arthroplasties. No evidence of hardware loosening or failure.     IMPRESSION: 1.  Bilateral nephrostomy tubes in situ. No residual hydroureteronephrosis. 2.  Status post cystectomy with right lower quadrant ileal conduit. 3.  Mild  constipation. Diverticulosis. No diverticulitis, colitis, or obstruction. 4.  Interval development of small right pleural effusion. 5.  Atherosclerotic vascular disease.     CT Abdomen Pelvis w/o Contrast    Result Date: 1/6/2022  EXAM: CT ABDOMEN PELVIS W/O CONTRAST LOCATION: [facility] DATE/TIME: 1/6/2022 8:28 PM INDICATION: Abdominal pain, acute, nonlocalized COMPARISON: CT 01/04/2020 02/11/1721 TECHNIQUE: CT scan of the abdomen and pelvis was performed without IV contrast. Multiplanar reformats were obtained. Dose reduction techniques were used. CONTRAST: None. FINDINGS: LOWER CHEST: Small hiatal hernia. Mild coronary artery calcifications. HEPATOBILIARY: Nonvisualized gallbladder. Unremarkable unenhanced liver and bile ducts. PANCREAS: Stable atrophy. SPLEEN: Normal. ADRENAL GLANDS: Normal. KIDNEYS/BLADDER: Interval placement of bilateral percutaneous nephrostomy drains. No residual hydronephrosis or hydroureter. Stable small exophytic lower right renal cyst. No follow-up is indicated. Post cystectomy with right lower quadrant ileal conduit. BOWEL: Duodenal diverticulum. Stable chronic patulous small bowel segment at the anastomosis. Otherwise, normal caliber small bowel. Colonic diverticulosis without evidence of acute diverticulitis. Prominent gas-filled segments of colon with mild layering fluid. Mild to moderate fecal retention within the distal sigmoid colon and rectum. No free air or free fluid. LYMPH NODES: Normal. VASCULATURE: Mild to moderate atherosclerosis. PELVIC ORGANS: Post prostatectomy. MUSCULOSKELETAL: Bilateral total hip arthroplasties. Osseous demineralization. Spinal degenerative changes.     IMPRESSION: 1.  Interval placement of bilateral previous nephrostomy drains. No residual hydronephrosis or hydroureter. 2.  Mild to moderate stool burden within the distal sigmoid colon and rectum suggesting constipation. Associated prominent upstream gas containing segments of  colon. No small bowel obstruction.     CT Abdomen Pelvis w/o Contrast    Result Date: 1/4/2022  EXAM: CT ABDOMEN PELVIS W/O CONTRAST LOCATION: [facility] DATE/TIME: 1/4/2022 5:33 PM INDICATION: Recent urostomy, acute renal failure. COMPARISON: CT AP 11/17/2021. TECHNIQUE: CT scan of the abdomen and pelvis was performed without IV contrast. Multiplanar reformats were obtained. Dose reduction techniques were used. CONTRAST: None. FINDINGS: LOWER CHEST: Normal. HEPATOBILIARY: Normal. PANCREAS: Normal. SPLEEN: Normal. ADRENAL GLANDS: Normal. KIDNEYS/BLADDER: Interval cystectomy and ileal conduit urinary diversion with right lower quadrant ostomy. Moderate right hydronephrosis and mild right proximal ureterectasis similar to prior. Interval development left perinephric edema, moderate hydronephrosis and mild ureterectasis to the level of the ureteral ileal conduit anastomosis. The ileal conduit is decompressed and normal in appearance. Small benign cyst right kidney. BOWEL: Colonic diverticulosis. No bowel obstruction or inflammatory change. LYMPH NODES: Normal. VASCULATURE: Unremarkable. PELVIC ORGANS: Prostatectomy. MUSCULOSKELETAL: Bones are demineralized. Bilateral hip arthroplasties.     IMPRESSION: 1.  Interval cystectomy and ileal conduit urinary diversion with right lower quadrant ostomy. 2.  Moderate right hydronephrosis and mild right proximal ureterectasis similar to prior. 3.  Interval development left perinephric edema moderate hydronephrosis and mild ureterectasis that tapers to the level of the ureteral ileal conduit anastomosis with cause of obstruction not evident.     Head CT w/o contrast    Result Date: 1/29/2022  EXAM: CT HEAD W/O CONTRAST LOCATION: [facility] DATE/TIME: 1/29/2022 7:16 PM INDICATION: Confusion COMPARISON: 01/06/2022 TECHNIQUE: Routine CT Head without IV contrast. Multiplanar reformats. Dose reduction techniques were used. FINDINGS:  INTRACRANIAL CONTENTS: No intracranial hemorrhage, extraaxial collection, or mass effect. No CT evidence of acute infarct. Moderate presumed chronic small vessel ischemic changes. Moderate generalized volume loss. No hydrocephalus. VISUALIZED ORBITS/SINUSES/MASTOIDS: No intraorbital abnormality. No paranasal sinus mucosal disease. No middle ear or mastoid effusion. BONES/SOFT TISSUES: No acute abnormality.     IMPRESSION: 1. No acute intracranial process.    CT Head w/o Contrast    Result Date: 1/6/2022  EXAM: CT HEAD W/O CONTRAST LOCATION: [facility] DATE/TIME: 1/6/2022 10:39 PM INDICATION: Nausea, weakness COMPARISON: None. TECHNIQUE: Routine CT Head without IV contrast. Multiplanar reformats. Dose reduction techniques were used. FINDINGS: INTRACRANIAL CONTENTS: No intracranial hemorrhage, extraaxial collection, or mass effect.  No CT evidence of acute infarct. Moderate presumed chronic small vessel ischemic changes. Moderate generalized volume loss. No hydrocephalus. VISUALIZED ORBITS/SINUSES/MASTOIDS: No intraorbital abnormality. No paranasal sinus mucosal disease. No middle ear or mastoid effusion. BONES/SOFT TISSUES: No acute abnormality.     IMPRESSION: 1.  No acute intracranial process.    XR Abdomen Upright Only    Result Date: 1/6/2022  EXAM: XR ABDOMEN UPRIGHT ONLY LOCATION: [facility] DATE/TIME: 1/6/2022 4:45 PM INDICATION: umbilical pain COMPARISON: CT 01/04/2022 in nephrostomy tube placement 01/05/2022     IMPRESSION: Bilateral percutaneous nephrostomy drainage tubes which appear to be in satisfactory positions. Mild diffuse gaseous distention of the colon suspicious for an ileus. Follow-up is recommended. No definite free air. Bilateral total hip arthroplasties.       Discharge Orders        Reason for your hospital stay    Confusion, believed to be related to Baclofen side affect     Follow-up and recommended labs and tests     Follow up with primary  care provider, Urbano Cedeno, within 3-4 days for hospital follow- up.  The following labs/tests are recommended: BMP, CBC.    Follow up with Kidney specialist 2 weeks  Follow up Urology     Activity    Your activity upon discharge: activity as tolerated     MD face to face encounter    Documentation of Face to Face and Certification for Home Health Services    I certify that patient: Marcelo Valerio is under my care and that I, or a nurse practitioner or physician's assistant working with me, had a face-to-face encounter that meets the physician face-to-face encounter requirements with this patient on: 2/2/2022.    This encounter with the patient was in whole, or in part, for the following medical condition, which is the primary reason for home health care: 75 y/o male was here with confusion caused by medication , baclofen side affect  He also needs home care to help with home RN eval, Pt/OT eval for ongoing needs for ambulation and strengthening  .    I certify that, based on my findings, the following services are medically necessary home health services: Nursing, Occupational Therapy, and Physical Therapy.    My clinical findings support the need for the above services because: Nurse is needed: To assess ongoing medical needs, mentation,  after changes in medications or other medical regimen.., Occupational Therapy Services are needed to assess and treat cognitive ability and address ADL safety due to impairment in ambulation., and Physical Therapy Services are needed to assess and treat the following functional impairments: home safety eval.    Further, I certify that my clinical findings support that this patient is homebound (i.e. absences from home require considerable and taxing effort and are for medical reasons or [employer] services or infrequently or of short duration when for other reasons) because: Requires assistance of another person or specialized equipment to access medical services because  patient: Requires supervision of another for safe transfer...    Based on the above findings. I certify that this patient is confined to the home and needs intermittent skilled nursing care, physical therapy and/or speech therapy.  The patient is under my care, and I have initiated the establishment of the plan of care.  This patient will be followed by a physician who will periodically review the plan of care.  Physician/Provider to provide follow up care: Urbano Cedeno    Attending hospital physician [facility]): Karina Cain MD  Physician Signature: See electronic signature associated with these discharge orders.  Date: 2/2/2022     Diet    Follow this diet upon discharge: Orders Placed This Encounter      Combination Diet Moderate Consistent Carb (60 g CHO per Meal) Diet       Examination   Physical Exam   Temp:  [97.7  F (36.5  C)-98.5  F (36.9  C)] 97.7  F (36.5  C)  Pulse:  [70-81] 81  Resp:  [16-18] 18  BP: (113-122)/(67-78) 119/78  SpO2:  [99 %-100 %] 100 %  Wt Readings from Last 1 Encounters:   01/29/22 98.4 kg (217 lb)       See today's note    Please see EMR for more detailed significant labs, imaging, consultant notes etc.    I, Karina Cain MD, personally saw the patient today and spent greater than 30 minutes discharging this patient.    Karina Cain MD  [facility]    CC:Urbano Cedeno

## 2022-02-02 NOTE — PROGRESS NOTES
Infectious Diseases Progress Note  [facility]    Date of visit: 02/02/2022       ASSESSMENT   76-year-old man with a history of bladder cancer status post cystectomy and ileal conduit placement, complicated by hydronephrosis requiring bilateral ureteral stents and bilateral nephrostomy tubes who is admitted with confusion.    1. Confusion.  Recently started on baclofen for back pain, then developed confusion and hallucinations afterwards.  Concern for possible UTI with positive urine cultures (from urostomy and nephrostomy tubes).  However no other signs of infection such as fevers or leukocytosis.  CRP is nonspecific. Confusion now resolved   2. Bacteriuria.  Difficult to interpret significance of bacteria in the urine as the ileostomy will always show bacteria from colonization.  Also, urostomy tubes are easily colonized, therefore difficult to interpret whether bacteria are colonization or infection.  Since the patient has improved since admission and did not have any fever or leukocytosis, it is hard to argue in favor of infection.    Principal Problem:    Urinary tract infection with hematuria, site unspecified  Active Problems:    Malignant neoplasm of overlapping sites of bladder (H)    Glaucoma    HTN (hypertension)    Uncontrolled type 2 diabetes mellitus with diabetic polyneuropathy, with long-term current use of insulin (H)    Sepsis with encephalopathy without septic shock, due to unspecified organism (H)    CKD (chronic kidney disease) stage 4, GFR 15-29 ml/min (H)    Acute encephalopathy       PLAN   -Low likelihood that infection had anything to do with the patient's confusion  -Despite colonization of his devices, there is not an immediate need to change his stents or nephrostomy tubes  -Okay to discharge from ID perspective.     Francis Mendez MD  [facility]  Direct messaging: [email or web address] Paging  On-Call ID provider: [phone], option:  9      ===========================================      SUBJECTIVE / INTERVAL HISTORY:     No events. Feels well. Wife at bedside and reports he is back to baseline. Making urine.      Review of Systems     No fevers, no rashes      Antibiotics   None     Previous:  Linezolid 1/29-1/31  Zosyn 1/29-2/1    Physical Exam     Temp:  [97.7  F (36.5  C)-98.5  F (36.9  C)] 97.7  F (36.5  C)  Pulse:  [70-81] 81  Resp:  [16-18] 18  BP: (113-122)/(67-78) 119/78  SpO2:  [99 %-100 %] 100 %    /78 (BP Location: Left arm)   Pulse 81   Temp 97.7  F (36.5  C) (Oral)   Resp 18   Wt 98.4 kg (217 lb)   SpO2 100%   BMI 30.27 kg/m      GENERAL:  well-developed, well-nourished, sitting in chair in no acute distress.   HENT:  Head is normocephalic, atraumatic.  EYES:  Eyes have anicteric sclerae without conjunctival injection   ABDOMEN:  Soft. Urostomy in place.  Bilateral nephrostomy tubes with clear urine output.  EXT: Extremities warm and without edema.  SKIN:  No acute rashes.    NEUROLOGIC: Alert and oriented x3      Cultures   1/29 urine culture (voided?):  Greater than 100,000 colonies Klebsiella oxytoca  1/29 blood cultures x2: No growth to date  1/30 left nephrostomy tube: Greater than 100,000 colonies Klebsiella oxytoca, 10-50,000 colonies C.parapsilosis  1/30 right nephrostomy tube: ,000 colonies Klebsiella oxytoca, 10-50,000 colonies C.parapsilosis  1/30 urostomy culture: ,000 colonies Klebsiella pneumoniae, 10-50,000 colonies Enterococcus faecalis      Pertinent Labs:     Recent Labs   Lab 02/02/22 0624 02/01/22  0605 01/31/22  0626   WBC 5.5 5.4 5.8   HGB 8.8* 8.6* 6.9*    321 285       Recent Labs   Lab 02/02/22  0624 02/01/22  0605 01/31/22  0626 01/29/22  1835    136 136 134*   CO2 24 23 24 22   BUN 22 22 24 34*   ALBUMIN  --   --  2.0* 2.7*   ALKPHOS  --   --  154* 224*   ALT  --   --  43 79*   AST  --   --  37 97*       Recent Labs   Lab 01/31/22  0626 01/29/22  1835   CRP 10.6*  20.6*           Imaging:     No results found.      Data reviewed today: I reviewed all medications, new labs and imaging results over the last 24 hours. I personally reviewed no images or EKG's today.  The patient's care was discussed with the Bedside Nurse, Patient and Patient's Family.

## 2022-02-02 NOTE — PROGRESS NOTES
Patient discharged.  Patient's wife and patient where present during discharge instructions.  Instructions sent with patient.  Patient discharged with bilateral nephro tubes and urostomy that where present prior to admit.  Patient and patient's wife stated that no further education was needed re: nephro tubes or urostomy.  Patient is belongings sent with patient.  Patient transported to [facility] by w/c transportation to home provided by wife.  Home health care orders where present at time of discharge.

## 2022-02-02 NOTE — PLAN OF CARE
Pt had a fairly quiet shift, no c/o pain. Urostomy, bilateral nephrostomy tubes are patent, outputs have been charted. BG= 144 and 150 mg/dL Will continue to monitor.

## 2022-02-02 NOTE — PLAN OF CARE
Patient discharging home with family transport. Resumption of care by [facility] for RN/PT/OT. Orders faxed to HC and left message with intake.

## 2022-02-02 NOTE — PROGRESS NOTES
[facility]    Medicine Progress Note - Hospitalist Service    Date of Admission:  1/29/2022    Assessment & Plan            Marcelo Valerio is a 76 year old male admitted on 1/29/2022. He has history of prostate cancer, bladder cancer status post ileal conduit, recent issues with pyelonephritis and hydronephrosis with bilateral nephrostomy tubes placed on 1/5/22, diabetes, hypertension presents for evaluation of altered mental status found to have probable UTI     1.Acute encephalopathy  -Suspected toxic/metabolic encephalopathy secondary to UTI, AND with new med baclofen (holding- per family he definitely seemed more confused after just 3-4 doses of this so family were already holding this)  -Seems improved each day  -CT head negative  -Supportive care, PT/OT  -If ID ok with home, possible home today     2.UTI  -No sepsis  -Patient with complex anatomy, recent cystectomy/ileal conduit back in December by Dr Manrique.  Then hospitalized 3 weeks ago for pyelonephritis and had bilateral hydronephrosis and perinephric fluid collections, bilateral PNT's placed on 1/5/22.  -History of recent UTI with vancomycin and penicillin resistant Enterococcus faecium and Candida.  -Patient started on linezolid and Zosyn. He just completed previous course of linezolid ~1/20.    -Urine from ileal conduit growing ampicillin resistant Klebsiella sensitive to Zosyn, no sign of VRE.  Discontinue linezolid, continue Zosyn.  -Urology and ID seeing--ID as of 2/1/22 stopped antibiotics and want to observe  -Blood cultures no growth to date.  -Cultures from both PNTs no growth to date.     3.Normocytic anemia  History of iron deficiency and takes ferrous sulfate at home, got Venofer in hospital recently  -Preop Hgb in Dec was 13, after surgery down to 11.2  -Here Hgb 8.2-->6.9, got 1 unit pRBC and now up to 8.8  -Denies any s/s active bleeding  -May need EPO in future  -Continue home ferrous sulfate  -Colonoscopy as  outpatient might be a good idea with the iron deficiency. Wife thinks maybe he has never had one     4.CKD 4  -Baseline Cr ~2, today 2.16  -During recent hospitalization, was up to 7.5 due to obstruction.  -Creatinine has been downtrending since that time, today 2  -Avoid nephrotoxins  -Supposed to follow up with [facility] as outpatient     5.IDDM  -Home regimen Trulicity, Lantus 20 units twice daily  -Lantus 15 units once daily for now  -Novolog carb count 1:15 and sliding scale     6.HTN  -Home metoprolol. Recently taken off of losartan     7.Glaucoma  -Home eye drops  -History of anisocoria from eye surgery in past     8.Heart health  -Home ASA     9.Severe malnutrition  Malnutrition:    - Level of malnutrition: Severe   May have lost 31 lbs in past 2 months  -Refuses supplements in the hospital apparently  -RD following     10.GERD, home Pepcid     11.Hypomagnesemia, noted during recent admission.  Continue home mag     12.Hyperlipidemia  -restart simvastatin     13.Obesity  -BMI 30              Diet: Combination Diet Moderate Consistent Carb (60 g CHO per Meal) Diet    DVT Prophylaxis: Heparin SQ  Bob Catheter: Not present  Central Lines: None  Cardiac Monitoring: None  Code Status: Full Code      Disposition Plan   Expected Discharge: 02/02/2022     Anticipated discharge location:  Awaiting care coordination huddle  Delays:     Lab Result Pending            The patient's care was discussed with the Care Coordinator/ and Patient.    Karina Cain MD  Hospitalist Service  [facility]  Securely message with the [employer] Web Console (learn more here)  Text page via [email or web address] Paging/Directory           ______________________________________________________________________    Interval History   He feels good today  No pain  Feels like himself  Hopes to go home soon    Data reviewed today: I reviewed all medications, new labs and imaging results over the last 24 hours. I  personally reviewed no images or EKG's today.    Physical Exam   Vital Signs: Temp: 97.7  F (36.5  C) Temp src: Oral BP: 119/78 Pulse: 81   Resp: 18 SpO2: 100 % O2 Device: None (Room air)    Weight: 217 lbs 0 oz  Constitutional: awake, alert, cooperative, no apparent distress, and appears stated age  Eyes: sclera clear  Respiratory: No increased work of breathing, good air exchange, clear to auscultation bilaterally, no crackles or wheezing  Cardiovascular: Normal apical impulse, regular rate and rhythm, normal S1 and S2, no S3 or S4, and no murmur noted  GI: normal bowel sounds, soft, non-distended and non-tender  Skin: no bruising or bleeding  Musculoskeletal: no lower extremity pitting edema present  Neurologic: Mental Status Exam:  Level of Alertness:   awake  Neuropsychiatric: General: normal, calm and normal eye contact    Data   Recent Labs   Lab 02/02/22  0833 02/02/22  0624 02/01/22  2108 02/01/22  0813 02/01/22  0605 01/31/22  0822 01/31/22  0626 01/30/22  0004 01/29/22  1835   WBC  --  5.5  --   --  5.4  --  5.8  --  10.6   HGB  --  8.8*  --   --  8.6*  --  6.9*  --  8.2*   MCV  --  93  --   --  91  --  94  --  92   PLT  --  369  --   --  321  --  285   < > 336   NA  --  140  --   --  136  --  136  --  134*   POTASSIUM  --  4.4  --   --  4.3  --  4.2  --  4.9   CHLORIDE  --  107  --   --  104  --  105  --  100   CO2  --  24  --   --  23  --  24  --  22   BUN  --  22  --   --  22  --  24  --  34*   CR  --  2.16*  --   --  1.95*  --  1.92*  --  2.19*   ANIONGAP  --  9  --   --  9  --  7  --  12   JERI  --  8.8  --   --  8.5  --  8.5  --  10.1   * 107 150*   < > 98   < > 110   < > 178*   ALBUMIN  --   --   --   --   --   --  2.0*  --  2.7*   PROTTOTAL  --   --   --   --   --   --  5.7*  --  7.3   BILITOTAL  --   --   --   --   --   --  0.5  --  0.7   ALKPHOS  --   --   --   --   --   --  154*  --  224*   ALT  --   --   --   --   --   --  43  --  79*   AST  --   --   --   --   --   --  37  --  97*    <  > = values in this interval not displayed.

## 2022-02-02 NOTE — PLAN OF CARE
Physical Therapy Discharge Summary    Reason for therapy discharge:    All goals and outcomes met, no further needs identified.    Progress towards therapy goal(s). See goals on Care Plan in [facility] electronic health record for goal details.  Goals met    Therapy recommendation(s):    Home with assist and continue with home PT.

## 2022-02-02 NOTE — PLAN OF CARE
Problem: Adult Inpatient Plan of Care  Goal: Absence of Hospital-Acquired Illness or Injury  Intervention: Prevent Infection  Recent Flowsheet Documentation  Taken 2/2/2022 0054 by Nishi Orozco RN  Infection Prevention:   hand hygiene promoted   personal protective equipment utilized   rest/sleep promoted   single patient room provided     Problem: Risk for Delirium  Goal: Improved Attention and Thought Clarity  Outcome: Improving     Patient remains A&O x 4. No complaints of pain or discomfort. Slept well this shift.

## 2022-02-03 ENCOUNTER — PATIENT OUTREACH (OUTPATIENT)
Dept: CARE COORDINATION | Facility: CLINIC | Age: 77
End: 2022-02-03
Payer: COMMERCIAL

## 2022-02-03 DIAGNOSIS — Z71.89 OTHER SPECIFIED COUNSELING: ICD-10-CM

## 2022-02-03 LAB
BACTERIA BLD CULT: NO GROWTH
BACTERIA BLD CULT: NO GROWTH

## 2022-02-03 NOTE — PROGRESS NOTES
Clinic Care Coordination Contact  [facility]: Post-Discharge Note  SITUATION                                                      Admission:    Admission Date: 01/29/22   Reason for Admission: Urinary tract infection with hematuria  Discharge:   Discharge Date: 02/02/22  Discharge Diagnosis: Urinary tract infection with hematuria    BACKGROUND                                                      Per hospital discharge summary and inpatient provider notes:    Marcelo Valerio is a 76 year old male with history of prostate cancer, bladder CA s/p robotic assisted cystectomy ileal conduit creation on December 6, 2021, hypertension, diabetes, dyslipidemia, glaucoma, gout, obesity, knee osteoarthritis, restless leg syndrome who presents with confusion x 3 days.  Wed during the day developed back pain.  Went to ER and starting on baclofen.  Thurs AM started to have confusion with hallucinations.  Thursday stopped baclofen after a total of 4 doses.  Continues to be confused though maybe slightly better.      ASSESSMENT      Enrollment  Primary Care Care Coordination Status: Not a Candidate    Discharge Assessment  How are you doing now that you are home?: Everything seems to be okay  How are your symptoms? (Red Flag symptoms escalate to triage hotline per guidelines): Unchanged  Do you feel your condition is stable enough to be safe at home until your provider visit?: Yes  Does the patient have their discharge instructions? : Yes  Does the patient have questions regarding their discharge instructions? : No  Were you started on any new medications or were there changes to any of your previous medications? : Yes  Does the patient have all of their medications?: Yes  Do you have questions regarding any of your medications? : No  Do you have all of your needed medical supplies or equipment (DME)?  (i.e. oxygen tank, CPAP, cane, etc.): Yes  Discharge follow-up appointment scheduled within 14 calendar days? : Yes  Discharge  Follow Up Appointment Date: 02/07/22  Discharge Follow Up Appointment Scheduled with?: Primary Care Provider                  PLAN                                                      Outpatient Plan: Follow up with primary care provider, Urbano Cedeno, within 3-4 days for hospital follow- up. The following labs/tests  are recommended: BMP, CBC.  Follow up with [facility] 2 weeks  Follow up Urology    No future appointments.      For any urgent concerns, please contact our 24 hour nurse triage line: [phone] ([phone])         TOSHA Cardona  [phone]  [facility] Care Resource [facility]

## 2022-02-10 ENCOUNTER — TELEPHONE (OUTPATIENT)
Dept: RADIOLOGY | Facility: CLINIC | Age: 77
End: 2022-02-10
Payer: COMMERCIAL

## 2022-02-10 NOTE — TELEPHONE ENCOUNTER
Interventional Radiology -  Telephone Encounter Note  02/10/22 10:14 AM    Patient Name Requesting Call:  Marcelo Valerio     Reason for Call:  Questions RE PNT care    Subjective:  Marcelo Valerio 76 year old male with history of prostate cancer, and bladder tumor.  Hx of cystoprostatectomy and ileal conduit diversion with ostomy early 12/2021.  S/P PNT placement 1/5/2022 2/2 ileal conduit anastomosis obstruction.     Ling from his nursing home reports that they did not receive any discharge instructions that she can find when he was discharged from the hospital.  Wondering about the care post hospitalization?    PMH/PSH: Reviewed    ALLERGIES:  Allergies   Allergen Reactions     Zinc Acetate Itching     Baclofen      confusion     Nickel      Sulfa (Sulfonamide Antibiotics) [Sulfa Drugs] Unknown     Childhood reaction       MEDICATIONS:  Current Outpatient Medications   Medication     acetaminophen (TYLENOL) 325 MG tablet     amoxicillin (AMOXIL) 500 MG capsule     aspirin (ASA) 81 MG EC tablet     brimonidine (ALPHAGAN) 0.15 % ophthalmic solution     calcipotriene (DOVONOX) 0.005 % cream     Dulaglutide 3 MG/0.5ML SOPN     famotidine (PEPCID) 10 MG tablet     ferrous sulfate (FEROSUL) 325 (65 Fe) MG tablet     insulin glargine (BASAGLAR KWIKPEN) 100 UNIT/ML pen     latanoprost (XALATAN) 0.005 % ophthalmic solution     metoprolol succinate ER (TOPROL-XL) 25 MG 24 hr tablet     polyethylene glycol (MIRALAX) 17 GM/Dose powder     simvastatin (ZOCOR) 20 MG tablet     triamcinolone (KENALOG) 0.1 % cream     No current facility-administered medications for this visit.       Relevant Labs: Reviewed      Relevant Imaging:     [facility]  LOCATION: [facility]  DATE: 1/5/2022     PROCEDURE:   1.  BILATERAL PERCUTANEOUS NEPHROSTOMY PLACEMENT WITH IMAGING GUIDANCE.  2.  MODERATE SEDATION.     INTERVENTIONAL RADIOLOGIST: León Adams MD.     INDICATION: 76-year-old male with bilateral  ureteral obstruction status post cystectomy with ileal diversion. Bilateral nephrostomy placement is requested.     CONSENT: The risks, benefits and alternatives of the stated procedure were discussed with the patient  in detail. All questions were answered. Informed consent was given to proceed with the procedure.     MODERATE SEDATION: Versed 1 mg IV; Fentanyl 25 mcg IV.  Under physician supervision, Versed and fentanyl were administered for moderate sedation. Pulse oximetry, heart rate and blood pressure were continuously monitored by an independent trained   observer. The physician spent 15 minutes of face-to-face sedation time with the patient.     CONTRAST: 15 mL Omnipaque 350.  ANTIBIOTICS: None.  ADDITIONAL MEDICATIONS: None.     FLUOROSCOPIC TIME: 0.5 minutes.  RADIATION DOSE: Air Kerma: 34 mGy.     COMPLICATIONS: No immediate complications.     STERILE BARRIER TECHNIQUE: Maximum sterile barrier technique was used. Cutaneous antisepsis was performed at the operative site with application of 2% chlorhexidine and large sterile drape. Prior to the procedure, the  and assistant performed   hand hygiene and wore hat, mask, sterile gown, and sterile gloves during the entire procedure.     PROCEDURE:    Using real-time ultrasound guidance, an 18-gauge trocar needle was inserted into a posterior midpole calyx of the right kidney from a subcostal approach. Over wire exchange was made for a 10 Burmese nephrostomy. The loop was formed within the renal pelvis   and attached to gravity drainage.     Using real-time ultrasound guidance, an 18-gauge trocar needle was inserted into a posterior lower pole calyx of the left kidney from a subcostal approach. Over wire exchange was made for a 10 Burmese nephrostomy. The loop was formed within the renal   pelvis and attached to gravity drainage.        FINDINGS:  Ultrasound demonstrates bilateral moderate hydronephrosis. Permanent images were stored of each  kidney.     The right antegrade nephrostogram confirms hydronephrosis. The completion image shows the nephrostomy with its loop in the right renal pelvis.     The left antegrade nephrostogram confirms hydronephrosis. The completion image shows the nephrostomy with its loop in the left renal pelvis.                                                                         IMPRESSION:    1.  Successful bilateral percutaneous nephrostomy placement, as detailed above.  2.  A urine specimen was sent from each kidney for microbiology analysis.    Assessment/Plan:     Complex PMH, including complex urological PMH including surgical creation of an ileal conduit now s/p PNT placement, per above    - Reiterrated over the phone standard PNT cares with Ling, who verbalized understanding of and agreement to POC      Total length of Call: 10 minutes spend in consultation/telephone communication with patient, reviewing records and data, coordination of care and establishing plan.          KELLY HU NP   2/10/2022   10:14 AM  [facility]  Interventional Radiology   [phone]

## 2022-02-15 DIAGNOSIS — Z11.59 ENCOUNTER FOR SCREENING FOR OTHER VIRAL DISEASES: Primary | ICD-10-CM

## 2022-02-17 NOTE — IR NOTE
IR Procedure Pre-call    Spoke with: Marcelo  Arrival and procedure time: 1130  Patient has : Yes  Patient has someone to stay overnight:Yes If angiogram must have responsible adult for 24 hours.  Patient is taking blood thinners:Yes Asprin, will talk to MD  Patient has H&P within 30 days:Yes   Patient has covid test:Yes 2/20  Patient NPO 8 hours prior: Yes    Pre-call completed.   February 17, 2022 5:04 PM  Bhavna Ambrose RN

## 2022-02-20 ENCOUNTER — LAB (OUTPATIENT)
Dept: FAMILY MEDICINE | Facility: CLINIC | Age: 77
End: 2022-02-20
Payer: COMMERCIAL

## 2022-02-20 DIAGNOSIS — Z11.59 ENCOUNTER FOR SCREENING FOR OTHER VIRAL DISEASES: ICD-10-CM

## 2022-02-20 PROCEDURE — U0003 INFECTIOUS AGENT DETECTION BY NUCLEIC ACID (DNA OR RNA); SEVERE ACUTE RESPIRATORY SYNDROME CORONAVIRUS 2 (SARS-COV-2) (CORONAVIRUS DISEASE [COVID-19]), AMPLIFIED PROBE TECHNIQUE, MAKING USE OF HIGH THROUGHPUT TECHNOLOGIES AS DESCRIBED BY CMS-2020-01-R: HCPCS

## 2022-02-20 PROCEDURE — U0005 INFEC AGEN DETEC AMPLI PROBE: HCPCS

## 2022-02-21 LAB — SARS-COV-2 RNA RESP QL NAA+PROBE: NEGATIVE

## 2022-02-22 RX ORDER — CEFTRIAXONE 1 G/1
1 INJECTION, POWDER, FOR SOLUTION INTRAMUSCULAR; INTRAVENOUS
Status: CANCELLED | OUTPATIENT
Start: 2022-02-22

## 2022-02-23 ENCOUNTER — HOSPITAL ENCOUNTER (OUTPATIENT)
Dept: INTERVENTIONAL RADIOLOGY/VASCULAR | Facility: HOSPITAL | Age: 77
Discharge: HOME | End: 2022-02-23
Attending: UROLOGY | Admitting: UROLOGY
Payer: MEDICARE

## 2022-02-23 VITALS
RESPIRATION RATE: 18 BRPM | HEART RATE: 88 BPM | OXYGEN SATURATION: 99 % | DIASTOLIC BLOOD PRESSURE: 78 MMHG | TEMPERATURE: 98.8 F | SYSTOLIC BLOOD PRESSURE: 160 MMHG

## 2022-02-23 DIAGNOSIS — Z85.51 PERSONAL HISTORY OF MALIGNANT NEOPLASM OF BLADDER: ICD-10-CM

## 2022-02-23 PROCEDURE — C1729 CATH, DRAINAGE: HCPCS

## 2022-02-23 PROCEDURE — C1769 GUIDE WIRE: HCPCS

## 2022-02-23 PROCEDURE — 50431 NJX PX NFROSGRM &/URTRGRM: CPT | Mod: XS

## 2022-02-23 PROCEDURE — 250N000011 HC RX IP 250 OP 636: Performed by: RADIOLOGY

## 2022-02-23 PROCEDURE — 250N000009 HC RX 250: Performed by: NURSE PRACTITIONER

## 2022-02-23 PROCEDURE — 255N000002 HC RX 255 OP 636: Performed by: UROLOGY

## 2022-02-23 PROCEDURE — 272N000117 HC CATH CR2

## 2022-02-23 RX ORDER — LIDOCAINE 40 MG/G
CREAM TOPICAL
Status: DISCONTINUED | OUTPATIENT
Start: 2022-02-23 | End: 2022-02-24 | Stop reason: HOSPADM

## 2022-02-23 RX ORDER — NALOXONE HYDROCHLORIDE 0.4 MG/ML
0.4 INJECTION, SOLUTION INTRAMUSCULAR; INTRAVENOUS; SUBCUTANEOUS
Status: DISCONTINUED | OUTPATIENT
Start: 2022-02-23 | End: 2022-02-24 | Stop reason: HOSPADM

## 2022-02-23 RX ORDER — SODIUM CHLORIDE 9 MG/ML
INJECTION, SOLUTION INTRAVENOUS CONTINUOUS
Status: DISCONTINUED | OUTPATIENT
Start: 2022-02-23 | End: 2022-02-24 | Stop reason: HOSPADM

## 2022-02-23 RX ORDER — FLUMAZENIL 0.1 MG/ML
0.2 INJECTION, SOLUTION INTRAVENOUS
Status: DISCONTINUED | OUTPATIENT
Start: 2022-02-23 | End: 2022-02-24 | Stop reason: HOSPADM

## 2022-02-23 RX ORDER — NALOXONE HYDROCHLORIDE 0.4 MG/ML
0.2 INJECTION, SOLUTION INTRAMUSCULAR; INTRAVENOUS; SUBCUTANEOUS
Status: DISCONTINUED | OUTPATIENT
Start: 2022-02-23 | End: 2022-02-24 | Stop reason: HOSPADM

## 2022-02-23 RX ORDER — FENTANYL CITRATE 50 UG/ML
25-50 INJECTION, SOLUTION INTRAMUSCULAR; INTRAVENOUS EVERY 5 MIN PRN
Status: ACTIVE | OUTPATIENT
Start: 2022-02-23 | End: 2022-02-23

## 2022-02-23 RX ADMIN — LIDOCAINE HYDROCHLORIDE 1 ML: 10 INJECTION, SOLUTION INFILTRATION; PERINEURAL at 12:53

## 2022-02-23 RX ADMIN — FENTANYL CITRATE 25 MCG: 50 INJECTION, SOLUTION INTRAMUSCULAR; INTRAVENOUS at 12:55

## 2022-02-23 RX ADMIN — IOHEXOL 45 ML: 350 INJECTION, SOLUTION INTRAVENOUS at 13:03

## 2022-02-23 RX ADMIN — MIDAZOLAM HYDROCHLORIDE 0.5 MG: 1 INJECTION, SOLUTION INTRAMUSCULAR; INTRAVENOUS at 12:51

## 2022-02-23 NOTE — DISCHARGE INSTRUCTIONS
Percutaneous Nephrostomy Tube (PNT) Exchange/Check Discharge Instructions:  You had your nephrostomy tube checked or exchanged today. Please refer to the below instructions following your check/exchange:    Care Instructions:  - If you received sedation for your procedure, do not drive or operate heavy machinery for the rest of the day.  - Rest today if your tube was exchanged. Avoid strenuous activity and heavy lifting for the rest of the day. Return to your normal activities as you tolerate tomorrow.  - Keep the nephrostomy tube site clean and dry.   - You may shower, but do not submerge the nephrostomy tube site in water (avoid tub baths, Jacuzzis, hot tubs and pools).  - If you have a gauze dressing, change the gauze and tape dressing as needed to keep site clean and dry. If you have a securement device, leave in place until your next exchange.  - Clean around nephrostomy tubes exit sites with soap and water, pat and apply new split gauze around the tube at the exit site.  - Urine going into the bag may be red with blood for the first few days.  - Keep the drainage bag lower than your kidney to keep urine from backing up.  - Inspect the tube often for kinks.  - Empty your drainage bag when it is approximately half way fluid. Follow below instructions for emptying bag:  - Clean hands well with soap and water.  - Place a container near the outlet valve of the drainage bag.  - To open the valve:  - If you have a [facility] leg bag: Twist the blue valve at the bottom of the bag while holding the valve over your container to empty bag. Re-twist the valve closed on the drainage bag once complete.  - If you have a [place] leg bag: Turn the lever downward while holding the valve over your container to empty the bag. Turn the valve upward to close once complete.  - Discard drainage into toilet once urine drained.    Follow-Up:  - Routine (every 2-3 months) nephrostomy tube exchange is recommended. Your Urologist may  order and schedule exchanges by calling [facility] at [phone].    Call [facility] ([phone]) if you experience the following:  - Nephrostomy tube(s) stops draining urine.  - Nephrostomy tube(s) site is leaking urine.  - Extreme pain at the nephrostomy tube site.  - Nephrostomy tube appears to be falling out or has fallen out, becomes clogged or breaks.    Seek Medical Evaluation for the following:  - Fever (greater than 101 F (38.3C)).  - Purulent (yellow/green/foul smelling) drainage from nephrostomy tube exit sites.  - Significant bleeding from nephrostomy tube site(s).  - Significant or worsening pain at nephrostomy tube exit site(s) or back.

## 2022-02-23 NOTE — PRE-PROCEDURE
GENERAL PRE-PROCEDURE:   Procedure:  Bilateral Nephrostograms, Possible PNT Exchange, Possible Stent Placement  Date/Time:  2/23/2022 12:10 PM    Written consent obtained?: Yes    Risks and benefits: Risks, benefits and alternatives were discussed    DC Plan: Appropriate discharge home plan in place for patients who are going home after procedure   Consent given by:  Patient  Patient states understanding of procedure being performed: Yes    Patient's understanding of procedure matches consent: Yes    Procedure consent matches procedure scheduled: Yes    Appropriately NPO:  Yes  ASA Class:  3  Mallampati  :  Grade 3- soft palate visible, posterior pharyngeal wall not visible  Lungs:  Lungs clear with good breath sounds bilaterally  Heart:  Normal heart sounds and rate  History & Physical reviewed:  Abbreviated history and physical done prior to moderate sedation  Statement of review:  I have reviewed the lab findings, diagnostic data, medications, and the plan for sedation

## 2022-02-23 NOTE — PROGRESS NOTES
Interventional Radiology - History and Physical  2/23/2022    Procedure Requested: Bilateral Nephrostograms, Possible PNT Exchange, Possible Stent Placement  Requesting Provider: Fam Manrique MD    HPI: Marcelo Valerio is a 76 year old male with PMH relevant for bladder cancer, s/p cystectomy, ileal diversion, Stage 4 CKD, T2DM, HTN, bilateral hydronephrosis for which patient had bilateral PNT's placed 1/20022. Patient denies any issues with PNT's. No fevers, drain well. Dressings are intact, clean and dry.   Patient to have nephrostograms, and possible stent placements.     Imaging:   Narrative & Impression   [facility]  LOCATION: [facility]  DATE: 1/5/2022     PROCEDURE:   1.  BILATERAL PERCUTANEOUS NEPHROSTOMY PLACEMENT WITH IMAGING GUIDANCE.  2.  MODERATE SEDATION.     INTERVENTIONAL RADIOLOGIST: León Adams MD.     INDICATION: 76-year-old male with bilateral ureteral obstruction status post cystectomy with ileal diversion. Bilateral nephrostomy placement is requested.     CONSENT: The risks, benefits and alternatives of the stated procedure were discussed with the patient  in detail. All questions were answered. Informed consent was given to proceed with the procedure.     MODERATE SEDATION: Versed 1 mg IV; Fentanyl 25 mcg IV.  Under physician supervision, Versed and fentanyl were administered for moderate sedation. Pulse oximetry, heart rate and blood pressure were continuously monitored by an independent trained   observer. The physician spent 15 minutes of face-to-face sedation time with the patient.     CONTRAST: 15 mL Omnipaque 350.  ANTIBIOTICS: None.  ADDITIONAL MEDICATIONS: None.     FLUOROSCOPIC TIME: 0.5 minutes.  RADIATION DOSE: Air Kerma: 34 mGy.     COMPLICATIONS: No immediate complications.     STERILE BARRIER TECHNIQUE: Maximum sterile barrier technique was used. Cutaneous antisepsis was performed at the operative site with application of 2% chlorhexidine  and large sterile drape. Prior to the procedure, the  and assistant performed   hand hygiene and wore hat, mask, sterile gown, and sterile gloves during the entire procedure.     PROCEDURE:    Using real-time ultrasound guidance, an 18-gauge trocar needle was inserted into a posterior midpole calyx of the right kidney from a subcostal approach. Over wire exchange was made for a 10 Qatari nephrostomy. The loop was formed within the renal pelvis   and attached to gravity drainage.     Using real-time ultrasound guidance, an 18-gauge trocar needle was inserted into a posterior lower pole calyx of the left kidney from a subcostal approach. Over wire exchange was made for a 10 Qatari nephrostomy. The loop was formed within the renal   pelvis and attached to gravity drainage.        FINDINGS:  Ultrasound demonstrates bilateral moderate hydronephrosis. Permanent images were stored of each kidney.     The right antegrade nephrostogram confirms hydronephrosis. The completion image shows the nephrostomy with its loop in the right renal pelvis.     The left antegrade nephrostogram confirms hydronephrosis. The completion image shows the nephrostomy with its loop in the left renal pelvis.                                                                         IMPRESSION:    1.  Successful bilateral percutaneous nephrostomy placement, as detailed above.  2.  A urine specimen was sent from each kidney for microbiology analysis.            NPO Status: 0015  Anticoagulation/Antiplatelets/Bleeding tendencies: ASA  Antibiotics: Amoxil po    Review of Systems: A comprehensive 10-point review of systems was performed. All systems were reviewed and negative with exception to those reported in the HPI.    PMH:  Past Medical History:   Diagnosis Date     Basal cell carcinoma      Bladder tumor      Cancer (H)     Prostate     Chronic kidney disease      CKD (chronic kidney disease)      DM2 (diabetes mellitus, type 2) (H)       Glaucoma      History of gout      HTN (hypertension)      Hyperlipidemia      Kidney stone      Lung nodule      Malignant neoplasm of urinary bladder, unspecified site (H)      Metabolic syndrome      Obesity      Osteoarthritis of knee      Psoriasis      Restless legs syndrome        PSH:  Past Surgical History:   Procedure Laterality Date     APPENDECTOMY       ARTHROPLASTY REVISION HIP Left      BASAL CELL CARCINOMA EXCISION      back     CARPAL TUNNEL RELEASE RT/LT       CATARACT EXTRACTION       COMBINED CYSTOSCOPY, RETROGRADES, EXCHANGE STENT URETER(S) Right 11/22/2021    Procedure: CYSTOSCOPY, RIGHT RETROGRADE PYELOGRAM, RIGHT URETERAL STENT EXCHANGE;  Surgeon: Fam Manrique MD;  Location: [facility] OR     CYSTECTOMY, ROBOT-ASSISTED, LAPAROSCOPIC, USING DA LARISSA SI, WITH URETEROILEAL CONDUIT CREATION N/A 12/6/2021    Procedure: CYSTOSCOPY, RIGHT URETEROSCOPY, RIGHT STENT REMOVAL ROBOTIC ASSISTED RADICAL CYSTECTOMY, CREATION OF ILEAL CONDUIT, EXTENSIVE LYSIS OF ADHESIONS;  Surgeon: Fam Manrique MD;  Location: [facility] OR     CYSTOSCOPY, FULGURATE BLADDER TUMOR, COMBINED Bilateral 09/08/2021    Procedure: CYSTOSCOPY, SELECTIVE CYTOLOGIES, BLADDER BIOPSIES AND FULGURATION, BILATERAL RETROGRADE PYELOGRAMS, RIGHT URETEROSCOPY , RIGHT URERETAL STENT PLACEMENT;  Surgeon: Fam Manrique MD;  Location: [facility] OR     CYSTOSCOPY, TRANSURETHRAL RESECTION (TUR) TUMOR BLADDER, COMBINED N/A 02/01/2021    Procedure: CYSTOSCOPY SELECTIVE CYTOLOGIES, BLADDER BIOPSY FULGURATION, MEATAL DILATION;  Surgeon: Fam Manrique MD;  Location: [facility];  Service: Urology     INGUINAL HERNIA REPAIR Bilateral      IR NEPHROSTOMY TUBE PLACEMENT BILATERAL  1/5/2022     JOINT REPLACEMENT Bilateral     bilateral CARLI, left TKA     MA CYSTOSCOPY,REMV CALCULUS,SIMPLE Right 03/22/2021    Procedure: CYSTOSCOPY, RIGHT RETROGRADE PYELOGRAM, RIGHT URETEROSCOPY, URETERAL BIOPSY, RIGHT URETERAL STENT  PLACEMENT;  Surgeon: Fam Manrique MD;  [facility]: [facility];  Service: Urology     IA CYSTOURETHROSCOPY,BIOPSY N/A 10/01/2019    Procedure: CYSTOSCOPY, WITH BLADDER BIOPSIES and Fulgeration;  Surgeon: Jose Murcia MD;  Location: [facility];  Service: Urology     IA CYSTOURETHROSCOPY,URETER CATHETER Bilateral 10/01/2019    Procedure: BILATERAL RETROGRADE PYELOGRAMS;  Surgeon: Jose Murcia MD;  Location: [facility];  Service: Urology     PROSTATECTOMY      Radical suprapubic     RELEASE CARPAL TUNNEL       TONSILLECTOMY       TOTAL KNEE ARTHROPLASTY       VASECTOMY         ALLERGIES:  Allergies   Allergen Reactions     Zinc Acetate Itching     Baclofen      confusion     Nickel      Sulfa (Sulfonamide Antibiotics) [Sulfa Drugs] Unknown     Childhood reaction       MEDICATIONS:  Current Outpatient Medications   Medication     acetaminophen (TYLENOL) 325 MG tablet     amoxicillin (AMOXIL) 500 MG capsule     aspirin (ASA) 81 MG EC tablet     brimonidine (ALPHAGAN) 0.15 % ophthalmic solution     calcipotriene (DOVONOX) 0.005 % cream     dulaglutide (TRULICITY) 0.75 MG/0.5ML pen     Dulaglutide 3 MG/0.5ML SOPN     famotidine (PEPCID) 10 MG tablet     ferrous sulfate (FEROSUL) 325 (65 Fe) MG tablet     insulin glargine (BASAGLAR KWIKPEN) 100 UNIT/ML pen     latanoprost (XALATAN) 0.005 % ophthalmic solution     metoprolol succinate ER (TOPROL-XL) 25 MG 24 hr tablet     polyethylene glycol (MIRALAX) 17 GM/Dose powder     simvastatin (ZOCOR) 20 MG tablet     triamcinolone (KENALOG) 0.1 % cream     Current Facility-Administered Medications   Medication     lidocaine (LMX4) cream     lidocaine 1 % 0.1-1 mL     sodium chloride (PF) 0.9% PF flush 3 mL     sodium chloride (PF) 0.9% PF flush 3 mL     sodium chloride 0.9% 1000 mL TABLE SOLN     sodium chloride 0.9% infusion         LABS:  INR   Date Value Ref Range Status   01/04/2022 1.27 (H) 0.90 - 1.15 Final      Hemoglobin    Date Value Ref Range Status   02/02/2022 8.8 (L) 13.3 - 17.7 g/dL Final   ]  Platelet Count   Date Value Ref Range Status   02/02/2022 369 150 - 450 10e3/uL Final     Creatinine   Date Value Ref Range Status   02/02/2022 2.16 (H) 0.70 - 1.30 mg/dL Final     Potassium   Date Value Ref Range Status   02/02/2022 4.4 3.5 - 5.0 mmol/L Final         EXAM:  BP (!) 143/67   Pulse 82   Temp 98.2  F (36.8  C) (Oral)   Resp 18   SpO2 100%   General:  Stable.  In no acute distress.    Neuro:  A&O x 3. Moves all extremities equally.  Resp:  Lungs clear to auscultation bilaterally.  Cardio:  S1S2 and reg, without murmur, clicks or rubs  Abdomen:  Soft, non-distended, non-tender, positive bowel sounds.      Pre-Sedation Assessment:  Mallampati Airway Classification:  III - Only soft palate is visible. Intubation is predicted to be difficult  Previous reaction to anesthesia/sedation:  No  Sedation plan based on assessment: Moderate (conscious) sedation  ASA Classification: Class 3 - SEVERE SYSTEMIC DISEASE, DEFINITE FUNCTIONAL LIMITATIONS.     Code Status: FULL CODE     ASSESSMENT:  75 yo male  - Complex PMH, including complex urological PMH including surgical creation of an ileal conduit  - S/P bilateral PNT placement 1/2022  - here for routine exchange, possible placement of stents      PLAN:    Proceed with Bilateral Nephrostograms, Possible PNT Exchange, Possible Stent Placements with sedation    Procedure, risks/benefits, details, alternatives, and sedation reviewed with patient/family and he verbalized understanding. All questions answered. OK to proceed with above radiology procedure.       Adrienne Strauss, CNP  Interventional Radiology  [phone]

## 2022-02-23 NOTE — IP AVS SNAPSHOT
[facility] Interventional Radiology  [address] [place] [id number]  Phone: [phone]  Fax: [phone]                                    After Visit Summary   2/23/2022    Marcelo Valerio   MRN: [id number]           After Visit Summary Signature Page    I have received my discharge instructions, and my questions have been answered. I have discussed any challenges I see with this plan with the nurse or doctor.    ..........................................................................................................................................  Patient/Patient Representative Signature      ..........................................................................................................................................  Patient Representative Print Name and Relationship to Patient    ..................................................               ................................................  Date                                   Time    ..........................................................................................................................................  Reviewed by Signature/Title    ...................................................              ..............................................  Date                                               Time          22EPIC Rev 08/18

## 2022-02-24 ENCOUNTER — TELEPHONE (OUTPATIENT)
Dept: INTERVENTIONAL RADIOLOGY/VASCULAR | Facility: HOSPITAL | Age: 77
End: 2022-02-24
Payer: COMMERCIAL

## 2022-02-24 DIAGNOSIS — Z85.51 HISTORY OF PRIMARY MALIGNANT NEOPLASM OF URINARY BLADDER: Primary | ICD-10-CM

## 2022-02-28 DIAGNOSIS — Z11.59 ENCOUNTER FOR SCREENING FOR OTHER VIRAL DISEASES: Primary | ICD-10-CM

## 2022-03-03 ENCOUNTER — LAB (OUTPATIENT)
Dept: FAMILY MEDICINE | Facility: CLINIC | Age: 77
End: 2022-03-03
Payer: COMMERCIAL

## 2022-03-03 DIAGNOSIS — Z11.59 ENCOUNTER FOR SCREENING FOR OTHER VIRAL DISEASES: ICD-10-CM

## 2022-03-03 LAB — SARS-COV-2 RNA RESP QL NAA+PROBE: NEGATIVE

## 2022-03-03 PROCEDURE — U0005 INFEC AGEN DETEC AMPLI PROBE: HCPCS

## 2022-03-03 PROCEDURE — U0003 INFECTIOUS AGENT DETECTION BY NUCLEIC ACID (DNA OR RNA); SEVERE ACUTE RESPIRATORY SYNDROME CORONAVIRUS 2 (SARS-COV-2) (CORONAVIRUS DISEASE [COVID-19]), AMPLIFIED PROBE TECHNIQUE, MAKING USE OF HIGH THROUGHPUT TECHNOLOGIES AS DESCRIBED BY CMS-2020-01-R: HCPCS

## 2022-03-07 ENCOUNTER — HOSPITAL ENCOUNTER (OUTPATIENT)
Dept: INTERVENTIONAL RADIOLOGY/VASCULAR | Facility: HOSPITAL | Age: 77
Discharge: HOME | End: 2022-03-07
Admitting: NURSE PRACTITIONER
Payer: MEDICARE

## 2022-03-07 DIAGNOSIS — Z85.51 HISTORY OF PRIMARY MALIGNANT NEOPLASM OF URINARY BLADDER: ICD-10-CM

## 2022-03-07 PROCEDURE — 255N000002 HC RX 255 OP 636: Performed by: RADIOLOGY

## 2022-03-07 PROCEDURE — 50431 NJX PX NFROSGRM &/URTRGRM: CPT | Mod: 50

## 2022-03-07 RX ADMIN — IOHEXOL 30 ML: 350 INJECTION, SOLUTION INTRAVENOUS at 10:44

## 2022-03-07 NOTE — IP AVS SNAPSHOT
[facility] Interventional Radiology  [address] [place] [id number]  Phone: [phone]  Fax: [phone]                                    After Visit Summary   3/7/2022    Marcelo Valerio   MRN: [id number]           After Visit Summary Signature Page    I have received my discharge instructions, and my questions have been answered. I have discussed any challenges I see with this plan with the nurse or doctor.    ..........................................................................................................................................  Patient/Patient Representative Signature      ..........................................................................................................................................  Patient Representative Print Name and Relationship to Patient    ..................................................               ................................................  Date                                   Time    ..........................................................................................................................................  Reviewed by Signature/Title    ...................................................              ..............................................  Date                                               Time          22EPIC Rev 08/18

## 2022-03-07 NOTE — PROGRESS NOTES
Interventional Radiology - Pre-Procedure Note:  3/7/2022    Procedure Requested: Bilateral nephrostograms, possible PNT removals  Requested by: Adrienne Strauss CNP    Brief HPI: Marcelo Valerio is a 76 year old male with PMH relevant for bladder cancer, s/p cystectomy, ileal diversion, Stage 4 CKD, T2DM, HTN, bilateral hydronephrosis for which patient had bilateral PNT's placed 1/20022. Patient denies any issues with PNT's. No fevers, drain well. Dressings are intact, clean and dry.     Patient to have nephrostograms, and possible PNT removals    Patient was last to [facility] for a tube exchange on 2/23/2022. At that time the tubes were capped as well. States he has had absolutely no problems with the PNT's. No fevers, pain, drainage from insertion sites.     Patient requests to have no sedation. Ate full breakfast at 0800. Prefers no Fentanyl as well.      IMAGING:  Narrative & Impression   LOCATION: [facility]  DATE: 2/23/2022     PROCEDURE: NEPHROSTOMY TUBE EXCHANGE. NEPHROSTOGRAM.     INTERVENTIONAL RADIOLOGIST: David Weinstein MD.     INDICATION: Bladder cancer. Status post cystectomy with ileal divergent. Bilateral nephrostomy tubes placed on 1/5/2022.     CONSENT: The procedure, risks and benefits of nephrostomy tube contrast injection with possible intervention, including ureterostomy tube placement were discussed with the patient  in detail. All questions were answered. Informed consent was given to   proceed with the procedure.     MODERATE SEDATION: Immediately prior to administration of medication, the patient was reassessed for adequacy to receive conscious sedation. Prior to the procedure, the patient was alert and oriented. Versed 0.5 mg and fentanyl 25 mcg were administered   intravenously with continuous vital signs monitoring by the radiology nurse under my direct supervision. Patient was alert and oriented post procedure. Total face to face moderate sedation  time: 8 minutes.     CONTRAST: Omnipaque 350: 45 cc  ANTIBIOTICS: None  ADDITIONAL MEDICATIONS: None.     FLUOROSCOPIC TIME: 2.6 minutes.  RADIATION DOSE:246 mGy     COMPLICATIONS: No immediate complications.     PROCEDURE/TECHNIQUE: After informed consent was obtained, the bilateral nephrostomy tubes were prepped and draped in sterile fashion. Nephrostograms were performed by contrast injection through both tubes. There was very good visualization of a normal   caliber right ureter draining into the ileal conduit. Left ureter was not as easily seen.     1% lidocaine was infiltrated around the left nephrostomy site. The retention mechanism was cut and the nephrostomy tube removed over an Bunker Mode guidewire. Over the guidewire 5 Congolese KMP catheter was advanced into the left ureter and antegrade   ureterogram obtained. No stenosis was visualized. Over the guidewire, a new 10 Congolese nephrostomy tube was advanced with its loop formed within the renal pelvis. Adequate position confirmed with contrast injection.     The bilateral tubes were capped.     FINDINGS:  1. Right nephrostogram demonstrates an adequately decompressed right renal collecting system. Normal caliber right ureter with adequate fluoroscopy of contrast into the conduit, without stenosis.  2. Left nephrostogram and antegrade ureterogram demonstrated normal caliber left ureter with adequate flow of contrast into the ileal conduit, without stenosis.  3. Completion fluoroscopic image demonstrates bilateral nephrostomy tubes with their loops formed within the renal pelvises.                                                                      IMPRESSION:    1.  Patent bilateral ureters without stenosis. Adequate drainage into the ileal conduit..     PLAN: One week capping trial. Patient return for nephrostogram in one week, with possible nephrostomy tube removal.            NPO: 0800  ANTICOAGULANTS: None  ANTIBIOTICS: NA    ALLERGIES  Allergies    Allergen Reactions     Zinc Acetate Itching     Baclofen      confusion     Nickel      Sulfa (Sulfonamide Antibiotics) [Sulfa Drugs] Unknown     Childhood reaction         LABS:  INR   Date Value Ref Range Status   01/04/2022 1.27 (H) 0.90 - 1.15 Final      Hemoglobin   Date Value Ref Range Status   02/02/2022 8.8 (L) 13.3 - 17.7 g/dL Final   ]  Platelet Count   Date Value Ref Range Status   02/02/2022 369 150 - 450 10e3/uL Final     Creatinine   Date Value Ref Range Status   02/02/2022 2.16 (H) 0.70 - 1.30 mg/dL Final     Potassium   Date Value Ref Range Status   02/02/2022 4.4 3.5 - 5.0 mmol/L Final         EXAM:  There were no vitals taken for this visit.  General:  Stable.  In no acute distress.    Neuro:  A&O x 3. Moves all extremities equally.  Resp:  Non-labored breathing  : BL PNT's are capped. Both dressings are CDI.    ASSESSMENT/PLAN:   - Complex PMH, including complex urological PMH including surgical creation of an ileal conduit  - S/P bilateral PNT placement 1/2022    Proceed with bilateral nephrostograms, and possible PNT removals with no sedation    Procedure, risks/benefits, details, alternatives, and sedation reviewed with patient/family and he verbalized understanding. All questions answered. OK to proceed with above radiology procedure.     Adrienne Strauss, CNP  Interventional Radiology

## 2022-03-08 NOTE — PROGRESS NOTES
Spoke with: pt  Call attempt:   Message left: No, spoke with pt  Any pain: No  Any fever: No  Any redness/swelling/ abnormal drainage around puncture site: No  Were you instructed well enough to take care of yourself at home: Yes  Are you satisfied with the care you received: Yes  Any additional concerns or questions: No    Post call completed.   March 8, 2022 10:52 AM  Kamille Oneill RN

## 2022-03-23 NOTE — IR NOTE
Order placed for gabapentin 4 times daily.  Patient should take the fourth pill at bedtime to make 600 mg.   Pt and wife verbalized understanding of discharge instructions.

## 2022-10-04 PROBLEM — E11.42 TYPE 2 DIABETES MELLITUS WITH DIABETIC POLYNEUROPATHY (H): Status: ACTIVE | Noted: 2017-07-14

## 2023-11-06 ENCOUNTER — MEDICAL CORRESPONDENCE (OUTPATIENT)
Dept: HEALTH INFORMATION MANAGEMENT | Facility: CLINIC | Age: 78
End: 2023-11-06

## 2023-11-24 ENCOUNTER — TELEPHONE (OUTPATIENT)
Dept: WOUND CARE | Facility: HOSPITAL | Age: 78
End: 2023-11-24
Payer: COMMERCIAL

## 2023-12-05 ENCOUNTER — HOSPITAL ENCOUNTER (OUTPATIENT)
Dept: WOUND CARE | Facility: HOSPITAL | Age: 78
Discharge: HOME | End: 2023-12-05
Admitting: UROLOGY
Payer: MEDICARE

## 2023-12-05 DIAGNOSIS — Z71.89 ENCOUNTER FOR COUNSELING FOR UROSTOMY MANAGEMENT: Primary | ICD-10-CM

## 2023-12-05 PROCEDURE — G0463 HOSPITAL OUTPT CLINIC VISIT: HCPCS

## 2023-12-05 NOTE — DISCHARGE INSTRUCTIONS
OUTPATIENT OSTOMY INSTRUCTIONS                                                                        Type of Stoma: Urostomy         Supplier: [employer] [phone]      Equipment:    Product # Brand Description   Pouch [address]15 [place] 1 pc precut 29mm soft convex         Strip Paste 73600 Coloplast Brava   Barrier extenders 66034 [place] Adapt Barrier extenders   Liquid Skin Barrier 3344 3M Cavilon No Sting        Procedure:  Close the bottom of the pouch.  If needed cut the opening of your pouch to the correct size (follow pattern or 1/8 inch larger than stoma) and then remove backings from adhesive surfaces.  Remove the soiled pouch and discard.  Wash skin with water and dry.    Then:  apply no sting cavilon skin prep around the stoma  Then: Apply Ring/Paste:  at 3 and 9 oclock in the skin creases                Then: Apply pouching system to stoma site and hold in place for 2-5 minutes.     ______________________________________________________________________________    Pouch Change:  every 2-3 days       [facility] Nurse Specialist: Sheila Baig                  Questions: [facility] [phone] ()      Follow-up Appointment: As needed. Please call [facility] [phone] () to request an appointment.

## 2023-12-05 NOTE — PROGRESS NOTES
[facility]  OUTPATIENT OSTOMY ASSESSMENT    INTAKE  Type of Stoma: Permanent Urostomy  Anticipated date of takedown: NA    Diagnosis Pertinent to Stoma: Cancer - Bladder   Type of Surgery: Ileal Conduit   Surgery Date: 12/6/2021  Sur[facility]: [facility]    Purpose of this visit: Leaking Problem    Pertinent Information: Wife at bedside    Present for Teaching Session: Patient and Spouse   Present: NA      Equipment:    Product # Brand Description   Pouch [address]15 [place] 1 pc precut 29mm soft convex         Strip Paste 00031 Coloplast Brava   Barrier extenders 40008 [place] Adapt Barrier extenders   Liquid Skin Barrier 3344 3M Cavilon No Sting          Pouch Change Frequency: 2-3 times per week  Provider of Care: Emptying: self Pouch Change: wife            ASSESSMENT  Stoma Size: 29mm, round - in a significant crease  Protrusion: Flush  Stoma Appearance: Red and Moist  Mucocutaneous Juncture: Intact   Peristomal Skin: Intact - patient has full body psoriasis. Leaking issues at 9 and 3 oclock. Some psoriatic lesions noted where tape edge of pouch lays.   Abdominal Assessment:  round, soft    TREATMENT  Applied: ring to 3 and 9 oclock to fill in crease, patient already using one piece convex pouch.   Wife has to do the pouch change as patient has a hard time seeing his stoma.     INTERVENTIONS  Educated on peristomal skin treatment and Educated on pouch change procedure    INSTRUCTIONS GIVEN  Activity, Clothing, Exercise, Pouching Products: Showed pouching system , Insurance, Night time drainage , and Ordering supplies    PLAN  Continue same Pouching System and Change in Supplies: See Outpatient Ostomy Instructions  Patient was shown a few accessories that may help.     Total Time Spent with Patient: 40 minutes

## (undated) DEVICE — SUTURE VICRYL+ 1 27IN CT-1 UND VCP261H

## (undated) DEVICE — PAD POS XL 1X20X40IN PINK PIGAZZI

## (undated) DEVICE — GOWN IMPERVIOUS BREATHABLE SMART LG 89015

## (undated) DEVICE — DAVINCI HOT SHEARS TIP COVER  400180

## (undated) DEVICE — SU SILK 2-0 FS-1 18" 685G

## (undated) DEVICE — PROTECTOR ARM STANDARD ONE STEP

## (undated) DEVICE — PREP POVIDONE IODINE SOLUTION 10% 4OZ BOTTLE 29906-004

## (undated) DEVICE — DRAIN RESERVOIR 100ML JP 0070740

## (undated) DEVICE — ENDO TROCAR FIRST ENTRY KII FIOS Z-THRD 05X100MM CTF03

## (undated) DEVICE — SYR BULB IRRIG 50ML LATEX FREE 0035280

## (undated) DEVICE — BLADE KNIFE SURG 10 371110

## (undated) DEVICE — GLOVE BIOGEL PI ULTRATOUCH G SZ 7.5 42175

## (undated) DEVICE — ESU CORD BIPOLAR 12' E0512

## (undated) DEVICE — BAG URINARY DRAIN 2000ML LF 154002

## (undated) DEVICE — TUBING LAP SUCT/IRRIG STRYKER 250070500

## (undated) DEVICE — SU SILK 0 SH 30" K834H

## (undated) DEVICE — LIGACLIP LARGE AESCULAP O4120-1

## (undated) DEVICE — CONTAINER URINE SPEC 4OZ STRL 1053

## (undated) DEVICE — Device

## (undated) DEVICE — GLOVE BIOGEL INDICATOR 7.5 LF 41675

## (undated) DEVICE — CLIP ENDO HEMO-LOC GREEN MED/LG 544230

## (undated) DEVICE — NDL INSUFFLATION 13GA 150MM C2202

## (undated) DEVICE — ENDO TROCAR CONMED AIRSEAL BLADELESS 12X120MM IAS12-120LP

## (undated) DEVICE — DAVINCI S DRAPE ARM INSTRUMENT 420015

## (undated) DEVICE — SUCTION TIP YANKAUER W/O VENT K86

## (undated) DEVICE — KIT ENDO FIRST STEP DISINFECTANT 200ML W/POUCH EP-4

## (undated) DEVICE — SUTURE VICRYL+ 2-0 27IN SH UND VCP417H

## (undated) DEVICE — STPL LINEAR CUT 75MM TLC75

## (undated) DEVICE — DRAIN BLAKE 19FR SIL 2231

## (undated) DEVICE — DRAPE WARMER 66X44" ORS-300

## (undated) DEVICE — GOWN XLG DISP 9545

## (undated) DEVICE — PREP POVIDONE-IODINE 7.5% SCRUB 4OZ BOTTLE MDS093945

## (undated) DEVICE — CATH URETERAL OPEN END 5FRX70CM M0064002010

## (undated) DEVICE — CLIP ENDO HEMO-LOC PURPLE LG 544240

## (undated) DEVICE — SUTURE VICRYL+ 0 27IN CT-1 UND VCP260H

## (undated) DEVICE — TUBING IRRIG TUR Y TYPE 96" LF 6543-01

## (undated) DEVICE — SYSTEM CLEARIFY VISUALIZATION 21-345

## (undated) DEVICE — DRAPE U SPLIT 74X120" 29440

## (undated) DEVICE — SU CHROMIC 5-0 RB-1 27" U202H

## (undated) DEVICE — SYR 50ML SLIP TIP W/O NDL 309654

## (undated) DEVICE — TUBING SET THERMEDX UROLOGY SGL USE LL0006

## (undated) DEVICE — MAT FLOOR SURGICAL 40X38 0702140238

## (undated) DEVICE — PLATE GROUNDING ADULT W/CORD 9165L

## (undated) DEVICE — SOL WATER IRRIG 1000ML BOTTLE 2F7114

## (undated) DEVICE — TUBING FILTER TRI-LUMEN AIRSEAL ASC-EVAC1

## (undated) DEVICE — SU SILK 3-0 SH 30" K832H

## (undated) DEVICE — SUTURE PDS 1 48IN TP1+ LPED VLT PDP880G

## (undated) DEVICE — SU VICRYL 4-0 RB-1 27" J304

## (undated) DEVICE — SUCTION MANIFOLD NEPTUNE 2 SYS 1 PORT 702-025-000

## (undated) DEVICE — DAVINCI SI DRAPE ARM CAMERA 420279

## (undated) DEVICE — LUBRICANT INST ELECTROLUBE EL101

## (undated) DEVICE — SU CHROMIC 5-0 P-3 18" 687G

## (undated) DEVICE — NDL INSUFFLATION 13GA 120MM C2201

## (undated) DEVICE — ESU PENCIL SMOKE EVAC W/ROCKER SWITCH 0703-047-000

## (undated) DEVICE — DAVINCI SI DRAPE CAMERA HEAD 420273

## (undated) DEVICE — PREP DURAPREP 26ML APL 8630

## (undated) DEVICE — ENDO TROCAR FIRST ENTRY KII FIOS Z-THRD 12X150MM CTF71

## (undated) DEVICE — SU SILK 3-0 SH CR 8X18" C013D

## (undated) DEVICE — ESU ELEC BLADE 6" COATED E1450-6

## (undated) DEVICE — SOL WATER IRRIG 3000ML BAG 2B7117

## (undated) DEVICE — DRSG PRIMAPORE 04X11 3/4"

## (undated) DEVICE — DRESSING COVERLET STRIP 3/4 X 3 LF 230

## (undated) DEVICE — CUSTOM PACK DA VINCI SBA5BDVHEA

## (undated) DEVICE — CUSTOM PACK CYSTO PREFERRED SOT5BCYHEA

## (undated) DEVICE — TUBING SUCTION MEDI-VAC 1/4"X20' N620A - HE

## (undated) DEVICE — ENDO POUCH UNIVERSAL RETRIEVAL SYSTEM INZII 12/15MM CD004

## (undated) DEVICE — BLADE KNIFE SURG 11 371111

## (undated) DEVICE — STPL RELOAD LINEAR CUT 75MM TCR75

## (undated) DEVICE — OSTOMY POUCH PREMIER DRAIN ULTRA CLEAR LOK'NROLL 8531

## (undated) DEVICE — ADAPTOR UROSTOMY DRAIN TUBE LF 7331

## (undated) DEVICE — DECANTER VIAL 2006S

## (undated) DEVICE — ENDO SHEARS RENEW LAP ENDOCUT SCISSOR TIP 16.5MM 3142

## (undated) DEVICE — SUTURE VICRYL+ 2-0 27IN CT-1 UND VCP259H

## (undated) DEVICE — HANDPIECE ENSEAL G2 45CM CURVED

## (undated) DEVICE — APPLICATOR ENDOSCOPIC 5 SURGICEL POWDER 3123SPEA

## (undated) DEVICE — GUIDEWIRE BENTSON FLEX TIP 0.035"X150CM M0066201250

## (undated) DEVICE — DAVINCI S CANNULA SEAL 8MM 400077

## (undated) RX ORDER — LIDOCAINE HYDROCHLORIDE 10 MG/ML
INJECTION, SOLUTION INFILTRATION; PERINEURAL
Status: DISPENSED
Start: 2022-02-23

## (undated) RX ORDER — BUPIVACAINE HYDROCHLORIDE 5 MG/ML
INJECTION, SOLUTION EPIDURAL; INTRACAUDAL
Status: DISPENSED
Start: 2021-12-06

## (undated) RX ORDER — LIDOCAINE HYDROCHLORIDE 10 MG/ML
INJECTION, SOLUTION INFILTRATION; PERINEURAL
Status: DISPENSED
Start: 2022-01-05

## (undated) RX ORDER — FENTANYL CITRATE 50 UG/ML
INJECTION, SOLUTION INTRAMUSCULAR; INTRAVENOUS
Status: DISPENSED
Start: 2022-02-23

## (undated) RX ORDER — FENTANYL CITRATE 50 UG/ML
INJECTION, SOLUTION INTRAMUSCULAR; INTRAVENOUS
Status: DISPENSED
Start: 2022-01-05